# Patient Record
Sex: FEMALE | Employment: UNEMPLOYED | URBAN - NONMETROPOLITAN AREA
[De-identification: names, ages, dates, MRNs, and addresses within clinical notes are randomized per-mention and may not be internally consistent; named-entity substitution may affect disease eponyms.]

---

## 2020-08-13 ENCOUNTER — TRANSFERRED RECORDS (OUTPATIENT)
Dept: HEALTH INFORMATION MANAGEMENT | Facility: HOSPITAL | Age: 60
End: 2020-08-13

## 2020-08-13 LAB
CHOLESTEROL (EXTERNAL): 242 MG/DL (ref 114–200)
HDLC SERPL-MCNC: 59 MG/DL (ref 40–60)
HPV ABSTRACT: NORMAL
LDL CHOLESTEROL CALCULATED (EXTERNAL): 162 MG/DL
PAP-ABSTRACT: NORMAL
TRIGLYCERIDES (EXTERNAL): 103 MG/DL (ref 10–200)

## 2020-09-16 ENCOUNTER — TRANSFERRED RECORDS (OUTPATIENT)
Dept: HEALTH INFORMATION MANAGEMENT | Facility: CLINIC | Age: 60
End: 2020-09-16

## 2021-04-06 ENCOUNTER — TRANSFERRED RECORDS (OUTPATIENT)
Dept: HEALTH INFORMATION MANAGEMENT | Facility: CLINIC | Age: 61
End: 2021-04-06

## 2022-01-28 ENCOUNTER — TRANSFERRED RECORDS (OUTPATIENT)
Dept: HEALTH INFORMATION MANAGEMENT | Facility: CLINIC | Age: 62
End: 2022-01-28
Payer: COMMERCIAL

## 2022-01-31 ENCOUNTER — MEDICAL CORRESPONDENCE (OUTPATIENT)
Dept: HEALTH INFORMATION MANAGEMENT | Facility: CLINIC | Age: 62
End: 2022-01-31
Payer: COMMERCIAL

## 2022-01-31 ENCOUNTER — TRANSCRIBE ORDERS (OUTPATIENT)
Dept: OTHER | Age: 62
End: 2022-01-31
Payer: COMMERCIAL

## 2022-01-31 DIAGNOSIS — R32 URINARY INCONTINENCE, UNSPECIFIED TYPE: ICD-10-CM

## 2022-01-31 DIAGNOSIS — N81.6 RECTOCELE: ICD-10-CM

## 2022-01-31 DIAGNOSIS — N81.4 CYSTOCELE WITH PROLAPSE: ICD-10-CM

## 2022-01-31 DIAGNOSIS — N89.8 VAGINAL DISCHARGE: Primary | ICD-10-CM

## 2022-01-31 DIAGNOSIS — R15.9 INCONTINENCE OF FECES, UNSPECIFIED FECAL INCONTINENCE TYPE: ICD-10-CM

## 2022-02-01 ENCOUNTER — PRE VISIT (OUTPATIENT)
Dept: UROLOGY | Facility: CLINIC | Age: 62
End: 2022-02-01
Payer: COMMERCIAL

## 2022-02-01 NOTE — TELEPHONE ENCOUNTER
Action 02/01/22 MMT   Action Taken  CSS received incoming referral and records from [facility] for cystocele and vaginal prolapse. Documents sent to scanning. Patient is not yet scheduled.

## 2023-01-11 ENCOUNTER — TRANSFERRED RECORDS (OUTPATIENT)
Dept: HEALTH INFORMATION MANAGEMENT | Facility: CLINIC | Age: 63
End: 2023-01-11

## 2023-03-20 ENCOUNTER — TRANSFERRED RECORDS (OUTPATIENT)
Dept: HEALTH INFORMATION MANAGEMENT | Facility: CLINIC | Age: 63
End: 2023-03-20

## 2023-05-01 ENCOUNTER — MEDICAL CORRESPONDENCE (OUTPATIENT)
Dept: HEALTH INFORMATION MANAGEMENT | Facility: CLINIC | Age: 63
End: 2023-05-01
Payer: COMMERCIAL

## 2023-05-03 ENCOUNTER — TRANSCRIBE ORDERS (OUTPATIENT)
Dept: OTHER | Age: 63
End: 2023-05-03

## 2023-05-03 DIAGNOSIS — R05.9 COUGH: ICD-10-CM

## 2023-05-03 DIAGNOSIS — J84.9 INTERSTITIAL LUNG DISEASE (H): Primary | ICD-10-CM

## 2023-07-21 ENCOUNTER — OFFICE VISIT (OUTPATIENT)
Dept: PULMONOLOGY | Facility: CLINIC | Age: 63
End: 2023-07-21
Attending: INTERNAL MEDICINE
Payer: COMMERCIAL

## 2023-07-21 ENCOUNTER — ANCILLARY PROCEDURE (OUTPATIENT)
Dept: CT IMAGING | Facility: CLINIC | Age: 63
End: 2023-07-21
Attending: INTERNAL MEDICINE
Payer: COMMERCIAL

## 2023-07-21 VITALS
OXYGEN SATURATION: 98 % | RESPIRATION RATE: 17 BRPM | BODY MASS INDEX: 38.82 KG/M2 | HEART RATE: 62 BPM | DIASTOLIC BLOOD PRESSURE: 85 MMHG | WEIGHT: 233 LBS | SYSTOLIC BLOOD PRESSURE: 127 MMHG | HEIGHT: 65 IN

## 2023-07-21 DIAGNOSIS — J84.9 ILD (INTERSTITIAL LUNG DISEASE) (H): Primary | ICD-10-CM

## 2023-07-21 DIAGNOSIS — R09.02 HYPOXIA: ICD-10-CM

## 2023-07-21 DIAGNOSIS — J84.9 ILD (INTERSTITIAL LUNG DISEASE) (H): ICD-10-CM

## 2023-07-21 DIAGNOSIS — G47.33 OSA (OBSTRUCTIVE SLEEP APNEA): ICD-10-CM

## 2023-07-21 LAB
6 MIN WALK (FT): 1100 FT
6 MIN WALK (M): 335 M

## 2023-07-21 PROCEDURE — 94729 DIFFUSING CAPACITY: CPT | Performed by: INTERNAL MEDICINE

## 2023-07-21 PROCEDURE — 94726 PLETHYSMOGRAPHY LUNG VOLUMES: CPT | Performed by: INTERNAL MEDICINE

## 2023-07-21 PROCEDURE — 94618 PULMONARY STRESS TESTING: CPT | Performed by: INTERNAL MEDICINE

## 2023-07-21 PROCEDURE — 71250 CT THORAX DX C-: CPT | Performed by: RADIOLOGY

## 2023-07-21 PROCEDURE — 94375 RESPIRATORY FLOW VOLUME LOOP: CPT | Performed by: INTERNAL MEDICINE

## 2023-07-21 PROCEDURE — G0463 HOSPITAL OUTPT CLINIC VISIT: HCPCS | Performed by: INTERNAL MEDICINE

## 2023-07-21 PROCEDURE — 99205 OFFICE O/P NEW HI 60 MIN: CPT | Mod: 25 | Performed by: INTERNAL MEDICINE

## 2023-07-21 RX ORDER — FLUOXETINE 40 MG/1
40 CAPSULE ORAL DAILY
COMMUNITY
Start: 2023-07-07 | End: 2023-10-09

## 2023-07-21 RX ORDER — FLUTICASONE PROPIONATE AND SALMETEROL 500; 50 UG/1; UG/1
1 POWDER RESPIRATORY (INHALATION)
COMMUNITY
Start: 2022-12-23 | End: 2023-07-21

## 2023-07-21 RX ORDER — METOPROLOL SUCCINATE 50 MG/1
1 TABLET, EXTENDED RELEASE ORAL
COMMUNITY
Start: 2023-07-07 | End: 2023-09-25

## 2023-07-21 RX ORDER — TRAZODONE HYDROCHLORIDE 50 MG/1
1 TABLET, FILM COATED ORAL
COMMUNITY
Start: 2022-02-11 | End: 2023-09-25

## 2023-07-21 RX ORDER — ALBUTEROL SULFATE 90 UG/1
2 AEROSOL, METERED RESPIRATORY (INHALATION)
COMMUNITY
Start: 2022-08-24 | End: 2024-04-03

## 2023-07-21 ASSESSMENT — PAIN SCALES - GENERAL: PAINLEVEL: MILD PAIN (3)
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
## 2023-07-21 NOTE — NURSING NOTE
Chief Complaint   Patient presents with     Consult     New Interstitial Lung     Medications reviewed and vital signs taken.   Yani Cordoba, ALLAN

## 2023-07-21 NOTE — NURSING NOTE
Met with patient in clinic after provider visit to provide contact information sheet and introduce role as ILD RN Care Coordinator. Patient appreciative of conversation and direct contact information. Explained [facility] and provided brochure.     Discussed pulm rehab, pt prefers [facility] Order placed, Michael aware. Pt 6MWT today shows pt needs 2L O2 with activity. Pt lives in [facility], 2L O2 with activity was rx by pulmonologist in [place] one month ago. Pt currently has a home concentrator and POC (does not know the name) through [employer]. Pt unhappy with current oxygen company and would like to switch.     Spoke with [employer] and they can take her on. They are currently out of stock on inogens, not sure when they will get them back in stock. They do have several different alternatives for POC that are less bulky than big tanks. Sent Michael message to fax oxygen orders to [employer]. Spoke to pt and she expressed understanding/agreed to plan.     Caroline Love, RN, BSN  ILD Nurse   [phone]
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
## 2023-07-24 DIAGNOSIS — J84.9 ILD (INTERSTITIAL LUNG DISEASE) (H): Primary | ICD-10-CM

## 2023-07-24 LAB
DLCOUNC-%PRED-PRE: 64 %
DLCOUNC-PRE: 12.85 ML/MIN/MMHG
DLCOUNC-PRED: 20.01 ML/MIN/MMHG
ERV-%PRED-PRE: 36 %
ERV-PRE: 0.3 L
ERV-PRED: 0.84 L
EXPTIME-PRE: 6.74 SEC
FEF2575-%PRED-PRE: 88 %
FEF2575-PRE: 1.84 L/SEC
FEF2575-PRED: 2.08 L/SEC
FEFMAX-%PRED-PRE: 100 %
FEFMAX-PRE: 6.32 L/SEC
FEFMAX-PRED: 6.28 L/SEC
FEV1-%PRED-PRE: 64 %
FEV1-PRE: 1.51 L
FEV1FEV6-PRE: 82 %
FEV1FEV6-PRED: 80 %
FEV1FVC-PRE: 82 %
FEV1FVC-PRED: 80 %
FEV1SVC-PRE: 84 %
FEV1SVC-PRED: 68 %
FIFMAX-PRE: 1.97 L/SEC
FRCPLETH-%PRED-PRE: 55 %
FRCPLETH-PRE: 1.56 L
FRCPLETH-PRED: 2.84 L
FVC-%PRED-PRE: 62 %
FVC-PRE: 1.83 L
FVC-PRED: 2.93 L
IC-%PRED-PRE: 60 %
IC-PRE: 1.5 L
IC-PRED: 2.49 L
RVPLETH-%PRED-PRE: 67 %
RVPLETH-PRE: 1.26 L
RVPLETH-PRED: 1.85 L
TLCPLETH-%PRED-PRE: 58 %
TLCPLETH-PRE: 3.06 L
TLCPLETH-PRED: 5.25 L
VA-%PRED-PRE: 57 %
VA-PRE: 2.78 L
VC-%PRED-PRE: 52 %
VC-PRE: 1.8 L
VC-PRED: 3.4 L

## 2023-07-26 ENCOUNTER — TELEPHONE (OUTPATIENT)
Dept: PULMONOLOGY | Facility: CLINIC | Age: 63
End: 2023-07-26
Payer: COMMERCIAL

## 2023-07-26 NOTE — TELEPHONE ENCOUNTER
Spoke to pt, she has not heard from [facility]. Instructed pt to keep her current oxygen equipment with [employer] and reach out to them for more another set of oxygen tubing as pt states it has not been changed in a long time. Left VM for [facility], requested call back to ILD nurse line [phone].     Caroline Love RN, BSN  ILD Nurse   [phone]    ----- Message from Gayatri Burkett RN sent at 7/26/2023  8:15 AM CDT -----  Regarding: Left VM  Pt left VM about switching oxygen companies and not having the right supplies to use her equipment.     Call back #: [phone]    Gayatri

## 2023-07-26 NOTE — TELEPHONE ENCOUNTER
Spoke to Adams at [facility] and he apologized that someone in his office mispoke about servicing [facility] with oxygen. They do not service pt home area with oxygen.     Caroline Love, RN, BSN  ILD Nurse   [phone]

## 2023-07-27 ENCOUNTER — TELEPHONE (OUTPATIENT)
Dept: PULMONOLOGY | Facility: CLINIC | Age: 63
End: 2023-07-27
Payer: COMMERCIAL

## 2023-07-27 NOTE — TELEPHONE ENCOUNTER
Called pt to relay Dr. Duque's message below. Let pt know she can call any [facility] clinic to make a lab appt to have Dr. Duque's labs drawn, then let us know so we can watch for the results. Pt will go to [facility]. Pt reminder set to check for results. Let pt know we will contact her re: lab results if anything is actionable. Pt expressed understanding/agreed to plan.     Caroline Love RN, BSN  ILD Nurse   [phone]    ----- Message from Isaac Duque MD sent at 7/27/2023  2:57 PM CDT -----  Hemarty alexandra,    I forgot to get back to this patient regarding labs- placed the orders last week. She can get them when ever.  Not as much ground glass seen on the follow up Ct Chest -want to review with Julius Aviles  ----- Message -----  From: Caroline Love RN  Sent: 7/21/2023   4:30 PM CDT  To: Isaac Duque MD; #    Hi Isaac,     Can you add the .[id number] dot phrase to your note today so I can fax to [employer] oxygen company? Pt would like to switch O2 companies.     Thanks,   Caroline

## 2023-07-28 ENCOUNTER — TELEPHONE (OUTPATIENT)
Dept: PULMONOLOGY | Facility: CLINIC | Age: 63
End: 2023-07-28
Payer: COMMERCIAL

## 2023-07-28 NOTE — CONFIDENTIAL NOTE
Spoke with pt about switching oxygen companies. She has not been happy with [employer]. Was able to find out the [employer] in [place] would service her area. Pt states she's been with [employer] for approx 3 months. Before calling [facility] to see if they'd take patient on writer called pt to update. Explained that [employer] does not cover her area so found out [employer] in [place] does if she wanted to make the switch. Pt wants to do some research first. She will call us back when/if she decides to switch. Did advise that she should make the decision within the next month or 2 to have the best chances of [facility] accepting her. Pt states agreement.     Gayatri Burkett RN

## 2023-07-31 ENCOUNTER — TELEPHONE (OUTPATIENT)
Dept: PULMONOLOGY | Facility: CLINIC | Age: 63
End: 2023-07-31
Payer: COMMERCIAL

## 2023-08-01 ENCOUNTER — LAB (OUTPATIENT)
Dept: LAB | Facility: OTHER | Age: 63
End: 2023-08-01
Payer: COMMERCIAL

## 2023-08-01 ENCOUNTER — TELEPHONE (OUTPATIENT)
Dept: PULMONOLOGY | Facility: CLINIC | Age: 63
End: 2023-08-01
Payer: COMMERCIAL

## 2023-08-01 DIAGNOSIS — J84.9 ILD (INTERSTITIAL LUNG DISEASE) (H): ICD-10-CM

## 2023-08-01 LAB
CK SERPL-CCNC: 82 U/L (ref 26–192)
CRP SERPL-MCNC: <3 MG/L
ERYTHROCYTE [SEDIMENTATION RATE] IN BLOOD BY WESTERGREN METHOD: 16 MM/HR (ref 0–30)

## 2023-08-01 PROCEDURE — 86200 CCP ANTIBODY: CPT | Mod: ZL | Performed by: FAMILY MEDICINE

## 2023-08-01 PROCEDURE — 86606 ASPERGILLUS ANTIBODY: CPT | Mod: ZL | Performed by: FAMILY MEDICINE

## 2023-08-01 PROCEDURE — 86431 RHEUMATOID FACTOR QUANT: CPT | Mod: ZL | Performed by: FAMILY MEDICINE

## 2023-08-01 PROCEDURE — 86235 NUCLEAR ANTIGEN ANTIBODY: CPT | Mod: ZL | Performed by: FAMILY MEDICINE

## 2023-08-01 PROCEDURE — 86140 C-REACTIVE PROTEIN: CPT | Mod: ZL | Performed by: FAMILY MEDICINE

## 2023-08-01 PROCEDURE — 82085 ASSAY OF ALDOLASE: CPT | Mod: ZL | Performed by: FAMILY MEDICINE

## 2023-08-01 PROCEDURE — 86331 IMMUNODIFFUSION OUCHTERLONY: CPT | Mod: ZL | Performed by: FAMILY MEDICINE

## 2023-08-01 PROCEDURE — 85652 RBC SED RATE AUTOMATED: CPT | Mod: ZL | Performed by: FAMILY MEDICINE

## 2023-08-01 PROCEDURE — 82550 ASSAY OF CK (CPK): CPT | Mod: ZL | Performed by: FAMILY MEDICINE

## 2023-08-01 PROCEDURE — 82784 ASSAY IGA/IGD/IGG/IGM EACH: CPT | Mod: ZL | Performed by: FAMILY MEDICINE

## 2023-08-01 PROCEDURE — 82787 IGG 1 2 3 OR 4 EACH: CPT | Mod: ZL | Performed by: FAMILY MEDICINE

## 2023-08-01 PROCEDURE — 36415 COLL VENOUS BLD VENIPUNCTURE: CPT | Mod: ZL | Performed by: FAMILY MEDICINE

## 2023-08-01 NOTE — TELEPHONE ENCOUNTER
Spoke to patient her oxygen company [employer]esta not rent batteries for travel and she needs 2 extra batteries. It would cost her $600 to purchase more batteries. Recommended that she check with [email or web address] to see if they rent batteries, they rent travel POC's. She will contact them and may have to consider purchasing batteries she said. She will contact back with further questions.

## 2023-08-01 NOTE — TELEPHONE ENCOUNTER
----- Message from Gayatri Burkett RN sent at 8/1/2023 11:56 AM CDT -----  Regarding: Oxygen  called again about flying with oxygen. [phone]. She wants to know if there are other options to get batteries for POC for flying other than paying $300?

## 2023-08-02 LAB
CCP AB SER IA-ACNC: 1.2 U/ML
ENA SS-A AB SER IA-ACNC: 0.6 U/ML
ENA SS-A AB SER IA-ACNC: NEGATIVE
ENA SS-B IGG SER IA-ACNC: <0.6 U/ML
ENA SS-B IGG SER IA-ACNC: NEGATIVE

## 2023-08-03 ENCOUNTER — HOSPITAL ENCOUNTER (OUTPATIENT)
Dept: CARDIAC REHAB | Facility: HOSPITAL | Age: 63
Setting detail: THERAPIES SERIES
Discharge: HOME | End: 2023-08-03
Attending: INTERNAL MEDICINE
Payer: COMMERCIAL

## 2023-08-03 DIAGNOSIS — J84.9 ILD (INTERSTITIAL LUNG DISEASE) (H): ICD-10-CM

## 2023-08-03 LAB — ALDOLASE SERPL-CCNC: 3.4 U/L

## 2023-08-03 PROCEDURE — G0238 OTH RESP PROC, INDIV: HCPCS

## 2023-08-04 LAB
IGG SERPL-MCNC: 1068 MG/DL (ref 610–1616)
IGG1 SER-MCNC: 839 MG/DL (ref 382–929)
IGG2 SER-MCNC: 180 MG/DL (ref 242–700)
IGG3 SER-MCNC: 39 MG/DL (ref 22–176)
IGG4 SER-MCNC: 1 MG/DL (ref 4–86)
RHEUMATOID FACT SER NEPH-ACNC: 12 IU/ML
SUBCLASSES, PERCENT: 99 %

## 2023-08-07 ENCOUNTER — TELEPHONE (OUTPATIENT)
Dept: PULMONOLOGY | Facility: CLINIC | Age: 63
End: 2023-08-07
Payer: COMMERCIAL

## 2023-08-07 LAB
A FLAVUS AB SER QL ID: NORMAL
A FUMIGATUS1 AB SER QL ID: NORMAL
A FUMIGATUS2 AB SER QL ID: NORMAL
A FUMIGATUS3 AB SER QL ID: NORMAL
A FUMIGATUS6 AB SER QL ID: NORMAL
A PULLULANS AB SER QL ID: NORMAL
PIGEON SERUM AB QL ID: NORMAL
S RECTIVIRGULA AB SER QL ID: NORMAL
S VIRIDIS AB SER QL ID: NORMAL
T CANDIDUS AB SER QL: NORMAL

## 2023-08-07 NOTE — TELEPHONE ENCOUNTER
Labs drawn 8/1 have all resulted. Will route to Dr. Duque for review.     Caroline Love RN, BSN  ILD Nurse   [phone]    ----- Message from Caroline Love RN sent at 7/27/2023  4:49 PM CDT -----  Regarding: New pt ILD labs  Let pt know on 7/27/23 she can call any [facility] clinic to make a lab appt to have Dr. Duque's labs (labs ordered 7/21/23) drawn, then let us know so we can watch for the results. Pt will go to [facility].     Make sure pt has had labs drawn or at least made an appt. Then let Dr. Duque know to review labs.

## 2023-08-08 ENCOUNTER — HOSPITAL ENCOUNTER (OUTPATIENT)
Dept: CARDIAC REHAB | Facility: HOSPITAL | Age: 63
Setting detail: THERAPIES SERIES
Discharge: HOME | End: 2023-08-08
Attending: FAMILY MEDICINE
Payer: COMMERCIAL

## 2023-08-08 PROCEDURE — G0239 OTH RESP PROC, GROUP: HCPCS

## 2023-08-10 ENCOUNTER — HOSPITAL ENCOUNTER (OUTPATIENT)
Dept: CARDIAC REHAB | Facility: HOSPITAL | Age: 63
Setting detail: THERAPIES SERIES
Discharge: HOME | End: 2023-08-10
Attending: FAMILY MEDICINE
Payer: COMMERCIAL

## 2023-08-10 ENCOUNTER — OFFICE VISIT (OUTPATIENT)
Dept: FAMILY MEDICINE | Facility: OTHER | Age: 63
End: 2023-08-10
Attending: STUDENT IN AN ORGANIZED HEALTH CARE EDUCATION/TRAINING PROGRAM
Payer: COMMERCIAL

## 2023-08-10 ENCOUNTER — TELEPHONE (OUTPATIENT)
Dept: MULTI SPECIALTY CLINIC | Facility: CLINIC | Age: 63
End: 2023-08-10
Payer: COMMERCIAL

## 2023-08-10 VITALS
SYSTOLIC BLOOD PRESSURE: 118 MMHG | BODY MASS INDEX: 39.11 KG/M2 | RESPIRATION RATE: 16 BRPM | TEMPERATURE: 98.2 F | HEART RATE: 71 BPM | DIASTOLIC BLOOD PRESSURE: 80 MMHG | OXYGEN SATURATION: 96 % | WEIGHT: 235 LBS

## 2023-08-10 DIAGNOSIS — N39.3 PRIMARY STRESS URINARY INCONTINENCE: ICD-10-CM

## 2023-08-10 DIAGNOSIS — F51.01 PRIMARY INSOMNIA: ICD-10-CM

## 2023-08-10 DIAGNOSIS — Z13.220 SCREENING FOR LIPID DISORDERS: ICD-10-CM

## 2023-08-10 DIAGNOSIS — F33.0 MILD EPISODE OF RECURRENT MAJOR DEPRESSIVE DISORDER (H): ICD-10-CM

## 2023-08-10 DIAGNOSIS — J84.9 ILD (INTERSTITIAL LUNG DISEASE) (H): ICD-10-CM

## 2023-08-10 DIAGNOSIS — N81.6 RECTOCELE: ICD-10-CM

## 2023-08-10 DIAGNOSIS — R73.03 PREDIABETES: ICD-10-CM

## 2023-08-10 DIAGNOSIS — Z12.31 BREAST CANCER SCREENING BY MAMMOGRAM: ICD-10-CM

## 2023-08-10 DIAGNOSIS — J45.20 MILD INTERMITTENT ASTHMA WITHOUT COMPLICATION: Chronic | ICD-10-CM

## 2023-08-10 DIAGNOSIS — Z76.89 ENCOUNTER TO ESTABLISH CARE: Primary | ICD-10-CM

## 2023-08-10 DIAGNOSIS — I10 ESSENTIAL HYPERTENSION: Chronic | ICD-10-CM

## 2023-08-10 PROBLEM — M19.072 PRIMARY OSTEOARTHRITIS OF LEFT FOOT: Status: RESOLVED | Noted: 2020-08-13 | Resolved: 2023-08-10

## 2023-08-10 PROBLEM — G47.33 OSA (OBSTRUCTIVE SLEEP APNEA): Status: ACTIVE | Noted: 2021-10-14

## 2023-08-10 PROBLEM — M79.7 FIBROMYALGIA: Status: ACTIVE | Noted: 2020-08-13

## 2023-08-10 PROBLEM — E78.00 PURE HYPERCHOLESTEROLEMIA: Status: ACTIVE | Noted: 2020-08-13

## 2023-08-10 PROBLEM — M19.072 PRIMARY OSTEOARTHRITIS OF LEFT FOOT: Status: ACTIVE | Noted: 2020-08-13

## 2023-08-10 PROBLEM — F60.3 BORDERLINE PERSONALITY DISORDER (H): Status: ACTIVE | Noted: 2020-08-13

## 2023-08-10 PROBLEM — G93.32 CHRONIC FATIGUE SYNDROME: Status: ACTIVE | Noted: 2020-08-13

## 2023-08-10 PROBLEM — E78.00 PURE HYPERCHOLESTEROLEMIA: Chronic | Status: ACTIVE | Noted: 2020-08-13

## 2023-08-10 PROBLEM — R10.13 DYSPEPSIA: Status: ACTIVE | Noted: 2020-08-13

## 2023-08-10 PROBLEM — E66.01 SEVERE OBESITY (BMI 35.0-39.9) WITH COMORBIDITY (H): Chronic | Status: ACTIVE | Noted: 2020-08-13

## 2023-08-10 PROBLEM — F60.3 BORDERLINE PERSONALITY DISORDER (H): Status: RESOLVED | Noted: 2020-08-13 | Resolved: 2023-08-10

## 2023-08-10 PROBLEM — E66.01 SEVERE OBESITY (BMI 35.0-39.9) WITH COMORBIDITY (H): Status: ACTIVE | Noted: 2020-08-13

## 2023-08-10 PROCEDURE — G0463 HOSPITAL OUTPT CLINIC VISIT: HCPCS | Mod: 25

## 2023-08-10 PROCEDURE — G0463 HOSPITAL OUTPT CLINIC VISIT: HCPCS

## 2023-08-10 PROCEDURE — G0239 OTH RESP PROC, GROUP: HCPCS

## 2023-08-10 PROCEDURE — 99204 OFFICE O/P NEW MOD 45 MIN: CPT | Performed by: STUDENT IN AN ORGANIZED HEALTH CARE EDUCATION/TRAINING PROGRAM

## 2023-08-10 RX ORDER — FLUTICASONE PROPIONATE AND SALMETEROL 50; 250 UG/1; UG/1
2 POWDER RESPIRATORY (INHALATION) DAILY
COMMUNITY
Start: 2022-11-17 | End: 2024-01-22

## 2023-08-10 ASSESSMENT — ANXIETY QUESTIONNAIRES
4. TROUBLE RELAXING: NOT AT ALL
7. FEELING AFRAID AS IF SOMETHING AWFUL MIGHT HAPPEN: MORE THAN HALF THE DAYS
2. NOT BEING ABLE TO STOP OR CONTROL WORRYING: NOT AT ALL
IF YOU CHECKED OFF ANY PROBLEMS ON THIS QUESTIONNAIRE, HOW DIFFICULT HAVE THESE PROBLEMS MADE IT FOR YOU TO DO YOUR WORK, TAKE CARE OF THINGS AT HOME, OR GET ALONG WITH OTHER PEOPLE: SOMEWHAT DIFFICULT
5. BEING SO RESTLESS THAT IT IS HARD TO SIT STILL: NOT AT ALL
GAD7 TOTAL SCORE: 5
3. WORRYING TOO MUCH ABOUT DIFFERENT THINGS: NOT AT ALL
6. BECOMING EASILY ANNOYED OR IRRITABLE: MORE THAN HALF THE DAYS
GAD7 TOTAL SCORE: 5
1. FEELING NERVOUS, ANXIOUS, OR ON EDGE: SEVERAL DAYS

## 2023-08-10 ASSESSMENT — PATIENT HEALTH QUESTIONNAIRE - PHQ9
SUM OF ALL RESPONSES TO PHQ QUESTIONS 1-9: 8
10. IF YOU CHECKED OFF ANY PROBLEMS, HOW DIFFICULT HAVE THESE PROBLEMS MADE IT FOR YOU TO DO YOUR WORK, TAKE CARE OF THINGS AT HOME, OR GET ALONG WITH OTHER PEOPLE: SOMEWHAT DIFFICULT
SUM OF ALL RESPONSES TO PHQ QUESTIONS 1-9: 8

## 2023-08-10 ASSESSMENT — PAIN SCALES - GENERAL: PAINLEVEL: MILD PAIN (3)

## 2023-08-10 NOTE — TELEPHONE ENCOUNTER
Reviewed CT Chest with the pt. Reviewing images- undifferentiated and mild disease; not much GGO. DD  includes OP vs early disease process vs undiffereniated.  ILD labs unrevealing.   Will discuss at ILD conference whether biopsy would be appropriate at this time vs treatment.   PFTs are restricted but body habitus may be playing a part in this.    Treatment plan- discussed anti-fibrotics with patient. However, will review Ct during ILD conference to assess level of ground glass and whether enough to warrant anti-inflammatory therapy   Already referred her to pulm rehab.     Isaac Duque MD  [facility],  of Medicine  Pulmonary/Critical Care Medicine  Pager: [phone]  August 10, 2023

## 2023-08-10 NOTE — PROGRESS NOTES
Assessment & Plan     Encounter to establish care  Histories reviewed. Medications updated.     Mild intermittent asthma without complication  ILD (interstitial lung disease) (H)  New diagnosis, worsening over the last 6 months.  CT scan from March showed peripheral fibrotic disease.  Has seen pulmonology at [facility].  PFTs contributing to a restrictive picture.  TTE in April showed some LVH but otherwise no concerning findings.  Currently in pulmonary rehab.  Follow-up planned in October with pulmonology.  Has advair inhaler - intermittent compliance.     Essential hypertension  Well-controlled.  Continues on metoprolol.    Mild episode of recurrent major depressive disorder (H)  Suspect more of a persistent depressive disorder.  Continues on Prozac 40 mg.  Planning to get medical marijuana.  Encouraged therapy.  Will continue to monitor.    Primary insomnia  Stable.  Intermittent use of trazodone.    Breast cancer screening by mammogram  - MA Screen Bilateral w/Tae; Future    Screening for lipid disorders  - Lipid Profile (Chol, Trig, HDL, LDL calc); Future    Prediabetes  - Hemoglobin A1c; Future     Follow up for physical in one month.     Jessie Rose MD  [facility] [place]    Subjective   Elsie is a 63 year old, presenting for the following health issues:  Establish Care      HPI   Establish care - moved from [place] six years ago. Just recently moved to [place] from [place].  is from here. They met and  in [place]. 6 kids and 15 grandkids - [place], [place] and [place]. 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     Was dx with ILD within last 6 months.   Has been seen at [facility].   More dyspnea with light exercise. Fine at rest.   Prior had thought asthma, so maybe some component. Some triggers - coughing hard. 2 viral infections that hit her hard.   Now doing pulmonary rehab. 2nd appointment today.   Not doing Advair. Has but isn't the most compliant.     A lot of joint pain.  "Has seen orthopedics. Told bursitis in her hip. Planning physical therapy. Felt good to ride the bike today.     Continues on metoprolol for hypertension. Readings at goal.     Mood isn't great. Continues on prozac 40mg. She doesn't like to leave the house. Doesn't want to go to Religion or leave the house. Usually is very outgoing. Thinks all of this has been \"forever\". Not abrupt change. Usually has projects and stayed busy. Feels more isolated from her . Living together full time - he can be impossible. She wants an orderly life, avoid clutter, etc. Has tried a lot of medications in the past. Thinking about THC. Wants certification.     Takes Trazodone as needed for sleep.     Does struggle with incontinence. Fecal and urinary. Saw OB GYN at [facility] a few years ago - displeased with visit.  Recommended surgery, however ended up proceeding with conservative management with pelvic floor therapy.    Does not want cholesterol checked. Says cholesterol study was a misinterpretation of the data.       Answers submitted by the patient for this visit:  Patient Health Questionnaire (Submitted on 8/10/2023)  If you checked off any problems, how difficult have these problems made it for you to do your work, take care of things at home, or get along with other people?: Somewhat difficult  PHQ9 TOTAL SCORE: 8  EDMAR-7 (Submitted on 8/10/2023)  EDMAR 7 TOTAL SCORE: 5        Objective    /80 (BP Location: Right arm, Patient Position: Sitting, Cuff Size: Adult Regular)   Pulse 71   Temp 98.2  F (36.8  C) (Tympanic)   Resp 16   Wt 106.6 kg (235 lb)   SpO2 96%   BMI 39.11 kg/m    Body mass index is 39.11 kg/m .    Physical Exam  Constitutional:       General: She is not in acute distress.     Appearance: Normal appearance.   Cardiovascular:      Rate and Rhythm: Normal rate and regular rhythm.      Heart sounds: No murmur heard.  Pulmonary:      Effort: Pulmonary effort is normal.      Breath sounds: Normal breath " sounds. No wheezing, rhonchi or rales.   Neurological:      General: No focal deficit present.      Mental Status: She is alert and oriented to person, place, and time.   Psychiatric:         Mood and Affect: Mood normal.         Behavior: Behavior normal.

## 2023-08-10 NOTE — PATIENT INSTRUCTIONS
[facility]on Lyle [phone]  [facility] [phone]  Call [place] for appointment for medical marijuana certification.     You are due for your mammogram. You can call the [facility] at [phone] to schedule an appointment or you can also schedule through [facility].

## 2023-08-11 ENCOUNTER — HOSPITAL ENCOUNTER (EMERGENCY)
Facility: HOSPITAL | Age: 63
Discharge: HOME | End: 2023-08-11
Attending: NURSE PRACTITIONER | Admitting: NURSE PRACTITIONER
Payer: COMMERCIAL

## 2023-08-11 VITALS
DIASTOLIC BLOOD PRESSURE: 79 MMHG | OXYGEN SATURATION: 95 % | RESPIRATION RATE: 16 BRPM | TEMPERATURE: 98.2 F | HEART RATE: 70 BPM | SYSTOLIC BLOOD PRESSURE: 123 MMHG

## 2023-08-11 DIAGNOSIS — J01.00 ACUTE MAXILLARY SINUSITIS: Primary | ICD-10-CM

## 2023-08-11 DIAGNOSIS — J01.00 ACUTE MAXILLARY SINUSITIS, RECURRENCE NOT SPECIFIED: ICD-10-CM

## 2023-08-11 PROCEDURE — 99213 OFFICE O/P EST LOW 20 MIN: CPT | Performed by: NURSE PRACTITIONER

## 2023-08-11 PROCEDURE — G0463 HOSPITAL OUTPT CLINIC VISIT: HCPCS

## 2023-08-11 ASSESSMENT — ENCOUNTER SYMPTOMS
RHINORRHEA: 0
MYALGIAS: 0
HEADACHES: 0
VOMITING: 0
SORE THROAT: 0
DIARRHEA: 0
FACIAL SWELLING: 0
PSYCHIATRIC NEGATIVE: 1
FEVER: 0
SHORTNESS OF BREATH: 0
NAUSEA: 0
CHILLS: 0
TROUBLE SWALLOWING: 0

## 2023-08-11 NOTE — ED PROVIDER NOTES
History     Chief Complaint   Patient presents with    Otalgia     HPI  Elsie Rubi is a 63 year old female who presents to urgent care today ambulatory with complaints of right ear pain, sinusitis, postnasal drip and mucus in her throat in the morning.  Denies any dental issues.  Denies any fever, chills, nausea, vomiting, diarrhea, shortness of breath or chest pain.  Denies any coughing.  Patient states she received a new O2 concentrator for at home which she uses as needed and feels she had an allergic reaction when using it a few weeks ago resulting in a large amount of sneezing.  No other concerns.    Allergies:  No Known Allergies    Problem List:    Patient Active Problem List    Diagnosis Date Noted    Primary insomnia 08/10/2023     Priority: Medium    Rectocele 08/10/2023     Priority: Medium    Primary stress urinary incontinence 08/10/2023     Priority: Medium     OB GYN [facility] 2021 - declined surgery      MIC (obstructive sleep apnea) 10/14/2021     Priority: Medium    Chronic fatigue syndrome 08/13/2020     Priority: Medium    Dyspepsia 08/13/2020     Priority: Medium    Essential hypertension 08/13/2020     Priority: Medium    Fibromyalgia 08/13/2020     Priority: Medium    Mild episode of recurrent major depressive disorder (H) 08/13/2020     Priority: Medium    Mild intermittent asthma without complication 08/13/2020     Priority: Medium    Pure hypercholesterolemia 08/13/2020     Priority: Medium    Severe obesity (BMI 35.0-39.9) with comorbidity (H) 08/13/2020     Priority: Medium        Past Medical History:    Past Medical History:   Diagnosis Date    Benign essential hypertension     Borderline personality disorder (H) 08/13/2020    Depression     ILD (interstitial lung disease) (H)     Primary osteoarthritis of left foot 08/13/2020       Past Surgical History:    Past Surgical History:   Procedure Laterality Date    BUNIONECTOMY Left        Family History:    No family history on  file.    Social History:  Marital Status:   [2]  Social History     Tobacco Use    Smoking status: Never    Smokeless tobacco: Never   Substance Use Topics    Alcohol use: Yes     Comment: 2 glasses of wine daily    Drug use: Not Currently        Medications:    ADVAIR DISKUS 250-50 MCG/ACT inhaler  albuterol (PROAIR HFA/PROVENTIL HFA/VENTOLIN HFA) 108 (90 Base) MCG/ACT inhaler  amoxicillin-clavulanate (AUGMENTIN) 875-125 MG tablet  FLUoxetine (PROZAC) 40 MG capsule  metoprolol succinate ER (TOPROL XL) 50 MG 24 hr tablet  omeprazole (PRILOSEC) 20 MG DR capsule  traZODone (DESYREL) 50 MG tablet      Review of Systems   Constitutional:  Negative for chills and fever.   HENT:  Positive for congestion, ear pain and postnasal drip. Negative for dental problem, ear discharge, facial swelling, hearing loss, rhinorrhea, sore throat and trouble swallowing.    Respiratory:  Negative for shortness of breath.    Cardiovascular:  Negative for chest pain.   Gastrointestinal:  Negative for diarrhea, nausea and vomiting.   Genitourinary:  Negative for decreased urine volume.   Musculoskeletal:  Negative for myalgias.   Skin:  Negative for rash.   Neurological:  Negative for headaches.   Psychiatric/Behavioral: Negative.       Physical Exam   BP: 123/79  Pulse: 70  Temp: 98.2  F (36.8  C)  Resp: 16  SpO2: 95 %    Physical Exam  Vitals and nursing note reviewed.   Constitutional:       General: She is not in acute distress.     Appearance: Normal appearance. She is not ill-appearing or toxic-appearing.   HENT:      Head:      Jaw: There is normal jaw occlusion.      Right Ear: Tympanic membrane, ear canal and external ear normal.      Left Ear: Tympanic membrane, ear canal and external ear normal.      Nose: Congestion present.      Right Sinus: Maxillary sinus tenderness present. No frontal sinus tenderness.      Left Sinus: No maxillary sinus tenderness or frontal sinus tenderness.      Mouth/Throat:      Mouth: Mucous  membranes are moist.      Pharynx: Oropharynx is clear. No oropharyngeal exudate or posterior oropharyngeal erythema.   Cardiovascular:      Rate and Rhythm: Normal rate and regular rhythm.      Pulses: Normal pulses.      Heart sounds: Normal heart sounds.   Pulmonary:      Effort: Pulmonary effort is normal.      Breath sounds: Normal breath sounds.   Musculoskeletal:      Cervical back: Normal range of motion and neck supple. No rigidity or tenderness.   Lymphadenopathy:      Cervical: No cervical adenopathy.   Neurological:      Mental Status: She is alert.   Psychiatric:         Mood and Affect: Mood normal.       ED Course     No results found for this or any previous visit (from the past 24 hour(s)).    Medications - No data to display    Assessments & Plan (with Medical Decision Making)     I have reviewed the nursing notes.    I have reviewed the findings, diagnosis, plan and need for follow up with the patient.  (J01.00) Acute maxillary sinusitis  (primary encounter diagnosis)  Plan:   Patient ambulatory with a nontoxic appearance.  Denies any fever, chills, nausea, vomiting, diarrhea, shortness of breath or chest pain.  Patient symptoms consistent with acute sinusitis over the past 7 to 10 days which started around the time of using a new O2 concentrator.  Patient to follow-up regarding potentially getting a new O2 concentrator given that she feels she had an a reaction from it.  Will treat with Augmentin.  Patient to follow-up with primary care provider or return to urgent care/ED with any worsening in condition or additional concerns.  Patient in agreement with treatment plan.    New Prescriptions    AMOXICILLIN-CLAVULANATE (AUGMENTIN) 875-125 MG TABLET    Take 1 tablet by mouth 2 times daily for 7 days     Final diagnoses:   Acute maxillary sinusitis     8/11/2023   [facility]       Caroline Sims NP  08/11/23 1148

## 2023-08-11 NOTE — ED TRIAGE NOTES
"Right sided ear pain that started this morning. Also reports having \"pus\" in her throat every morning for the past month. Denies any other symptoms.        "

## 2023-08-11 NOTE — ED TRIAGE NOTES
"Pt presents with c/o sharp shooting right ear pain and also has \"pus\" in throat when waking up that has a foul taste.  Denies any other symtpoms   Denies any fever chills, N/V/D   S/x started this morning for the ear and throat started this month   No otc meds taken today           "

## 2023-08-11 NOTE — DISCHARGE INSTRUCTIONS
Augmentin as ordered  - Take entire course of antibiotic even if you start to feel better.  - Antibiotics can cause stomach upset including nausea and diarrhea. Read your bottle or ask the pharmacist if antibiotic can be taken with food to help prevent nausea. If you have symptoms of diarrhea you can take an over-the-counter probiotic and/or increase foods with probiotics such as yogurt, [place], sauerkraut.    Follow-up with primary care provider or return to urgent care/ED with any worsening in condition or additional concerns.

## 2023-08-13 ENCOUNTER — HEALTH MAINTENANCE LETTER (OUTPATIENT)
Age: 63
End: 2023-08-13

## 2023-08-14 ENCOUNTER — TEAM CONFERENCE (OUTPATIENT)
Dept: PULMONOLOGY | Facility: CLINIC | Age: 63
End: 2023-08-14
Payer: COMMERCIAL

## 2023-08-14 DIAGNOSIS — J84.9 ILD (INTERSTITIAL LUNG DISEASE) (H): Primary | ICD-10-CM

## 2023-08-14 NOTE — PROGRESS NOTES
ILD Conference      Patient Name: Elsie Rubi    Reason for conference discussion/Specific Question:  New consult    Referring Physician:  Reyna Littlejohn MD     Radiology Interpretation: CTs 3/20/2023, 7/21/2023  House with mold, more SOB within last year. Dry cough worse in am.  Treated w/ pred/neb-felt better, never admitted  Joint pains, not consistent with joint arthritis  Topical steroids for skin condition on arms  Moderate diffusion defect on PFTs  ILD serologies neg  6MWT decreased exercise tolerance, 2L O2 requirement     Mosaicism, scattered pattern of scarring, minimal bronchiectesis, hiatal hernia, PA a little large. Gas trapping.   Normal RV/pressures on echo   7/21/23 HRCT: lower lung predominate    DDx: NSIP vs organizing pna with small airways overlap or HP  If org pna, it has to be with small airways overlap (due to gas trapping), so maybe NSIP with organizing pna?   Nikhil Luis MD (radiology)      There was a consensus recommendation for the following actions:     Continue to lose weight. Start Cellcept.  If not improving, start steroid course.     Cellcept up to 1000mg BID to start, since it will take some time to start working and we are avoiding steroids for now, we can see pt back in 6mos w/ PFTs (Spirometry and DLCO).      Pulmonary/ILD Provider: Isaac Duque MD (pulm)     Isaac Duque MD Phillips, Jessica M, RN    Looks good (probably put continue to work on decreasing weight)  MMF up to 1000mg BID to start, so since it will take some time to start working and we are avoiding steroids for now, 6 months looks good with PFTs (Spirometry and DLCO) on return.  When I did update her, I only spoke about anti-fibrotic's, so you may have to let her know that at the ILD conference there was enough inflammatory changes to warrant the MMF.  I also spoke with her regarding biopsy if the radiologic findings were not helpful during the conference- at this time will hold  off.    Isaac Richards    8/18/23 Spoke to pt, explained team conference updates and answered questions. Pt agreeable to start MMF ramp (Increase dosing after 2 weeks at each: 500 mg/500 mg; 500 mg/1000 mg; 1000 mg/1000 mg (maintain)) up to goal of 1000mg BID. Educated pt on calcium/vit D recommendations; increased risk of skin cancer; re: side effects and to let us know if any of them are intolerable.      Baseline labs cbc w/ diff, cmp ordered, pt will schedule lab appt at [facility] Tuesday 8/22/23. Rx sent to pharmacy of choice. Instructed pt to not start taking MMF until we have determined her baseline labs are stable. Pt will have Q3m standing labs (orders placed) completed at [facility] facility in between q6m ILD visits. Pt reminder placed to review labs in 3 mos.     Michael aware to reschedule 11/3/2023 appt with Dr. Duque for Jan/Feb with PFTs (Spirometry and DLCO) and labs, pt aware. Pt expressed understanding/agreed to plan.     Caroline Love, RN, BSN  ILD Nurse   [phone]

## 2023-08-15 ENCOUNTER — HOSPITAL ENCOUNTER (OUTPATIENT)
Dept: CARDIAC REHAB | Facility: HOSPITAL | Age: 63
Setting detail: THERAPIES SERIES
Discharge: HOME | End: 2023-08-15
Attending: FAMILY MEDICINE
Payer: COMMERCIAL

## 2023-08-15 PROCEDURE — G0239 OTH RESP PROC, GROUP: HCPCS

## 2023-08-17 ENCOUNTER — MYC MEDICAL ADVICE (OUTPATIENT)
Dept: FAMILY MEDICINE | Facility: OTHER | Age: 63
End: 2023-08-17

## 2023-08-17 ENCOUNTER — HOSPITAL ENCOUNTER (OUTPATIENT)
Dept: CARDIAC REHAB | Facility: HOSPITAL | Age: 63
Setting detail: THERAPIES SERIES
Discharge: HOME | End: 2023-08-17
Attending: FAMILY MEDICINE
Payer: COMMERCIAL

## 2023-08-17 ENCOUNTER — PATIENT OUTREACH (OUTPATIENT)
Dept: OTHER | Facility: HOSPITAL | Age: 63
End: 2023-08-17

## 2023-08-17 PROCEDURE — G0239 OTH RESP PROC, GROUP: HCPCS

## 2023-08-17 NOTE — TELEPHONE ENCOUNTER
Patient Quality Outreach    Patient is due for the following:   Asthma  -  ACT needed    Next Steps:   Patient was assigned appropriate questionnaire to complete    Type of outreach:    Sent [employer] message.      Questions for provider review:    None           Brandy Garcia RN

## 2023-08-18 RX ORDER — MYCOPHENOLATE MOFETIL 500 MG/1
TABLET ORAL
Qty: 120 TABLET | Refills: 3 | Status: SHIPPED | OUTPATIENT
Start: 2023-08-18 | End: 2023-08-24

## 2023-08-18 ASSESSMENT — ASTHMA QUESTIONNAIRES: ACT_TOTALSCORE: 16

## 2023-08-21 ENCOUNTER — PATIENT OUTREACH (OUTPATIENT)
Dept: PULMONOLOGY | Facility: CLINIC | Age: 63
End: 2023-08-21
Payer: COMMERCIAL

## 2023-08-21 DIAGNOSIS — J84.9 ILD (INTERSTITIAL LUNG DISEASE) (H): ICD-10-CM

## 2023-08-21 NOTE — PROGRESS NOTES
Patient contacted with questions about Cellcept and side effects and how well its tolerated. Answered questions. Discussed diagnosis and goal of treatment. Patient express understanding and will proceed with getting lab done tomorrow and will start medication if all labs look good.     8/24- Lab reviewed look good, informed patient okay to start Cellcept.

## 2023-08-21 NOTE — TELEPHONE ENCOUNTER
Patient completed MyChart ACT per [facility] quality measure patient outreach. This writer notes that patient reports asthma is somewhat controlled presently with total ACT score is 16. Noted patient has upcoming scheduled visit with PCP Rose on 9/12/2023 10:00 AM (Arrive by 9:45 AM) and this writer added Asthma to clinic schedule note for review during Physical Exam.            8/18/2023     8:36 PM   ACT Total Scores   ACT TOTAL SCORE (Goal Greater than or Equal to 20) 16   In the past 12 months, how many times did you visit the emergency room for your asthma without being admitted to the hospital? 1   In the past 12 months, how many times were you hospitalized overnight because of your asthma? 0      Brandy Garcia RN

## 2023-08-22 ENCOUNTER — HOSPITAL ENCOUNTER (OUTPATIENT)
Dept: CARDIAC REHAB | Facility: HOSPITAL | Age: 63
Setting detail: THERAPIES SERIES
Discharge: HOME | End: 2023-08-22
Attending: FAMILY MEDICINE
Payer: COMMERCIAL

## 2023-08-22 PROCEDURE — G0237 THERAPEUTIC PROCD STRG ENDUR: HCPCS

## 2023-08-22 PROCEDURE — G0238 OTH RESP PROC, INDIV: HCPCS

## 2023-08-24 ENCOUNTER — HOSPITAL ENCOUNTER (OUTPATIENT)
Dept: CARDIAC REHAB | Facility: HOSPITAL | Age: 63
Setting detail: THERAPIES SERIES
Discharge: HOME | End: 2023-08-24
Attending: FAMILY MEDICINE
Payer: COMMERCIAL

## 2023-08-24 ENCOUNTER — LAB (OUTPATIENT)
Dept: LAB | Facility: OTHER | Age: 63
End: 2023-08-24
Payer: COMMERCIAL

## 2023-08-24 DIAGNOSIS — Z13.220 SCREENING FOR LIPID DISORDERS: ICD-10-CM

## 2023-08-24 DIAGNOSIS — R73.03 PREDIABETES: ICD-10-CM

## 2023-08-24 DIAGNOSIS — J84.9 ILD (INTERSTITIAL LUNG DISEASE) (H): ICD-10-CM

## 2023-08-24 LAB
ALBUMIN SERPL BCG-MCNC: 4.1 G/DL (ref 3.5–5.2)
ALP SERPL-CCNC: 93 U/L (ref 35–104)
ALT SERPL W P-5'-P-CCNC: 19 U/L (ref 0–50)
ANION GAP SERPL CALCULATED.3IONS-SCNC: 12 MMOL/L (ref 7–15)
AST SERPL W P-5'-P-CCNC: 28 U/L (ref 0–45)
BASOPHILS # BLD AUTO: 0 10E3/UL (ref 0–0.2)
BASOPHILS NFR BLD AUTO: 1 %
BILIRUB SERPL-MCNC: 0.7 MG/DL
BUN SERPL-MCNC: 13.8 MG/DL (ref 8–23)
CALCIUM SERPL-MCNC: 9.4 MG/DL (ref 8.8–10.2)
CHLORIDE SERPL-SCNC: 107 MMOL/L (ref 98–107)
CHOLEST SERPL-MCNC: 218 MG/DL
CREAT SERPL-MCNC: 0.95 MG/DL (ref 0.51–0.95)
DEPRECATED HCO3 PLAS-SCNC: 21 MMOL/L (ref 22–29)
EOSINOPHIL # BLD AUTO: 0.2 10E3/UL (ref 0–0.7)
EOSINOPHIL NFR BLD AUTO: 3 %
ERYTHROCYTE [DISTWIDTH] IN BLOOD BY AUTOMATED COUNT: 13.6 % (ref 10–15)
EST. AVERAGE GLUCOSE BLD GHB EST-MCNC: 123 MG/DL
GFR SERPL CREATININE-BSD FRML MDRD: 67 ML/MIN/1.73M2
GLUCOSE SERPL-MCNC: 98 MG/DL (ref 70–99)
HBA1C MFR BLD: 5.9 %
HCT VFR BLD AUTO: 39.9 % (ref 35–47)
HDLC SERPL-MCNC: 57 MG/DL
HGB BLD-MCNC: 12.8 G/DL (ref 11.7–15.7)
IMM GRANULOCYTES # BLD: 0 10E3/UL
IMM GRANULOCYTES NFR BLD: 0 %
LDLC SERPL CALC-MCNC: 139 MG/DL
LYMPHOCYTES # BLD AUTO: 1.9 10E3/UL (ref 0.8–5.3)
LYMPHOCYTES NFR BLD AUTO: 36 %
MCH RBC QN AUTO: 30 PG (ref 26.5–33)
MCHC RBC AUTO-ENTMCNC: 32.1 G/DL (ref 31.5–36.5)
MCV RBC AUTO: 93 FL (ref 78–100)
MONOCYTES # BLD AUTO: 0.6 10E3/UL (ref 0–1.3)
MONOCYTES NFR BLD AUTO: 11 %
NEUTROPHILS # BLD AUTO: 2.5 10E3/UL (ref 1.6–8.3)
NEUTROPHILS NFR BLD AUTO: 49 %
NONHDLC SERPL-MCNC: 161 MG/DL
NRBC # BLD AUTO: 0 10E3/UL
NRBC BLD AUTO-RTO: 0 /100
PLATELET # BLD AUTO: 205 10E3/UL (ref 150–450)
POTASSIUM SERPL-SCNC: 4.4 MMOL/L (ref 3.4–5.3)
PROT SERPL-MCNC: 7 G/DL (ref 6.4–8.3)
RBC # BLD AUTO: 4.27 10E6/UL (ref 3.8–5.2)
SODIUM SERPL-SCNC: 140 MMOL/L (ref 136–145)
TRIGL SERPL-MCNC: 110 MG/DL
WBC # BLD AUTO: 5.2 10E3/UL (ref 4–11)

## 2023-08-24 PROCEDURE — 80053 COMPREHEN METABOLIC PANEL: CPT | Mod: ZL

## 2023-08-24 PROCEDURE — 36415 COLL VENOUS BLD VENIPUNCTURE: CPT | Mod: ZL

## 2023-08-24 PROCEDURE — 83036 HEMOGLOBIN GLYCOSYLATED A1C: CPT | Mod: ZL

## 2023-08-24 PROCEDURE — 80061 LIPID PANEL: CPT | Mod: ZL

## 2023-08-24 PROCEDURE — 85004 AUTOMATED DIFF WBC COUNT: CPT | Mod: ZL

## 2023-08-24 PROCEDURE — G0238 OTH RESP PROC, INDIV: HCPCS

## 2023-08-24 RX ORDER — MYCOPHENOLATE MOFETIL 500 MG/1
TABLET ORAL
Qty: 120 TABLET | Refills: 3 | Status: SHIPPED | OUTPATIENT
Start: 2023-08-24 | End: 2023-09-27

## 2023-08-25 PROBLEM — R73.03 PREDIABETES: Status: ACTIVE | Noted: 2023-08-25

## 2023-08-25 PROBLEM — E78.2 MIXED HYPERLIPIDEMIA: Chronic | Status: ACTIVE | Noted: 2023-08-25

## 2023-08-25 PROBLEM — E78.2 MIXED HYPERLIPIDEMIA: Status: ACTIVE | Noted: 2023-08-25

## 2023-08-25 PROBLEM — R73.03 PREDIABETES: Chronic | Status: ACTIVE | Noted: 2023-08-25

## 2023-09-06 ENCOUNTER — TELEPHONE (OUTPATIENT)
Dept: PULMONOLOGY | Facility: CLINIC | Age: 63
End: 2023-09-06
Payer: COMMERCIAL

## 2023-09-06 NOTE — TELEPHONE ENCOUNTER
Spoke to pt, she started cellcept Aug 26th. Reviewed cellcept ramp up: MMF ramp (Increase dosing after 2 weeks at each: 500 mg/500 mg; 500 mg/1000 mg; 1000 mg/1000 mg (maintain)) up to goal of 1000mg BID. Pt taking citracal and tolerating med so far, no SEs. Pt reminder set for 9/12 to check labs. Reviewed next follow up appt with PFTs on 11/3/23 with pt. Staff message sent to Michael to add lab appt to 11/3/23 3 mo follow up appt with Dunia.  Pt was in agreement with plan and will reach out with intolerable side effects.    Caroline Love, RN, BSN  ILD Nurse   [phone]

## 2023-09-07 ENCOUNTER — TELEPHONE (OUTPATIENT)
Dept: PULMONOLOGY | Facility: CLINIC | Age: 63
End: 2023-09-07
Payer: COMMERCIAL

## 2023-09-07 NOTE — TELEPHONE ENCOUNTER
Spoke to pt re: mycophenolate lab monitoring.   Pt will have q 3-month lab monitoring (indefinite): CBC with diff + CMP drawn at [facility] end of Nov. Will also send pt MyC with details since she was driving.     Michael has left several voicemails for pt to return call to reschedule appt for Jan/Feb.   Pt agreeable to 6mo follow up with Dr. Duque with PFTs (spirometry and DLCO) in February.   Pt states she has not received any phone voicemails from Michael to re-schedule appt. Will send pt MyC with Michael phone number so she can call him to reschedule her follow up appt.     Pt expressed understanding/agreed to plan.     Caroline Love, RN, BSN  ILD Nurse   [phone]

## 2023-09-14 ENCOUNTER — TELEPHONE (OUTPATIENT)
Dept: PULMONOLOGY | Facility: CLINIC | Age: 63
End: 2023-09-14
Payer: COMMERCIAL

## 2023-09-14 NOTE — TELEPHONE ENCOUNTER
Spoke to pt, she is currently taking cellcept 1000mg Qam and 500mg Qpm.     She is currently out of town, heading to [place] to visit grandchildren today. Pt will schedule lab appt for next week at [facility] when she returns. Reminded pt to wear well fitted N95 mask around people to prevent COVID and other respiratory illness.     Pt c/o increasing SOB over past couple days.  Oxygen levels remain >90% at all times. Pt has an advair and albuterol inhaler on her med list. Pt states she threw out her advair inhaler when she started coming to the ILD clinic because she thought she didn't need it anymore. Instructed pt to use her albuterol inhaler as directed to see if it helps her SOB and to pace activity & use her POC to maintain sats >90% at all times. Will route to Dr. Duque for insight and to clarify inhalers.     Caroline Love RN, BSN  ILD Nurse   [phone]    ----- Message from Caroline Love RN sent at 9/6/2023 12:54 PM CDT -----  Pt started cellcept Aug 26th. (cellcept ramp up: MMF ramp (Increase dosing after 2 weeks at each: 500 mg/500 mg; 500 mg/1000 mg; 1000 mg/1000 mg (maintain)) up to goal of 1000mg BID)    -pt has lab appt 9/12 at 0930 to have Dr. Duque's labs drawn for Cellcept. Look for results.     -Next set of labs due in 3mo with Dunia follow up on 11/3/2023

## 2023-09-21 ENCOUNTER — HOSPITAL ENCOUNTER (OUTPATIENT)
Dept: CARDIAC REHAB | Facility: HOSPITAL | Age: 63
Setting detail: THERAPIES SERIES
Discharge: HOME | End: 2023-09-21
Attending: FAMILY MEDICINE
Payer: COMMERCIAL

## 2023-09-21 ENCOUNTER — LAB (OUTPATIENT)
Dept: LAB | Facility: OTHER | Age: 63
End: 2023-09-21
Payer: COMMERCIAL

## 2023-09-21 ENCOUNTER — TELEPHONE (OUTPATIENT)
Dept: PULMONOLOGY | Facility: CLINIC | Age: 63
End: 2023-09-21
Payer: COMMERCIAL

## 2023-09-21 DIAGNOSIS — J84.9 ILD (INTERSTITIAL LUNG DISEASE) (H): ICD-10-CM

## 2023-09-21 LAB
ALBUMIN SERPL BCG-MCNC: 4.4 G/DL (ref 3.5–5.2)
ALP SERPL-CCNC: 90 U/L (ref 35–104)
ALT SERPL W P-5'-P-CCNC: 18 U/L (ref 0–50)
ANION GAP SERPL CALCULATED.3IONS-SCNC: 12 MMOL/L (ref 7–15)
AST SERPL W P-5'-P-CCNC: 27 U/L (ref 0–45)
BASOPHILS # BLD AUTO: 0 10E3/UL (ref 0–0.2)
BASOPHILS NFR BLD AUTO: 1 %
BILIRUB SERPL-MCNC: 0.7 MG/DL
BUN SERPL-MCNC: 18 MG/DL (ref 8–23)
CALCIUM SERPL-MCNC: 9.4 MG/DL (ref 8.8–10.2)
CHLORIDE SERPL-SCNC: 104 MMOL/L (ref 98–107)
CREAT SERPL-MCNC: 1.01 MG/DL (ref 0.51–0.95)
DEPRECATED HCO3 PLAS-SCNC: 22 MMOL/L (ref 22–29)
EGFRCR SERPLBLD CKD-EPI 2021: 62 ML/MIN/1.73M2
EOSINOPHIL # BLD AUTO: 0.2 10E3/UL (ref 0–0.7)
EOSINOPHIL NFR BLD AUTO: 3 %
ERYTHROCYTE [DISTWIDTH] IN BLOOD BY AUTOMATED COUNT: 14 % (ref 10–15)
GLUCOSE SERPL-MCNC: 82 MG/DL (ref 70–99)
HCT VFR BLD AUTO: 42 % (ref 35–47)
HGB BLD-MCNC: 13.6 G/DL (ref 11.7–15.7)
IMM GRANULOCYTES # BLD: 0 10E3/UL
IMM GRANULOCYTES NFR BLD: 0 %
LYMPHOCYTES # BLD AUTO: 3 10E3/UL (ref 0.8–5.3)
LYMPHOCYTES NFR BLD AUTO: 39 %
MCH RBC QN AUTO: 30 PG (ref 26.5–33)
MCHC RBC AUTO-ENTMCNC: 32.4 G/DL (ref 31.5–36.5)
MCV RBC AUTO: 93 FL (ref 78–100)
MONOCYTES # BLD AUTO: 1.1 10E3/UL (ref 0–1.3)
MONOCYTES NFR BLD AUTO: 14 %
NEUTROPHILS # BLD AUTO: 3.3 10E3/UL (ref 1.6–8.3)
NEUTROPHILS NFR BLD AUTO: 43 %
NRBC # BLD AUTO: 0 10E3/UL
NRBC BLD AUTO-RTO: 0 /100
PLATELET # BLD AUTO: 214 10E3/UL (ref 150–450)
POTASSIUM SERPL-SCNC: 3.8 MMOL/L (ref 3.4–5.3)
PROT SERPL-MCNC: 7.2 G/DL (ref 6.4–8.3)
RBC # BLD AUTO: 4.54 10E6/UL (ref 3.8–5.2)
SODIUM SERPL-SCNC: 138 MMOL/L (ref 136–145)
WBC # BLD AUTO: 7.7 10E3/UL (ref 4–11)

## 2023-09-21 PROCEDURE — 85004 AUTOMATED DIFF WBC COUNT: CPT | Mod: ZL

## 2023-09-21 PROCEDURE — G0239 OTH RESP PROC, GROUP: HCPCS

## 2023-09-21 PROCEDURE — 80053 COMPREHEN METABOLIC PANEL: CPT | Mod: ZL

## 2023-09-21 PROCEDURE — 36415 COLL VENOUS BLD VENIPUNCTURE: CPT | Mod: ZL

## 2023-09-21 NOTE — TELEPHONE ENCOUNTER
Spoke to pt, she used albuterol inhaler once since we last talked on 9/14/23 and it helped her SOB and she hasn't felt like she needed it since. Pt denies any other symptoms and reports feeling well after trip to [place] to visit family. Pt has Pulm Rehab appt today and will see if lab can draw her labs (mycophenolate 3-month lab monitoring CBC with diff + CMP) on a walk in basis since they usually can do that for her.     Caroline Love RN, BSN  ILD Nurse   [phone]  ------------------------------------------------------------  Isaac Duque MD [facility]-  Caller: Unspecified (1 week ago, 10:00 AM)  Ask her to use the albuterol inhaler- if she is finding it a benefit, she can continue using it.  If she is finding it of benefit, but that she is having to use it multiples times a day, then we can add back another maintenance inhaler (and inhaled corticosteroid/LABA like her Advair).  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  AM

## 2023-09-22 ENCOUNTER — TELEPHONE (OUTPATIENT)
Dept: PULMONOLOGY | Facility: CLINIC | Age: 63
End: 2023-09-22
Payer: COMMERCIAL

## 2023-09-22 NOTE — TELEPHONE ENCOUNTER
----- Message from Isaac Duque MD sent at 9/22/2023 10:30 AM CDT -----  Regarding: RE: Elevated Creatinine  That is okay  ----- Message -----  From: Gayatri Burkett RN  Sent: 9/22/2023  10:28 AM CDT  To: Isaac Duque MD  Subject: Elevated Creatinine                              Hi IVY GrayI: Crt: 1.01 up from .95 4 weeks ago.    ThanksGayatri

## 2023-09-25 ENCOUNTER — OFFICE VISIT (OUTPATIENT)
Dept: FAMILY MEDICINE | Facility: OTHER | Age: 63
End: 2023-09-25
Attending: STUDENT IN AN ORGANIZED HEALTH CARE EDUCATION/TRAINING PROGRAM
Payer: COMMERCIAL

## 2023-09-25 VITALS
SYSTOLIC BLOOD PRESSURE: 105 MMHG | BODY MASS INDEX: 37.96 KG/M2 | WEIGHT: 228.13 LBS | OXYGEN SATURATION: 97 % | TEMPERATURE: 98.4 F | HEART RATE: 73 BPM | DIASTOLIC BLOOD PRESSURE: 70 MMHG

## 2023-09-25 DIAGNOSIS — Z12.11 COLON CANCER SCREENING: ICD-10-CM

## 2023-09-25 DIAGNOSIS — F51.01 PRIMARY INSOMNIA: ICD-10-CM

## 2023-09-25 DIAGNOSIS — I10 ESSENTIAL HYPERTENSION: Chronic | ICD-10-CM

## 2023-09-25 DIAGNOSIS — Z00.00 ROUTINE GENERAL MEDICAL EXAMINATION AT A HEALTH CARE FACILITY: Primary | ICD-10-CM

## 2023-09-25 DIAGNOSIS — L82.1 SEBORRHEIC KERATOSES: ICD-10-CM

## 2023-09-25 DIAGNOSIS — Z23 ENCOUNTER FOR IMMUNIZATION: ICD-10-CM

## 2023-09-25 DIAGNOSIS — L57.0 ACTINIC KERATOSIS: ICD-10-CM

## 2023-09-25 PROCEDURE — 90677 PCV20 VACCINE IM: CPT

## 2023-09-25 PROCEDURE — 90472 IMMUNIZATION ADMIN EACH ADD: CPT

## 2023-09-25 PROCEDURE — 99213 OFFICE O/P EST LOW 20 MIN: CPT | Mod: 25 | Performed by: STUDENT IN AN ORGANIZED HEALTH CARE EDUCATION/TRAINING PROGRAM

## 2023-09-25 PROCEDURE — 17110 DESTRUCTION B9 LES UP TO 14: CPT | Mod: 59 | Performed by: STUDENT IN AN ORGANIZED HEALTH CARE EDUCATION/TRAINING PROGRAM

## 2023-09-25 PROCEDURE — 90686 IIV4 VACC NO PRSV 0.5 ML IM: CPT

## 2023-09-25 PROCEDURE — 17000 DESTRUCT PREMALG LESION: CPT | Performed by: STUDENT IN AN ORGANIZED HEALTH CARE EDUCATION/TRAINING PROGRAM

## 2023-09-25 PROCEDURE — 17003 DESTRUCT PREMALG LES 2-14: CPT | Performed by: STUDENT IN AN ORGANIZED HEALTH CARE EDUCATION/TRAINING PROGRAM

## 2023-09-25 PROCEDURE — 99396 PREV VISIT EST AGE 40-64: CPT | Mod: 25 | Performed by: STUDENT IN AN ORGANIZED HEALTH CARE EDUCATION/TRAINING PROGRAM

## 2023-09-25 PROCEDURE — G0463 HOSPITAL OUTPT CLINIC VISIT: HCPCS | Mod: 25

## 2023-09-25 PROCEDURE — G0008 ADMIN INFLUENZA VIRUS VAC: HCPCS

## 2023-09-25 RX ORDER — TRAZODONE HYDROCHLORIDE 50 MG/1
50 TABLET, FILM COATED ORAL
Qty: 90 TABLET | Refills: 1 | Status: SHIPPED | OUTPATIENT
Start: 2023-09-25

## 2023-09-25 RX ORDER — METOPROLOL SUCCINATE 50 MG/1
25 TABLET, EXTENDED RELEASE ORAL
COMMUNITY
Start: 2023-09-25 | End: 2024-03-12

## 2023-09-25 ASSESSMENT — ENCOUNTER SYMPTOMS
DIZZINESS: 0
SHORTNESS OF BREATH: 1
HEADACHES: 0
FEVER: 0
DYSURIA: 0
CHILLS: 0
MYALGIAS: 0
CONSTIPATION: 1
HEMATURIA: 0
BREAST MASS: 0
WEAKNESS: 1
PARESTHESIAS: 0
HEARTBURN: 0
JOINT SWELLING: 0
SORE THROAT: 0
FREQUENCY: 0
EYE PAIN: 0
HEMATOCHEZIA: 0
COUGH: 1
ARTHRALGIAS: 0
NAUSEA: 0
ABDOMINAL PAIN: 0
DIARRHEA: 1
NERVOUS/ANXIOUS: 1
PALPITATIONS: 0

## 2023-09-25 ASSESSMENT — ASTHMA QUESTIONNAIRES: ACT_TOTALSCORE: 18

## 2023-09-25 ASSESSMENT — PAIN SCALES - GENERAL: PAINLEVEL: NO PAIN (0)

## 2023-09-25 NOTE — PROGRESS NOTES
"   SUBJECTIVE:   CC: Ju is an 63 year old who presents for preventive health visit.       9/25/2023    10:34 AM   Additional Questions   Roomed by Claudia John   Accompanied by          9/25/2023    10:34 AM   Patient Reported Additional Medications   Patient reports taking the following new medications None       Healthy Habits:     Getting at least 3 servings of Calcium per day:  NO    Bi-annual eye exam:  Yes    Dental care twice a year:  NO    Sleep apnea or symptoms of sleep apnea:  Sleep apnea    Diet:  Carbohydrate counting    Frequency of exercise:  2-3 days/week    Duration of exercise:  Other    Taking medications regularly:  Yes    Medication side effects:  None    Additional concerns today:  Yes    Pap normal/neg HPV 8/13/2020 (next due 8/2025) - reports she had two normals in the last 10 years   Colon screen: thinks it was done 8 years ago at [facility]. No polyps or family history.   Mammogram: scheduled 11/10/2023  Declines covid-19 vaccine  Agreeable to pneumovax     Asthma Follow-Up    Was ACT completed today?  Yes        9/25/2023    10:30 AM   ACT Total Scores   ACT TOTAL SCORE (Goal Greater than or Equal to 20) 18   In the past 12 months, how many times did you visit the emergency room for your asthma without being admitted to the hospital? 0   In the past 12 months, how many times were you hospitalized overnight because of your asthma? 0     Feels worsening of sensation of shortness of breath (chronic dyspnea) with new diagnosis of ILD  Wearing O2 more   Feeling \"faint\" at times   Recent labs were normal 9/21/2023 per pulmonology labs   No chest pain or anginal symptoms.   No cough or fever or URI symptoms   Does struggle with her diagnosis and chronic shortness of breath sensation - is in a supportive group  Tearful while discussing today     How many days per week do you miss taking your asthma controller medication?  I do not have an asthma controller " medication  Please describe any recent triggers for your asthma: None  Have you had any Emergency Room Visits, Urgent Care Visits, or Hospital Admissions since your last office visit?  No    Skin Lesion  Onset: Unknown  Description:   Location: Right leg around her knee   Character: raised, flakey  Itching (Pruritis): no   Progression of Symptoms:  worsening  Accompanying Signs & Symptoms:None  Precipitating factors:   Exposure to similar rash: no   New exposures: None   Recent travel: no   Alleviating factors:  None  Therapies Tried and outcome: None      Need annual derm visit now with just starting mycophenolate.   There about 1-2 months   No history of skin cancer   Can get in for annual check in six months with derm   Also two spots on right cheek that are flaky and raised    Have you ever done Advance Care Planning? (For example, a Health Directive, POLST, or a discussion with a medical provider or your loved ones about your wishes): No, advance care planning information given to patient to review.  Patient plans to discuss their wishes with loved ones or provider.      Social History     Tobacco Use    Smoking status: Never    Smokeless tobacco: Never   Substance Use Topics    Alcohol use: Yes     Comment: 2 glasses of wine daily         9/25/2023    10:29 AM   Alcohol Use   Prescreen: >3 drinks/day or >7 drinks/week? Yes   AUDIT SCORE  5     Reviewed orders with patient.  Reviewed health maintenance and updated orders accordingly - Yes    Breast Cancer Screening:  Any new diagnosis of family breast, ovarian, or bowel cancer? No    FHS-7:       9/25/2023    10:33 AM   Breast CA Risk Assessment (FHS-7)   Did any of your first-degree relatives have breast or ovarian cancer? No   Did any of your relatives have bilateral breast cancer? No   Did any man in your family have breast cancer? No   Did any woman in your family have breast and ovarian cancer? No   Did any woman in your family have breast cancer before  age 50 y? Yes   Do you have 2 or more relatives with breast and/or ovarian cancer? Yes   Do you have 2 or more relatives with breast and/or bowel cancer? No       Pertinent mammograms are reviewed under the imaging tab.    History of abnormal Pap smear: NO - age 30-65 PAP every 5 years with negative HPV co-testing recommended     Reviewed and updated as needed this visit by clinical staff   Tobacco  Allergies  Meds  Problems  Med Hx  Surg Hx           Reviewed and updated as needed this visit by Provider     Meds  Problems  Med Hx  Surg Hx            Review of Systems   Constitutional:  Negative for chills and fever.   HENT:  Negative for congestion, ear pain, hearing loss and sore throat.    Eyes:  Negative for pain and visual disturbance.   Respiratory:  Positive for cough and shortness of breath.    Cardiovascular:  Negative for chest pain, palpitations and peripheral edema.   Gastrointestinal:  Positive for constipation and diarrhea. Negative for abdominal pain, heartburn, hematochezia and nausea.   Breasts:  Negative for tenderness, breast mass and discharge.   Genitourinary:  Positive for urgency and vaginal discharge. Negative for dysuria, frequency, genital sores, hematuria, pelvic pain and vaginal bleeding.   Musculoskeletal:  Negative for arthralgias, joint swelling and myalgias.   Skin:  Negative for rash.   Neurological:  Positive for weakness. Negative for dizziness, headaches and paresthesias.   Psychiatric/Behavioral:  Positive for mood changes. The patient is nervous/anxious.         OBJECTIVE:   /70   Pulse 73   Temp 98.4  F (36.9  C) (Tympanic)   Wt 103.5 kg (228 lb 2 oz)   SpO2 97%   BMI 37.96 kg/m      Physical Exam  Constitutional:       General: She is not in acute distress.     Appearance: Normal appearance. She is well-developed. She is not ill-appearing.   HENT:      Head: Normocephalic and atraumatic.      Right Ear: Tympanic membrane and external ear normal.      Left  Ear: Tympanic membrane and external ear normal.      Nose: Nose normal.      Mouth/Throat:      Mouth: Mucous membranes are moist.      Pharynx: No oropharyngeal exudate.   Eyes:      Extraocular Movements: Extraocular movements intact.      Conjunctiva/sclera: Conjunctivae normal.   Neck:      Thyroid: No thyroid mass, thyromegaly or thyroid tenderness.   Cardiovascular:      Rate and Rhythm: Normal rate and regular rhythm.      Pulses: Normal pulses.      Heart sounds: Normal heart sounds, S1 normal and S2 normal. No murmur heard.  Pulmonary:      Effort: Pulmonary effort is normal. No respiratory distress.      Breath sounds: Normal breath sounds. No wheezing, rhonchi or rales.      Comments: Wearing O2  Abdominal:      General: There is no abdominal bruit.      Palpations: There is no pulsatile mass.   Musculoskeletal:         General: Normal range of motion.      Cervical back: Neck supple.      Right lower leg: No edema.      Left lower leg: No edema.   Lymphadenopathy:      Cervical: No cervical adenopathy.   Skin:     General: Skin is warm and dry.      Capillary Refill: Capillary refill takes less than 2 seconds.      Findings: No rash.      Comments: 2 stuck on appearing fleshy colored with slight erythema and scale on right superior knee and right middle thigh, that can get irritated and often are picked/rubbed off.     2 flaky, dry erythematous lesions with erythematous pseudonetwork under dermoscopy on right upper cheek.    Neurological:      Mental Status: She is alert and oriented to person, place, and time.      Gait: Gait is intact.   Psychiatric:         Attention and Perception: Attention normal.         Mood and Affect: Mood normal.         Speech: Speech normal.         Behavior: Behavior normal.         Thought Content: Thought content normal.         Cognition and Memory: Cognition normal.         Judgment: Judgment normal.       Risks and benefits of cryotherapy were discussed and alternate  treatment options reviewed. Lesion on right upper check, right upper mid cheek, right superior knee and right mid thigh (see exam information) was treated with 3 freeze thaw cycles each. Patient tolerated procedure well.     Diagnostic Test Results:  Labs reviewed in Epic    ASSESSMENT/PLAN:   Routine general medical Pap normal/neg HPV 8/13/2020 (next due 8/2025) - reports she had two normals in the last 10 years   Colon screen: thinks it was done 8 years ago at [facility]. No polyps or family history.   Mammogram: scheduled 11/10/2023  Declines covid-19 vaccine  Agreeable to pneumovax     Colon cancer screening  Low risk.   - COLOGUARD([employer])    Primary insomnia  Refilled.   - traZODone (DESYREL) 50 MG tablet; Take 1 tablet (50 mg) by mouth nightly as needed for sleep    Encounter for immunization  Flu and pneumo 20 today.     Seborrheic keratoses  2 lesion on right leg. Benign features under dermoscopy. Discussed concerning findings/recurrence that would be a reason to remove with shave biopsy.   - DESTRUCT BENIGN LESION, UP TO 14    Actinic keratosis  2 lesions on right upper cheek - frozen today.   Discussed if they do not fall off/resolve, will need shave biopsy.   Encouraged annual appointment with dermatology for skin exam especially now on mycophenolate.   - DESTRUCT PREMALIGNANT LESION, FIRST  - DESTRUCT PREMALIGNANT LESION, 2-14    Essential hypertension  BP on lower side today - suspect contributing to occasional symptoms of dizziness/fatigue.   Reduce dose to 25mg Metoprolol daily and monitor BP.   Hold parameters given.   May need to discontinue medication if BP consistently <120 systolic pre medication.   She will reach out via [employer] to update me in the next few weeks.       Patient has been advised of split billing requirements and indicates understanding: No      COUNSELING:  Reviewed preventive health counseling, as reflected in patient instructions      BMI:   Estimated body  "mass index is 37.96 kg/m  as calculated from the following:    Height as of 7/21/23: 1.651 m (5' 5\").    Weight as of this encounter: 103.5 kg (228 lb 2 oz).   Weight management plan: Discussed healthy diet and exercise guidelines      She reports that she has never smoked. She has never used smokeless tobacco.      Jessie Rose MD  [facility] [place]  "

## 2023-09-25 NOTE — LETTER
My Asthma Action Plan    Name: Elsie Rubi   YOB: 1960  Date: 9/25/2023   My doctor: Jessie Rose MD   [facility]        My Rescue Medicine:   Albuterol inhaler (Proair/Ventolin/Proventil HFA)  2-4 puffs EVERY 4 HOURS as needed. Use a spacer if recommended by your provider.   My Asthma Severity:   Intermittent / Exercise Induced  Know your asthma triggers: upper respiratory infections  None          GREEN ZONE   Good Control  I feel good  No cough or wheeze  Can work, sleep and play without asthma symptoms       Take your asthma control medicine every day.     If exercise triggers your asthma, take your rescue medication  15 minutes before exercise or sports, and  During exercise if you have asthma symptoms  Spacer to use with inhaler: If you have a spacer, make sure to use it with your inhaler             YELLOW ZONE Getting Worse  I have ANY of these:  I do not feel good  Cough or wheeze  Chest feels tight  Wake up at night   Keep taking your Green Zone medications  Start taking your rescue medicine:  every 20 minutes for up to 1 hour. Then every 4 hours for 24-48 hours.  If you stay in the Yellow Zone for more than 12-24 hours, contact your doctor.  If you do not return to the Green Zone in 12-24 hours or you get worse, start taking your oral steroid medicine if prescribed by your provider.           RED ZONE Medical Alert - Get Help  I have ANY of these:  I feel awful  Medicine is not helping  Breathing getting harder  Trouble walking or talking  Nose opens wide to breathe       Take your rescue medicine NOW  If your provider has prescribed an oral steroid medicine, start taking it NOW  Call your doctor NOW  If you are still in the Red Zone after 20 minutes and you have not reached your doctor:  Take your rescue medicine again and  Call 911 or go to the emergency room right away    See your regular doctor within 2 weeks of an Emergency Room or Urgent  Care visit for follow-up treatment.          Annual Reminders:  Meet with Asthma Educator,  Flu Shot in the Fall, consider Pneumonia Vaccination for patients with asthma (aged 19 and older).    Pharmacy: [facility] [address]    Electronically signed by Jessie Rose MD   Date: 09/25/23                    Asthma Triggers  How To Control Things That Make Your Asthma Worse    Triggers are things that make your asthma worse.  Look at the list below to help you find your triggers and   what you can do about them. You can help prevent asthma flare-ups by staying away from your triggers.      Trigger                                                          What you can do   Cigarette Smoke  Tobacco smoke can make asthma worse. Do not allow smoking in your home, car or around you.  Be sure no one smokes at a child s day care or school.  If you smoke, ask your health care provider for ways to help you quit.  Ask family members to quit too.  Ask your health care provider for a referral to Quit Plan to help you quit smoking, or call 9-067-902-PLAN.     Colds, Flu, Bronchitis  These are common triggers of asthma. Wash your hands often.  Don t touch your eyes, nose or mouth.  Get a flu shot every year.     Dust Mites  These are tiny bugs that live in cloth or carpet. They are too small to see. Wash sheets and blankets in hot water every week.   Encase pillows and mattress in dust mite proof covers.  Avoid having carpet if you can. If you have carpet, vacuum weekly.   Use a dust mask and HEPA vacuum.   Pollen and Outdoor Mold  Some people are allergic to trees, grass, or weed pollen, or molds. Try to keep your windows closed.  Limit time out doors when pollen count is high.   Ask you health care provider about taking medicine during allergy season.     Animal Dander  Some people are allergic to skin flakes, urine or saliva from pets with fur or feathers. Keep pets with fur or feathers out of your  home.    If you can t keep the pet outdoors, then keep the pet out of your bedroom.  Keep the bedroom door closed.  Keep pets off cloth furniture and away from stuffed toys.     Mice, Rats, and Cockroaches  Some people are allergic to the waste from these pests.   Cover food and garbage.  Clean up spills and food crumbs.  Store grease in the refrigerator.   Keep food out of the bedroom.   Indoor Mold  This can be a trigger if your home has high moisture. Fix leaking faucets, pipes, or other sources of water.   Clean moldy surfaces.  Dehumidify basement if it is damp and smelly.   Smoke, Strong Odors, and Sprays  These can reduce air quality. Stay away from strong odors and sprays, such as perfume, powder, hair spray, paints, smoke incense, paint, cleaning products, candles and new carpet.   Exercise or Sports  Some people with asthma have this trigger. Be active!  Ask your doctor about taking medicine before sports or exercise to prevent symptoms.    Warm up for 5-10 minutes before and after sports or exercise.     Other Triggers of Asthma  Cold air:  Cover your nose and mouth with a scarf.  Sometimes laughing or crying can be a trigger.  Some medicines and food can trigger asthma.

## 2023-09-25 NOTE — LETTER
My Depression Action Plan  Name: Elsie Rubi   Date of Birth 1960  Date: 9/25/2023    My doctor: Jessie Rose   My clinic: [facility] [place]  [address]  [place] [address]  [phone]            GREEN    ZONE   Good Control    What it looks like:   Things are going generally well. You have normal ups and downs. You may even feel depressed from time to time, but bad moods usually last less than a day.   What you need to do:  Continue to care for yourself (see self care plan)  Check your depression survival kit and update it as needed  Follow your physician s recommendations including any medication.  Do not stop taking medication unless you consult with your physician first.             YELLOW         ZONE Getting Worse    What it looks like:   Depression is starting to interfere with your life.   It may be hard to get out of bed; you may be starting to isolate yourself from others.  Symptoms of depression are starting to last most all day and this has happened for several days.   You may have suicidal thoughts but they are not constant.   What you need to do:     Call your care team. Your response to treatment will improve if you keep your care team informed of your progress. Yellow periods are signs an adjustment may need to be made.     Continue your self-care.  Just get dressed and ready for the day.  Don't give yourself time to talk yourself out of it.    Talk to someone in your support network.    Open up your Depression Self-Care Plan/Wellness Kit.             RED    ZONE Medical Alert - Get Help    What it looks like:   Depression is seriously interfering with your life.   You may experience these or other symptoms: You can t get out of bed most days, can t work or engage in other necessary activities, you have trouble taking care of basic hygiene, or basic responsibilities, thoughts of suicide or death that will not go away, self-injurious behavior.     What  you need to do:  Call your care team and request a same-day appointment. If they are not available (weekends or after hours) call your local crisis line, emergency room or 911.          Depression Self-Care Plan / Wellness Kit    Many people find that medication and therapy are helpful treatments for managing depression. In addition, making small changes to your everyday life can help to boost your mood and improve your wellbeing. Below are some tips for you to consider. Be sure to talk with your medical provider and/or behavioral health consultant if your symptoms are worsening or not improving.     Sleep   Sleep hygiene  means all of the habits that support good, restful sleep. It includes maintaining a consistent bedtime and wake time, using your bedroom only for sleeping or sex, and keeping the bedroom dark and free of distractions like a computer, smartphone, or television.     Develop a Healthy Routine  Maintain good hygiene. Get out of bed in the morning, make your bed, brush your teeth, take a shower, and get dressed. Don t spend too much time viewing media that makes you feel stressed. Find time to relax each day.    Exercise  Get some form of exercise every day. This will help reduce pain and release endorphins, the  feel good  chemicals in your brain. It can be as simple as just going for a walk or doing some gardening, anything that will get you moving.      Diet  Strive to eat healthy foods, including fruits and vegetables. Drink plenty of water. Avoid excessive sugar, caffeine, alcohol, and other mood-altering substances.     Stay Connected with Others  Stay in touch with friends and family members.    Manage Your Mood  Try deep breathing, massage therapy, biofeedback, or meditation. Take part in fun activities when you can. Try to find something to smile about each day.     Psychotherapy  Be open to working with a therapist if your provider recommends it.     Medication  Be sure to take your  medication as prescribed. Most anti-depressants need to be taken every day. It usually takes several weeks for medications to work. Not all medicines work for all people. It is important to follow-up with your provider to make sure you have a treatment plan that is working for you. Do not stop your medication abruptly without first discussing it with your provider.    Crisis Resources   These hotlines are for both adults and children. They and are open 24 hours a day, 7 days a week unless noted otherwise.    National Suicide Prevention Lifeline   988 or 7-796-794-RVIC (6402)    Crisis Text Line    www.crisistextline.org  Text HOME to 364120 from anywhere in the [place], anytime, about any type of crisis. A live, trained crisis counselor will receive the text and respond quickly.    [place] Lifeline for LGBTQ Youth  A national crisis intervention and suicide lifeline for LGBTQ youth under 25. Provides a safe place to talk without judgement. Call 1-335.836.2377; text START to 200932 or visit www.theRoobiqvorproject.org to talk to a trained counselor.    For [facility] crisis numbers, visit the [facility] website at:  https://[place].gov/dhs/people-we-serve/adults/health-care/mental-health/resources/crisis-contacts.jsp

## 2023-09-25 NOTE — PATIENT INSTRUCTIONS
Will need 2nd shingles shot at some point     Reduce metoprolol to 25mg once daily - check blood pressure before you take metoprolol in AM - if <110 systolic HOLD    For constipation, try miralax one capful daily mixed in 8oz water. Can titrate to more or less depending on one soft formed stool daily.   Could also consider adding magnesium citrate gummies/tabs.       Preventive Health Recommendations  Female Ages 50 - 64    Yearly exam: See your health care provider every year in order to  Review health changes.   Discuss preventive care.    Review your medicines if your doctor has prescribed any.    Get a Pap test every three years (unless you have an abnormal result and your provider advises testing more often).  If you get Pap tests with HPV test, you only need to test every 5 years, unless you have an abnormal result.   You do not need a Pap test if your uterus was removed (hysterectomy) and you have not had cancer.  You should be tested each year for STDs (sexually transmitted diseases) if you're at risk.   Have a mammogram every 1 to 2 years.  Have a colonoscopy at age 50, or have a yearly FIT test (stool test). These exams screen for colon cancer.    Have a cholesterol test every 5 years, or more often if advised.  Have a diabetes test (fasting glucose) every three years. If you are at risk for diabetes, you should have this test more often.   If you are at risk for osteoporosis (brittle bone disease), think about having a bone density scan (DEXA).    Shots: Get a flu shot each year. Get a tetanus shot every 10 years.    Nutrition:   Eat at least 5 servings of fruits and vegetables each day.  Eat whole-grain bread, whole-wheat pasta and brown rice instead of white grains and rice.  Get adequate Calcium and Vitamin D.     Lifestyle  Exercise at least 150 minutes a week (30 minutes a day, 5 days a week). This will help you control your weight and prevent disease.  Limit alcohol to one drink per day.  No  smoking.   Wear sunscreen to prevent skin cancer.   See your dentist every six months for an exam and cleaning.  See your eye doctor every 1 to 2 years.

## 2023-09-26 ENCOUNTER — HOSPITAL ENCOUNTER (OUTPATIENT)
Dept: CARDIAC REHAB | Facility: HOSPITAL | Age: 63
Setting detail: THERAPIES SERIES
Discharge: HOME | End: 2023-09-26
Attending: FAMILY MEDICINE
Payer: COMMERCIAL

## 2023-09-26 ENCOUNTER — TELEPHONE (OUTPATIENT)
Dept: PULMONOLOGY | Facility: CLINIC | Age: 63
End: 2023-09-26
Payer: COMMERCIAL

## 2023-09-26 PROCEDURE — G0239 OTH RESP PROC, GROUP: HCPCS

## 2023-09-26 NOTE — TELEPHONE ENCOUNTER
----- Message from Gayatri Burkett RN sent at 9/25/2023 11:06 AM CDT -----  Regarding: Left VM  Pt left vm stating she wants to talk more about [employer] and coordinating the monitoring with her PCP.    [phone]

## 2023-09-26 NOTE — TELEPHONE ENCOUNTER
Spoke to patient. She has new PCP wants to make sure they can see our records, they are in [facility], so told her they should be able to. Answered questions about oxygen. Patient will call back with any other issues.

## 2023-09-27 DIAGNOSIS — J84.9 ILD (INTERSTITIAL LUNG DISEASE) (H): ICD-10-CM

## 2023-09-27 RX ORDER — MYCOPHENOLATE MOFETIL 500 MG/1
1000 TABLET ORAL 2 TIMES DAILY
Qty: 120 TABLET | Refills: 3 | Status: SHIPPED | OUTPATIENT
Start: 2023-09-27 | End: 2024-01-31

## 2023-09-28 ENCOUNTER — HOSPITAL ENCOUNTER (OUTPATIENT)
Dept: CARDIAC REHAB | Facility: HOSPITAL | Age: 63
Setting detail: THERAPIES SERIES
Discharge: HOME | End: 2023-09-28
Attending: FAMILY MEDICINE
Payer: COMMERCIAL

## 2023-09-28 PROCEDURE — G0239 OTH RESP PROC, GROUP: HCPCS

## 2023-10-03 ENCOUNTER — HOSPITAL ENCOUNTER (OUTPATIENT)
Dept: CARDIAC REHAB | Facility: HOSPITAL | Age: 63
Setting detail: THERAPIES SERIES
Discharge: HOME | End: 2023-10-03
Attending: FAMILY MEDICINE
Payer: COMMERCIAL

## 2023-10-03 PROCEDURE — G0239 OTH RESP PROC, GROUP: HCPCS

## 2023-10-06 DIAGNOSIS — F33.0 MILD EPISODE OF RECURRENT MAJOR DEPRESSIVE DISORDER (H): Primary | ICD-10-CM

## 2023-10-09 RX ORDER — FLUOXETINE 40 MG/1
40 CAPSULE ORAL DAILY
Qty: 90 CAPSULE | Refills: 3 | Status: SHIPPED | OUTPATIENT
Start: 2023-10-09 | End: 2024-07-15

## 2023-10-09 NOTE — TELEPHONE ENCOUNTER
Prozac       Last Written Prescription Date:  07/07/2023 reported by patient   Last Fill Quantity: unknown,   # refills: unknown   Last Office Visit: 9/25/2023

## 2023-10-10 ENCOUNTER — HOSPITAL ENCOUNTER (OUTPATIENT)
Dept: CARDIAC REHAB | Facility: HOSPITAL | Age: 63
Setting detail: THERAPIES SERIES
Discharge: HOME | End: 2023-10-10
Attending: STUDENT IN AN ORGANIZED HEALTH CARE EDUCATION/TRAINING PROGRAM
Payer: COMMERCIAL

## 2023-10-10 PROCEDURE — G0239 OTH RESP PROC, GROUP: HCPCS

## 2023-10-12 ENCOUNTER — HOSPITAL ENCOUNTER (OUTPATIENT)
Dept: CARDIAC REHAB | Facility: HOSPITAL | Age: 63
Setting detail: THERAPIES SERIES
Discharge: HOME | End: 2023-10-12
Attending: STUDENT IN AN ORGANIZED HEALTH CARE EDUCATION/TRAINING PROGRAM
Payer: COMMERCIAL

## 2023-10-12 LAB — NONINV COLON CA DNA+OCC BLD SCRN STL QL: NEGATIVE

## 2023-10-12 PROCEDURE — G0239 OTH RESP PROC, GROUP: HCPCS

## 2023-10-18 ENCOUNTER — TELEPHONE (OUTPATIENT)
Dept: PULMONOLOGY | Facility: CLINIC | Age: 63
End: 2023-10-18
Payer: COMMERCIAL

## 2023-10-18 NOTE — TELEPHONE ENCOUNTER
Left Voicemail (1st Attempt) for the patient to call back and reschedule the following:    Appointment type: TISH  Provider: SREEDHAR  Return date: N/A  Specialty phone number: [phone]  Additional appointment(s) needed: LABS, FPFT  Additonal Notes: N/A

## 2023-11-03 ENCOUNTER — TELEPHONE (OUTPATIENT)
Dept: PULMONOLOGY | Facility: CLINIC | Age: 63
End: 2023-11-03

## 2023-11-03 NOTE — TELEPHONE ENCOUNTER
Call placed to patient regarding covid exposure  Left message on voicemail to call back to further discuss    Lottei Kohli RN

## 2023-11-03 NOTE — TELEPHONE ENCOUNTER
Patient returned call to clinic  Patient reports her coworker had first hand exposure to a covid positive patient, she was then exposed to her coworker  Both her and her coworker remain symptom free  Ju wants to know which mask to wear at work  Nurse advised patient N95 will definitely reduce her risk the most but to use best judgement in being around people with symptoms   Patient advised to monitor for symptoms in herself and to reach out if they become present    Lottie Kohli RN

## 2023-11-03 NOTE — TELEPHONE ENCOUNTER
----- Message from Caroline Love RN sent at 11/3/2023  1:49 PM CDT -----  Regarding: VM: poss covid expsure  1258: she was exposed to covid at work and wants to know what to do protect herself

## 2023-11-10 ENCOUNTER — ANCILLARY PROCEDURE (OUTPATIENT)
Dept: MAMMOGRAPHY | Facility: OTHER | Age: 63
End: 2023-11-10
Attending: STUDENT IN AN ORGANIZED HEALTH CARE EDUCATION/TRAINING PROGRAM
Payer: COMMERCIAL

## 2023-11-10 ENCOUNTER — TELEPHONE (OUTPATIENT)
Dept: MAMMOGRAPHY | Facility: OTHER | Age: 63
End: 2023-11-10

## 2023-11-10 DIAGNOSIS — Z12.31 BREAST CANCER SCREENING BY MAMMOGRAM: ICD-10-CM

## 2023-11-10 PROCEDURE — 77067 SCR MAMMO BI INCL CAD: CPT | Mod: TC

## 2023-12-19 ENCOUNTER — HOSPITAL ENCOUNTER (EMERGENCY)
Facility: HOSPITAL | Age: 63
Discharge: HOME | End: 2023-12-19
Attending: NURSE PRACTITIONER | Admitting: NURSE PRACTITIONER
Payer: COMMERCIAL

## 2023-12-19 VITALS
OXYGEN SATURATION: 98 % | HEART RATE: 89 BPM | TEMPERATURE: 98.6 F | RESPIRATION RATE: 19 BRPM | WEIGHT: 228.18 LBS | SYSTOLIC BLOOD PRESSURE: 149 MMHG | HEIGHT: 64 IN | BODY MASS INDEX: 38.96 KG/M2 | DIASTOLIC BLOOD PRESSURE: 92 MMHG

## 2023-12-19 DIAGNOSIS — R06.02 SHORTNESS OF BREATH: ICD-10-CM

## 2023-12-19 DIAGNOSIS — R05.1 ACUTE COUGH: Primary | ICD-10-CM

## 2023-12-19 LAB
FLUAV RNA SPEC QL NAA+PROBE: NEGATIVE
FLUBV RNA RESP QL NAA+PROBE: NEGATIVE
RSV RNA SPEC NAA+PROBE: NEGATIVE
SARS-COV-2 RNA RESP QL NAA+PROBE: NEGATIVE

## 2023-12-19 PROCEDURE — 87637 SARSCOV2&INF A&B&RSV AMP PRB: CPT | Performed by: NURSE PRACTITIONER

## 2023-12-19 PROCEDURE — G0463 HOSPITAL OUTPT CLINIC VISIT: HCPCS | Mod: 25

## 2023-12-19 PROCEDURE — 99213 OFFICE O/P EST LOW 20 MIN: CPT | Performed by: NURSE PRACTITIONER

## 2023-12-19 PROCEDURE — 94640 AIRWAY INHALATION TREATMENT: CPT

## 2023-12-19 PROCEDURE — 250N000012 HC RX MED GY IP 250 OP 636 PS 637: Performed by: NURSE PRACTITIONER

## 2023-12-19 PROCEDURE — 87633 RESP VIRUS 12-25 TARGETS: CPT | Performed by: NURSE PRACTITIONER

## 2023-12-19 PROCEDURE — 250N000009 HC RX 250: Performed by: NURSE PRACTITIONER

## 2023-12-19 PROCEDURE — 87486 CHLMYD PNEUM DNA AMP PROBE: CPT | Performed by: NURSE PRACTITIONER

## 2023-12-19 RX ORDER — PREDNISONE 20 MG/1
60 TABLET ORAL ONCE
Status: COMPLETED | OUTPATIENT
Start: 2023-12-19 | End: 2023-12-19

## 2023-12-19 RX ORDER — IPRATROPIUM BROMIDE AND ALBUTEROL SULFATE 2.5; .5 MG/3ML; MG/3ML
3 SOLUTION RESPIRATORY (INHALATION) ONCE
Status: COMPLETED | OUTPATIENT
Start: 2023-12-19 | End: 2023-12-19

## 2023-12-19 RX ADMIN — PREDNISONE 60 MG: 20 TABLET ORAL at 16:07

## 2023-12-19 RX ADMIN — IPRATROPIUM BROMIDE AND ALBUTEROL SULFATE 3 ML: .5; 3 SOLUTION RESPIRATORY (INHALATION) at 16:13

## 2023-12-19 ASSESSMENT — ENCOUNTER SYMPTOMS
FEVER: 0
CHEST TIGHTNESS: 1
CHILLS: 0
SHORTNESS OF BREATH: 1
COUGH: 1

## 2023-12-19 NOTE — ED TRIAGE NOTES
ARMANDO Velazquez assessed patient in triage and determined patient Urgent Care appropriate. Will be seen in Urgent Care.

## 2023-12-19 NOTE — DISCHARGE INSTRUCTIONS
You tested negative for COVID-19, influenza and RSV.  We will let you know about the other respiratory viral testing that we did once we have the results.    Continue with your medications at home.    Keep scheduled appointment with pulmonology tomorrow.    Return to urgent care or emergency department for any worsening or concerning symptoms.

## 2023-12-19 NOTE — ED PROVIDER NOTES
History     Chief Complaint   Patient presents with    Cough    Shortness of Breath     Breathing issues, cough     HPI  Elsie Rubi is a 63 year old female who has a primary medical history most significant for interstitial lung disease and asthma and presents today with concerns of an acute cough, chest tightness and shortness of breath.  Patient reports symptoms started earlier today.  She denies any fever, rhinorrhea, nasal congestion.  Eating and drinking as per usual.  She has been using her albuterol inhaler and last used it about an hour prior to this arrival.  She just feels like she cannot catch her breath.  She called her pulmonologist and was advised to present here for a respiratory viral panel testing.  She has an appointment with the pulmonologist but it was supposed to be for recheck of her ILD.    Allergies:  No Known Allergies    Problem List:    Patient Active Problem List    Diagnosis Date Noted    Prediabetes 08/25/2023     Priority: Medium    Mixed hyperlipidemia 08/25/2023     Priority: Medium     The 10-year ASCVD risk score (Radha DENT, et al., 2019) is: 5.8%    Values used to calculate the score:      Age: 63 years      Sex: Female      Is Non- : No      Diabetic: No      Tobacco smoker: No      Systolic Blood Pressure: 123 mmHg      Is BP treated: Yes      HDL Cholesterol: 57 mg/dL      Total Cholesterol: 218 mg/dL        Primary insomnia 08/10/2023     Priority: Medium    Rectocele 08/10/2023     Priority: Medium    Primary stress urinary incontinence 08/10/2023     Priority: Medium     OB GYN [facility] 2021 - declined surgery      MIC (obstructive sleep apnea) 10/14/2021     Priority: Medium    Chronic fatigue syndrome 08/13/2020     Priority: Medium    Dyspepsia 08/13/2020     Priority: Medium    Essential hypertension 08/13/2020     Priority: Medium    Fibromyalgia 08/13/2020     Priority: Medium    Mild episode of recurrent major depressive disorder (H24)  "08/13/2020     Priority: Medium    Mild intermittent asthma without complication 08/13/2020     Priority: Medium    Pure hypercholesterolemia 08/13/2020     Priority: Medium    Severe obesity (BMI 35.0-39.9) with comorbidity (H) 08/13/2020     Priority: Medium        Past Medical History:    Past Medical History:   Diagnosis Date    Benign essential hypertension     Borderline personality disorder (H) 08/13/2020    Depression     ILD (interstitial lung disease) (H)     Primary osteoarthritis of left foot 08/13/2020       Past Surgical History:    Past Surgical History:   Procedure Laterality Date    BUNIONECTOMY Left        Family History:    History reviewed. No pertinent family history.    Social History:  Marital Status:   [2]  Social History     Tobacco Use    Smoking status: Never    Smokeless tobacco: Never   Vaping Use    Vaping Use: Never used   Substance Use Topics    Alcohol use: Yes     Comment: 2 glasses of wine daily    Drug use: Not Currently        Medications:    albuterol (PROAIR HFA/PROVENTIL HFA/VENTOLIN HFA) 108 (90 Base) MCG/ACT inhaler  FLUoxetine (PROZAC) 40 MG capsule  metoprolol succinate ER (TOPROL XL) 50 MG 24 hr tablet  mycophenolate (GENERIC EQUIVALENT) 500 MG tablet  omeprazole (PRILOSEC) 20 MG DR capsule  traZODone (DESYREL) 50 MG tablet  ADVAIR DISKUS 250-50 MCG/ACT inhaler          Review of Systems   Constitutional:  Negative for chills and fever.   Respiratory:  Positive for cough, chest tightness and shortness of breath.    Cardiovascular:  Negative for chest pain.   All other systems reviewed and are negative.      Physical Exam   BP: 149/92  Pulse: 89  Temp: 98.6  F (37  C)  Resp: 19  Height: 162.6 cm (5' 4\")  Weight: 103.5 kg (228 lb 2.8 oz)  SpO2: 98 %      Physical Exam  Vitals and nursing note reviewed.   Constitutional:       General: She is not in acute distress.     Appearance: Normal appearance. She is well-developed. She is not diaphoretic.   HENT:      Head: " Normocephalic and atraumatic.      Mouth/Throat:      Mouth: Mucous membranes are moist.   Eyes:      General: No scleral icterus.     Conjunctiva/sclera: Conjunctivae normal.   Cardiovascular:      Rate and Rhythm: Normal rate and regular rhythm.   Pulmonary:      Effort: Pulmonary effort is normal. No tachypnea, bradypnea or respiratory distress.      Breath sounds: Decreased breath sounds present. No wheezing, rhonchi or rales.      Comments: Occasional dry harsh cough heard during exam.  Slight audible wheezes heard during coughing.  These resolve after she is done coughing.  Abdominal:      General: Abdomen is flat.   Musculoskeletal:      Cervical back: Normal range of motion and neck supple.   Skin:     General: Skin is warm and dry.      Coloration: Skin is not pale.   Neurological:      Mental Status: She is alert and oriented to person, place, and time.         ED Course                 Procedures              Results for orders placed or performed during the hospital encounter of 12/19/23 (from the past 24 hour(s))   Symptomatic Influenza A/B, RSV, & SARS-CoV2 PCR (COVID-19) Nasopharyngeal    Specimen: Nasopharyngeal; Swab   Result Value Ref Range    Influenza A PCR Negative Negative    Influenza B PCR Negative Negative    RSV PCR Negative Negative    SARS CoV2 PCR Negative Negative    Narrative    Testing was performed using the Xpert Xpress CoV2/Flu/RSV Assay on the [employer] GeneXpert Instrument. This test should be ordered for the detection of SARS-CoV-2, influenza, and RSV viruses in individuals who meet clinical and/or epidemiological criteria. Test performance is unknown in asymptomatic patients. This test is for in vitro diagnostic use under the FDA EUA for laboratories certified under CLIA to perform high or moderate complexity testing. This test has not been FDA cleared or approved. A negative result does not rule out the presence of PCR inhibitors in the specimen or target RNA in concentration  below the limit of detection for the assay. If only one viral target is positive but coinfection with multiple targets is suspected, the sample should be re-tested with another FDA cleared, approved, or authorized test, if coinfection would change clinical management. This test was validated by the [facility]. These laboratories are certified under the Clinical Laboratory Improvement Amendments of 1988 (CLIA-88) as qualified to perform high complexity laboratory testing.       Medications   predniSONE (DELTASONE) tablet 60 mg (60 mg Oral $Given 12/19/23 1538)   ipratropium - albuterol 0.5 mg/2.5 mg/3 mL (DUONEB) neb solution 3 mL (3 mLs Nebulization $Given 12/19/23 6902)       Assessments & Plan (with Medical Decision Making)  This is a pleasant 63-year-old female with a history of ILD and asthma that presented for evaluation of cough and shortness of breath with symptoms having started earlier today.  No wheezing or crackles auscultated.  She does have some diminished lung sounds bilaterally.  Talking clearly and in full sentences.  Respirations are even and nonlabored with an oxygen saturation of 98% on room air.  During this visit patient noted that she still did not feel like she could catch her breath.  She was given a DuoNeb as well as prednisone and reported improvement in her breathing following the DuoNeb.  She tested negative for COVID-19, influenza and RSV.  An additional respiratory viral panel was completed today and results are pending at discharge.  Patient will be notified of these results when they are available.  At this time it appears patient has a viral respiratory illness that may be causing her asthma or ILD symptoms to flareup.  Advised patient to continue with her inhalers at home.  Keep scheduled appointment with pulmonologist tomorrow.  Return to urgent care or emergency department for any worsening or concerning symptoms.     I have reviewed the nursing notes.    I  have reviewed the findings, diagnosis, plan and need for follow up with the patient.  This document was prepared using a combination of typing and voice generated software.  While every attempt was made for accuracy, spelling and grammatical errors may exist.         New Prescriptions    No medications on file       Final diagnoses:   Acute cough   Shortness of breath       12/19/2023   [facility]       Mpofu, Prudence, CNP  12/19/23 9096

## 2023-12-19 NOTE — ED TRIAGE NOTES
Pt presents with c/o SOB, cough. Reports interstitial lung disease and asthma. Sx started earlier today. Denies other flu like sx. Called provider and was told to come get checked. Pt has taken albuterol.

## 2023-12-20 ENCOUNTER — OFFICE VISIT (OUTPATIENT)
Dept: PULMONOLOGY | Facility: CLINIC | Age: 63
End: 2023-12-20
Payer: COMMERCIAL

## 2023-12-20 ENCOUNTER — LAB (OUTPATIENT)
Dept: LAB | Facility: CLINIC | Age: 63
End: 2023-12-20
Payer: COMMERCIAL

## 2023-12-20 ENCOUNTER — OFFICE VISIT (OUTPATIENT)
Dept: PULMONOLOGY | Facility: CLINIC | Age: 63
End: 2023-12-20
Attending: INTERNAL MEDICINE
Payer: COMMERCIAL

## 2023-12-20 VITALS — OXYGEN SATURATION: 95 % | DIASTOLIC BLOOD PRESSURE: 86 MMHG | SYSTOLIC BLOOD PRESSURE: 138 MMHG | HEART RATE: 75 BPM

## 2023-12-20 DIAGNOSIS — J84.9 ILD (INTERSTITIAL LUNG DISEASE) (H): Primary | ICD-10-CM

## 2023-12-20 DIAGNOSIS — R09.02 HYPOXIA: ICD-10-CM

## 2023-12-20 DIAGNOSIS — J84.9 ILD (INTERSTITIAL LUNG DISEASE) (H): ICD-10-CM

## 2023-12-20 DIAGNOSIS — J45.31 MILD PERSISTENT ASTHMA WITH ACUTE EXACERBATION: ICD-10-CM

## 2023-12-20 DIAGNOSIS — R73.03 PREDIABETES: Chronic | ICD-10-CM

## 2023-12-20 LAB
ALBUMIN SERPL BCG-MCNC: 4.4 G/DL (ref 3.5–5.2)
ALP SERPL-CCNC: 90 U/L (ref 40–150)
ALT SERPL W P-5'-P-CCNC: 24 U/L (ref 0–50)
ANION GAP SERPL CALCULATED.3IONS-SCNC: 11 MMOL/L (ref 7–15)
AST SERPL W P-5'-P-CCNC: 33 U/L (ref 0–45)
BASOPHILS # BLD AUTO: 0 10E3/UL (ref 0–0.2)
BASOPHILS NFR BLD AUTO: 0 %
BILIRUB SERPL-MCNC: 0.5 MG/DL
BUN SERPL-MCNC: 17.1 MG/DL (ref 8–23)
C PNEUM DNA SPEC QL NAA+PROBE: NOT DETECTED
CALCIUM SERPL-MCNC: 9.6 MG/DL (ref 8.8–10.2)
CHLORIDE SERPL-SCNC: 104 MMOL/L (ref 98–107)
CREAT SERPL-MCNC: 0.8 MG/DL (ref 0.51–0.95)
DEPRECATED HCO3 PLAS-SCNC: 25 MMOL/L (ref 22–29)
EGFRCR SERPLBLD CKD-EPI 2021: 82 ML/MIN/1.73M2
EOSINOPHIL # BLD AUTO: 0.1 10E3/UL (ref 0–0.7)
EOSINOPHIL NFR BLD AUTO: 1 %
ERYTHROCYTE [DISTWIDTH] IN BLOOD BY AUTOMATED COUNT: 14.1 % (ref 10–15)
FLUAV H1 2009 PAND RNA SPEC QL NAA+PROBE: NOT DETECTED
FLUAV H1 RNA SPEC QL NAA+PROBE: NOT DETECTED
FLUAV H3 RNA SPEC QL NAA+PROBE: NOT DETECTED
FLUAV RNA SPEC QL NAA+PROBE: NOT DETECTED
FLUBV RNA SPEC QL NAA+PROBE: NOT DETECTED
GLUCOSE SERPL-MCNC: 102 MG/DL (ref 70–99)
HADV DNA SPEC QL NAA+PROBE: NOT DETECTED
HCOV PNL SPEC NAA+PROBE: NOT DETECTED
HCT VFR BLD AUTO: 41.4 % (ref 35–47)
HGB BLD-MCNC: 13.3 G/DL (ref 11.7–15.7)
HMPV RNA SPEC QL NAA+PROBE: NOT DETECTED
HPIV1 RNA SPEC QL NAA+PROBE: NOT DETECTED
HPIV2 RNA SPEC QL NAA+PROBE: NOT DETECTED
HPIV3 RNA SPEC QL NAA+PROBE: NOT DETECTED
HPIV4 RNA SPEC QL NAA+PROBE: NOT DETECTED
IMM GRANULOCYTES # BLD: 0 10E3/UL
IMM GRANULOCYTES NFR BLD: 0 %
LYMPHOCYTES # BLD AUTO: 2.7 10E3/UL (ref 0.8–5.3)
LYMPHOCYTES NFR BLD AUTO: 28 %
M PNEUMO DNA SPEC QL NAA+PROBE: NOT DETECTED
MCH RBC QN AUTO: 30.4 PG (ref 26.5–33)
MCHC RBC AUTO-ENTMCNC: 32.1 G/DL (ref 31.5–36.5)
MCV RBC AUTO: 95 FL (ref 78–100)
MONOCYTES # BLD AUTO: 1 10E3/UL (ref 0–1.3)
MONOCYTES NFR BLD AUTO: 10 %
NEUTROPHILS # BLD AUTO: 5.9 10E3/UL (ref 1.6–8.3)
NEUTROPHILS NFR BLD AUTO: 61 %
NRBC # BLD AUTO: 0 10E3/UL
NRBC BLD AUTO-RTO: 0 /100
PLATELET # BLD AUTO: 222 10E3/UL (ref 150–450)
POTASSIUM SERPL-SCNC: 3.9 MMOL/L (ref 3.4–5.3)
PROT SERPL-MCNC: 7.3 G/DL (ref 6.4–8.3)
RBC # BLD AUTO: 4.38 10E6/UL (ref 3.8–5.2)
RSV RNA SPEC QL NAA+PROBE: NOT DETECTED
RSV RNA SPEC QL NAA+PROBE: NOT DETECTED
RV+EV RNA SPEC QL NAA+PROBE: NOT DETECTED
SODIUM SERPL-SCNC: 140 MMOL/L (ref 135–145)
WBC # BLD AUTO: 9.7 10E3/UL (ref 4–11)

## 2023-12-20 PROCEDURE — 85025 COMPLETE CBC W/AUTO DIFF WBC: CPT | Performed by: PATHOLOGY

## 2023-12-20 PROCEDURE — 80053 COMPREHEN METABOLIC PANEL: CPT | Performed by: PATHOLOGY

## 2023-12-20 PROCEDURE — G0463 HOSPITAL OUTPT CLINIC VISIT: HCPCS | Performed by: INTERNAL MEDICINE

## 2023-12-20 PROCEDURE — 99215 OFFICE O/P EST HI 40 MIN: CPT | Mod: 25 | Performed by: INTERNAL MEDICINE

## 2023-12-20 PROCEDURE — 94729 DIFFUSING CAPACITY: CPT | Performed by: INTERNAL MEDICINE

## 2023-12-20 PROCEDURE — 94060 EVALUATION OF WHEEZING: CPT | Performed by: INTERNAL MEDICINE

## 2023-12-20 PROCEDURE — 36415 COLL VENOUS BLD VENIPUNCTURE: CPT | Performed by: PATHOLOGY

## 2023-12-20 RX ORDER — PREDNISONE 20 MG/1
20 TABLET ORAL DAILY
Qty: 7 TABLET | Refills: 0 | Status: SHIPPED | OUTPATIENT
Start: 2023-12-20 | End: 2024-01-22

## 2023-12-20 RX ORDER — FLUTICASONE PROPIONATE AND SALMETEROL 250; 50 UG/1; UG/1
1 POWDER RESPIRATORY (INHALATION) 2 TIMES DAILY
Qty: 20.01 EACH | Refills: 6 | Status: SHIPPED | OUTPATIENT
Start: 2023-12-20 | End: 2024-01-22

## 2023-12-20 ASSESSMENT — PAIN SCALES - GENERAL: PAINLEVEL: MILD PAIN (3)

## 2023-12-20 NOTE — LETTER
12/20/2023         RE: Elsie Rubi  [address] [facility] [address]        Dear Colleague,    Thank you for referring your patient, Elsie Rubi, to the [facility] [place]. Please see a copy of my visit note below.    Pulmonary Patient Follow Up Clinic Note   12/20/2023      PCP: Jessie Rose    Reason for visit: ILD     Pulmonary HPI:   Elsie Rubi is a 63 year old female w/ h/o elevated BMI, hx of PSG/HST+ but not on NIV, hiatal hernia, GERD, HTN, reported hx of asthma  who presents for follow up of ILD.     Last Pulmonary visit 7/2023.  Team conference outcome-  NSIP vs organizing pna with small airways overlap or HP with small airways disease with enough acute inflammatory changes to suggest treatment with MMF.   Started on MMF, tolerating well. Was prescribed O2 2 LPM via NC on exertion.   Pulm rehab completed.   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 Symptomatic, not wearing O2 with exertion consistently.   O2 at rest (When she should be on RA) is good. Not measuring with exertion. When at pulm rehab, they were not measuring very consistently with exertion though the few times she was measured she was on RA and spO2>90%.   Recently developed URI symptoms. CXR unchanged, oxygenation unchanged. She visited the ED yesterday- COVID-19, RSV, Influenza negative. Full viral PCR pending at time of appt.  She is coughing frequently, chest tightness, wheezing, and increased SOB/BECKER. No fevers.   Patient assumed that since she had ILD that she did not need the inhaler anymore, but has found a difference since stopping her previous Advair.   Did try going to the sleep medicine referral but had issues with scheduling. Will try again.  Having issues with weight.   Having some issues with mood, though partly due to recent job experience.   Will be traveling down to [place] soon.     Review of systems: a complete 12-point ROS  conducted, & found to be negative w/ exceptions as noted in the HPI.    Reviewed PMH, PSH, FH, SH    Medications:  Current Outpatient Medications   Medication    albuterol (PROAIR HFA/PROVENTIL HFA/VENTOLIN HFA) 108 (90 Base) MCG/ACT inhaler    FLUoxetine (PROZAC) 40 MG capsule    fluticasone-salmeterol (ADVAIR) 250-50 MCG/ACT inhaler    metoprolol succinate ER (TOPROL XL) 50 MG 24 hr tablet    mycophenolate (GENERIC EQUIVALENT) 500 MG tablet    omeprazole (PRILOSEC) 20 MG DR capsule    predniSONE (DELTASONE) 20 MG tablet    traZODone (DESYREL) 50 MG tablet    ADVAIR DISKUS 250-50 MCG/ACT inhaler     No current facility-administered medications for this visit.       Allergies:  No Known Allergies    Physical examination:  /86 (BP Location: Right arm, Patient Position: Chair, Cuff Size: Adult Large)   Pulse 75   SpO2 95%     General: NAD, coughing with deep inspiration  Eyes: Anicteric sclera  Mouth: MMM w/o lesions  Neck: No stridor   Lymphatics: No significant cervical or supraclavicular LNs  CV: RR, no m/c/r   Lungs:Expiratory wheezing in posterior lung fields, and in periphery of anterior lung fields  Abd: protruding  Ext: WWP, no BLE edema  Skin: No rashes, cyanosis, or jaundice  Neuro: Intact mentation w/ normal speech. Normal strength & muscle tone w/ normal gait & stance  Psych: Euthymic, normal affect, good eye contact    Labs: reviewed in [facility] & personally interpreted.     Previous ILD serologies negative    Imaging: reviewed in [facility] & personally interpreted. Below are the interpretations from the formal Radiology review.  No new imaging    PFT:  Most Recent [facility] Pulmonary Function Testing (FVC/FEV1 only)  FVC-Pre   Date Value Ref Range Status   12/20/2023 1.89 L    07/21/2023 1.83 L      FVC-%Pred-Pre   Date Value Ref Range Status   12/20/2023 65 %    07/21/2023 62 %      FEV1-Pre   Date Value Ref Range Status   12/20/2023 1.62 L    07/21/2023 1.51 L      FEV1-%Pred-Pre   Date Value Ref Range  Status   12/20/2023 70 %    07/21/2023 64 %      PFT- Date: 12/20/2023   FEV1- 1.62L (70%), FVC-1.89L (65%), FEV1/FVC- 0.86,  DLCO- 15.3 ml/min/mmHg (76%)-  Interpretation-Similar spirometry with suggestion of mild obstruction  Normalization of DLCO compared to previous      7/21/2023  FEV1- 1.51L (64%)  FVC- 1.83 (62%)  R- 0.82  TLC- 3.06L (58%)  uncDLCO- 12.85 (64%)  ERV and RV not as reduced as severely as TLC  RV/TLC-0.41  6MW- 2L NC O2 to complete (ended at 94%). 335m walked (329m -LLN)     [facility] PFTs:  3/20/23:   FVC: 1.89 L (61%)  FEV1: 1.62 L (67%)  FEV1/FVC: 86%  RV: 1.18 L (60%)  TLC: 3.38 L (68%)  RV/TLC ratio 35%  uncDLCO: 14.93 ml/min/mmHg (76%, not adjusted for Hb)    Impression & recommendations:    Ju was seen today for interstitial lung disease (ild).    Diagnoses and all orders for this visit:    ILD (interstitial lung disease) (H)  -     Adult Pulmonology Clinic Follow-Up Order (3 Month)  -     General PFT Lab (Please always keep checked); Future  -     Pulmonary Function Test; Future    Hypoxia    Mild persistent asthma with acute exacerbation  -     predniSONE (DELTASONE) 20 MG tablet; Take 1 tablet (20 mg) by mouth daily  -     fluticasone-salmeterol (ADVAIR) 250-50 MCG/ACT inhaler; Inhale 1 puff into the lungs 2 times daily    Prediabetes  -     Adult Comprehensive Weight Management  Referral; Future    BMI 39.0-39.9,adult  -     Adult Comprehensive Weight Management  Referral; Future    Other orders  -     Adult Pulmonology Clinic Follow-Up Order (3 Month); Future      Patient returns on follow-up ILD appointment.  Diagnosis non-IPF- NSIP with organizing portions first HP with evidence small airways disease also present, groundglass also seen.  Started on mycophenolate earlier this year.  PFTs exhibit improvement on DLCO.  Patient underwent pulmonary rehab with intermittent measurements of her SpO2 on exertion noting for SpO2 greater than 90% typically while symptoms on  room air.  That being said, patient's weight continues to be an issue.  Dyspnea on exertion did improve with increased activity and she did enjoy it.  Looking to restart program and maintain it at home.  Considering her prediabetes status and the potential effect her weight is causing on her respiratory symptoms and oxygenation, will refer her to weight management clinic.  If she continues to have good improvement on her DLCO will repeat 6-minute walk to see if she continues to need oxygen.  Will continue mycophenolate at this time.  She referred to sleep medicine considering that she has had diagnosed sleep apnea but never wore a noninvasive device previously.  Considering length of time and potential confounding factor this could play with nocturnal hypoxia, refer to sleep medicine.  Has had difficulty scheduling an appointment for repeat polysomnogram.  If she does have difficulty we will place a referral again potentially to a different group.  Today she was noted to have extensive wheezing on exam though PFTs today did not show any obstruction.  Asked her to use nebulizers and if no improvement in her symptoms she could use prednisone for short course.  She had stopped her Advair for period of time and has found that her daily asthma symptoms have gotten worse.  As such, we will restart her Advair.     RTC in 3 months with PFTs and potentially 6-minute walk      54 minutes excluding the time spent on cigarette cessation was  spent on the date of the encounter doing chart review, history and exam, documentation and further activities as noted above.    These conclusions are made at the best of one's knowledge and belief based on the provided evidence such as patient's history and allergy test results and they can change over time or can be incomplete because of missing information's.    I explained the lab values, imagings and findings to the patient.  Patient expressed understanding I did not recognize any  barriers to the understanding of the patient.    The above note was dictated using voice recognition software and may include typographical errors. Please contact the author for any clarifications.    Isaac Duque MD  , Division of Pulmonary/Critical Care

## 2023-12-20 NOTE — NURSING NOTE
Chief Complaint   Patient presents with    Interstitial Lung Disease (ILD)     ILD Follow up      Vitals were taken and medications were reconciled.     Caridad WINSTON  1:06 PM

## 2023-12-21 LAB
DLCOCOR-%PRED-PRE: 76 %
DLCOCOR-PRE: 15.34 ML/MIN/MMHG
DLCOUNC-%PRED-PRE: 76 %
DLCOUNC-PRE: 15.3 ML/MIN/MMHG
DLCOUNC-PRED: 19.94 ML/MIN/MMHG
ERV-%PRED-PRE: 17 %
ERV-PRE: 0.19 L
ERV-PRED: 1.09 L
EXPTIME-PRE: 6.54 SEC
FEF2575-%PRED-POST: 138 %
FEF2575-%PRED-PRE: 113 %
FEF2575-POST: 2.82 L/SEC
FEF2575-PRE: 2.31 L/SEC
FEF2575-PRED: 2.04 L/SEC
FEFMAX-%PRED-PRE: 105 %
FEFMAX-PRE: 6.58 L/SEC
FEFMAX-PRED: 6.22 L/SEC
FEV1-%PRED-PRE: 70 %
FEV1-PRE: 1.62 L
FEV1FEV6-PRE: 86 %
FEV1FEV6-PRED: 80 %
FEV1FVC-PRE: 86 %
FEV1FVC-PRED: 80 %
FEV1SVC-PRE: 74 %
FEV1SVC-PRED: 70 %
FIFMAX-PRE: 2.94 L/SEC
FVC-%PRED-PRE: 65 %
FVC-PRE: 1.89 L
FVC-PRED: 2.9 L
IC-%PRED-PRE: 91 %
IC-PRE: 1.99 L
IC-PRED: 2.19 L
VA-%PRED-PRE: 67 %
VA-PRE: 3.23 L
VC-%PRED-PRE: 66 %
VC-PRE: 2.18 L
VC-PRED: 3.29 L

## 2023-12-26 NOTE — PROGRESS NOTES
Pulmonary Patient Follow Up Clinic Note   12/20/2023      PCP: Jessie Rose    Reason for visit: ILD     Pulmonary HPI:   Elsie Rubi is a 63 year old female w/ h/o elevated BMI, hx of PSG/HST+ but not on NIV, hiatal hernia, GERD, HTN, reported hx of asthma  who presents for follow up of ILD.     Last Pulmonary visit 7/2023.  Team conference outcome-  NSIP vs organizing pna with small airways overlap or HP with small airways disease with enough acute inflammatory changes to suggest treatment with MMF.   Started on MMF, tolerating well. Was prescribed O2 2 LPM via NC on exertion.   Pulm rehab completed.   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 Symptomatic, not wearing O2 with exertion consistently.   O2 at rest (When she should be on RA) is good. Not measuring with exertion. When at pulm rehab, they were not measuring very consistently with exertion though the few times she was measured she was on RA and spO2>90%.   Recently developed URI symptoms. CXR unchanged, oxygenation unchanged. She visited the ED yesterday- COVID-19, RSV, Influenza negative. Full viral PCR pending at time of appt.  She is coughing frequently, chest tightness, wheezing, and increased SOB/BECKER. No fevers.   Patient assumed that since she had ILD that she did not need the inhaler anymore, but has found a difference since stopping her previous Advair.   Did try going to the sleep medicine referral but had issues with scheduling. Will try again.  Having issues with weight.   Having some issues with mood, though partly due to recent job experience.   Will be traveling down to [place] soon.     Review of systems: a complete 12-point ROS conducted, & found to be negative w/ exceptions as noted in the HPI.    Reviewed PMH, PSH, FH, SH    Medications:  Current Outpatient Medications   Medication     albuterol (PROAIR HFA/PROVENTIL HFA/VENTOLIN HFA) 108 (90 Base) MCG/ACT inhaler     FLUoxetine (PROZAC) 40 MG capsule      fluticasone-salmeterol (ADVAIR) 250-50 MCG/ACT inhaler     metoprolol succinate ER (TOPROL XL) 50 MG 24 hr tablet     mycophenolate (GENERIC EQUIVALENT) 500 MG tablet     omeprazole (PRILOSEC) 20 MG DR capsule     predniSONE (DELTASONE) 20 MG tablet     traZODone (DESYREL) 50 MG tablet     ADVAIR DISKUS 250-50 MCG/ACT inhaler     No current facility-administered medications for this visit.       Allergies:  No Known Allergies    Physical examination:  /86 (BP Location: Right arm, Patient Position: Chair, Cuff Size: Adult Large)   Pulse 75   SpO2 95%     General: NAD, coughing with deep inspiration  Eyes: Anicteric sclera  Mouth: MMM w/o lesions  Neck: No stridor   Lymphatics: No significant cervical or supraclavicular LNs  CV: RR, no m/c/r   Lungs:Expiratory wheezing in posterior lung fields, and in periphery of anterior lung fields  Abd: protruding  Ext: WWP, no BLE edema  Skin: No rashes, cyanosis, or jaundice  Neuro: Intact mentation w/ normal speech. Normal strength & muscle tone w/ normal gait & stance  Psych: Euthymic, normal affect, good eye contact    Labs: reviewed in [facility] & personally interpreted.     Previous ILD serologies negative    Imaging: reviewed in [facility] & personally interpreted. Below are the interpretations from the formal Radiology review.  No new imaging    PFT:  Most Recent [facility] Pulmonary Function Testing (FVC/FEV1 only)  FVC-Pre   Date Value Ref Range Status   12/20/2023 1.89 L    07/21/2023 1.83 L      FVC-%Pred-Pre   Date Value Ref Range Status   12/20/2023 65 %    07/21/2023 62 %      FEV1-Pre   Date Value Ref Range Status   12/20/2023 1.62 L    07/21/2023 1.51 L      FEV1-%Pred-Pre   Date Value Ref Range Status   12/20/2023 70 %    07/21/2023 64 %      PFT- Date: 12/20/2023   FEV1- 1.62L (70%), FVC-1.89L (65%), FEV1/FVC- 0.86,  DLCO- 15.3 ml/min/mmHg (76%)-  Interpretation-Similar spirometry with suggestion of mild obstruction  Normalization of DLCO compared to  previous      7/21/2023  FEV1- 1.51L (64%)  FVC- 1.83 (62%)  R- 0.82  TLC- 3.06L (58%)  uncDLCO- 12.85 (64%)  ERV and RV not as reduced as severely as TLC  RV/TLC-0.41  6MW- 2L NC O2 to complete (ended at 94%). 335m walked (329m -LLN)     OSH PFTs:  3/20/23:   FVC: 1.89 L (61%)  FEV1: 1.62 L (67%)  FEV1/FVC: 86%  RV: 1.18 L (60%)  TLC: 3.38 L (68%)  RV/TLC ratio 35%  uncDLCO: 14.93 ml/min/mmHg (76%, not adjusted for Hb)    Impression & recommendations:    Ju was seen today for interstitial lung disease (ild).    Diagnoses and all orders for this visit:    ILD (interstitial lung disease) (H)  -     Adult Pulmonology Clinic Follow-Up Order (3 Month)  -     General PFT Lab (Please always keep checked); Future  -     Pulmonary Function Test; Future    Hypoxia    Mild persistent asthma with acute exacerbation  -     predniSONE (DELTASONE) 20 MG tablet; Take 1 tablet (20 mg) by mouth daily  -     fluticasone-salmeterol (ADVAIR) 250-50 MCG/ACT inhaler; Inhale 1 puff into the lungs 2 times daily    Prediabetes  -     Adult Comprehensive Weight Management  Referral; Future    BMI 39.0-39.9,adult  -     Adult Comprehensive Weight Management  Referral; Future    Other orders  -     Adult Pulmonology Clinic Follow-Up Order (3 Month); Future      Patient returns on follow-up ILD appointment.  Diagnosis non-IPF- NSIP with organizing portions first HP with evidence small airways disease also present, groundglass also seen.  Started on mycophenolate earlier this year.  PFTs exhibit improvement on DLCO.  Patient underwent pulmonary rehab with intermittent measurements of her SpO2 on exertion noting for SpO2 greater than 90% typically while symptoms on room air.  That being said, patient's weight continues to be an issue.  Dyspnea on exertion did improve with increased activity and she did enjoy it.  Looking to restart program and maintain it at home.  Considering her prediabetes status and the potential effect  her weight is causing on her respiratory symptoms and oxygenation, will refer her to weight management clinic.  If she continues to have good improvement on her DLCO will repeat 6-minute walk to see if she continues to need oxygen.  Will continue mycophenolate at this time.  She referred to sleep medicine considering that she has had diagnosed sleep apnea but never wore a noninvasive device previously.  Considering length of time and potential confounding factor this could play with nocturnal hypoxia, refer to sleep medicine.  Has had difficulty scheduling an appointment for repeat polysomnogram.  If she does have difficulty we will place a referral again potentially to a different group.  Today she was noted to have extensive wheezing on exam though PFTs today did not show any obstruction.  Asked her to use nebulizers and if no improvement in her symptoms she could use prednisone for short course.  She had stopped her Advair for period of time and has found that her daily asthma symptoms have gotten worse.  As such, we will restart her Advair.     RTC in 3 months with PFTs and potentially 6-minute walk      54 minutes excluding the time spent on cigarette cessation was  spent on the date of the encounter doing chart review, history and exam, documentation and further activities as noted above.    These conclusions are made at the best of one's knowledge and belief based on the provided evidence such as patient's history and allergy test results and they can change over time or can be incomplete because of missing information's.    I explained the lab values, imagings and findings to the patient.  Patient expressed understanding I did not recognize any barriers to the understanding of the patient.    The above note was dictated using voice recognition software and may include typographical errors. Please contact the author for any clarifications.    Isaac Duque MD  , Division of Pulmonary/Critical  Care

## 2023-12-29 DIAGNOSIS — J84.9 ILD (INTERSTITIAL LUNG DISEASE) (H): Primary | ICD-10-CM

## 2023-12-29 DIAGNOSIS — J45.31 MILD PERSISTENT ASTHMA WITH ACUTE EXACERBATION: ICD-10-CM

## 2024-01-02 ENCOUNTER — MYC MEDICAL ADVICE (OUTPATIENT)
Dept: PULMONOLOGY | Facility: CLINIC | Age: 64
End: 2024-01-02
Payer: COMMERCIAL

## 2024-01-02 DIAGNOSIS — R09.02 HYPOXIA: ICD-10-CM

## 2024-01-02 DIAGNOSIS — J84.9 ILD (INTERSTITIAL LUNG DISEASE) (H): Primary | ICD-10-CM

## 2024-01-04 RX ORDER — FLUTICASONE PROPIONATE AND SALMETEROL 250; 50 UG/1; UG/1
1 POWDER RESPIRATORY (INHALATION) EVERY 12 HOURS
Qty: 60 EACH | Refills: 3 | Status: SHIPPED | OUTPATIENT
Start: 2024-01-04 | End: 2024-04-26

## 2024-01-04 NOTE — TELEPHONE ENCOUNTER
No preference response   Rx ordered for advair inhaler  Mychart message sent to patient    Lottie Kohli RN

## 2024-01-22 ENCOUNTER — OFFICE VISIT (OUTPATIENT)
Dept: ENDOCRINOLOGY | Facility: CLINIC | Age: 64
End: 2024-01-22
Payer: COMMERCIAL

## 2024-01-22 VITALS
RESPIRATION RATE: 16 BRPM | SYSTOLIC BLOOD PRESSURE: 146 MMHG | BODY MASS INDEX: 37.94 KG/M2 | WEIGHT: 222.2 LBS | HEART RATE: 71 BPM | HEIGHT: 64 IN | OXYGEN SATURATION: 97 % | DIASTOLIC BLOOD PRESSURE: 100 MMHG

## 2024-01-22 DIAGNOSIS — R10.13 DYSPEPSIA: ICD-10-CM

## 2024-01-22 DIAGNOSIS — R73.03 PREDIABETES: Chronic | ICD-10-CM

## 2024-01-22 DIAGNOSIS — E66.01 CLASS 2 SEVERE OBESITY WITH SERIOUS COMORBIDITY AND BODY MASS INDEX (BMI) OF 38.0 TO 38.9 IN ADULT, UNSPECIFIED OBESITY TYPE (H): Primary | ICD-10-CM

## 2024-01-22 DIAGNOSIS — G47.33 OSA (OBSTRUCTIVE SLEEP APNEA): Chronic | ICD-10-CM

## 2024-01-22 DIAGNOSIS — E66.812 CLASS 2 SEVERE OBESITY WITH SERIOUS COMORBIDITY AND BODY MASS INDEX (BMI) OF 38.0 TO 38.9 IN ADULT, UNSPECIFIED OBESITY TYPE (H): Primary | ICD-10-CM

## 2024-01-22 PROCEDURE — 99205 OFFICE O/P NEW HI 60 MIN: CPT

## 2024-01-22 PROCEDURE — 99417 PROLNG OP E/M EACH 15 MIN: CPT

## 2024-01-22 ASSESSMENT — PAIN SCALES - GENERAL: PAINLEVEL: NO PAIN (0)

## 2024-01-22 NOTE — NURSING NOTE
"Chief Complaint   Patient presents with    New Patient     New bariatric        Vitals:    01/22/24 1057   BP: (!) 146/100   BP Location: Left arm   Patient Position: Sitting   Cuff Size: Adult Large   Pulse: 71   Resp: 16   SpO2: 97%   Weight: 100.8 kg (222 lb 3.2 oz)   Height: 1.626 m (5' 4\")       Body mass index is 38.14 kg/m .                          Kevon Landin NRP  "

## 2024-01-22 NOTE — PATIENT INSTRUCTIONS
"Thank you for allowing us the privilege of caring for you. We hope we provided you with the excellent service you deserve.   Please let us know if there is anything else we can do for you so that we can be sure you are completely satisfied with your care experience.    To ensure the quality of our services you may be receiving a patient satisfaction survey from an independent patient satisfaction monitoring company.    The greatest compliment you can give is a \"Likely to Recommend\"    Your visit was with Leanne Stanford PA-C today.    Instructions per today's visit:     Henrry Rubi, it was great to visit with you today.  Here is a review of our visit.  If our clinic scheduler is not able to reach you please call [phone] to schedule your next appointments.    Plan  Start Zepbound 2.5mg once weekly for 4 weeks, then increase to 5mg once weekly.  Alternatives discussed if there are issues with side effects or insurance: topiramate, contrave, metformin  Goals we discussed today: reducing nightly wine, have a protein rich snack in the afternoon (protein bar, protein shake, cheese, yogurt)   Labs ordered today: pre-bariatric labs, please schedule these at any [facility] lab  Required weight loss prior to surgery 10 lbs, weight goal is 212 lbs  Clearance(s) required prior to surgery: primary care, pulmonology, cardiology, sleep (templates have been sent)   A psychological evaluation is required for all patients prior to surgery, contact information for recommended psych providers has been sent via [employer]  Follow up with Leanne in 3 months   Meet with bariatric surgeon Dr. Cabrera in 1 month. Items to discuss: patient'sconcern for stomach ulcers, whether she needs to stop mycophenolate prior to surgery  Meet with [facility] pharmacist in 6 weeks to check in with starting zepbound and medication management  Group dietician visit scheduled tomorrow  New bariatric consult class scheduled 1/24/24  See dietician in 1 " month and monthly until surgery (3 month minimum)   Schedule EGD with Dr. Cabrera to evaluate for concern for frequent heart burn over the last 5 years        Information about Video Visits with [facility]: video visit information  _________________________________________________________________________________________________________________________________________________________  If you are asked by your clinic team to have your blood pressure checked:  [facility] do offer several locations for blood pressure checks. Please follow the below link to schedule an appointment. Scheduling an appointment at the pharmacy for a blood pressure check is now preferred.    Appointment Plus (appointment-plus.[employer])  _________________________________________________________________________________________________________________________________________________________  Important contact and scheduling information:  Please call our contact center at [phone] to schedule your next appointments.  To find a lab location near you, please call [phone].  For any nursing questions or concerns call Deyanira Schuler LPN at [phone] or Katarina Leonard RN at [phone]  Please call during clinic hours Monday through Friday 8:00a - 4:00p if you have questions or you can contact us via [employer] at anytime and we will reply during clinic hours.    Lab results will be communicated through My Chart or letter (if My Chart not used). Please call the clinic if you have not received communication after 1 week or if you have any questions.?  Clinic Fax: [phone]    _________________________________________________________________________________________________________________________________________________________  Meal Replacement Products:    Here is the link to our new e-store where you can purchase our meal replacement products    [facility] E-Store  [employer]/store    The one week starter kit is a great  way to sample a variety of products and see what works for you.    If you want more information about the product go to: [employer]    If you are an employee or [facility] or [facility] please contact your care team for a 10% estore discount    Free Shipping for orders over $75     Benefits of meal replacements products:    Portion and calorie control  Improved nutrition  Structured eating  Simplified food choices  Avoid contact with trigger foods  _________________________________________________________________________________________________________________________________________________________  Interested in working with a health ?  Health coaches work with you to improve your overall health and wellbeing.  They look at the whole person, and may involve discussion of different areas of life, including, but not limited to the four pillars of health (sleep, exercise, nutrition, and stress management). Discuss with your care team if you would like to start working a health .  Health Coaching-3 Pack: Schedule by calling [phone]    $99 for three health coaching visits    Visits may be done in person or via phone    Coaching is a partnership between the  and the client; Coaches do not prescribe or diagnose    Coaching helps inspire the client to reach his/her personal goals   _________________________________________________________________________________________________________________________________________________________  24 Week Healthy Lifestyle Plan:    Our mission in the 24-week Healthy Lifestyle Plan is to provide you with individualized care by giving you the tools, education and support you need to lose weight and maintain a healthy lifestyle. In your 24-week journey, you ll be supported by a dedicated weight loss team that includes registered dietitians, medical weight management providers, health coaches, and nurses -- all  with special expertise in weight loss -- to help you every step of the way.     Monthly meetings with your registered dietician or medical weight management provider help to review your progress, update your care plan, and make any adjustments needed to ensure success. Between these visits, weekly and bi-weekly health  visits will help you focus on the four pillars of weight loss -- stress, sleep, nutrition, and exercise -- and how you can best adapt each to achieve sustainable weight loss results.    In addition, you will be given exclusive access to online wellbeing classes through [employer].  Your initial visit will be with a medical weight management provider who will help to understand your weight loss goals and ensure this program is the right fit for you. Please let our team know if you are interested in the 24 week plan by sending a message to your care team or calling [phone] to schedule.  _________________________________________________________________________________________________________________________________________________________  __________  [facility] Get Moving Program  Our team of physical therapists is trained to help you understand and take control of your condition. They will perform a thorough evaluation to determine your ability for activity and develop a customized plan to fit your goals and physical ability.  Scheduling: Unsure if the Get Moving program is right for you? Discuss the program with your medical provider or diabetes educator. You can also call us at [phone] to ask questions or schedule an appointment.   [facility] Get Moving Program  ____________________________________________________________________________________________________________________________________________________________________________  [facility] Diabetes Prevention Program (DPP)  If you have prediabetes and Medicare please contact us via MyChart to learn more about the  Diabetes Prevention Program (DPP)  Program Details:  [facility] offers the year-long Diabetes Prevention Program (DPP). The program helps you to make lifestyle changes that prevent or delay type 2 diabetes by supporting healthy eating, increased physical activity, stress reduction and use of coping skills.   On average, previous [facility] DPP cohorts have lost and maintained at least 5% of their starting weight throughout the program and averaged more than 150 minutes of physical activity per week.  Participants meet weekly for one-hour group sessions over sixteen weeks, every other week for the next 8 weeks, and monthly for the last six months.   A year-long maintenance program is also available for participants who complete the first year.   Location & Cost:   During the COVID-19 Public Health Emergency, the program is offered virtually. When in-person classes can resume, they will be held at [facility].  For people with Medicare, the program is covered in full. A self-pay option will also be available for those with non-Medicare insurance plans.   ______________________________________________________________________________________________________________________________________________________________________________________________________________________________    To work with a Behavioral Health Psychologist:    Call to schedule:    Tu Navarrete - [phone]  Sonia Ku - [phone]  Luiza Zuniga - [phone]  Lou Franklin - [phone]   Love Ye PhD (cannot accept Medicare) [phone]        Thank you,   [facility] Comprehensive Weight Management Team

## 2024-01-22 NOTE — PROGRESS NOTES
"  145 minutes spent by me on the date of the encounter doing chart review, history and exam, documentation and further activities per the note    New Bariatric Surgery Consultation Note    2024    RE: Elsie Rubi  MR#: [id number]  : 1960      Referring provider:       2024    11:27 AM   --   Who referred you Dr. Duque       Chief Complaint/Reason for visit: evaluation for possible weight loss surgery    Dear Jessie Rose MD (General),    I had the pleasure of seeing your patient, Elsie Rubi, to evaluate her obesity and consider her for possible weight loss surgery. As you know, Elsie Rubi is 64 year old.  She has a height of 5' 4\", a weight of 222 lbs 3.2 oz, and calculated Body mass index is 38.14 kg/m .    Assessment & Plan   Problem List Items Addressed This Visit       MIC (obstructive sleep apnea) (Chronic)    Prediabetes (Chronic)    Relevant Medications    tirzepatide-Weight Management (ZEPBOUND) 2.5 MG/0.5ML prefilled pen    tirzepatide-Weight Management (ZEPBOUND) 5 MG/0.5ML prefilled pen    Other Relevant Orders    Adult Sleep Eval & Management  Referral    Parathyroid Hormone Intact    TSH with free T4 reflex    Vitamin A    Vitamin D Deficiency    Vitamin B12    Adult Cardiology Eval  Referral    Adult GI  Referral - Procedure Only    Med Therapy Management Referral    Dyspepsia     Other Visit Diagnoses       Class 2 severe obesity with serious comorbidity and body mass index (BMI) of 38.0 to 38.9 in adult, unspecified obesity type (H)    -  Primary    Relevant Medications    tirzepatide-Weight Management (ZEPBOUND) 2.5 MG/0.5ML prefilled pen    tirzepatide-Weight Management (ZEPBOUND) 5 MG/0.5ML prefilled pen    Other Relevant Orders    Adult Sleep Eval & Management  Referral    Parathyroid Hormone Intact    TSH with free T4 reflex    Vitamin A    Vitamin D Deficiency    Vitamin B12    Adult Cardiology Eval  " Referral    Adult GI  Referral - Procedure Only    Med Therapy Management Referral    BMI 39.0-39.9,adult             Overweight onset in late 30s with starting to work night shift while caring for 6 kids. Prior to this, weighed around 130 lbs.  Gained 70 lbs from age 35 to 40. Attributes this to not having time to cook, eating a lot more convenient food.   At age 40 was able to lose 30 lbs with atkins and exercising, was then prescribed aderall 70mg for adhd, saw 50lbs further weight loss, was able to get down to 130 lbs. Did not tolerate adderall due to side effects, stopped adderall 12 years ago and saw weight regain with this, and has continued to see  weight gain.     6 months ago weighed highest lifetime weight of 235 lbs, was able to lose approximately 10 lbs with significant diet changes (high protein, high fat, non-starchy vegetables, low carb), but she found this to be overly restrictive.  She has since been working to improve diet by not being overly restrictive, and has been able to maintain weight loss but has not been able to lose further weight. Focuses on mediterranean style diet currently.   She has been considering weight loss surgery for the last 10 years. She was working towards bariatric surgery in [place] 6-7 years ago, but was ultimately unable to get it due to concerns about her pulmonary health at the time.     Comorbidities associated with weight gain  include prediabetes, high blood pressure, GERD (managed with omeprazole), knee pain.   Additional health issues outside of weight gain include interstitial lung disease and asthma (diagnosed 9 months ago, managed by pulmonologist). Per her chart, she is also diagnosed with borderline personality disorder, though she disputes this diagnosis and feels that she hasn't had symptoms related to this diagnosis in over 15 years. Does not see a therapist. No history of psychosis, no history of hallucinations. Patient has a lot of community  support and feels that her mental health is well managed through her [employer] and her community.     Motivators for weight loss include improved exercise tolerance, reduce complications related to her lung disease, improve her self esteem, having more clothing options, decreasing fatigue.    She would like to pursue bariatric surgery for sustainable weight loss.     Regarding eating patterns and diet,  she typically eats 2-3 meals a day. Is able to get full. Can stay full. Eats out/ gets take out 2 times a month. Drinks coffee with half and half and splenda (2 cups every morning). Drinks diet pepsi a few time a week. Drinks a few glasses of water throughout the day. Drinks 2 glasses of wine every night. Is open to having 1 glass of wine instead.     L[employer]-day Restorationist, doesn't eat meat any Wednesday or Friday, and then for 7 weeks before christmas, 7 weeks before easter.     Breakfast: eats later in the day due to medication restrictions (mycophenolate) coffee with half n half, salami, cheese  Lunch: skips lunch about half of the time, might snack on cheese or left-overs, piece of bread with peanut butter   Dinner: hamburger vanesa with mayonnaise, salmon and a vegetable, tuna melt, always has 2 glasses of wine  Snacks: 3 fun sized chocolates after   Doesn't eat after dinner.    Struggles with portion size, enjoys sweets, enjoys wine every night.     Regarding activity, has a very sedentary lifestyle. 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  Doesn't typically like to leave house, loves to be at home. Bought a modified rowing machine that she had enjoyed using in the past with pulmonary rehab, hasn't been using. Is struggling to find the motivation to exercise more and also has big issues with activity due to anxiety related to becoming out of breath as a result of her interstitial lung disease.       Past/ Current AOMs   Current: none  Past: Wellbutrin in the past for mood 20-30+ years ago. Does not recall  negative side effects, cannot recall if it impacted weight.       To help with 10 lbs of weight loss prior to surgery, would likely benefit with a GLP-1 GIP dual agonist medication like zepbound to help with reducing portion size and in reducing cravings for sweets and wine. Side effects and risks associated with this medication, as well as medication administration instructions, were discussed. Patient expressed understanding and a willingness to proceed with starting this medication.     Plan  Start Zepbound 2.5mg once weekly for 4 weeks, then increase to 5mg once weekly.  Alternatives discussed if there are issues with side effects or insurance: topiramate, contrave, metformin  Goals we discussed today: reducing nightly wine, have a protein rich snack in the afternoon (protein bar, protein shake, cheese, yogurt)   Labs ordered today: pre-bariatric labs, please schedule these at any [facility] lab  Required weight loss prior to surgery 10 lbs, weight goal is 212 lbs  Clearance(s) required prior to surgery: primary care, pulmonology, cardiology, sleep (templates have been sent)   A psychological evaluation is required for all patients prior to surgery, contact information for recommended psych providers has been sent via [employer]  Follow up with Leanne in 3 months   Meet with bariatric surgeon Dr. Cabrera in 1 month. Items to discuss: patient's concern for stomach ulcers, whether she needs to stop mycophenolate prior to surgery  Meet with [facility] pharmacist in 6 weeks to check in with starting zepbound and medication management  Group dietician visit scheduled tomorrow  New bariatric consult class scheduled 1/24/24  See dietician in 1 month and monthly until surgery (3 month minimum)   Schedule EGD with Dr. Cabrera to evaluate for concern for frequent heart burn over the last 5 years      Anti-obesity medication started today for this patient   ZEPBOUND  Not concurrently taking a different GLP-1   No history of pancreatitis    No personal or family history of medullary thyroid carcinoma  No personal or family history of Multiple Endocrine Neoplasia Type 2  .     Potential anti-obesity medications for this patient the future if there are issues with cost or side effects  TOPIRAMATE  No history of kidney stones  No history of glaucoma   No issues with memory  No chronic kidney disease   .  CONTRAVE  No current opioid use  No history of liver disease  No history of bipolar disorder  No history of cardiovascular disease   No history of cardiac arrhythmias   No history of seizures  No history of bulimia nervosa  No history of anorexia nervosa  .  METFORMIN  Has diagnosis of prediabetes   No history of chronic kidney disease  GFR >45  .    The following medications could be considered with caution for this patient   PHENTERMINE  Would need better controlled blood pressure  .         HISTORY OF PRESENT ILLNESS:      1/16/2024    11:27 AM   Weight Loss History Reviewed with Patient   How long have you been overweight? From Middle age and beyond   What is the most that you have ever weighed 233   What is the most weight you have lost? 70   I have tried the following methods to lose weight Watching portions or calories    Exercise    Atkins type diet (low carb/high protein)   I have tried the following weight loss medications? (Check all that apply) None     Overweight onset in late 30s with starting to work night shift while caring for 6 kids. Prior to this, weighed around 130 lbs.  Gained 70 lbs from age 35 to 40. Attributes this to not having time to cook, eating a lot more convenient food.   At age 40 was able to lose 30 lbs with atkins and exercising, was then prescribed aderall 70mg for adhd, saw 50lbs weight loss, was able to get down to 130 lbs. Did not tolerate adderall due to side effects, stopped adderall 12 years ago and saw weight regain with this, and has continued to see  weight gain.     6 months ago weighed highest lifetime  weight of 235, was able to lose approximately 10 lbs with significant diet changes (high protein, high fat, non-starchy vegetables, low carb), but she found this to be overly restrictive.  She has since been working to improve diet by not being overly restrictive, and has been able to maintain weight loss but has not been able to lose weight. Focuses on mediterranean style diet currently.     Comorbidities associated with weight gain  include prediabetes, high blood pressure, GERD (managed with omeprazole), knee pain.   Additional health issues outside of weight gain include interstitial lung disease (diagnosed 9 months ago, managed by pulmonologist).   Motivators include improved self esteem, having more clothing options, improve energy levels.      Has been considering weight loss surgery for the last 10 years. She was working towards bariatric surgery in [place] 6-7 years ago, but was ultimately unable to get it due to pulmonary concerns.     Regarding eating patterns and diet,  she typically eats 2-3 meals a day. Is able to get full. Can stay full. Eats out/ gets take out 2 times a month. Drinks coffee with half and half and splenda (2 cups every morning). Drinks diet pepsi a few time a week. Drinks a few glasses of water throughout the day. Drinks 2 glasses of wine every night. Is open to having 1 glass of wine instead.     L[employer] day Baptism, doesn't eat meat any Wednesday or Friday, and then for 7 weeks before tamiko, 7 weeks before easter.     Breakfast: eats later in the day due to medication restrictions (mycophenolate) coffee with half n half, salami, cheese  Lunch: skips lunch about half of the time, might snack on cheese or left-overs, piece of bread with peanut butter   Dinner: hamburger vanesa with mayonnaise, salmon and a vegetable, tuna melt, always has 2 glasses of wine  Snacks: 3 fun sized chocolates after   Doesn't eat after dinner.        Regarding activity, has a very sedentary lifestyle. 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  Doesn't typically like to leave house, loves to be at home. Bought a modified rowing machine that she had enjoyed using in the past with pulmonary rehab, hasn't been using. Is struggling to find the motivation to exercise more and also has big issues with activity due to anxiety related to becoming out of breath as a result of her interstitial lung disease.       Past/ Current AOMs   Current: none    Past: Wellbutrin in the past for mood 20-30+ years ago. Does not recall negative side effects, cannot recall if it impacted weight.     CO-MORBIDITIES OF OBESITY INCLUDE:      1/16/2024    11:27 AM   --   I have the following health issues associated with obesity Pre-Diabetes    High Blood Pressure    GERD (Reflux)    Asthma    Stress Incontinence       MIC: has been diagnosed with sleep apnea, does not use a CPAP.     History of bleeding or clotting disorder: none    Is patient on biologic or immunomodulators: takes mycophenolate for interstitial lung disease         PAST MEDICAL HISTORY:  Past Medical History:   Diagnosis Date    Benign essential hypertension     Borderline personality disorder (H) 08/13/2020    Depression     ILD (interstitial lung disease) (H)     Primary osteoarthritis of left foot 08/13/2020     Has been diagnosed with major depressive disorder   Hasn't had issues related to borderline personality disorder for over 15 years. Does not see a therapist. Patient does not feel that borderline personality disorder is appropriate to have on her chart. No history of psychosis, no history of hallucinations. Patient has a lot of community support and feels that her mental health is well managed through her [employer] and her community.       PAST SURGICAL HISTORY:  Past Surgical History:   Procedure Laterality Date    BUNIONECTOMY Left      Tubal ligation     No other abdominal surgeries.        FAMILY HISTORY:   No family history on file.    SOCIAL HISTORY:        1/16/2024    11:27 AM   Social History Questions Reviewed With Patient   Which best describes your employment status (select all that apply) I am retired   Which best describes your marital status    Who do you have in your support network that can be available to help you for the first 2 weeks after surgery?    Who can you count on for support throughout your weight loss surgery journey?        Alcohol use: 2 glasses of wine daily   Marijauna use: seldom, tried THC gummies 1.5 months ago, doesn't plan on doing it again.         HABITS:      1/16/2024    11:27 AM   --   How often do you drink alcohol? 4 or more times a week   If you do drink alcohol, how many drinks might you have in a day? (one drink = 5 oz. wine, 1 can/bottle of beer, 1 shot liquor) 1 or 2   Do you currently use any of the following Nicotine products? No   Have you ever used any of the following nicotine products? No   Have you or are you currently using street drugs or prescription strength medication for which you do not have a prescription for? No   Do you have a history of chemical dependency (alcohol or drug abuse)? No     Currently taking narcotic/opioids No      PSYCHOLOGICAL HISTORY:       1/16/2024    11:27 AM   Psychological History Reviewed With Patient   Have you ever attempted suicide? Never.   Have you had thoughts of suicide in the past year? No   Have you ever been hospitalized for mental illness or a suicide attempt? More than 10 years ago.   Do you have a history of chronic pain? Yes   Have you ever been diagnosed with fibromyalgia? Yes   Are you currently being treated for any of the following? (select all that apply) Depression   Are you currently seeing a therapist or counselor? No   Are you currently seeing a psychiatrist? No     Was hospitalized due to extreme anxiety more than 10 years ago.   No suicidal attempts.   No suicidal ideation for the last several years.     ROS:      1/16/2024    11:27 AM    --   Skin None of the above   HEENT None of these   Musculoskeletal Back pain    Limited mobility   Cardiovascular None of the above   Pulmonary Shortness of breath with activity   Gastrointestinal Heartburn   Genitourinary Stress incontinence (losing urine when coughing, sneezing, etc.)   Hematological Family history of blood clots   Neurological None of the above   Female only Post-menopausal       GI specific ROS   GERD Yes  Frequency of GERD symptoms: significant issues (abdominal pain, burping) when not taking omeprazole, well controlled with omeprazole   Duration of GERD symptoms :10 years   History of peptic ulcer disease No , she expresses concern that she has one undiagnosed.   Dysphagia: none  Vomiting No  Hematemesis No  Melena No  Hematochezia No  Last EGD: last one 5+ years ago, does not recall the reading from this     EATING BEHAVIORS:      1/16/2024    11:27 AM   --   Have you or anyone else thought that you had an eating disorder? No   Do you currently binge eat (eat a large amount of food in a short time)? No   Are you an emotional eater? No   Do you get up to eat after falling asleep? No   Can you afford 3 meals a day? Yes   Can you afford 50-60 dollars a month for vitamins? Yes         EXERCISE:      1/16/2024    11:27 AM   --   How often do you exercise? Less than 1 time per week   What is the duration of your exercise (in minutes)? 10 Minutes   What keeps you from being more active? Pain    I should be more active but I just have not gotten around to it    Shortness of breath         MEDICATIONS:  Current Outpatient Medications   Medication Sig Dispense Refill    albuterol (PROAIR HFA/PROVENTIL HFA/VENTOLIN HFA) 108 (90 Base) MCG/ACT inhaler Inhale 2 puffs into the lungs      FLUoxetine (PROZAC) 40 MG capsule TAKE ONE CAPSULE BY MOUTH DAILY 90 capsule 3    fluticasone-salmeterol (ADVAIR) 250-50 MCG/ACT inhaler Inhale 1 puff into the lungs every 12 hours 60 each 3    omeprazole (PRILOSEC) 20  MG DR capsule Take 20 mg by mouth daily      tirzepatide-Weight Management (ZEPBOUND) 2.5 MG/0.5ML prefilled pen Inject 0.5 mLs (2.5 mg) Subcutaneous every 7 days For 4 weeks 2 mL 0    tirzepatide-Weight Management (ZEPBOUND) 5 MG/0.5ML prefilled pen Inject 0.5 mLs (5 mg) Subcutaneous every 7 days After completing 4 weeks of taking the 2.5mg dose 2 mL 2    traZODone (DESYREL) 50 MG tablet Take 1 tablet (50 mg) by mouth nightly as needed for sleep 90 tablet 1    metoprolol succinate ER (TOPROL XL) 50 MG 24 hr tablet Take 0.5 tablets (25 mg) by mouth daily at 2 pm      mycophenolate (GENERIC EQUIVALENT) 500 MG tablet Take 2 tablets (1,000 mg) by mouth 2 times daily 120 tablet 3       ALLERGIES:  No Known Allergies             No data to display                FIB-4 Calculation: 1.91 at 12/20/2023 11:42 AM  Calculated from:  SGOT/AST: 33 U/L at 12/20/2023 11:42 AM  SGPT/ALT: 24 U/L at 12/20/2023 11:42 AM  Platelets: 222 10e3/uL at 12/20/2023 11:42 AM  Age: 63 years    Fib-4 < 1.3: No further evaluation at this point, unless other concerns    - If the Fib-4 is >2.67  Fibroscan and elective liver clinic referral    - Intermediate Fib-4 scores: Get a Fibroscan, consider repeating this in 1-2 years.    Anti-obesity medication ROS:    HEENT  Hx of glaucoma: No    Cardiovascular  CAD:No  HTN:Yes managed with PCP     Gastrointestinal  GERD:Yes  Constipation:Yes has a rectocele  Liver Dz:No  H/O Pancreatitis:No    Psychiatric  Bipolar: No , no history of psychosis or hallucinations   Anxiety:Yes  Depression:Yes  History of alcohol/drug abuse: No  Hx of eating disorder:No    Endocrine  Personal or family hx of MTC or MEN2:No  Diabetes/prediabetes: Yes    Neurologic:  Hx of seizures: No  Hx of migraines: Yes stopped since menopause   Memory Impairment: No  CVA history: No        History of kidney stones: No  Kidney disease: No        Objective    BP (!) 146/100 (BP Location: Left arm, Patient Position: Sitting, Cuff Size:  "Adult Large)   Pulse 71   Resp 16   Ht 1.626 m (5' 4\")   Wt 100.8 kg (222 lb 3.2 oz)   SpO2 97%   BMI 38.14 kg/m    BP (!) 146/100 (BP Location: Left arm, Patient Position: Sitting, Cuff Size: Adult Large)   Pulse 71   Resp 16   Ht 1.626 m (5' 4\")   Wt 100.8 kg (222 lb 3.2 oz)   SpO2 97%   BMI 38.14 kg/m    Body mass index is 38.14 kg/m .  Physical Exam   GENERAL: alert and no distress  EYES: Eyes grossly normal to inspection.  No discharge or erythema, or obvious scleral/conjunctival abnormalities.  RESP: No audible wheeze, cough, or visible cyanosis.    SKIN: Visible skin clear. No significant rash, abnormal pigmentation or lesions.  NEURO: Cranial nerves grossly intact.  Mentation and speech appropriate for age.  PSYCH: Appropriate affect, tone, and pace of words        In summary, Elsie Rubi has Class II obesity with a body mass index of Body mass index is 38.14 kg/m . kg/m2 and the comorbidities stated above. She completed an informational seminar and is a possible candidate for the laparoscopic gastric sleeve.  She will have to complete the following pre-requisites:    Received weight loss goal of 10 lb prior to surgery.  Achieve clearance from dietitian to see surgeon.  Have preoperative laboratory tests drawn.  Psychological Evaluation with MMPI and clearance for weight loss surgery.  Letter of clearance from the following pulmonology, cardiology, sleep medicine .    Today in the office we discussed gastric sleeve surgery. Preoperative, perioperative, and postoperative processes, management, and follow up were addressed.  Risks and benefits were outlined including the risk of death, staple line leak (1-2%), PE, DVT, ulcer, worsening GERD, N/V, stricture, hernia, wound infection, weight regain, and vitamin deficiencies. I emphasized exercise and activity along with appropriate food choice as the main foundation for weight loss with surgery providing surgical reinforcement of this.  All " questions were answered.  A goal sheet and support group handout were given to the patient.        If you have not already watched our online seminar please go to [email or web address]    Weight loss requirement: 10lbs prior to surgery. Goal Weight: 212. Will have final weight check 2-3 weeks prior to surgery at anesthesia or nurse pre-op teaching visit.    -Need current weight confirmation at primary clinic or weight management clinic No    Bariatric labs ordered, call for a lab only appointment at any [facility] lab. To find a lab location near you, please call [phone]. Please let us know if orders need to be faxed to a non [facility] lab.    Schedule bariatric psych eval as soon as possible.  List of psychologists will be sent to you via [email or web address] or given to you in clinic.     Call Agustin Arreaga at [phone] to discuss insurance coverage for bariatric surgery.  Please check with your insurance regarding bariatric surgery coverage also. Agustin can also help you with scheduling psych eval if you are having difficulties.    The following clearance letters are needed: Letter templates will be sent to you via [email or web address] or given to you in clinic. Providers can submit through electronic medical record or fax to [phone].  - Primary care provider.   - Sleep Medicine due to history of diagnosed sleep apnea, Cardiology due to age over 55, and Pulmonology due to interstitial lung disease     Smoking cessation and nicotine test needed: No    Birth control after surgery discussed. Patient instructed that 2 forms of birth control required after surgery and to avoid pregnancy for at least 18 months after surgery: NA, post menopause     NEXT VISITS: A  should reach out to you to schedule the following appointments.  If they do not reach you please call [phone] to schedule the following appointments:    -See dietitian in 1 month and monthly for 3 months    -See Lauren Bloch [facility]  pharmacist in 1 month to follow up on weight loss medications and     -See Leanne in 3 months to follow up on pre-op weight loss and weight loss medications    -See Dr Cabrera in 1 month for bariatric surgeon visit. Discuss bariatric surgery.  Important items to discuss: concern for stomach ulcers, whether she needs to stop mycophenolate prior to surgery    -Schedule EGD with Dr. Cabrera      Once the patient has completed the requirements in their task list and there are no further recommendations, the pt will be allowed to see the surgeon of their choice for consultation on the laparoscopic gastric sleeve surgery. Patient verbalizes understanding of the process to surgery and expectations for the postoperative period including the need for lifelong lifestyle changes, vitamin supplementation, and laboratory monitoring.    Medications that may contribute to the patient's obesity, such as antipsychotic medications, have been identified. Correctable endocrine disorders and other medical conditions have been ruled out, or are under successful treatment. Identified personal barriers to making and continuing needed life changes. Identified behavior changes/strategies to address personal barriers.    Sincerely,     Leanne Stanford PA-C

## 2024-01-22 NOTE — LETTER
"2024       RE: Elsie Rubi  [address] [facility] [address]     Dear Colleague,    Thank you for referring your patient, Elsie Rubi, to the [facility]. Please see a copy of my visit note below.      145 minutes spent by me on the date of the encounter doing chart review, history and exam, documentation and further activities per the note    New Bariatric Surgery Consultation Note    2024    RE: Elsie Rubi  MR#: [id number]  : 1960      Referring provider:       2024    11:27 AM   --   Who referred you Dr. Duque       Chief Complaint/Reason for visit: evaluation for possible weight loss surgery    Dear Jessie Rose MD (General),    I had the pleasure of seeing your patient, Elsie Rubi, to evaluate her obesity and consider her for possible weight loss surgery. As you know, Elsie Rubi is 64 year old.  She has a height of 5' 4\", a weight of 222 lbs 3.2 oz, and calculated Body mass index is 38.14 kg/m .    Assessment & Plan  Problem List Items Addressed This Visit       MIC (obstructive sleep apnea) (Chronic)    Prediabetes (Chronic)    Relevant Medications    tirzepatide-Weight Management (ZEPBOUND) 2.5 MG/0.5ML prefilled pen    tirzepatide-Weight Management (ZEPBOUND) 5 MG/0.5ML prefilled pen    Other Relevant Orders    Adult Sleep Eval & Management  Referral    Parathyroid Hormone Intact    TSH with free T4 reflex    Vitamin A    Vitamin D Deficiency    Vitamin B12    Adult Cardiology Eval  Referral    Adult GI  Referral - Procedure Only    Med Therapy Management Referral    Dyspepsia     Other Visit Diagnoses       Class 2 severe obesity with serious comorbidity and body mass index (BMI) of 38.0 to 38.9 in adult, unspecified obesity type (H)    -  Primary    Relevant Medications    tirzepatide-Weight Management " (ZEPBOUND) 2.5 MG/0.5ML prefilled pen    tirzepatide-Weight Management (ZEPBOUND) 5 MG/0.5ML prefilled pen    Other Relevant Orders    Adult Sleep Eval & Management  Referral    Parathyroid Hormone Intact    TSH with free T4 reflex    Vitamin A    Vitamin D Deficiency    Vitamin B12    Adult Cardiology Eval  Referral    Adult GI  Referral - Procedure Only    Med Therapy Management Referral    BMI 39.0-39.9,adult             Overweight onset in late 30s with starting to work night shift while caring for 6 kids. Prior to this, weighed around 130 lbs.  Gained 70 lbs from age 35 to 40. Attributes this to not having time to cook, eating a lot more convenient food.   At age 40 was able to lose 30 lbs with atkins and exercising, was then prescribed aderall 70mg for adhd, saw 50lbs further weight loss, was able to get down to 130 lbs. Did not tolerate adderall due to side effects, stopped adderall 12 years ago and saw weight regain with this, and has continued to see  weight gain.     6 months ago weighed highest lifetime weight of 235 lbs, was able to lose approximately 10 lbs with significant diet changes (high protein, high fat, non-starchy vegetables, low carb), but she found this to be overly restrictive.  She has since been working to improve diet by not being overly restrictive, and has been able to maintain weight loss but has not been able to lose further weight. Focuses on mediterranean style diet currently.   She has been considering weight loss surgery for the last 10 years. She was working towards bariatric surgery in [place] 6-7 years ago, but was ultimately unable to get it due to concerns about her pulmonary health at the time.     Comorbidities associated with weight gain  include prediabetes, high blood pressure, GERD (managed with omeprazole), knee pain.   Additional health issues outside of weight gain include interstitial lung disease and asthma (diagnosed 9 months ago,  managed by pulmonologist). Per her chart, she is also diagnosed with borderline personality disorder, though she disputes this diagnosis and feels that she hasn't had symptoms related to this diagnosis in over 15 years. Does not see a therapist. No history of psychosis, no history of hallucinations. Patient has a lot of community support and feels that her mental health is well managed through her [employer] and her community.     Motivators for weight loss include improved exercise tolerance, reduce complications related to her lung disease, improve her self esteem, having more clothing options, decreasing fatigue.    She would like to pursue bariatric surgery for sustainable weight loss.     Regarding eating patterns and diet,  she typically eats 2-3 meals a day. Is able to get full. Can stay full. Eats out/ gets take out 2 times a month. Drinks coffee with half and half and splenda (2 cups every morning). Drinks diet pepsi a few time a week. Drinks a few glasses of water throughout the day. Drinks 2 glasses of wine every night. Is open to having 1 glass of wine instead.     Congregational Religious, doesn't eat meat any Wednesday or Friday, and then for 7 weeks before christmas, 7 weeks before easter.     Breakfast: eats later in the day due to medication restrictions (mycophenolate) coffee with half n half, salami, cheese  Lunch: skips lunch about half of the time, might snack on cheese or left-overs, piece of bread with peanut butter   Dinner: hamburger vanesa with mayonnaise, salmon and a vegetable, tuna melt, always has 2 glasses of wine  Snacks: 3 fun sized chocolates after   Doesn't eat after dinner.    Struggles with portion size, enjoys sweets, enjoys wine every night.     Regarding activity, has a very sedentary lifestyle. 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  Doesn't typically like to leave house, loves to be at home. Bought a modified rowing machine that she had enjoyed using in the past with pulmonary  rehab, hasn't been using. Is struggling to find the motivation to exercise more and also has big issues with activity due to anxiety related to becoming out of breath as a result of her interstitial lung disease.       Past/ Current AOMs   Current: none  Past: Wellbutrin in the past for mood 20-30+ years ago. Does not recall negative side effects, cannot recall if it impacted weight.       To help with 10 lbs of weight loss prior to surgery, would likely benefit with a GLP-1 GIP dual agonist medication like zepbound to help with reducing portion size and in reducing cravings for sweets and wine. Side effects and risks associated with this medication, as well as medication administration instructions, were discussed. Patient expressed understanding and a willingness to proceed with starting this medication.     Plan  Start Zepbound 2.5mg once weekly for 4 weeks, then increase to 5mg once weekly.  Alternatives discussed if there are issues with side effects or insurance: topiramate, contrave, metformin  Goals we discussed today: reducing nightly wine, have a protein rich snack in the afternoon (protein bar, protein shake, cheese, yogurt)   Labs ordered today: pre-bariatric labs, please schedule these at any [place] lab  Required weight loss prior to surgery 10 lbs, weight goal is 212 lbs  Clearance(s) required prior to surgery: primary care, pulmonology, cardiology, sleep (templates have been sent)   A psychological evaluation is required for all patients prior to surgery, contact information for recommended psych providers has been sent via FAMOCO  Follow up with Leanne in 3 months   Meet with bariatric surgeon Dr. Cabrera in 1 month. Items to discuss: patient's concern for stomach ulcers, whether she needs to stop mycophenolate prior to surgery  Meet with [facility] pharmacist in 6 weeks to check in with starting zepbound and medication management  Group dietician visit scheduled tomorrow  New bariatric consult class  scheduled 1/24/24  See dietician in 1 month and monthly until surgery (3 month minimum)   Schedule EGD with Dr. Cabrera to evaluate for concern for frequent heart burn over the last 5 years      Anti-obesity medication started today for this patient   ZEPBOUND  Not concurrently taking a different GLP-1   No history of pancreatitis   No personal or family history of medullary thyroid carcinoma  No personal or family history of Multiple Endocrine Neoplasia Type 2  .     Potential anti-obesity medications for this patient the future if there are issues with cost or side effects  TOPIRAMATE  No history of kidney stones  No history of glaucoma   No issues with memory  No chronic kidney disease   .  CONTRAVE  No current opioid use  No history of liver disease  No history of bipolar disorder  No history of cardiovascular disease   No history of cardiac arrhythmias   No history of seizures  No history of bulimia nervosa  No history of anorexia nervosa  .  METFORMIN  Has diagnosis of prediabetes   No history of chronic kidney disease  GFR >45  .    The following medications could be considered with caution for this patient   PHENTERMINE  Would need better controlled blood pressure  .         HISTORY OF PRESENT ILLNESS:      1/16/2024    11:27 AM   Weight Loss History Reviewed with Patient   How long have you been overweight? From Middle age and beyond   What is the most that you have ever weighed 233   What is the most weight you have lost? 70   I have tried the following methods to lose weight Watching portions or calories    Exercise    Atkins type diet (low carb/high protein)   I have tried the following weight loss medications? (Check all that apply) None     Overweight onset in late 30s with starting to work night shift while caring for 6 kids. Prior to this, weighed around 130 lbs.  Gained 70 lbs from age 35 to 40. Attributes this to not having time to cook, eating a lot more convenient food.   At age 40 was able to  lose 30 lbs with atkins and exercising, was then prescribed aderall 70mg for adhd, saw 50lbs weight loss, was able to get down to 130 lbs. Did not tolerate adderall due to side effects, stopped adderall 12 years ago and saw weight regain with this, and has continued to see  weight gain.     6 months ago weighed highest lifetime weight of 235, was able to lose approximately 10 lbs with significant diet changes (high protein, high fat, non-starchy vegetables, low carb), but she found this to be overly restrictive.  She has since been working to improve diet by not being overly restrictive, and has been able to maintain weight loss but has not been able to lose weight. Focuses on mediterranean style diet currently.     Comorbidities associated with weight gain  include prediabetes, high blood pressure, GERD (managed with omeprazole), knee pain.   Additional health issues outside of weight gain include interstitial lung disease (diagnosed 9 months ago, managed by pulmonologist).   Motivators include improved self esteem, having more clothing options, improve energy levels.      Has been considering weight loss surgery for the last 10 years. She was working towards bariatric surgery in [place] 6-7 years ago, but was ultimately unable to get it due to pulmonary concerns.     Regarding eating patterns and diet,  she typically eats 2-3 meals a day. Is able to get full. Can stay full. Eats out/ gets take out 2 times a month. Drinks coffee with half and half and splenda (2 cups every morning). Drinks diet pepsi a few time a week. Drinks a few glasses of water throughout the day. Drinks 2 glasses of wine every night. Is open to having 1 glass of wine instead.     Religion Denominational, doesn't eat meat any Wednesday or Friday, and then for 7 weeks before tamiko, 7 weeks before easter.     Breakfast: eats later in the day due to medication restrictions (mycophenolate) coffee with half n half, salami, cheese  Lunch: skips lunch  about half of the time, might snack on cheese or left-overs, piece of bread with peanut butter   Dinner: hamburger vanesa with mayonnaise, salmon and a vegetable, tuna melt, always has 2 glasses of wine  Snacks: 3 fun sized chocolates after   Doesn't eat after dinner.        Regarding activity, has a very sedentary lifestyle. 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  Doesn't typically like to leave house, loves to be at home. Bought a modified rowing machine that she had enjoyed using in the past with pulmonary rehab, hasn't been using. Is struggling to find the motivation to exercise more and also has big issues with activity due to anxiety related to becoming out of breath as a result of her interstitial lung disease.       Past/ Current AOMs   Current: none    Past: Wellbutrin in the past for mood 20-30+ years ago. Does not recall negative side effects, cannot recall if it impacted weight.     CO-MORBIDITIES OF OBESITY INCLUDE:      1/16/2024    11:27 AM   --   I have the following health issues associated with obesity Pre-Diabetes    High Blood Pressure    GERD (Reflux)    Asthma    Stress Incontinence       MIC: has been diagnosed with sleep apnea, does not use a CPAP.     History of bleeding or clotting disorder: none    Is patient on biologic or immunomodulators: takes mycophenolate for interstitial lung disease         PAST MEDICAL HISTORY:  Past Medical History:   Diagnosis Date    Benign essential hypertension     Borderline personality disorder (H) 08/13/2020    Depression     ILD (interstitial lung disease) (H)     Primary osteoarthritis of left foot 08/13/2020     Has been diagnosed with major depressive disorder   Hasn't had issues related to borderline personality disorder for over 15 years. Does not see a therapist. Patient does not feel that borderline personality disorder is appropriate to have on her chart. No history of psychosis, no history of hallucinations. Patient has a lot of community  support and feels that her mental health is well managed through her [employer] and her community.       PAST SURGICAL HISTORY:  Past Surgical History:   Procedure Laterality Date    BUNIONECTOMY Left      Tubal ligation     No other abdominal surgeries.        FAMILY HISTORY:   No family history on file.    SOCIAL HISTORY:       1/16/2024    11:27 AM   Social History Questions Reviewed With Patient   Which best describes your employment status (select all that apply) I am retired   Which best describes your marital status    Who do you have in your support network that can be available to help you for the first 2 weeks after surgery?    Who can you count on for support throughout your weight loss surgery journey?        Alcohol use: 2 glasses of wine daily   Marijauna use: seldom, tried THC gummies 1.5 months ago, doesn't plan on doing it again.         HABITS:      1/16/2024    11:27 AM   --   How often do you drink alcohol? 4 or more times a week   If you do drink alcohol, how many drinks might you have in a day? (one drink = 5 oz. wine, 1 can/bottle of beer, 1 shot liquor) 1 or 2   Do you currently use any of the following Nicotine products? No   Have you ever used any of the following nicotine products? No   Have you or are you currently using street drugs or prescription strength medication for which you do not have a prescription for? No   Do you have a history of chemical dependency (alcohol or drug abuse)? No     Currently taking narcotic/opioids No      PSYCHOLOGICAL HISTORY:       1/16/2024    11:27 AM   Psychological History Reviewed With Patient   Have you ever attempted suicide? Never.   Have you had thoughts of suicide in the past year? No   Have you ever been hospitalized for mental illness or a suicide attempt? More than 10 years ago.   Do you have a history of chronic pain? Yes   Have you ever been diagnosed with fibromyalgia? Yes   Are you currently being treated for any of the  following? (select all that apply) Depression   Are you currently seeing a therapist or counselor? No   Are you currently seeing a psychiatrist? No     Was hospitalized due to extreme anxiety more than 10 years ago.   No suicidal attempts.   No suicidal ideation for the last several years.     ROS:      1/16/2024    11:27 AM   --   Skin None of the above   HEENT None of these   Musculoskeletal Back pain    Limited mobility   Cardiovascular None of the above   Pulmonary Shortness of breath with activity   Gastrointestinal Heartburn   Genitourinary Stress incontinence (losing urine when coughing, sneezing, etc.)   Hematological Family history of blood clots   Neurological None of the above   Female only Post-menopausal       GI specific ROS   GERD Yes  Frequency of GERD symptoms: significant issues (abdominal pain, burping) when not taking omeprazole, well controlled with omeprazole   Duration of GERD symptoms :10 years   History of peptic ulcer disease No , she expresses concern that she has one undiagnosed.   Dysphagia: none  Vomiting No  Hematemesis No  Melena No  Hematochezia No  Last EGD: last one 5+ years ago, does not recall the reading from this     EATING BEHAVIORS:      1/16/2024    11:27 AM   --   Have you or anyone else thought that you had an eating disorder? No   Do you currently binge eat (eat a large amount of food in a short time)? No   Are you an emotional eater? No   Do you get up to eat after falling asleep? No   Can you afford 3 meals a day? Yes   Can you afford 50-60 dollars a month for vitamins? Yes         EXERCISE:      1/16/2024    11:27 AM   --   How often do you exercise? Less than 1 time per week   What is the duration of your exercise (in minutes)? 10 Minutes   What keeps you from being more active? Pain    I should be more active but I just have not gotten around to it    Shortness of breath         MEDICATIONS:  Current Outpatient Medications   Medication Sig Dispense Refill     albuterol (PROAIR HFA/PROVENTIL HFA/VENTOLIN HFA) 108 (90 Base) MCG/ACT inhaler Inhale 2 puffs into the lungs      FLUoxetine (PROZAC) 40 MG capsule TAKE ONE CAPSULE BY MOUTH DAILY 90 capsule 3    fluticasone-salmeterol (ADVAIR) 250-50 MCG/ACT inhaler Inhale 1 puff into the lungs every 12 hours 60 each 3    omeprazole (PRILOSEC) 20 MG DR capsule Take 20 mg by mouth daily      tirzepatide-Weight Management (ZEPBOUND) 2.5 MG/0.5ML prefilled pen Inject 0.5 mLs (2.5 mg) Subcutaneous every 7 days For 4 weeks 2 mL 0    tirzepatide-Weight Management (ZEPBOUND) 5 MG/0.5ML prefilled pen Inject 0.5 mLs (5 mg) Subcutaneous every 7 days After completing 4 weeks of taking the 2.5mg dose 2 mL 2    traZODone (DESYREL) 50 MG tablet Take 1 tablet (50 mg) by mouth nightly as needed for sleep 90 tablet 1    metoprolol succinate ER (TOPROL XL) 50 MG 24 hr tablet Take 0.5 tablets (25 mg) by mouth daily at 2 pm      mycophenolate (GENERIC EQUIVALENT) 500 MG tablet Take 2 tablets (1,000 mg) by mouth 2 times daily 120 tablet 3       ALLERGIES:  No Known Allergies             No data to display                FIB-4 Calculation: 1.91 at 12/20/2023 11:42 AM  Calculated from:  SGOT/AST: 33 U/L at 12/20/2023 11:42 AM  SGPT/ALT: 24 U/L at 12/20/2023 11:42 AM  Platelets: 222 10e3/uL at 12/20/2023 11:42 AM  Age: 63 years    Fib-4 < 1.3: No further evaluation at this point, unless other concerns    - If the Fib-4 is >2.67  Fibroscan and elective liver clinic referral    - Intermediate Fib-4 scores: Get a Fibroscan, consider repeating this in 1-2 years.    Anti-obesity medication ROS:    HEENT  Hx of glaucoma: No    Cardiovascular  CAD:No  HTN:Yes managed with PCP     Gastrointestinal  GERD:Yes  Constipation:Yes has a rectocele  Liver Dz:No  H/O Pancreatitis:No    Psychiatric  Bipolar: No , no history of psychosis or hallucinations   Anxiety:Yes  Depression:Yes  History of alcohol/drug abuse: No  Hx of eating disorder:No    Endocrine  Personal or  "family hx of MTC or MEN2:No  Diabetes/prediabetes: Yes    Neurologic:  Hx of seizures: No  Hx of migraines: Yes stopped since menopause   Memory Impairment: No  CVA history: No        History of kidney stones: No  Kidney disease: No        Objective   BP (!) 146/100 (BP Location: Left arm, Patient Position: Sitting, Cuff Size: Adult Large)   Pulse 71   Resp 16   Ht 1.626 m (5' 4\")   Wt 100.8 kg (222 lb 3.2 oz)   SpO2 97%   BMI 38.14 kg/m    BP (!) 146/100 (BP Location: Left arm, Patient Position: Sitting, Cuff Size: Adult Large)   Pulse 71   Resp 16   Ht 1.626 m (5' 4\")   Wt 100.8 kg (222 lb 3.2 oz)   SpO2 97%   BMI 38.14 kg/m    Body mass index is 38.14 kg/m .  Physical Exam   GENERAL: alert and no distress  EYES: Eyes grossly normal to inspection.  No discharge or erythema, or obvious scleral/conjunctival abnormalities.  RESP: No audible wheeze, cough, or visible cyanosis.    SKIN: Visible skin clear. No significant rash, abnormal pigmentation or lesions.  NEURO: Cranial nerves grossly intact.  Mentation and speech appropriate for age.  PSYCH: Appropriate affect, tone, and pace of words        In summary, Elsie Rubi has Class II obesity with a body mass index of Body mass index is 38.14 kg/m . kg/m2 and the comorbidities stated above. She completed an informational seminar and is a possible candidate for the laparoscopic gastric sleeve.  She will have to complete the following pre-requisites:    Received weight loss goal of 10 lb prior to surgery.  Achieve clearance from dietitian to see surgeon.  Have preoperative laboratory tests drawn.  Psychological Evaluation with MMPI and clearance for weight loss surgery.  Letter of clearance from the following pulmonology, cardiology, sleep medicine .    Today in the office we discussed gastric sleeve surgery. Preoperative, perioperative, and postoperative processes, management, and follow up were addressed.  Risks and benefits were outlined including " the risk of death, staple line leak (1-2%), PE, DVT, ulcer, worsening GERD, N/V, stricture, hernia, wound infection, weight regain, and vitamin deficiencies. I emphasized exercise and activity along with appropriate food choice as the main foundation for weight loss with surgery providing surgical reinforcement of this.  All questions were answered.  A goal sheet and support group handout were given to the patient.        If you have not already watched our online seminar please go to [email or web address]    Weight loss requirement: 10lbs prior to surgery. Goal Weight: 212. Will have final weight check 2-3 weeks prior to surgery at anesthesia or nurse pre-op teaching visit.    -Need current weight confirmation at primary clinic or weight management clinic No    Bariatric labs ordered, call for a lab only appointment at any [facility] lab. To find a lab location near you, please call [phone]. Please let us know if orders need to be faxed to a non [facility] lab.    Schedule bariatric psych eval as soon as possible.  List of psychologists will be sent to you via [facility] or given to you in clinic.     Call Agustin Arreaga at [phone] to discuss insurance coverage for bariatric surgery.  Please check with your insurance regarding bariatric surgery coverage also. Agustin can also help you with scheduling psych eval if you are having difficulties.    The following clearance letters are needed: Letter templates will be sent to you via [facility] or given to you in clinic. Providers can submit through electronic medical record or fax to [phone].  - Primary care provider.   - Sleep Medicine due to history of diagnosed sleep apnea, Cardiology due to age over 55, and Pulmonology due to interstitial lung disease     Smoking cessation and nicotine test needed: No    Birth control after surgery discussed. Patient instructed that 2 forms of birth control required after surgery and to avoid pregnancy  for at least 18 months after surgery: NA, post menopause     NEXT VISITS: A  should reach out to you to schedule the following appointments.  If they do not reach you please call [phone] to schedule the following appointments:    -See dietitian in 1 month and monthly for 3 months    -See Lauren Bloch [facility] pharmacist in 1 month to follow up on weight loss medications and     -See Leanne in 3 months to follow up on pre-op weight loss and weight loss medications    -See Dr Cabrera in 1 month for bariatric surgeon visit. Discuss bariatric surgery.  Important items to discuss: concern for stomach ulcers, whether she needs to stop mycophenolate prior to surgery    -Schedule EGD with Dr. Cabrera      Once the patient has completed the requirements in their task list and there are no further recommendations, the pt will be allowed to see the surgeon of their choice for consultation on the laparoscopic gastric sleeve surgery. Patient verbalizes understanding of the process to surgery and expectations for the postoperative period including the need for lifelong lifestyle changes, vitamin supplementation, and laboratory monitoring.    Medications that may contribute to the patient's obesity, such as antipsychotic medications, have been identified. Correctable endocrine disorders and other medical conditions have been ruled out, or are under successful treatment. Identified personal barriers to making and continuing needed life changes. Identified behavior changes/strategies to address personal barriers.    Sincerely,     Leanne Stanford PA-C

## 2024-01-23 ENCOUNTER — TELEPHONE (OUTPATIENT)
Dept: ENDOCRINOLOGY | Facility: CLINIC | Age: 64
End: 2024-01-23

## 2024-01-23 ENCOUNTER — VIRTUAL VISIT (OUTPATIENT)
Dept: ENDOCRINOLOGY | Facility: CLINIC | Age: 64
End: 2024-01-23
Payer: COMMERCIAL

## 2024-01-23 ENCOUNTER — TELEPHONE (OUTPATIENT)
Dept: GASTROENTEROLOGY | Facility: CLINIC | Age: 64
End: 2024-01-23

## 2024-01-23 VITALS — BODY MASS INDEX: 37.56 KG/M2 | WEIGHT: 220 LBS | HEIGHT: 64 IN

## 2024-01-23 PROCEDURE — 99207 PR NO CHARGE NURSE ONLY: CPT | Mod: 95

## 2024-01-23 NOTE — TELEPHONE ENCOUNTER
Called [facility] to check if policy covers bariatric surgery, it does, follows DHS guidelines per Debbi 1/23/24.

## 2024-01-24 ENCOUNTER — MYC MEDICAL ADVICE (OUTPATIENT)
Dept: PULMONOLOGY | Facility: OTHER | Age: 64
End: 2024-01-24

## 2024-01-24 ENCOUNTER — TELEPHONE (OUTPATIENT)
Dept: ENDOCRINOLOGY | Facility: CLINIC | Age: 64
End: 2024-01-24

## 2024-01-24 ENCOUNTER — VIRTUAL VISIT (OUTPATIENT)
Dept: ENDOCRINOLOGY | Facility: CLINIC | Age: 64
End: 2024-01-24
Payer: COMMERCIAL

## 2024-01-24 VITALS — WEIGHT: 220 LBS | BODY MASS INDEX: 37.56 KG/M2 | HEIGHT: 64 IN

## 2024-01-24 DIAGNOSIS — Z71.3 NUTRITIONAL COUNSELING: Primary | ICD-10-CM

## 2024-01-24 DIAGNOSIS — E66.812 CLASS 2 SEVERE OBESITY WITH SERIOUS COMORBIDITY AND BODY MASS INDEX (BMI) OF 38.0 TO 38.9 IN ADULT, UNSPECIFIED OBESITY TYPE (H): ICD-10-CM

## 2024-01-24 DIAGNOSIS — E66.01 CLASS 2 SEVERE OBESITY WITH SERIOUS COMORBIDITY AND BODY MASS INDEX (BMI) OF 38.0 TO 38.9 IN ADULT, UNSPECIFIED OBESITY TYPE (H): ICD-10-CM

## 2024-01-24 PROCEDURE — 99207 PR NO CHARGE LOS: CPT | Mod: 95

## 2024-01-24 PROCEDURE — 97804 MEDICAL NUTRITION GROUP: CPT | Mod: 95

## 2024-01-24 ASSESSMENT — SLEEP AND FATIGUE QUESTIONNAIRES
HOW LIKELY ARE YOU TO NOD OFF OR FALL ASLEEP WHILE WATCHING TV: SLIGHT CHANCE OF DOZING
HOW LIKELY ARE YOU TO NOD OFF OR FALL ASLEEP IN A CAR, WHILE STOPPED FOR A FEW MINUTES IN TRAFFIC: WOULD NEVER DOZE
HOW LIKELY ARE YOU TO NOD OFF OR FALL ASLEEP WHILE SITTING AND READING: WOULD NEVER DOZE
HOW LIKELY ARE YOU TO NOD OFF OR FALL ASLEEP WHILE LYING DOWN TO REST IN THE AFTERNOON WHEN CIRCUMSTANCES PERMIT: WOULD NEVER DOZE
HOW LIKELY ARE YOU TO NOD OFF OR FALL ASLEEP WHILE SITTING INACTIVE IN A PUBLIC PLACE: SLIGHT CHANCE OF DOZING
HOW LIKELY ARE YOU TO NOD OFF OR FALL ASLEEP WHEN YOU ARE A PASSENGER IN A CAR FOR AN HOUR WITHOUT A BREAK: SLIGHT CHANCE OF DOZING
HOW LIKELY ARE YOU TO NOD OFF OR FALL ASLEEP WHILE SITTING AND TALKING TO SOMEONE: WOULD NEVER DOZE
HOW LIKELY ARE YOU TO NOD OFF OR FALL ASLEEP WHILE SITTING QUIETLY AFTER LUNCH WITHOUT ALCOHOL: SLIGHT CHANCE OF DOZING

## 2024-01-24 NOTE — NURSING NOTE
Is the patient currently in the state of [place]? YES    Visit mode:VIDEO    If the visit is dropped, the patient can be reconnected by: VIDEO VISIT: Text to cell phone:   Telephone Information:   Mobile [phone]       Will anyone else be joining the visit? NO  (If patient encounters technical issues they should call [phone]    How would you like to obtain your AVS? MyChart    Are changes needed to the allergy or medication list? No    Reason for visit: Nurse Visit    Leanne ASHLEY

## 2024-01-24 NOTE — TELEPHONE ENCOUNTER
Patient Contacted  and scheduled the following:    Appointment type: MINOO GIL  Provider: Radha Stanford PA-C  Return date: 04/30/2024  Specialty phone number: [phone]  Additional appointment(s) needed: yes  Additonal Notes: PT would like to hold off on Vivek Appts for now, she is still checking with insurance about coverage.  **Spoke with pt in regards to Vivek /RD appts, pt decided not to move forward due to insurance coverage. 02/01/24 **

## 2024-01-24 NOTE — PROGRESS NOTES
"Video-Visit Details    Type of service:  Video Visit    Video Start Time: 3:00 PM   Video End Time: 3:54 PM     Originating Location (pt. Location): Home    Distant Location (provider location):  Offsite (providers home) [facility] [place]     Platform used for Video Visit: Other: Zoom    New Bariatric Nutrition Class Note    Reason For Visit: Nutrition Class     Elsie Rubi is a 64 year old presenting today for virtual bariatric nutrition class and consultation.  Pt is interested in weight loss surgery.     Pt referred by EARL Varela on January 22, 2024.    CO-MORBIDITIES OF OBESITY INCLUDE:        1/16/2024    11:27 AM   --   I have the following health issues associated with obesity Pre-Diabetes    High Blood Pressure    GERD (Reflux)    Asthma    Stress Incontinence       SUPPORT:      1/16/2024    11:27 AM   Support System Reviewed With Patient   Who do you have in your support network that can be available to help you for the first 2 weeks after surgery?    Who can you count on for support throughout your weight loss surgery journey?        ANTHROPOMETRICS:  Estimated body mass index is 37.74 kg/m  as calculated from the following:    Height as of 1/23/24: 1.626 m (5' 4.02\").    Weight as of 1/23/24: 99.8 kg (220 lb).        1/16/2024    11:27 AM   --   I have tried the following methods to lose weight Watching portions or calories    Exercise    Atkins type diet (low carb/high protein)           1/16/2024    11:27 AM   Weight Loss Questions Reviewed With Patient   How long have you been overweight? From Middle age and beyond         NUTRITION HISTORY:    Patient attended virtual WLS nutrition class today via Zoom.         1/16/2024    11:27 AM   Recall Diet Questions Reviewed With Patient   Describe what you typically consume for breakfast (typical or most recent) Cheesy eggs   Describe what you typically consume for lunch (typical or most " recent) Skip or munch on leftovers   Describe what you typically consume for supper (typical or most recent) 5 oz Protein with 1/2 baked potato or vegetable   Describe what you typically consume as snacks (typical or most recent) Cheese, nuts   How many ounces of water, or other low calorie drinks, do you drink daily (8 oz=1 glass)? 24 oz   How many ounces of caffeine (coffee, tea, pop) do you drink daily (8 oz=1 glass)? 16 oz   How many ounces of carbonated (pop, beer, sparkling water) drinks do you drinky daily (8 oz=1 glass)? 0 oz   How many ounces of juice, pop, sweet tea, sports drinks, protein drinks, other sweetened drinks, do you drink daily (8 oz=1 glass)? 0 oz   How many ounces of milk do you drink daily (8 oz=1 glass) 0 oz   How often do you drink alcohol? 4 or more times a week   If you do drink alcohol, how many drinks might you have in a day? (one drink = 5 oz. wine, 1 can/bottle of beer, 1 shot liquor) 1 or 2           1/16/2024    11:27 AM   Eating Habits   What foods do you crave? Salty           1/16/2024    11:27 AM   Dining Out History Reviewed With Patient   How often do you dine out? Rarely.   Where do you dine out? (select all that apply) sit-down restaurants   What types of food do you order when you dine out? Entree           1/16/2024    11:27 AM   Physical Activity Reviewed With Patient   How often do you exercise? Less than 1 time per week   What is the duration of your exercise (in minutes)? 10 Minutes   What keeps you from being more active? Pain    I should be more active but I just have not gotten around to it    Shortness of breath       EXERCISE:        1/16/2024    11:27 AM   --   How often do you exercise? Less than 1 time per week   What is the duration of your exercise (in minutes)? 10 Minutes   What keeps you from being more active? Pain    I should be more active but I just have not gotten around to it    Shortness of breath       NUTRITION DIAGNOSIS:  Obesity r/t long history  of positive energy balance aeb BMI >30 kg/m2.    INTERVENTION:  Patient attended New WLS Nutrition Class today.    Intervention Provided/Education Provided on post-op diet guidelines, vitamins/minerals essential post-operatively, GI anatomy of bariatric surgeries, ways to help prepare for post-op diet guidelines pre-operatively, portion/calorie-control, mindful eating and sources of protein. Provided pt with list of goals and handouts below via [employer]. Presentation slides provided via email.          1/16/2024    11:27 AM   Questions Reviewed With Patient   How ready are you to make changes regarding your weight? Number 1 = Not ready at all to make changes up to 10 = very ready. 10   How confident are you that you can change? 1 = Not confident that you will be successful making changes up to 10 = very confident. 8       Expected Engagement: good    GOALS:  Relating To Eating:  - Eat slowly (20-30 minutes per meal), chewing foods well (25 chews per bite/applesauce consistency)  - Focus on eating smaller portion sizes at meals and snacks    Relating to beverages:  - Reduce caffeine/carbonation/calorie containing beverages  - Separate fluids from meals by 30 minutes before, during, and after eating  - Drink 48-64 ounces of fluid per day. Small, frequent sips between meals.    Relating to activity:  Increase activity as able      Protein Sources for Weight Loss  [email or web address]     Carbohydrates  [email or web address]     Mindful Eating  [email or web address]     Post-op Diet Handouts:  Diet Guidelines after Weight-loss Surgery  [email or web address]     Your Stage 1 Diet: Clear Liquids  [email or web address]     Your Stage 2 Diet: Low-fat Full Liquids  [email or web address]     Your Stage 3 Diet: Pureed Foods  [email or web address]     Pureed Pleasures  [email or web address]    Your Stage 4 Diet: Soft Foods  [email or web address]    Your  Stage 5 Diet: Regular Foods  [email or web address]    Supplements after Sleeve Gastrectomy, Gastric Bypass or Single Anastomosis Duodenal Switch  [email or web address]    Keeping Track of Fluids  [email or web address]      Follow up: Monthly until surgery recommended     Time spent with patient: 54 minutes.  Caroline Mcduffie RD, LD

## 2024-01-24 NOTE — PATIENT INSTRUCTIONS
Henrry Dodd,     Thank you for attending the weight loss surgery nutrition class.     Follow-up with RD in 1 month. Please call to schedule.     Thank you,    Caroline Mcduffie, RD, LD  If you would like to schedule or reschedule an appointment with the RD, please call [phone]    Nutrition Goals  Relating To Eating:  - Eat slowly (20-30 minutes per meal), chewing foods well (25 chews per bite/applesauce consistency)  - Focus on eating smaller portion sizes at meals and snacks    Relating to beverages:  - Reduce caffeine/carbonation/calorie containing beverages  - Separate fluids from meals by 30 minutes before, during, and after eating  - Drink 48-64 ounces of fluid per day. Small, frequent sips between meals.    Relating to activity:  Increase activity as able      Protein Sources for Weight Loss  [email or web address]     Carbohydrates  [email or web address]     Mindful Eating  [email or web address]     Post-op Diet Handouts:  Diet Guidelines after Weight-loss Surgery  [email or web address]     Your Stage 1 Diet: Clear Liquids  [email or web address]     Your Stage 2 Diet: Low-fat Full Liquids  [email or web address]     Your Stage 3 Diet: Pureed Foods  [email or web address]     Pureed Pleasures  [email or web address]    Your Stage 4 Diet: Soft Foods  [email or web address]    Your Stage 5 Diet: Regular Foods  [email or web address]    Supplements after Sleeve Gastrectomy, Gastric Bypass or Single Anastomosis Duodenal Switch  [email or web address]    Keeping Track of Fluids  [email or web address]      COMPREHENSIVE WEIGHT MANAGEMENT PROGRAM  VIRTUAL SUPPORT GROUPS    At [facility], our Comprehensive Weight Management program offers on-line support groups for patients who are working on weight loss and considering, preparing for, or have had weight loss surgery.     There is no cost for this opportunity.  You are  invited to attend the?Virtual Support Groups?provided by any of the following locations:    [facility] via Microsoft Teams with Sandra Hopper RN  2.   [place] via Rennovia with Lalit Keenan, PhD, LP  3.   [place] via Rennovia with Shea Winston RN  4.   [facility] via a Zoom Meeting with LLUVIA Ng    The following Support Group information can also be found on our website:  [email or web address]      [facility] Weight Loss Surgery Support Group  The support group is a patient-lead forum that meets monthly to share experiences, encouragement and education. It is open to those who have had weight loss surgery, are scheduled for surgery, or are considering surgery.   WHEN: This group meets on the 3rd Wednesday of each month from 5:00PM - 6:00PM virtually using Microsoft Teams.   FACILITATOR: Led by Sandra Covarrubias, ANDREW, MADHURI, RN, the program's Clinical Coordinator.   TO REGISTER: Please contact the clinic via "[employer] or call the nurse line directly at [phone] to inform our staff that you would like an invite sent to you and to let us know the email you would like the invite sent to. Prior to the meeting, a link with directions on how to join the meeting will be sent to you.    2024 Meetings   January 17  February 21  March 20  April 17  May 15  Alexandra 19      [facility] Bariatric Care Support Group?  This is open to all pre- and post- operative bariatric surgery patients as well as their support system.   WHEN: This group meets the 3rd Tuesday of each month from 6:30 PM - 8:00 PM virtually using Microsoft Teams.   FACILITATOR: Led by Lalit Keenan, Ph.D who is a Licensed Psychologist with the [facility] Comprehensive Weight Management Program.   TO REGISTER: Please send an email to aLlit Keenan, Ph.D.,   [email or web address]?if you would like an invitation to the group. Prior to the meeting, a link with directions on how to join the meeting will be sent to you.    2024 Meetings January 16: \"Medication Management and Bariatric Surgery\", Jackie Gambino, PharmD, Pharmacy Resident at [facility]  February 20: \"A Bariatric Surgery Patient's Perspective\", ELENA Torres, [facility], Behavioral Health Clinician at [facility]  March 19  April 16  May 21  Alexandra 18: \"Nutritional Labeling\", Dietitian speaker to be announced.  November 19: \"Holiday Eating\", Dietitian speaker to be announced.    [facility] and [facility] Post-Operative Bariatric Surgery Support Group  This is a support group for [facility] bariatric patients (and those external to [facility]) who have had bariatric surgery and are at least 3 months post-surgery.  WHEN: This support group meets the 4th Wednesday of the month from 11:00 AM - 12:00 PM virtually using Microsoft Teams.   FACILITATOR: Led by Certified Bariatric Nurse, Shea Winston RN.   TO REGISTER: Please send an email to Shea at [email or web address] if you would like an invitation to the group.  Prior to the meeting, a link with directions on how to join the meeting will be sent to you.    2024 Meetings  January 24  February 28  March 27  April 24  May 22  Alexandra 26    [facility] Healthy Lifestyle Group?  This is a 60 minute virtual coaching group for those who want to lead a healthier lifestyle. Come together to set goals and overcome barriers in a supportive group environment. We will address the four pillars of health: nutrition, exercise, sleep and emotional well-being.  This group is highly recommended for those who are participating in the 24 week Healthy Lifestyle Plan and our Health Coaching sessions.  WHEN: This group meets the 1st Friday " "of the month, 12:30 PM - 1:30 PM online, via a zoom meeting.    FACILITATOR: Led by National Board Certified Health and , Shea Toure, [facility].   TO REGISTER: Please call the Call Center at [phone] to register. You will get an appointment to attend in [facility]. Fifteen minutes prior to the meeting, complete the e-check in and you will get the link to join the meeting.    There is no charge to attend this group and space is limited.     2024 Meetings  January 5: \"New Years Vision: Manifest your Best 2024!\" (guided imagery,  journaling and discussion)  February 2: \"Let's Talk\"  March 1: \"10 Percent Happier\" by Priyank Christensen (Book Bites - a guided discussion on the nuggets of wisdom from favorite wellness books, no need to read the book but highly encouraged)  April 5: \"Let's Talk\"  May 3: \"Essentialism: The Disciplined Pursuit of Less\" by Jorge Rosario (Book Bites discussion)  June 7: \"Let's Talk\"  July 5: NO MEETING, off for the 4th of July Holiday  August 2: \"The Blue Zones, Secrets for Living a Longer Life\" by Priyank Sofia (Book Bites discussion)        "

## 2024-01-25 ENCOUNTER — TELEPHONE (OUTPATIENT)
Dept: ENDOCRINOLOGY | Facility: CLINIC | Age: 64
End: 2024-01-25
Payer: COMMERCIAL

## 2024-01-25 NOTE — LETTER
"2024    To: Awa    RE: Elsie Rubi  [address] [facility] [address]  : 1960  MRN: [id number]    To Whom It May Concern,    I am writing on behalf of my patient, Elsie Rubi to document the medical necessity of Zepbound for the treatment of ovesity. This letter provides information about the patient's medical history and diagnosis and a statement summarizing my treatment rationale.     Summary of Patient History and Diagnosis  I had the pleasure of seeing your patient, Elsie Rubi, to evaluate her obesity and consider her for possible weight loss surgery. As you know, Elsie Rubi is 64 year old.  She has a height of 5' 4\", a weight of 222 lbs 3.2 oz, and calculated Body mass index is 38.14 kg/m .     Overweight onset in late 30s with starting to work night shift while caring for 6 kids. Prior to this, weighed around 130 lbs.  Gained 70 lbs from age 35 to 40. Attributes this to not having time to cook, eating a lot more convenient food. At age 40 was able to lose 30 lbs with atkins and exercising, was then prescribed Adderall 70mg for ADHD, saw 50lbs further weight loss, was able to get down to 130 lbs. Did not tolerate Adderall due to side effects, stopped Adderall 12 years ago and saw weight regain with this, and has continued to see  weight gain.      6 months ago weighed highest lifetime weight of 235 lbs, was able to lose approximately 10 lbs with significant diet changes (high protein, high fat, non-starchy vegetables, low carb), but she found this to be overly restrictive.  She has since been working to improve diet by not being overly restrictive, and has been able to maintain weight loss but has not been able to lose further weight. Focuses on mediterranean style diet currently.     She has been considering weight loss surgery for the last 10 years. She was working towards bariatric surgery in [place] 6-7 years ago, but was ultimately unable to get it due to " "concerns about her pulmonary health at the time.      Comorbidities associated with weight gain  include prediabetes, high blood pressure, GERD (managed with omeprazole), knee pain. Additional health issues outside of weight gain include interstitial lung disease and asthma (diagnosed 9 months ago, managed by pulmonologist). Per her chart, she is also diagnosed with borderline personality disorder, though she disputes this diagnosis and feels that she hasn't had symptoms related to this diagnosis in over 15 years. Does not see a therapist. No history of psychosis, no history of hallucinations. Patient has a lot of community support and feels that her mental health is well managed through her Yarsanism and her community.      Motivators for weight loss include improved exercise tolerance, reduce complications related to her lung disease, improve her self esteem, having more clothing options, decreasing fatigue.     She would like to pursue bariatric surgery for sustainable weight loss.     Weight loss medication(s) are indicated due to patient having a BMI of at least 30 kg/m2.    Estimated body mass index is 37.76 kg/m  as calculated from the following:    Height as of 1/24/24: 1.626 m (5' 4\").    Weight as of 1/24/24: 99.8 kg (220 lb).        Treatment Rationale  Denial Date: 1/26/2024  Denial Reason(s):     Phentermine is contraindicated due to uncontrolled high blood pressure. Saxenda and Wegovy are on nationwide supply shortages and indefinite backorders, therefore inappropriate to prescribe. We are requesting the start of Zepbound, as it is most effective for weight loss and available.     Zepbound (tirzepatide) is an FDA-approved medication for chronic weight management in adults with a BMI of 30 or higher or a BMI of 27 or higher with weight-related comorbidity. Elsie's BMI qualifies them for Zepbound, which has shown remarkable efficacy and a favorable safety profile. Patient has no history of pancreatitis. " Elsie has no personal or family history of medullary thyroid carcinoma or MEN2. Zepbound is the only medication in its class as a Glucagon-like peptide-1 (GLP-1) and glucose-dependent insulinotropic polypeptide (GIP) agonist being used for weight management.     Elsie's unsuccessful weight management attempts and previous medication failures highlight the need for Zepbound as a medically necessary treatment option. Denying coverage would hinder Elsie's progress and increase the risk of obesity-related health conditions.    I kindly request that you review Elsie's case and reconsider coverage for Zepbound as an integral part of their obesity treatment plan.         Duration  Up to 12 months.      Summary  In summary, Zepbound is medically necessary for this patient s medical condition. Please call my office at [phone] if I can provide you with any additional information to approve my request. I look forward to receiving your timely response and approval of this request.     Sincerely,    Leanne Stanford PA-C

## 2024-01-25 NOTE — PROGRESS NOTES
01/24/24 1209   Reason For Your Visit   Please briefly describe the main reason(s) for your sleep visit Overweight and I snore.   Approximately when did this problem start 6 years   What are your goals for this visit Evaluation   Time in Bed - Work Or School Days   Do you work or go to school No   About how long does it take you to fall asleep Up to an hour   How often do you have trouble falling asleep 5   How often do you wake up during the night 2   What wakes you up at night Pain;Use the bathroom   How often do you have trouble falling back to sleep Occasionally   About how long does it take to fall back to sleep 1 minute up to an hour or two.   What do you usually do if you have trouble getting back to sleep Benadryl. Leah. Internet   What time do you usually get out of bed to start your day 9am   Do you use an alarm No   Time in Bed - Weekends/Non-work Days/All Other Days   What time do you usually get into bed 11is   About how long does it take you to fall asleep 20 min - 1 hour   What time do you usually get out of bed to start your day 9   Do you use an alarm No   Sleep Need   On average, about how much sleep do you think you get 7-8 hours   About how much sleep do you think you need 7-8 hours   Sleep Position   Which sleep positions do you prefer Side   Do you do any of the following activities in bed Use phone, computer, or tablet   How often do you take a nap on purpose Never   About how long are your naps 1 hour when sick   How often do you doze off unintentionally Never   Have you ever had a driving accident or near-miss due to sleepiness/drowsiness Yes   Sleep Disruptions - Breathing/Snoring   Do you snore Yes   Do other people complain about your snoring Yes   Have you been told you stop breathing in your sleep Yes   Do you have issues with any of the following Morning mouth dryness;Stuffy nose when you wake up;Heartburn or reflux at night   Sleep Disruptions - Movement   Do you get pain,  discomfort, with an urge to move Yes   Does it happen when you are resting Yes   Does it get better if you move around No   Does it happen more at night Yes   Have you been told you kick your legs at night No   Sleep Disruptions - Behaviours in Sleep   Have you ever experienced any of the following during your sleep Recurring Nightmares   Do you ever experience sudden muscle weakness during the day No   4) Is there anything else you would like your sleep provider to know Neck pain interferes w/ sleep   Caffeine, Alcohol and Other Substances   How many caffeinated beverages (coffee, tea, soda, energy drinks) per day 3   What time of day is your last caffeine use 2 pm   List any prescribed or over the counter stimulants that you take None   List any prescribed or over the counter sleep medication you take Trazadone, benadryl   List previous sleep medications you have tried Ambien   Do you drink alcohol to help you sleep Yes   Do you drink alcohol near bedtime Yes   Family History   Has any family member been diagnosed with a sleep disorder No   In the last TWO WEEKS have you experienced any of the following symptoms?   Fevers No   Night Sweats No   Weight Gain No   Pain at Night Yes   Double Vision Yes   Changes in Vision No   Difficulty Breathing through Nose Yes   Sore Throat in Morning No   Dry Mouth in the Morning Yes   Shortness of Breath Lying Flat No   Shortness of Breath With Activity Yes   Awakening with Shortness of Breath No   Increased Cough No   Heart Racing at Night No   Swelling in Feet or Legs No   Diarrhea at Night No   Heartburn at Night Yes   Urinating More than Once at Night No   Losing Control of Urine at Night No   Joint Pains at Night Yes   Headaches in Morning No   Weakness in Arms or Legs No   Depressed Mood Yes   Anxiety No         1/24/2024    12:15 PM    Douglas Sleepiness Scale ( GUILLERMO Zayas  4726-3255<br>ESS - [place]/English - Final version - 21 Nov 07 - [facility] [place].)   Sitting  and reading Would never doze   Watching TV Slight chance of dozing   Sitting, inactive in a public place (e.g. a theatre or a meeting) Slight chance of dozing   As a passenger in a car for an hour without a break Slight chance of dozing   Lying down to rest in the afternoon when circumstances permit Would never doze   Sitting and talking to someone Would never doze   Sitting quietly after a lunch without alcohol Slight chance of dozing   In a car, while stopped for a few minutes in traffic Would never doze   Williamson Score (MC) 4   Williamson Score (Sleep) 4         1/24/2024    12:11 PM   Insomnia Severity Index (FELICITA)   Difficulty falling asleep 2   Difficulty staying asleep 2   Problems waking up too early 0   How SATISFIED/DISSATISFIED are you with your CURRENT sleep pattern? 3   How NOTICEABLE to others do you think your sleep problem is in terms of impairing the quality of your life? 0   How WORRIED/DISTRESSED are you about your current sleep problem? 2   To what extent do you consider your sleep problem to INTERFERE with your daily functioning (e.g. daytime fatigue, mood, ability to function at work/daily chores, concentration, memory, mood, etc.) CURRENTLY? 2   FELICITA Total Score 11         1/24/2024     2:50 PM   STOP BANG Questionnaire (  2008, the American Society of Anesthesiologists, Inc. [employer]   BMI Clinic: 37.76

## 2024-01-25 NOTE — TELEPHONE ENCOUNTER
PA Initiation    Medication: ZEPBOUND 2.5 MG/0.5ML SC SOAJ  Insurance Company: [employer] - Phone [phone] Fax [phone]  Pharmacy Filling the Rx:    Filling Pharmacy Phone:    Filling Pharmacy Fax:    Start Date: 1/25/2024

## 2024-01-26 NOTE — TELEPHONE ENCOUNTER
PRIOR AUTHORIZATION DENIED    Medication: ZEPBOUND 2.5 MG/0.5ML SC SOAJ  Insurance Company: [employer] - Phone [phone] Fax [phone]  Denial Date: 1/26/2024  Denial Reason(s):       Appeal Information:   Patient Notified: yes

## 2024-01-26 NOTE — TELEPHONE ENCOUNTER
Chart review prior to sleep testing.    Patient Summary:  64 year old female who is referred for sleep-disordered breathing.    Patient Active Problem List    Diagnosis Date Noted    Prediabetes 08/25/2023     Priority: Medium    Mixed hyperlipidemia 08/25/2023     Priority: Medium     The 10-year ASCVD risk score (Radha DENT, et al., 2019) is: 5.8%    Values used to calculate the score:      Age: 63 years      Sex: Female      Is Non- : No      Diabetic: No      Tobacco smoker: No      Systolic Blood Pressure: 123 mmHg      Is BP treated: Yes      HDL Cholesterol: 57 mg/dL      Total Cholesterol: 218 mg/dL        Primary insomnia 08/10/2023     Priority: Medium    Rectocele 08/10/2023     Priority: Medium    Primary stress urinary incontinence 08/10/2023     Priority: Medium     OB GYN [facility] 2021 - declined surgery      MIC (obstructive sleep apnea) 10/14/2021     Priority: Medium    Chronic fatigue syndrome 08/13/2020     Priority: Medium    Dyspepsia 08/13/2020     Priority: Medium    Essential hypertension 08/13/2020     Priority: Medium    Fibromyalgia 08/13/2020     Priority: Medium    Mild episode of recurrent major depressive disorder (H24) 08/13/2020     Priority: Medium    Mild intermittent asthma without complication 08/13/2020     Priority: Medium    Pure hypercholesterolemia 08/13/2020     Priority: Medium    Severe obesity (BMI 35.0-39.9) with comorbidity (H) 08/13/2020     Priority: Medium       Current Outpatient Medications   Medication    albuterol (PROAIR HFA/PROVENTIL HFA/VENTOLIN HFA) 108 (90 Base) MCG/ACT inhaler    FLUoxetine (PROZAC) 40 MG capsule    fluticasone-salmeterol (ADVAIR) 250-50 MCG/ACT inhaler    metoprolol succinate ER (TOPROL XL) 50 MG 24 hr tablet    mycophenolate (GENERIC EQUIVALENT) 500 MG tablet    omeprazole (PRILOSEC) 20 MG DR capsule    tirzepatide-Weight Management (ZEPBOUND) 2.5 MG/0.5ML prefilled pen    tirzepatide-Weight Management (ZEPBOUND)  "5 MG/0.5ML prefilled pen    traZODone (DESYREL) 50 MG tablet     No current facility-administered medications for this visit.       Pertinent PMHx of chronic fatigue syndrome, fibromyalgia, mild intermittent asthma, obesity, HTN.    Six minute walk tolerance test on 8/2/2023 that did show need for supplemental oxygen at 2 LPM starting at ~3.5 minutes.    STOP-BANG score of 6+, with unknown neck circumference.  [place] score of 4.  FELICITA: 11    BMI of Estimated body mass index is 37.76 kg/m  as calculated from the following:    Height as of an earlier encounter on 1/24/24: 1.626 m (5' 4\").    Weight as of an earlier encounter on 1/24/24: 99.8 kg (220 lb).     Chief concern per questionnaire: \"Overweight and I snore. \"    Duration of symptoms:  \"6 years \"    Goals for visit per questionnaire: \"Evaluation \"    Sleep pattern:  Workdays.  ? To 9am.  Weekends.  11ish - 9am.  Time to fall asleep: ~up to 60 minutes.  Awakenings: 2 times per night, 1 minutes up to 1-2 hours to return to sleep.  Average total sleep time:  7-8 hours  Napping.  0 days per week, - hours per nap.    Yes for phone in bed.    Yes for RLS screen.  No for sleep walking.  No for dream enactment behavior.  No for bruxism.    No for morning headaches.  Yes for snoring.  Yes for observed apnea.  No for FHx of MIC.    Currently taking trazodone 50mg and benadryl (unknown dose) at bedtime.    Caffeine use:  Yes for 3+ per day.  No for within 6 hours of bed.    A/P:    1.)  High likelihood of MIC with STOP-BANG score of 6+.   - Would appear to be candidate for either home sleep testing or in-lab PSG.    ---  This note was written with the assistance of the Dragon voice-dictation technology software. The final document, although reviewed, may contain errors. For corrections, please contact the office.    Rigoberto Nielson MD    Sleep Medicine  [facility]  - [place], [place]  Main Office: [phone]  [facility] - " [facility], [facility] - [place]  [address] [place] [address], [address]  Schedule visits: [phone]  Main Office: [phone]  Fax: [phone]

## 2024-01-29 DIAGNOSIS — E66.812 CLASS 2 SEVERE OBESITY WITH SERIOUS COMORBIDITY AND BODY MASS INDEX (BMI) OF 38.0 TO 38.9 IN ADULT, UNSPECIFIED OBESITY TYPE (H): ICD-10-CM

## 2024-01-29 DIAGNOSIS — E66.01 CLASS 2 SEVERE OBESITY WITH SERIOUS COMORBIDITY AND BODY MASS INDEX (BMI) OF 38.0 TO 38.9 IN ADULT, UNSPECIFIED OBESITY TYPE (H): ICD-10-CM

## 2024-01-29 DIAGNOSIS — R10.13 DYSPEPSIA: Primary | ICD-10-CM

## 2024-01-29 RX ORDER — CEFAZOLIN SODIUM 2 G/50ML
2 SOLUTION INTRAVENOUS
Status: CANCELLED | OUTPATIENT
Start: 2024-01-29

## 2024-01-29 RX ORDER — CEFAZOLIN SODIUM 2 G/50ML
2 SOLUTION INTRAVENOUS SEE ADMIN INSTRUCTIONS
Status: CANCELLED | OUTPATIENT
Start: 2024-01-29

## 2024-01-29 NOTE — TELEPHONE ENCOUNTER
Medication Appeal Initiation    Medication: ZEPBOUND 2.5 MG/0.5ML SC SOAJ  Appeal Start Date:  1/29/2024  Insurance Company: [employer]  Insurance Phone:  [phone]  Insurance Fax: [phone]  Comments:      Faxed appeal letter, Denial and visit notes

## 2024-01-29 NOTE — TELEPHONE ENCOUNTER
Medication Appeal Request    Please initiate an appeal for the requested medication: ZEPBOUND 2.5 MG/0.5ML SC SOAJ    Has a letter of medical necessity been completed in EPIC?   yes    Any additional lab values/information to include?     Would you like to include any research articles?               If yes please include the hyperlink(s) below or fax to    [phone] for Specialty/Retail               [phone] for Infusion/Clinic Administered.                Include the patients name and MRN on the fax cover sheet.

## 2024-01-31 ENCOUNTER — TELEPHONE (OUTPATIENT)
Dept: PULMONOLOGY | Facility: OTHER | Age: 64
End: 2024-01-31

## 2024-01-31 ENCOUNTER — TELEPHONE (OUTPATIENT)
Dept: PULMONOLOGY | Facility: CLINIC | Age: 64
End: 2024-01-31
Payer: COMMERCIAL

## 2024-01-31 DIAGNOSIS — J84.9 ILD (INTERSTITIAL LUNG DISEASE) (H): ICD-10-CM

## 2024-01-31 RX ORDER — MYCOPHENOLATE MOFETIL 500 MG/1
1000 TABLET ORAL 2 TIMES DAILY
Qty: 120 TABLET | Refills: 3 | Status: SHIPPED | OUTPATIENT
Start: 2024-01-31 | End: 2024-08-22

## 2024-01-31 NOTE — TELEPHONE ENCOUNTER
MEDICATION APPEAL APPROVED    Medication: ZEPBOUND 2.5 MG/0.5ML SC SOAJ  Authorization Effective Date: 1/30/2024  Authorization Expiration Date: 1/30/2025  Approved Dose/Quantity: 2  Reference #: [id number]   Appeal Insurance Company: [employer]  Expected CoPay: $       CoPay Card Available:    Financial Assistance Needed:    Filling Pharmacy:    Patient Notified:    Comments:

## 2024-02-01 DIAGNOSIS — E66.01 MORBID OBESITY (H): ICD-10-CM

## 2024-02-01 DIAGNOSIS — R73.03 DIABETES MELLITUS, LATENT: Primary | ICD-10-CM

## 2024-02-09 NOTE — PATIENT INSTRUCTIONS
Elsie Rubi,    Thank you for taking time to attend the New Bariatric class.  Below are items to be mindful of as you work through the process as you wait for your surgery date.    In-Clinic Weight:  If you have not had an official in-clinic weight, please call [phone] to schedule a nurse visit for an in-clinic weight.  If you live a distance from the Clinic and Surgery Center, you can have the weight done with your PCP.  On the day of your weight, weigh at home in the same clothes you will weigh in at the clinic.  That way we can get a better correlation of the scales.    Labs: if you have not had your initial labs done yet, please schedule an appointment with a [facility] lab.  If you need to have your labs faxed to an outside lab, contact our office.    Clearances: work on getting scheduled for your Psych clearance and contact your other providers for other clearances/letters of support that are needed.  As you get your clearances, you are welcome to send a [facility] message and we will keep an eye out for the reports/letters.  Our fax is: [phone].  Electronic copies can always be uploaded into [facility] and sent as an attachment to a [employer] message.    Dietician visits: attend your dietician visits monthly.  Missed appointments can delay your surgery date.  All patients are required to attend monthly dietician visits until their surgery.    Nutrition/Intake Modification:  Decrease portion sizes.  Read labels and portion out food according to the portion on the container.    Take time to read the nutrition information for recipes.  You will be surprised where hidden carbohydrates can be.  Make sure you are getting in at least 60 grams of protein daily - roughly 20 grams per meal.  Decrease/eliminate carbs in the form of chips, crackers, pasta, rice, pancakes, waffles, bagels, and white potatoes.  Replace with healthy vegetables.  Increase water intake to at least 64 ounces each day.  Take at least 30  minutes to eat your meal.  Be mindful of your meal when eating.  Chew your food well.  Savor the flavors and textures and be mindful of your mood.  Switch to caffeine and carbonation free beverages.    Mental Health:  Take a look at your past and current relationship with food.  Work with a therapist/psychiatrist/counselor/psychologist to discuss your mental health as it relates to food and food intake.  Attend a support group.    Activity:  Get active!  It is important to be mobile after surgery.  It helps with weight loss, healthy muscles & bones, and decreases chances of blood clots.  Start slow and have small goals.  If you currently have mobility issues, start with chair-based exercises and work your way up to standing exercises.    8. Support Group Information  Please click the following link for Support Group Information and Times: [email or web address]    9.  Your Task List:    Bariatric Task List    Fax:  Please fax all paperwork to: [phone] -     Status:  Is patient a candidate for bariatric surgery?:  patient is a candidate for bariatric surgery -     Cleared to schedule surgeon consult?:  cleared to schedule surgeon consult -     Status:  surgery evaluation in process -     Surgeon: Dr. Cabrera -     Tentative surgery month/year: tbd -        Insurance: Insurance:  [facility] -      Contact insurance to discuss coverage: Needed -       Cigna: PCP Recommendation and Medical Clearance:    -     HP Referral:    -      Advanced beneficiary notification (ABN) for Medicare patients for RD visits   and surgery:   -      Weight history:   -     Other:    -        Patient Info: Initial Weight:  222 -     Date of Initial Weight/Height:  1/22/2024 -     Goal Weight (lbs):  212 -     Required Weight Loss:  10 -     Surgery Type:  sleeve gastrectomy -     Multidisciplinary Meeting:    -        Dietician Visits: Structured weight loss required by insurance?:   "  -     Dietician Visit 1:  Needed -     Dietician Visit 2:  Needed -     Dietician Visit 3:  Needed -     Dietician Visit additional:  Needed - Monthly until surgery   Clearance from dietician to see surgeon?:    -     Dietician Notes:    -        Psychological Evaluation: Psych eval:  Needed - scheduling info sent 1/22/24   Other:    -        Lab Work: Complete Blood Count:  Completed -     Comprehensive Metabolic Panel:  Needed - Ordered 1/22/24 - AS   Vitamin D:  Needed - Ordered 1/22/24 - AS   PTH:  Needed - Ordered 1/22/24 - AS   Hgb A1c:  Completed -      Lipids: Completed -      TSH ([facility], [facility], [place] MA): Needed - Ordered 1/22/24 - AS   Vitamin A: Needed - Ordered 1/22/24 - AS   Vitamin B12: Needed - Ordered 1/22/24 - AS   Other:    -        Consults/ Clearance Sleep Medicine:  Needed - template sent 1/22/24 [facility]   Cardiac:  Needed - template sent 1/22/24 [facility]   Pulmonology:  Needed - template sent 1/22/24 [facility]      Testing: UGI:    -     EGD:  Needed - order placed, scheduling info sent 1/22/24 [facility]   Sleep Study:   -     Other:   -     Other:    -        PCP: Establish care with PCP:  Completed -     Follow up with PCP:    -     PCP letter of support:  Needed - template sent 1/22/24 [facility]      Health Maintenance: Colonoscopy(> 50 yrs or family hx):    -     Mammogram (> 40 yrs or family hx):    -     Pap Smear (women):   -     Other:   -     Other:    -        Patient Education:  Information Session:  Needed -     Attended New Consult Class?: Completed - 1/23/24 - AS   Given \"Making your decision\" handout?:  Yes -     Given \"A Roadmap to you Weight Loss Surgery\" handout?: Yes -     Given \"Epic Care Companion\" information?: Yes -     Attended support group?:  Needed -     Support plan in place?:  Needed -     Research consents signed?:    -     Avoid NSAIDS/ Alternate Plan for Pain:   -        Additional Surgery Requirements: Review Coag plan:    -     HgA1c <8:    -     Inpatient pain consult:    -     Final " nicotine screen:    -     Dental work complete:    -     Birth control plan:    -     Gallstone prevention plan (Actigall for 6 months postop): Needed -     Other:   -     Other:   -        Final Tasks:  Before surgery online preop class:  Needed -     After surgery online class:  Needed -     Nurse visit for information:  Needed -     Weight Check: Needed -     History and Physical:   -   Needed -     Final labs per clinic: Needed -     See MTM Pharmacist for medication review and plan for after surgery (if DM, transplant hx, greater than 10 meds): Needed -     Chest xray per clinic:   -     Electrocardiogram (ECG) per clinic:   -     Schedule postop appointments: Needed -     Other:   -   -        Notes:   -             Please feel free to reach out if you have any questions.    Sincerely,    Katarina Cortez RN, BSN  RN Care Coordinator  Bariatric Surgery and Comprehensive Weight Management  Phone: [phone]  Fax: [phone]  [phone]

## 2024-02-09 NOTE — PROGRESS NOTES
New Bariatric Patient Class    Elsie M Hayleeruben attended the New Consult WLS class today.     Patient's current comorbidities include:  Patient Active Problem List   Diagnosis    Chronic fatigue syndrome    Dyspepsia    Essential hypertension    Fibromyalgia    Mild episode of recurrent major depressive disorder (H24)    Mild intermittent asthma without complication    MIC (obstructive sleep apnea)    Pure hypercholesterolemia    Severe obesity (BMI 35.0-39.9) with comorbidity (H)    Primary insomnia    Rectocele    Primary stress urinary incontinence    Prediabetes    Mixed hyperlipidemia       Current Outpatient Medications   Medication Sig Dispense Refill    albuterol (PROAIR HFA/PROVENTIL HFA/VENTOLIN HFA) 108 (90 Base) MCG/ACT inhaler Inhale 2 puffs into the lungs      FLUoxetine (PROZAC) 40 MG capsule TAKE ONE CAPSULE BY MOUTH DAILY 90 capsule 3    fluticasone-salmeterol (ADVAIR) 250-50 MCG/ACT inhaler Inhale 1 puff into the lungs every 12 hours 60 each 3    metoprolol succinate ER (TOPROL XL) 50 MG 24 hr tablet Take 0.5 tablets (25 mg) by mouth daily at 2 pm      mycophenolate (GENERIC EQUIVALENT) 500 MG tablet Take 2 tablets (1,000 mg) by mouth 2 times daily 120 tablet 3    omeprazole (PRILOSEC) 20 MG DR capsule Take 20 mg by mouth daily      tirzepatide-Weight Management (ZEPBOUND) 2.5 MG/0.5ML prefilled pen Inject 0.5 mLs (2.5 mg) Subcutaneous every 7 days For 4 weeks 2 mL 0    tirzepatide-Weight Management (ZEPBOUND) 5 MG/0.5ML prefilled pen Inject 0.5 mLs (5 mg) Subcutaneous every 7 days After completing 4 weeks of taking the 2.5mg dose 2 mL 2    traZODone (DESYREL) 50 MG tablet Take 1 tablet (50 mg) by mouth nightly as needed for sleep 90 tablet 1       The following items were reviewed and questions answered.  Goal weight  Labs  Psych clearance  Specialty Clearances  Task list  Preop diet and exercise changes  Postop diet requirements  Postop medication requirements  Postop exercise  Postop lifetime  behavior and diet changes    Patient will continue to meet with ASHELY, Dietician and Surgeon as required preoperatively.    All questions answered.  Patient instructed to contact the office for any questions and to notify office of any updates/clearances completed.       Bariatric Task List    Fax:  Please fax all paperwork to: [phone] -     Status:  Is patient a candidate for bariatric surgery?:  patient is a candidate for bariatric surgery -     Cleared to schedule surgeon consult?:  cleared to schedule surgeon consult -     Status:  surgery evaluation in process -     Surgeon: Dr. Cabrera -     Tentative surgery month/year: tbd -        Insurance: Insurance:  [facility] -      Contact insurance to discuss coverage: Needed -       C[employer]: PCP Recommendation and Medical Clearance:    -     HP Referral:    -      Advanced beneficiary notification (ABN) for Medicare patients for RD visits   and surgery:   -      Weight history:   -     Other:    -        Patient Info: Initial Weight:  222 -     Date of Initial Weight/Height:  1/22/2024 -     Goal Weight (lbs):  212 -     Required Weight Loss:  10 -     Surgery Type:  sleeve gastrectomy -     Multidisciplinary Meeting:    -        Dietician Visits: Structured weight loss required by insurance?:    -     Dietician Visit 1:  Needed -     Dietician Visit 2:  Needed -     Dietician Visit 3:  Needed -     Dietician Visit 4:    -     Dietician Visit 5:    -     Dietician Visit 6:    -     Dietician Visit additional:  Needed - Monthly until surgery   Clearance from dietician to see surgeon?:    -     Dietician Notes:    -        Psychological Evaluation: Psych eval:  Needed - scheduling info sent 1/22/24   Therapist letter of support:    -     Psychiatrist letter of support:    -     Establish care with therapist:    -     Complete eating disorder evaluation:    -     Letter of clearance from therapist/eating disorder program:    -     Other:    -        Lab Work: Complete  "Blood Count:  Completed -     Comprehensive Metabolic Panel:  Needed - Ordered 1/22/24 - AS   Vitamin D:  Needed - Ordered 1/22/24 - AS   PTH:  Needed - Ordered 1/22/24 - AS   Hgb A1c:  Completed -      Lipids: Completed -      TSH ([facility], [facility], [place]): Needed - Ordered 1/22/24 - AS     Ferritin:   -       Folate:   -       Testosterone, Total and Free:   -     Thiamine:   -     Vitamin A: Needed - Ordered 1/22/24 - AS   Vitamin B12: Needed - Ordered 1/22/24 - AS   Zinc:   -     C-peptide:   -     H. pylori:    -     MRSA (2 swabs, minimum 48 hours apart):   -     Nicotine Testing:    -     Recheck Vitamin D:   -     Other:    -        Consults/ Clearance Sleep Medicine:  Needed - template sent 1/22/24 [facility]   Cardiac:  Needed - template sent 1/22/24 [facility]   Pain:   -     Dental:    -     Endocrine:    -     Gastroenterology:    -     Vascular Medicine:    -     Hematology:    -     Medical Weight Management:   -     Physical Therapy/Exercise:    -     Nephrology:    -     Neurology:    -     Pulmonology:  Needed - template sent 1/22/24 [facility]   Rheumatology:    -     Other:    -     Other:    -     Other:    -        Testing: UGI:    -     EGD:  Needed - order placed, scheduling info sent 1/22/24 [facility]   Sleep Study:   -     Other:   -     Other:    -        PCP: Establish care with PCP:  Completed -     Follow up with PCP:    -     PCP letter of support:  Needed - template sent 1/22/24 [facility]      Health Maintenance: Colonoscopy(> 50 yrs or family hx):    -     Mammogram (> 40 yrs or family hx):    -     Pap Smear (women):   -     Other:   -     Other:    -        Stopping Smoking/ Alcohol Use/Cannabis Use: Quit tobacco use (3 months smoke free)?:    -     Quit date:    -     Quit alcohol use:   -     Quit date:   -     Other:   -     Quit date:   -        Patient Education:  Information Session:  Needed -     Attended New Consult Class?: Completed - 1/23/24 - AS   Given \"Making your decision\" handout?:  Yes -     Given \"A " "Roadmap to you Weight Loss Surgery\" handout?: Yes -     Given \"Epic Care Companion\" information?: Yes -     Attended support group?:  Needed -     Support plan in place?:  Needed -     Research consents signed?:    -     Avoid NSAIDS/ Alternate Plan for Pain:   -        Additional Surgery Requirements: Review Coag plan:    -     HgA1c <8:    -     Inpatient pain consult:    -     Final nicotine screen:    -     Dental work complete:    -     Birth control plan:    -     Gallstone prevention plan (Actigall for 6 months postop): Needed -     Other:   -     Other:   -        Final Tasks:  Before surgery online preop class:  Needed -     After surgery online class:  Needed -     Nurse visit for information:  Needed -     Weight Check: Needed -     History and Physical:   -   Needed -     Final labs per clinic: Needed -     See MTM Pharmacist for medication review and plan for after surgery (if DM, transplant hx, greater than 10 meds): Needed -     Chest xray per clinic:   -     Electrocardiogram (ECG) per clinic:   -     Schedule postop appointments: Needed -     Other:   -   -        Notes:   -            "

## 2024-02-26 ENCOUNTER — TELEPHONE (OUTPATIENT)
Dept: FAMILY MEDICINE | Facility: OTHER | Age: 64
End: 2024-02-26

## 2024-02-26 NOTE — TELEPHONE ENCOUNTER
Okay to schedule at 10:30 or 11am tomorrow with virtual. Can do other time in afternoon if those don't work either.

## 2024-02-26 NOTE — TELEPHONE ENCOUNTER
3:11 PM    Reason for Call: OVERBOOK    Patient is having the following symptoms: patient is OUT OF TOWN and is experiencing severe back spasms.  Requesting a video/telephone visit    The patient is requesting an appointment for as soon as possible with Dr. Jessie Rose.    Was an appointment offered for this call? No  If yes : Appointment type              Date    Preferred method for responding to this message: Telephone Call    What is your phone number [phone]     If we cannot reach you directly, may we leave a detailed response at the number you provided? Yes    Can this message wait until your PCP/provider returns, if unavailable today? No, Patient requesting a return phone call today is possible    Thank you  Lizzie Stewart

## 2024-02-27 ENCOUNTER — TELEPHONE (OUTPATIENT)
Dept: PHARMACY | Facility: CLINIC | Age: 64
End: 2024-02-27
Payer: COMMERCIAL

## 2024-02-27 DIAGNOSIS — I10 ESSENTIAL HYPERTENSION: Primary | Chronic | ICD-10-CM

## 2024-02-27 RX ORDER — METOPROLOL SUCCINATE 50 MG/1
25 TABLET, EXTENDED RELEASE ORAL
Status: CANCELLED | OUTPATIENT
Start: 2024-02-27

## 2024-02-27 NOTE — TELEPHONE ENCOUNTER
Patient had to have initial visit with MTM cancelled today as she is in [place]. She needs refill. Routing to nursing Melvin for prescribing provider Leanne Stanford, PAC.     Lauren Bloch, PharmD, BCACP   Medication Therapy Management Pharmacist   [facility]

## 2024-02-27 NOTE — TELEPHONE ENCOUNTER
Patient needs to be rescheduled for their virtual visit due to Reason for Reschedule: Out-of-State    Appointment mode: Telephone  Provider: Bloch, Lauren Turner, RPH      Pt requesting clinic call patient back as soon as possible for medication refills.

## 2024-02-27 NOTE — TELEPHONE ENCOUNTER
Writer called to check patient in for her video visit. Patient stated she was informed that she is not able to do a video visit due to being in [place]. Patient stated she is having back spasms and was wanting to get a muscle relaxer. Patient also stated she needled a refill on her metoprolol. Patient stated she got some of the medication from her mother.     Writer spoke with PCP. PCP would like the patient to be triage to get more information and determine what treatment is needed. Please advise.

## 2024-02-27 NOTE — TELEPHONE ENCOUNTER
Patient Contacted regarding the following:    Appointment type: New MTM  Provider: Lauren Bloch,RPH  Return date: 02/27/2024  Specialty phone number: [phone]  Additional appointment(s) needed: no   Additonal Notes: PT declined to rescheduled appt, stated she has not started medication due to her insurance not covering it.

## 2024-02-28 ENCOUNTER — TELEPHONE (OUTPATIENT)
Dept: ENDOCRINOLOGY | Facility: CLINIC | Age: 64
End: 2024-02-28

## 2024-02-28 NOTE — TELEPHONE ENCOUNTER
MTM appointment cancelled, we made one more attempt to reschedule. Spoke with patient today, she declined to reschedule appointment as she is not taking the medication/appointment not needed.     Routing back to referring provider and MTM pharmacist.       Gayatri Georges CPhT  MTM

## 2024-02-28 NOTE — TELEPHONE ENCOUNTER
"Checks BP approx 1x month  Can't remember last check  States feeling \"fine\"  Traveling home from [place] this week  Writer recommended to present to ED/UC along her route with any acute decline in condition  Made f/up celestino't with PCP for 3/12/24  No further concerns at calls end.  Phone call ended pleasantly.     "

## 2024-03-11 ENCOUNTER — TELEPHONE (OUTPATIENT)
Dept: GASTROENTEROLOGY | Facility: CLINIC | Age: 64
End: 2024-03-11
Payer: COMMERCIAL

## 2024-03-11 ENCOUNTER — TELEPHONE (OUTPATIENT)
Dept: ENDOCRINOLOGY | Facility: CLINIC | Age: 64
End: 2024-03-11
Payer: COMMERCIAL

## 2024-03-11 NOTE — TELEPHONE ENCOUNTER
Called patient as I had a cancellation for an endoscopy 3/13 at 1 p.m. with Dr. Cabrera. I asked her if she could fill that slot and she said the endoscopy was to be done as part of her workup for bariatric surgery but she called [facility] and was told there is no coverage.   I had called [facility] on 1/23/24 and documented in Epic that there is coverage after speaking with a rep named Debbi.   I spoke with Maureen today at [facility] and she said there is coverage and apologized for the misinformation. She said our program is in network and Dr. Cabrera is in network as well. Will call Elsie back to see if she can take the 3/13 endoscopy slot.

## 2024-03-11 NOTE — TELEPHONE ENCOUNTER
"PER KATHERINE GO RN, DR TOM HAS STATED THAT THE PROCEDURE CAN BE PERFORMED UNDER CS.     FULL SCREENING FROM 1/23/24 TELEPHONE ENCOUNTER BELOW.    Final Scheduling Details   Colonoscopy prep sent?  N/A      Procedure scheduled  Upper endoscopy (EGD)      Surgeon:  VAISHALI     Date of procedure:  3/13/24     Pre-OP / PAC:   No - Not required for this site.    Location  [facility] - Per order.    Sedation   Moderate Sedation  PER DR TOM      Patient Reminders:   You will receive a call from a Nurse to review instructions and health history.  This assessment must be completed prior to your procedure.  Failure to complete the Nurse assessment may result in the procedure being cancelled.      On the day of your procedure, please designate an adult(s) who can drive you home stay with you for the next 24 hours. The medicines used in the exam will make you sleepy. You will not be able to drive.      You cannot take public transportation, ride share services, or non-medical taxi service without a responsible caregiver.  Medical transport services are allowed with the requirement that a responsible caregiver will receive you at your destination.  We require that drivers and caregivers are confirmed prior to your procedure.    FROM 1/23/24 TELEPHONE ENCOUNTER:    Endoscopy Scheduling Screen     Have you had a positive Covid test in the last 14 days?  No     Are you active on [facility]?   Yes     What insurance is in the chart?  Other:  [facility]     Ordering/Referring Provider:   CHARANJIT RAWLS         (If ordering provider performs procedure, schedule with ordering provider unless otherwise instructed. )     BMI: Estimated body mass index is 37.74 kg/m  as calculated from the following:    Height as of an earlier encounter on 1/23/24: 1.626 m (5' 4.02\").    Weight as of an earlier encounter on 1/23/24: 99.8 kg (220 lb).      Sedation Ordered  MAC/deep sedation.   BMI<= 45 45 < BMI <= 48 48 < BMI < = 50  BMI > 50   No Restrictions " "No [facility]  No [facility]  [facility] with exceptions Hospital Only OR Only         Are you taking any prescription medications for pain 3 or more times per week?   NO - No RN review required.     Do you have a history of malignant hyperthermia or adverse reaction to anesthesia?  No     (Females) Are you currently pregnant?   No          Have you been diagnosed or told you have pulmonary hypertension?   No     Do you have an LVAD?  No     Have you been told you have moderate to severe sleep apnea?  No     Have you been told you have COPD, asthma, or any other lung disease?  Yes      What breathing problems do you have?  Asthma and interstitial lung disease       Do you use home oxygen?  Yes (Hospital Only)     Have your breathing problems required an ED visit or hospitalization in the last year?  Yes (RN Review required for scheduling unless scheduling in Hospital.)     Do you have any heart conditions?  No      Have you ever had an organ transplant?         No     Have you ever had or are you awaiting a heart or lung transplant?    No     Have you had a stroke or transient ischemic attack (TIA aka \"mini stroke\" in the last 6 months?           No     Have you been diagnosed with or been told you have cirrhosis of the liver?             No     Are you currently on dialysis?   No     Do you need assistance transferring?              No     BMI: Estimated body mass index is 37.74 kg/m  as calculated from the following:    Height as of an earlier encounter on 1/23/24: 1.626 m (5' 4.02\").    Weight as of an earlier encounter on 1/23/24: 99.8 kg (220 lb).      Is patients BMI > 40 and scheduling location [facility]?  No     Do you take an injectable medication for weight loss or diabetes (excluding insulin)?  No     Do you take the medication Naltrexone?  No     Do you take blood thinners?  No         Prep   Are you currently on dialysis or do you have chronic kidney disease?  No     Do you have a diagnosis of diabetes?  No     Do " you have a diagnosis of cystic fibrosis (CF)?  No     On a regular basis do you go 3 -5 days between bowel movements?  No     BMI > 40?  No     Preferred Pharmacy: N/A

## 2024-03-11 NOTE — TELEPHONE ENCOUNTER
General Call    Contacts         Type Contact Phone/Fax    03/11/2024 10:39 AM CDT Phone (Incoming) Ju Rubi (Self)           Reason for Call:  Please call pt to discuss when to increase Zepbound. She has finally started it, I've sched a pharm appt in mid-April        [facility] you [facility] or would you prefer to receive a phone call?:   Patient would prefer a phone call   Okay to leave a detailed message?: Yes at Cell number on file:    Telephone Information:   Mobile [phone]

## 2024-03-11 NOTE — TELEPHONE ENCOUNTER
Pre visit planning completed.      Procedure details:    Patient scheduled for Upper endoscopy (EGD) on 3/13/2024.     Arrival time: 1200. Procedure time 1300    Pre op exam needed? N/A    Facility location: [facility]; [address], [place] [address]    Sedation type: Conscious sedation     Indication for procedure: 10+ years of heart burn, managed with omeprazole, candidate for bariatric surgery. Last EGD in [place] 5 years ago.       Chart review:     Electronic implanted devices? No    Recent diagnosis of diverticulitis within the last 6 weeks? N/A    Diabetic? Prediabetic     Diabetic medication HOLDING recommendations: (if applicable)  Oral diabetic medications: N/A  Diabetic injectables: N/A  Insulin: N/A      Medication review:    Anticoagulants? No    NSAIDS? No NSAID medications per patient's medication list.  RN will verify with pre-assessment call.    Other medication HOLDING recommendations:  Weight loss injectable medication: Tirzepatide-Weight Management (Zepbound). Weekly dosing of medication.  Hold 7 days before procedure.  Follow up with managing provider.     - IVYI sent to  advising that pt took her weight loss injectable on 3/7    Prep for procedure:     Bowel prep recommendation: N/A    Prep instructions sent via lorena Estes RN  Endoscopy Procedure Pre Assessment RN  [phone] option 4

## 2024-03-11 NOTE — TELEPHONE ENCOUNTER
Answered questions regarding Zepbound dosing.    Checked with insurance regarding bariatric surgery.  States she is covered for surgery as long as there is a prior authorization.  Reviewed and updated task list.

## 2024-03-11 NOTE — TELEPHONE ENCOUNTER
General Call    Contacts         Type Contact Phone/Fax    03/11/2024 10:37 AM CDT Phone (Incoming) Ju Rubi (Self) [phone] (H)          Reason for Call:  please call pt to discuss task list (pt is now Certain she has insurance).        Could we send this information to you in [employer] or would you prefer to receive a phone call?:   Patient would prefer a phone call   Okay to leave a detailed message?: Yes at Cell number on file:    Telephone Information:   Mobile [phone]

## 2024-03-11 NOTE — TELEPHONE ENCOUNTER
Pre assessment completed for upcoming procedure.   (Please see previous telephone encounter notes for complete details)       Procedure details:    Arrival time and facility location reviewed.    Pre op exam needed? N/A    Designated  policy reviewed. Instructed to have someone stay 6  hours post procedure.       Medication review:    Medications reviewed. Please see supporting documentation below. Holding recommendations discussed (if applicable).       Prep for procedure:     Procedure prep instructions reviewed.        Any additional information needed:  N/A      Patient  verbalized understanding and had no questions or concerns at this time.      Danielle Estes RN  Endoscopy Procedure Pre Assessment RN  [phone] option 4

## 2024-03-12 ENCOUNTER — OFFICE VISIT (OUTPATIENT)
Dept: FAMILY MEDICINE | Facility: OTHER | Age: 64
End: 2024-03-12
Attending: STUDENT IN AN ORGANIZED HEALTH CARE EDUCATION/TRAINING PROGRAM
Payer: COMMERCIAL

## 2024-03-12 ENCOUNTER — THERAPY VISIT (OUTPATIENT)
Dept: SLEEP MEDICINE | Facility: HOSPITAL | Age: 64
End: 2024-03-12
Attending: FAMILY MEDICINE
Payer: COMMERCIAL

## 2024-03-12 ENCOUNTER — LAB (OUTPATIENT)
Dept: LAB | Facility: OTHER | Age: 64
End: 2024-03-12
Attending: STUDENT IN AN ORGANIZED HEALTH CARE EDUCATION/TRAINING PROGRAM
Payer: COMMERCIAL

## 2024-03-12 VITALS
WEIGHT: 220.5 LBS | BODY MASS INDEX: 37.85 KG/M2 | DIASTOLIC BLOOD PRESSURE: 94 MMHG | HEART RATE: 93 BPM | OXYGEN SATURATION: 97 % | TEMPERATURE: 98.8 F | SYSTOLIC BLOOD PRESSURE: 138 MMHG

## 2024-03-12 DIAGNOSIS — M25.561 CHRONIC PAIN OF RIGHT KNEE: ICD-10-CM

## 2024-03-12 DIAGNOSIS — M62.838 MUSCLE SPASM: ICD-10-CM

## 2024-03-12 DIAGNOSIS — G89.29 CHRONIC PAIN OF RIGHT KNEE: ICD-10-CM

## 2024-03-12 DIAGNOSIS — E66.01 CLASS 2 SEVERE OBESITY WITH SERIOUS COMORBIDITY AND BODY MASS INDEX (BMI) OF 38.0 TO 38.9 IN ADULT, UNSPECIFIED OBESITY TYPE (H): ICD-10-CM

## 2024-03-12 DIAGNOSIS — J45.20 MILD INTERMITTENT ASTHMA WITHOUT COMPLICATION: Chronic | ICD-10-CM

## 2024-03-12 DIAGNOSIS — R73.03 PREDIABETES: Chronic | ICD-10-CM

## 2024-03-12 DIAGNOSIS — E66.01 MORBID OBESITY (H): ICD-10-CM

## 2024-03-12 DIAGNOSIS — M54.9 CHRONIC UPPER BACK PAIN: ICD-10-CM

## 2024-03-12 DIAGNOSIS — R73.03 DIABETES MELLITUS, LATENT: ICD-10-CM

## 2024-03-12 DIAGNOSIS — E66.812 CLASS 2 SEVERE OBESITY WITH SERIOUS COMORBIDITY AND BODY MASS INDEX (BMI) OF 38.0 TO 38.9 IN ADULT, UNSPECIFIED OBESITY TYPE (H): ICD-10-CM

## 2024-03-12 DIAGNOSIS — E66.01 SEVERE OBESITY (BMI 35.0-39.9) WITH COMORBIDITY (H): Chronic | ICD-10-CM

## 2024-03-12 DIAGNOSIS — J84.9 ILD (INTERSTITIAL LUNG DISEASE) (H): ICD-10-CM

## 2024-03-12 DIAGNOSIS — G89.29 CHRONIC UPPER BACK PAIN: ICD-10-CM

## 2024-03-12 DIAGNOSIS — I10 ESSENTIAL HYPERTENSION: Primary | Chronic | ICD-10-CM

## 2024-03-12 LAB
ALBUMIN SERPL BCG-MCNC: 4.3 G/DL (ref 3.5–5.2)
ALP SERPL-CCNC: 93 U/L (ref 40–150)
ALT SERPL W P-5'-P-CCNC: 23 U/L (ref 0–50)
ANION GAP SERPL CALCULATED.3IONS-SCNC: 13 MMOL/L (ref 7–15)
AST SERPL W P-5'-P-CCNC: 30 U/L (ref 0–45)
BASOPHILS # BLD AUTO: 0 10E3/UL (ref 0–0.2)
BASOPHILS NFR BLD AUTO: 0 %
BILIRUB SERPL-MCNC: 0.6 MG/DL
BUN SERPL-MCNC: 18.5 MG/DL (ref 8–23)
CALCIUM SERPL-MCNC: 9.5 MG/DL (ref 8.8–10.2)
CHLORIDE SERPL-SCNC: 102 MMOL/L (ref 98–107)
CREAT SERPL-MCNC: 0.86 MG/DL (ref 0.51–0.95)
DEPRECATED HCO3 PLAS-SCNC: 21 MMOL/L (ref 22–29)
EGFRCR SERPLBLD CKD-EPI 2021: 75 ML/MIN/1.73M2
EOSINOPHIL # BLD AUTO: 0.1 10E3/UL (ref 0–0.7)
EOSINOPHIL NFR BLD AUTO: 2 %
ERYTHROCYTE [DISTWIDTH] IN BLOOD BY AUTOMATED COUNT: 14.2 % (ref 10–15)
GLUCOSE SERPL-MCNC: 95 MG/DL (ref 70–99)
HCT VFR BLD AUTO: 39.3 % (ref 35–47)
HGB BLD-MCNC: 12.6 G/DL (ref 11.7–15.7)
IMM GRANULOCYTES # BLD: 0 10E3/UL
IMM GRANULOCYTES NFR BLD: 0 %
LYMPHOCYTES # BLD AUTO: 1.9 10E3/UL (ref 0–5.3)
LYMPHOCYTES NFR BLD AUTO: 26 %
MCH RBC QN AUTO: 29.1 PG (ref 26.5–33)
MCHC RBC AUTO-ENTMCNC: 32.1 G/DL (ref 31.5–36.5)
MCV RBC AUTO: 91 FL (ref 78–100)
MONOCYTES # BLD AUTO: 0.8 10E3/UL (ref 0–1.3)
MONOCYTES NFR BLD AUTO: 11 %
NEUTROPHILS # BLD AUTO: 4.4 10E3/UL (ref 1.6–8.3)
NEUTROPHILS NFR BLD AUTO: 61 %
NRBC # BLD AUTO: 0 10E3/UL
NRBC BLD AUTO-RTO: 0 /100
PLATELET # BLD AUTO: 214 10E3/UL (ref 150–450)
POTASSIUM SERPL-SCNC: 4.1 MMOL/L (ref 3.4–5.3)
PROT SERPL-MCNC: 7.5 G/DL (ref 6.4–8.3)
RBC # BLD AUTO: 4.33 10E6/UL (ref 3.8–5.2)
SLPCOMP: NORMAL
SODIUM SERPL-SCNC: 136 MMOL/L (ref 135–145)
TSH SERPL DL<=0.005 MIU/L-ACNC: 2.39 UIU/ML (ref 0.3–4.2)
WBC # BLD AUTO: 7.2 10E3/UL (ref 4–11)

## 2024-03-12 PROCEDURE — G0463 HOSPITAL OUTPT CLINIC VISIT: HCPCS

## 2024-03-12 PROCEDURE — 80053 COMPREHEN METABOLIC PANEL: CPT | Mod: ZL

## 2024-03-12 PROCEDURE — 85025 COMPLETE CBC W/AUTO DIFF WBC: CPT | Mod: ZL

## 2024-03-12 PROCEDURE — 84590 ASSAY OF VITAMIN A: CPT | Mod: ZL

## 2024-03-12 PROCEDURE — 82607 VITAMIN B-12: CPT | Mod: ZL

## 2024-03-12 PROCEDURE — 95810 POLYSOM 6/> YRS 4/> PARAM: CPT

## 2024-03-12 PROCEDURE — 95810 POLYSOM 6/> YRS 4/> PARAM: CPT | Mod: 26 | Performed by: FAMILY MEDICINE

## 2024-03-12 PROCEDURE — 83970 ASSAY OF PARATHORMONE: CPT | Mod: ZL

## 2024-03-12 PROCEDURE — 84443 ASSAY THYROID STIM HORMONE: CPT | Mod: ZL

## 2024-03-12 PROCEDURE — 36415 COLL VENOUS BLD VENIPUNCTURE: CPT | Mod: ZL

## 2024-03-12 PROCEDURE — 82306 VITAMIN D 25 HYDROXY: CPT | Mod: ZL

## 2024-03-12 PROCEDURE — 99214 OFFICE O/P EST MOD 30 MIN: CPT | Performed by: STUDENT IN AN ORGANIZED HEALTH CARE EDUCATION/TRAINING PROGRAM

## 2024-03-12 RX ORDER — CYCLOBENZAPRINE HCL 5 MG
5 TABLET ORAL 2 TIMES DAILY PRN
Qty: 30 TABLET | Refills: 0 | Status: ON HOLD | OUTPATIENT
Start: 2024-03-12 | End: 2024-09-10

## 2024-03-12 RX ORDER — METOPROLOL SUCCINATE 25 MG/1
25 TABLET, EXTENDED RELEASE ORAL DAILY
Qty: 90 TABLET | Refills: 1 | Status: ON HOLD | OUTPATIENT
Start: 2024-03-12 | End: 2024-09-10

## 2024-03-12 ASSESSMENT — ANXIETY QUESTIONNAIRES
2. NOT BEING ABLE TO STOP OR CONTROL WORRYING: NOT AT ALL
8. IF YOU CHECKED OFF ANY PROBLEMS, HOW DIFFICULT HAVE THESE MADE IT FOR YOU TO DO YOUR WORK, TAKE CARE OF THINGS AT HOME, OR GET ALONG WITH OTHER PEOPLE?: SOMEWHAT DIFFICULT
IF YOU CHECKED OFF ANY PROBLEMS ON THIS QUESTIONNAIRE, HOW DIFFICULT HAVE THESE PROBLEMS MADE IT FOR YOU TO DO YOUR WORK, TAKE CARE OF THINGS AT HOME, OR GET ALONG WITH OTHER PEOPLE: SOMEWHAT DIFFICULT
GAD7 TOTAL SCORE: 4
6. BECOMING EASILY ANNOYED OR IRRITABLE: MORE THAN HALF THE DAYS
1. FEELING NERVOUS, ANXIOUS, OR ON EDGE: SEVERAL DAYS
4. TROUBLE RELAXING: NOT AT ALL
7. FEELING AFRAID AS IF SOMETHING AWFUL MIGHT HAPPEN: SEVERAL DAYS
3. WORRYING TOO MUCH ABOUT DIFFERENT THINGS: NOT AT ALL
GAD7 TOTAL SCORE: 4
5. BEING SO RESTLESS THAT IT IS HARD TO SIT STILL: NOT AT ALL
7. FEELING AFRAID AS IF SOMETHING AWFUL MIGHT HAPPEN: SEVERAL DAYS
GAD7 TOTAL SCORE: 4

## 2024-03-12 ASSESSMENT — PATIENT HEALTH QUESTIONNAIRE - PHQ9
SUM OF ALL RESPONSES TO PHQ QUESTIONS 1-9: 6
10. IF YOU CHECKED OFF ANY PROBLEMS, HOW DIFFICULT HAVE THESE PROBLEMS MADE IT FOR YOU TO DO YOUR WORK, TAKE CARE OF THINGS AT HOME, OR GET ALONG WITH OTHER PEOPLE: VERY DIFFICULT
SUM OF ALL RESPONSES TO PHQ QUESTIONS 1-9: 6

## 2024-03-12 ASSESSMENT — ASTHMA QUESTIONNAIRES
QUESTION_4 LAST FOUR WEEKS HOW OFTEN HAVE YOU USED YOUR RESCUE INHALER OR NEBULIZER MEDICATION (SUCH AS ALBUTEROL): ONCE A WEEK OR LESS
QUESTION_2 LAST FOUR WEEKS HOW OFTEN HAVE YOU HAD SHORTNESS OF BREATH: MORE THAN ONCE A DAY
QUESTION_3 LAST FOUR WEEKS HOW OFTEN DID YOUR ASTHMA SYMPTOMS (WHEEZING, COUGHING, SHORTNESS OF BREATH, CHEST TIGHTNESS OR PAIN) WAKE YOU UP AT NIGHT OR EARLIER THAN USUAL IN THE MORNING: NOT AT ALL
ACT_TOTALSCORE: 16
ACT_TOTALSCORE: 16
QUESTION_5 LAST FOUR WEEKS HOW WOULD YOU RATE YOUR ASTHMA CONTROL: SOMEWHAT CONTROLLED
EMERGENCY_ROOM_LAST_YEAR_TOTAL: ONE
QUESTION_1 LAST FOUR WEEKS HOW MUCH OF THE TIME DID YOUR ASTHMA KEEP YOU FROM GETTING AS MUCH DONE AT WORK, SCHOOL OR AT HOME: SOME OF THE TIME

## 2024-03-12 ASSESSMENT — PAIN SCALES - GENERAL: PAINLEVEL: MILD PAIN (3)

## 2024-03-12 NOTE — LETTER
March 12, 2024      Elsie Rubi  [address] [place] [id number]        To Whom It May Concern:      Elsie Rubi is a patient of mine and I am writing a letter in support of her having bariatric surgery. She does have complications, with obesity as a possible inciting factor, including hypertension and interstitial lung disease. She has been compliant with recommended treatments and would follow necessary recommendations post bariatric surgery.         Sincerely,      3/12/2024  2:27 PM        Jessie Rose MD

## 2024-03-12 NOTE — PROGRESS NOTES
Assessment & Plan     Essential hypertension  Not well-controlled at this time.  Did stop her metoprolol last fall, has had borderline elevation since then.  Discussed option of ACE or ARB rather than beta-blocker therapy, however she did well with the beta-blocker before and would like to resume this.  Plan to start metoprolol 25 mg once daily.  Monitor blood pressure at home  Follow-up in 1 month, sooner if needed  - metoprolol succinate ER (TOPROL XL) 25 MG 24 hr tablet; Take 1 tablet (25 mg) by mouth daily    ILD (interstitial lung disease) (H)  Follows with pulmonology for her interstitial lung disease.  Continues on mycophenolate.  Interested in pulmonary rehab again, as this was very beneficial for her in the past.  New referral placed.  - Pulmonary Rehab  Referral; Future    Mild intermittent asthma without complication  Possibly worsening over the last few days, although no wheezing on examination and exam is quite reassuring.  She is already on a relatively high dose of Advair, although could technically increase to 2 puffs twice daily.  May be an allergy component, try adding Claritin or Zyrtec over the next 1 to 2 weeks  Discussed option of Singulair, which she declined right now due to possible side effects  If worsening, certainly needs follow-up right away and she was understanding    Severe obesity (BMI 35.0-39.9) with comorbidity (H)  Letter of support written for bariatric surgery.  Do feel this would be very beneficial for her with her interstitial lung disease, which may improve some with weight loss, in addition to her hypertension and back pain    Muscle spasm  Chronic upper back pain  Significant trapezius muscle spasm, worse on the left  With her history of upper back pain/muscle dysfunction, recommend physical therapy  Will give Flexeril to use as needed for severe muscle spasm.  She has tolerated this in the past.  - cyclobenzaprine (FLEXERIL) 5 MG tablet; Take 1 tablet (5 mg) by  mouth 2 times daily as needed for muscle spasms  - Physical Therapy  Referral; Future    Chronic pain of right knee  Suspect deconditioning versus patellofemoral pain versus patellar arthritis  No signs of ligamentous injury or meniscus etiology  Recommend physical therapy for initial treatment  Will reassess at follow-up in 4 weeks, if not improving will obtain x-ray  - Physical Therapy  Referral; Future      I spent a total of 36 minutes on the day of the visit.   Time spent by me doing chart review, history and exam, documentation and further activities per the note        Subjective   Ju is a 64 year old, presenting for the following health issues:  Hypertension, Back Pain, and Asthma        3/12/2024     2:01 PM   Additional Questions   Roomed by Claudia John   Accompanied by None         3/12/2024     2:01 PM   Patient Reported Additional Medications   Patient reports taking the following new medications None     HPI       Letter of support for Bariatric surgery     Patient states she has been sedentary and has concerns about it, she is looking for something in the [facility] system to get her out of the house and some exercise. Loved being in pulmonary rehab. Has been six month since she stopped. More deconditioned.       Hypertension Follow-up    Do you check your blood pressure regularly outside of the clinic? Yes   Are you following a low salt diet? No   Are your blood pressures ever more than 140 on the top number (systolic) OR more   than 90 on the bottom number (diastolic), for example 140/90? Yes    Stopped metoprolol last fall - bp high since then   Has noticed higher HR since off metoprolol   Liked metoprolol, wanting to resume     Back Spasm     Duration: on and off for her whole life, worsened around 2 weeks ago   Description (location/character/radiation): upper back and neck   Intensity:  moderate, severe  Accompanying signs and symptoms: pain, tense muscles, limited  "range of motion   History (similar episodes/previous evaluation): ongoing   Precipitating or alleviating factors: stretching, focusing on posture   Therapies tried and outcome: stretching, focusing on posture       History of scoliosis and back brace as a child  Back has always been weak and easily fatigued with muscle tightness and bad posture  Prior ortho visits helped   Was at moms for 3 weeks, sleeping on bad bed, a lot of time sitting with pillows propped up - this caused flare   Unsure if ice helps, heat minimal effect   Naproxen helps   Mom had flexeril, helped her   No fever, night sweats, unintentional weight loss, focal tenderness, or unchanged pain in spite of position.   No signs of cauda equina - no bilateral radicular symptoms, urinary retention, or saddle anesthesia.        Musculoskeletal problem/pain    Duration: ongoing   Description  Location: left knee   Intensity:  8/10 when using stairs   Accompanying signs and symptoms: none  History  Previous similar problem: YES  Previous evaluation:  Unknown   Precipitating or alleviating factors:  Trauma or overuse: no   Aggravating factors include: climbing stairs  Therapies tried and outcome: nothing     Left knee, sporadic type pain   Hard time getting up and down stairs   Occasional pain when getting up from sitting   Pain is stabbing, behind patella  No giving out, locking or swelling   Hips feel unstable at times   Deconditioned     ILD - feeling like lungs are worse right now, last few days. Little flares up usually resolve on their own. Did start using her o2, intermittently compliant with. Not ill. Feeling more \"anxious\". Talking in complete sentences. No significant dyspnea. Used albuterol this AM and felt good. Has not needed steroids.         Objective    BP (!) 139/91 (BP Location: Right arm, Patient Position: Sitting, Cuff Size: Adult Large)   Pulse 93   Temp 98.8  F (37.1  C) (Tympanic)   Wt 100 kg (220 lb 8 oz)   SpO2 97%   BMI 37.85 " kg/m    Body mass index is 37.85 kg/m .    Physical Exam  Constitutional:       General: She is not in acute distress.     Appearance: Normal appearance.   Cardiovascular:      Rate and Rhythm: Normal rate and regular rhythm.      Heart sounds: No murmur heard.  Pulmonary:      Effort: Pulmonary effort is normal.      Breath sounds: Normal breath sounds. No wheezing, rhonchi or rales.      Comments: Good air entry. Normal work of breathing. Talking in complete sentences.   Musculoskeletal:      Comments: Full range of motion with her neck.  Significant muscle tension in the left upper trapezius and into the neck area.  Some tension in the right side but less than the left.    Left knee with full range of motion and no crepitus.  No medial or lateral joint line tenderness.  Full flexion and extension without pain.  No pain with medial or lateral Jennifer.  MCL, LCL, ACL, and PCL intact.  No edema or erythema.   Neurological:      General: No focal deficit present.      Mental Status: She is alert and oriented to person, place, and time.   Psychiatric:         Mood and Affect: Mood normal.         Behavior: Behavior normal.                Signed Electronically by: Jessie Rose MD    Answers submitted by the patient for this visit:  Patient Health Questionnaire (Submitted on 3/12/2024)  If you checked off any problems, how difficult have these problems made it for you to do your work, take care of things at home, or get along with other people?: Very difficult  PHQ9 TOTAL SCORE: 6  EDMAR-7 (Submitted on 3/12/2024)  EDMAR 7 TOTAL SCORE: 4

## 2024-03-12 NOTE — PATIENT INSTRUCTIONS
Ideal blood pressure 130/80  Check blood pressure once daily  Resume metoprolol 25mg once daily   A referral was placed for you to do physical therapy.   If this referral was to [facility], if you have not heard anything after 2 business days, please call [phone] to schedule an appointment.   Try flexeril 5mg as needed for muscle spasm

## 2024-03-13 ENCOUNTER — HOSPITAL ENCOUNTER (OUTPATIENT)
Facility: CLINIC | Age: 64
Discharge: HOME | End: 2024-03-13
Attending: SURGERY | Admitting: SURGERY
Payer: COMMERCIAL

## 2024-03-13 VITALS
SYSTOLIC BLOOD PRESSURE: 129 MMHG | OXYGEN SATURATION: 100 % | RESPIRATION RATE: 15 BRPM | HEART RATE: 81 BPM | DIASTOLIC BLOOD PRESSURE: 78 MMHG

## 2024-03-13 LAB — UPPER GI ENDOSCOPY: NORMAL

## 2024-03-13 PROCEDURE — 43239 EGD BIOPSY SINGLE/MULTIPLE: CPT | Performed by: SURGERY

## 2024-03-13 PROCEDURE — G0500 MOD SEDAT ENDO SERVICE >5YRS: HCPCS | Performed by: SURGERY

## 2024-03-13 PROCEDURE — 88305 TISSUE EXAM BY PATHOLOGIST: CPT | Mod: TC | Performed by: SURGERY

## 2024-03-13 PROCEDURE — 88305 TISSUE EXAM BY PATHOLOGIST: CPT | Mod: 26 | Performed by: PATHOLOGY

## 2024-03-13 PROCEDURE — 250N000011 HC RX IP 250 OP 636: Performed by: SURGERY

## 2024-03-13 RX ORDER — ONDANSETRON 2 MG/ML
4 INJECTION INTRAMUSCULAR; INTRAVENOUS EVERY 6 HOURS PRN
Status: CANCELLED | OUTPATIENT
Start: 2024-03-13

## 2024-03-13 RX ORDER — NALOXONE HYDROCHLORIDE 0.4 MG/ML
0.4 INJECTION, SOLUTION INTRAMUSCULAR; INTRAVENOUS; SUBCUTANEOUS
Status: CANCELLED | OUTPATIENT
Start: 2024-03-13

## 2024-03-13 RX ORDER — ONDANSETRON 4 MG/1
4 TABLET, ORALLY DISINTEGRATING ORAL EVERY 6 HOURS PRN
Status: CANCELLED | OUTPATIENT
Start: 2024-03-13

## 2024-03-13 RX ORDER — FLUMAZENIL 0.1 MG/ML
0.2 INJECTION, SOLUTION INTRAVENOUS
Status: CANCELLED | OUTPATIENT
Start: 2024-03-13 | End: 2024-03-14

## 2024-03-13 RX ORDER — FENTANYL CITRATE 50 UG/ML
INJECTION, SOLUTION INTRAMUSCULAR; INTRAVENOUS PRN
Status: DISCONTINUED | OUTPATIENT
Start: 2024-03-13 | End: 2024-03-18 | Stop reason: HOSPADM

## 2024-03-13 RX ORDER — PROCHLORPERAZINE MALEATE 10 MG
10 TABLET ORAL EVERY 6 HOURS PRN
Status: CANCELLED | OUTPATIENT
Start: 2024-03-13

## 2024-03-13 RX ORDER — NALOXONE HYDROCHLORIDE 0.4 MG/ML
0.2 INJECTION, SOLUTION INTRAMUSCULAR; INTRAVENOUS; SUBCUTANEOUS
Status: CANCELLED | OUTPATIENT
Start: 2024-03-13

## 2024-03-13 ASSESSMENT — ACTIVITIES OF DAILY LIVING (ADL)
ADLS_ACUITY_SCORE: 35

## 2024-03-13 NOTE — H&P
[facility] Anesthesia Pre-op History and Physical    Elsie Rubi MRN# [id number]   Age: 64 year old YOB: 1960                    Location [facility], [place]; 3rd floor endoscopy center          Primary care provider: Jessie Rose         Chief Complaint and/or Reason for Procedure:   Has obesity; reflux; pre-bariatric evaluation         Active problem list:     Patient Active Problem List    Diagnosis Date Noted    ILD (interstitial lung disease) (H) 03/12/2024     Priority: Medium    Prediabetes 08/25/2023     Priority: Medium    Mixed hyperlipidemia 08/25/2023     Priority: Medium     The 10-year ASCVD risk score (Radha DENT, et al., 2019) is: 5.8%    Values used to calculate the score:      Age: 63 years      Sex: Female      Is Non- : No      Diabetic: No      Tobacco smoker: No      Systolic Blood Pressure: 123 mmHg      Is BP treated: Yes      HDL Cholesterol: 57 mg/dL      Total Cholesterol: 218 mg/dL        Primary insomnia 08/10/2023     Priority: Medium    Rectocele 08/10/2023     Priority: Medium    Primary stress urinary incontinence 08/10/2023     Priority: Medium     OB GYN [facility] 2021 - declined surgery      MIC (obstructive sleep apnea) 10/14/2021     Priority: Medium    Chronic fatigue syndrome 08/13/2020     Priority: Medium    Dyspepsia 08/13/2020     Priority: Medium    Essential hypertension 08/13/2020     Priority: Medium    Fibromyalgia 08/13/2020     Priority: Medium    Mild episode of recurrent major depressive disorder (H24) 08/13/2020     Priority: Medium    Mild intermittent asthma without complication 08/13/2020     Priority: Medium    Severe obesity (BMI 35.0-39.9) with comorbidity (H) 08/13/2020     Priority: Medium            Medications (include herbals and vitamins):     No current facility-administered medications for this encounter.             Allergies:    No Known Allergies           Physical Exam:   Patient Vitals for the  past 8 hrs:   BP Pulse Resp SpO2   03/13/24 1314 (!) 138/96 87 16 97 %     No intake/output data recorded.  Breathing unlabored  NAD            Lab / Radiology Results:   Guidelines for CXR: new or unstable cardio-pulmonary disease         Anesthetic risk and/or ASA classification:       Jerel Cabrera MD     Plan    Upper Endoscopy to evaluate for pre-sleeve      Jerel Cabrera MD  Surgery  [phone] (hospital )  [phone] (clinic nurses)

## 2024-03-13 NOTE — PROGRESS NOTES
The patient presents to the sleep center with complaints of sleep disordered breathing. The patient's AHI was 12, with the lowest o2 saturation being 81.5%, and the time below 89% being 15.3 minutes. A Trazadone 50mg was taken. A normal sinus rhythm was observed. Minimal limb movements were present during the test. Moderate to loud snoring was noted by the tech. All stages of sleep were observed. The total sleep time was 370.5 minutes, sleep efficiency was 79.4%, and the sleep latency was 53.1 minutes. The patient tolerated the test well.

## 2024-03-14 ENCOUNTER — OFFICE VISIT (OUTPATIENT)
Dept: CARDIOLOGY | Facility: OTHER | Age: 64
End: 2024-03-14
Payer: COMMERCIAL

## 2024-03-14 VITALS
HEIGHT: 64 IN | SYSTOLIC BLOOD PRESSURE: 135 MMHG | BODY MASS INDEX: 37.39 KG/M2 | DIASTOLIC BLOOD PRESSURE: 89 MMHG | OXYGEN SATURATION: 100 % | HEART RATE: 101 BPM | RESPIRATION RATE: 16 BRPM | WEIGHT: 219 LBS

## 2024-03-14 DIAGNOSIS — E66.812 CLASS 2 SEVERE OBESITY WITH SERIOUS COMORBIDITY AND BODY MASS INDEX (BMI) OF 38.0 TO 38.9 IN ADULT, UNSPECIFIED OBESITY TYPE (H): ICD-10-CM

## 2024-03-14 DIAGNOSIS — Z82.49 FAMILY HISTORY OF ISCHEMIC HEART DISEASE (IHD): ICD-10-CM

## 2024-03-14 DIAGNOSIS — E78.5 HYPERLIPIDEMIA LDL GOAL <100: ICD-10-CM

## 2024-03-14 DIAGNOSIS — J84.9 ILD (INTERSTITIAL LUNG DISEASE) (H): Chronic | ICD-10-CM

## 2024-03-14 DIAGNOSIS — K44.9 HIATAL HERNIA WITH GASTROESOPHAGEAL REFLUX DISEASE AND ESOPHAGITIS: Primary | ICD-10-CM

## 2024-03-14 DIAGNOSIS — R73.03 PREDIABETES: Chronic | ICD-10-CM

## 2024-03-14 DIAGNOSIS — G47.33 OSA (OBSTRUCTIVE SLEEP APNEA): ICD-10-CM

## 2024-03-14 DIAGNOSIS — E66.01 CLASS 2 SEVERE OBESITY WITH SERIOUS COMORBIDITY AND BODY MASS INDEX (BMI) OF 38.0 TO 38.9 IN ADULT, UNSPECIFIED OBESITY TYPE (H): ICD-10-CM

## 2024-03-14 DIAGNOSIS — I10 ESSENTIAL HYPERTENSION: Chronic | ICD-10-CM

## 2024-03-14 DIAGNOSIS — K29.30 CHRONIC SUPERFICIAL GASTRITIS WITHOUT BLEEDING: ICD-10-CM

## 2024-03-14 DIAGNOSIS — K21.00 HIATAL HERNIA WITH GASTROESOPHAGEAL REFLUX DISEASE AND ESOPHAGITIS: Primary | ICD-10-CM

## 2024-03-14 DIAGNOSIS — R06.09 DYSPNEA ON EXERTION: Primary | ICD-10-CM

## 2024-03-14 LAB
ATRIAL RATE - MUSE: 82 BPM
DIASTOLIC BLOOD PRESSURE - MUSE: NORMAL MMHG
INTERPRETATION ECG - MUSE: NORMAL
P AXIS - MUSE: 66 DEGREES
PATH REPORT.COMMENTS IMP SPEC: NORMAL
PATH REPORT.FINAL DX SPEC: NORMAL
PATH REPORT.GROSS SPEC: NORMAL
PATH REPORT.MICROSCOPIC SPEC OTHER STN: NORMAL
PATH REPORT.RELEVANT HX SPEC: NORMAL
PHOTO IMAGE: NORMAL
PR INTERVAL - MUSE: 158 MS
PTH-INTACT SERPL-MCNC: 61 PG/ML (ref 15–65)
QRS DURATION - MUSE: 84 MS
QT - MUSE: 382 MS
QTC - MUSE: 446 MS
R AXIS - MUSE: 0 DEGREES
SYSTOLIC BLOOD PRESSURE - MUSE: NORMAL MMHG
T AXIS - MUSE: 43 DEGREES
VENTRICULAR RATE- MUSE: 82 BPM
VIT B12 SERPL-MCNC: 609 PG/ML (ref 232–1245)
VIT D+METAB SERPL-MCNC: 21 NG/ML (ref 20–50)

## 2024-03-14 PROCEDURE — 99204 OFFICE O/P NEW MOD 45 MIN: CPT | Performed by: NURSE PRACTITIONER

## 2024-03-14 PROCEDURE — G0463 HOSPITAL OUTPT CLINIC VISIT: HCPCS

## 2024-03-14 PROCEDURE — 93005 ELECTROCARDIOGRAM TRACING: CPT | Performed by: NURSE PRACTITIONER

## 2024-03-14 PROCEDURE — 93010 ELECTROCARDIOGRAM REPORT: CPT | Mod: 77 | Performed by: INTERNAL MEDICINE

## 2024-03-14 RX ORDER — OMEPRAZOLE 40 MG/1
40 CAPSULE, DELAYED RELEASE ORAL DAILY
Qty: 90 CAPSULE | Refills: 3 | Status: ON HOLD | OUTPATIENT
Start: 2024-03-14 | End: 2024-09-11

## 2024-03-14 ASSESSMENT — PAIN SCALES - GENERAL: PAINLEVEL: NO PAIN (0)

## 2024-03-14 NOTE — PATIENT INSTRUCTIONS
Thank you for allowing Becac Singer CNP and our  team to participate in your care. Please call our office at [phone] with scheduling questions or if you need to cancel or change your appointment. With any other questions or concerns you may call cardiology nurse at  [phone].       If you experience chest pain, chest pressure, chest tightness, shortness of breath, fainting, lightheadedness, nausea, vomiting, or other concerning symptoms, please report to the Emergency Department or call 911. These symptoms may be emergent, and best treated in the Emergency Department.

## 2024-03-14 NOTE — PROGRESS NOTES
"[facility]   CARDIOLOGY CONSULT     Elsie Rubi   [address] [facility] [address]      Jessie Rose     Chief Complaint   Patient presents with    Consult        HPI:   Elsie \"Ju\"Greyson is a pleasant 64-year old female who presents for cardiology consult for pre-operative clearance. She has a cardiovascular history including hypertension, hyperlipidemia. She has a non-cardiac history including asthma, interstitial lung disease, obstructive sleep apnea, nocturnal hypoxia, obesity.      Today, Ju has no specific concerns. She endorses that for insurance purposes she is needing clearance for weight loss surgery.  Lifestyle is sedentary- \"sit on the couch\" most of the time. She has low energy. She is hoping to do some pulmonary rehab to help her get back into physical shape. She does get dyspneic easily. Walking into the kitchen \"I am gasping for air.\" Yesterday she did go for a walk outside and she was able to walk 2 blocks without dyspnea. After 2-3 minutes of doing the 6 minute walk test she tells me she gets dyspneic and her oxygen saturation drops into the 80s. She does have a second story to her home and feels dyspneic once she gets to the top of the stairs. She tells me without the metoprolol her heart rate was 130 the other day at the top of the stairs. Other than physical exertion- she has not had sensation of racing/skipping/fluttering in her chest. No episodes of lightheadedness, near-syncope, syncope. No LE edema.     Dyspnea on exertion for the last year- she went in to see her PCP as she had been significant dyspneic, she was getting \"really sick with viral illness.\" She was referred to pulmonology and diagnosis of ILD was made. She also has MIC and nocturnal hypoxia.     Social history: , supportive . Raised six children, Retired as of January 2024.       Smoking History: Lifelong non-tobacco user    Alcohol History: Daily moderate use- 2 glasses of wine for " the last few years    Substance Abuse History: 2 years was on ritalin for concentration/focus during graduate school. She has been off of this for about 10 years.     Sleep History: She has a lot of pain that interferes with her sleep. Sometimes takes benadryl or trazodone. She does snore, she has had witnessed apnea episodes. She did recently do sleep evaluation- results pending.    Family History:   - Father- MI at age 55, 4V CABG, heart failure        RELEVANT TESTING:  Transthoracic Echocardiogram 4/2023   Interpretation Summary   A two-dimensional transthoracic echocardiogram with color flow and Doppler was performed.     The left ventricle is normal size. The left ventricular systolic function is normal. Wall motion is normal.   Qualitative ejection fraction is 65-70% (normal).   Left ventricular diastolic function is normal.   TR signal inadequate to allow accurate estimate RV systolic pressure.   Inferior vena cava size normal and collapsibility > 50% normal indicating normal right atrial pressure (3 mm Hg).   There is no pericardial effusion.   No color doppler evidence for atrial septal defect or patent foramen ovale. A bubble study was not performed.     PAST MEDICAL HISTORY:   Past Medical History:   Diagnosis Date    Benign essential hypertension     Borderline personality disorder (H) 08/13/2020    Depression     Depressive disorder 1978    ILD (interstitial lung disease) (H)     Primary osteoarthritis of left foot 08/13/2020    Uncomplicated asthma 2019          FAMILY HISTORY:   Family History   Problem Relation Age of Onset    Hypertension Mother     Osteoporosis Mother     Coronary Artery Disease Father     Hyperlipidemia Father     Cerebrovascular Disease Father     Coronary Artery Disease Paternal Grandfather     Breast Cancer Paternal Grandmother     Anxiety Disorder Son     Anxiety Disorder Daughter     Mental Illness Daughter     Obesity Daughter     Obesity Sister     Obesity Daughter            PAST SURGICAL HISTORY:   Past Surgical History:   Procedure Laterality Date    BUNIONECTOMY Left     COLONOSCOPY  2015    ESOPHAGOSCOPY, GASTROSCOPY, DUODENOSCOPY (EGD), COMBINED N/A 3/13/2024    Procedure: ESOPHAGOGASTRODUODENOSCOPY, WITH BIOPSY;  Surgeon: Jerel Cabrera MD;  Location:  GI    GYN SURGERY  1996    ORTHOPEDIC SURGERY  2021          SOCIAL HISTORY:   Social History     Socioeconomic History    Marital status:      Spouse name: Delon    Number of children: None    Years of education: None    Highest education level: None   Tobacco Use    Smoking status: Never    Smokeless tobacco: Never   Vaping Use    Vaping Use: Never used   Substance and Sexual Activity    Alcohol use: Yes     Comment: Wiine daily with dinner    Drug use: Not Currently    Sexual activity: Yes     Partners: Male     Birth control/protection: Post-menopausal   Other Topics Concern    Parent/sibling w/ CABG, MI or angioplasty before 65F 55M? Yes     Comment: Father heart attack and bypass around 55     Social Determinants of Health     Financial Resource Strain: Low Risk  (3/9/2024)    Financial Resource Strain     Within the past 12 months, have you or your family members you live with been unable to get utilities (heat, electricity) when it was really needed?: No   Food Insecurity: Low Risk  (3/9/2024)    Food Insecurity     Within the past 12 months, did you worry that your food would run out before you got money to buy more?: No     Within the past 12 months, did the food you bought just not last and you didn t have money to get more?: No   Transportation Needs: Low Risk  (3/9/2024)    Transportation Needs     Within the past 12 months, has lack of transportation kept you from medical appointments, getting your medicines, non-medical meetings or appointments, work, or from getting things that you need?: No   Interpersonal Safety: Unknown (3/12/2024)    Interpersonal Safety     Do you feel physically and  "emotionally safe where you currently live?: Patient declined     Within the past 12 months, have you been hit, slapped, kicked or otherwise physically hurt by someone?: Patient declined     Within the past 12 months, have you been humiliated or emotionally abused in other ways by your partner or ex-partner?: Patient declined   Housing Stability: Low Risk  (3/9/2024)    Housing Stability     Do you have housing? : Yes     Are you worried about losing your housing?: No          CURRENT MEDICATIONS:   Current Outpatient Medications   Medication    albuterol (PROAIR HFA/PROVENTIL HFA/VENTOLIN HFA) 108 (90 Base) MCG/ACT inhaler    cyclobenzaprine (FLEXERIL) 5 MG tablet    FLUoxetine (PROZAC) 40 MG capsule    fluticasone-salmeterol (ADVAIR) 250-50 MCG/ACT inhaler    metoprolol succinate ER (TOPROL XL) 25 MG 24 hr tablet    mycophenolate (GENERIC EQUIVALENT) 500 MG tablet    omeprazole (PRILOSEC) 40 MG DR capsule    tirzepatide-Weight Management (ZEPBOUND) 2.5 MG/0.5ML prefilled pen    traZODone (DESYREL) 50 MG tablet    tirzepatide-Weight Management (ZEPBOUND) 5 MG/0.5ML prefilled pen     No current facility-administered medications for this visit.            ALLERGIES:   No Known Allergies       PHYSICAL EXAM:     Vitals: /89   Pulse 101   Resp 16   Ht 1.626 m (5' 4\")   Wt 99.3 kg (219 lb)   SpO2 100%   BMI 37.59 kg/m    BMI= Body mass index is 37.59 kg/m .    GENERAL: The patient is a well-developed, well-nourished, in no apparent distress.  HEENT: Head is normocephalic and atraumatic.   NECK: Supple. No adenopathy, asymmetry or masses. Carotid upstroke are normal bilaterally, no cervical bruits, JVP not visible.   CHEST/ LUNGS: Lungs clear to auscultation, no rales, rhonchi or wheezes, no use of accessory muscles, no retractions, respirations unlabored and normal respiratory rate.   CARDIO: Regular rate and rhythm normal with S1 and S2, no S3 or S4 and no murmur, click or rub. Precordium quiet with normal " "PMI.    ABD: Abdomen is soft and nontender, nondistended. no abdominal bruits heard.   EXTREMITIES: No lower extremity edema present. Peripheral pulses normal and equal bilaterally.  VASC: Radial, femoral, dorsalis pedis and posterior tibialis pulses normal and symmetric with no femoral bruits heard.  MUSCULOSKELETAL: No joint swelling.   NEUROLOGIC: Alert and oriented X3. Normal speech  SKIN: No jaundice. No visible skin lesions present.       TEST RESULTS:   EKG 3/14/2024          ASSESSMENT:   Elsie \"Hang Rubi is a pleasant 64-year old female who presents for cardiology consult for pre-operative clearance. She has a cardiovascular history including hypertension, hyperlipidemia. She has a non-cardiac history including asthma, interstitial lung disease, obstructive sleep apnea, nocturnal hypoxia, obesity.      Today, Ju has no specific concerns. She is wanting to proceed with weight loss surgery and needs cardiovascular clearance. No chest discomfort. She does have ongoing dyspnea, dyspnea on exertion for about 1 year. This has limited her physical activity level. Symptoms related to interstitial lung disease. She does follow with pulmonology.       PLAN:   Dyspnea on exertion  Could be multifactorial with pulmonary history, obesity, deconditioning. Could also be an anginal equivalent. Given her cardiovascular risk factors- plan for stress testing.     Hyperlipidemia with LDL goal less than 100, uncontrolled  Family history of early ischemic heart disease  She will trial heart healthy lifestyle changes and see if improvement in LDL. If not, consider starting statin to lower LDL versus CT calcium to further risk stratify with family history of early ischemic heart disease.   Heart healthy lifestyle including:  Heart healthy diet low in sodium and saturated fats  Maintain a healthy weight  At least 30 minutes of moderate intensity physical activity daily  Smoking cessation, if applicable  Alcohol in " moderation- this is no more than 1 drink per day for women, 2 drinks per day for men    Recent Labs   Lab Test 08/24/23  1223 08/13/20  1152   CHOL 218*  --    HDL 57  --    *  --    TRIG 110 103     The 10-year ASCVD risk score (Radha DENT, et al., 2019) is: 6.2%    Values used to calculate the score:      Age: 64 years      Sex: Female      Is Non- : No      Diabetic: No      Tobacco smoker: No      Systolic Blood Pressure: 121 mmHg      Is BP treated: Yes      HDL Cholesterol: 57 mg/dL      Total Cholesterol: 218 mg/dL      Obesity  Pre-diabetes  Heart healthy lifestyle as above  Body mass index is 37.59 kg/m .  Wt Readings from Last 5 Encounters:   03/21/24 98.4 kg (217 lb)   03/20/24 98.4 kg (217 lb)   03/15/24 99.8 kg (220 lb)   03/14/24 99.3 kg (219 lb)   03/12/24 100 kg (220 lb 8 oz)         Hypertension with BP goal less than 140/90, controlled  She has been on metoprolol succinate 25 mg once daily for management of hypertension. She ran out about a month ago and re-started it this morning.   Heart healthy lifestyle as above and weight loss will also be helpful to lowering blood pressure    BP Readings from Last 6 Encounters:   03/21/24 121/80   03/20/24 121/80   03/14/24 135/89   03/13/24 129/78   03/12/24 (!) 138/94   01/22/24 (!) 146/100       Follow-up on an as needed basis if stress testing is normal.  If stress testing is abnormal- follow up/discuss coronary angiogram.       Thank you for allowing me to participate in the care of your patient. Please do not hesitate to contact me if you have any questions.     Becca Singer, CNP         Addendum:  Patient completed stress testing 3/25/2024 for dyspnea on exertion and unable to complete 4 METs of physical activity. Stress testing was negative for ischemia or infarction.   This individual is at low risk for cardiovascular event during the moderate risk surgery. The revised cardiovascular risk index is <2 and is  associated with a <7 percent risk of major cardiovascular events. She has none of the following risk factors: surgery type [undergoing supra-inguinal vascular, intra-peritoneal, or intra-thoracic surgery], history of congestive heart failure, history of ischemic heart disease, history of cerebrovascular disease, pre-operative treatment with insulin, and pre-operative creatinine > 2mg/dL. She doesnot have the ability to perform > 4 MET levels of activity due to dyspnea. She did have a stress test 3/2024 which was negative for infarction and ischemia. She may proceed with elective weight loss surgery with no additional cardiac testing or procedures.      Becca Singer CNP on 8/13/2024 at 1:36 PM

## 2024-03-14 NOTE — PROGRESS NOTES
"Video-Visit Details    Type of service:  Video Visit    Video Start Time: 2:06 PM   Video End Time: 2:31 PM     Originating Location (pt. Location): Home    Distant Location (provider location): Offsite (providers home) [facility] [place]     Platform used for Video Visit: Kelsy    New Bariatric Nutrition Consultation Note    Reason For Visit: Nutrition Assessment    Elsie Rubi is a 64 year old presenting today for new bariatric nutrition consult.   Patient is interested in laparoscopic sleeve gastrectomy with Dr. Cabrera expected surgery in [place].  Patient is accompanied by self.  This is patient's 2nd of 3 required nutrition visits prior to surgery.     Pt referred by EARL Varela on January 22, 2024.    CO-MORBIDITIES OF OBESITY INCLUDE:        1/16/2024    11:27 AM   --   I have the following health issues associated with obesity Pre-Diabetes    High Blood Pressure    GERD (Reflux)    Asthma    Stress Incontinence     SUPPORT:      1/16/2024    11:27 AM   Support System Reviewed With Patient   Who do you have in your support network that can be available to help you for the first 2 weeks after surgery?    Who can you count on for support throughout your weight loss surgery journey?      ANTHROPOMETRICS:  Initial consult weight: 222 lb on 1/22/24   Estimated body mass index is 37.74 kg/m  as calculated from the following:    Height as of this encounter: 1.626 m (5' 4.02\").    Weight as of this encounter: 99.8 kg (220 lb).  Current Weight: 220 lb per pt report.     Required weight loss goal pre-op: -10 lbs from initial consult weight (goal weight 212 lbs or less before surgery)        1/16/2024    11:27 AM   --   I have tried the following methods to lose weight Watching portions or calories    Exercise    Atkins type diet (low carb/high protein)           1/16/2024    11:27 AM   Weight Loss Questions Reviewed With Patient   How long have you been " overweight? From Middle age and beyond       MEDICATION FOR WEIGHT LOSS: Zepbound - 2nd injection, side effects indigestion for 2-3 days. Appetite suppression     VITAMIN/MINERAL SUPPLEMENTS: Did not discuss     NUTRITION HISTORY:  Food allergies: NKFA   Food intolerances: None   Previous experience with diet modification for weight loss: Mediterranean style diet, high protein, high fat, non-starchy vegetables, low carb.     Focuses on a mediterranean style diet currently.     Per Leanne's visit: Regarding eating patterns and diet,  she typically eats 2-3 meals a day. Is able to get full. Can stay full. Eats out/ gets take out 2 times a month. Drinks coffee with half and half and splenda (2 cups every morning). Drinks diet pepsi a few time a week. Drinks a few glasses of water throughout the day. Drinks 2 glasses of wine every night. Is open to having 1 glass of wine instead.      Yarsanism Muslim, doesn't eat meat any Wednesday or Friday, and then for 7 weeks before christmas, 7 weeks before easter.     Carb Manager  Katy - tracking daily intake are around 900-1300 calories per day. Encouraged to aim for around 1550 calories which is her estimated BMR.     Recent Diet Recall:  Breakfast: delays this till around noon.   Lunch: Eats around noon.   Dinner: 7 PM   Snacks: little piece of chocolate   Beverages: Drink coffee, tea, water but doesn't love the taste of plain water. Has gotten away from diet Pepsi.   Alcohol: did not discuss     Lent starts on Monday for patient and pretty much follows a vegan diet during this time.         1/16/2024    11:27 AM   Recall Diet Questions Reviewed With Patient   Describe what you typically consume for breakfast (typical or most recent) Cheesy eggs   Describe what you typically consume for lunch (typical or most recent) Skip or munch on leftovers   Describe what you typically consume for supper (typical or most recent) 5 oz Protein with 1/2 baked potato or vegetable   Describe  what you typically consume as snacks (typical or most recent) Cheese, nuts   How many ounces of water, or other low calorie drinks, do you drink daily (8 oz=1 glass)? 24 oz   How many ounces of caffeine (coffee, tea, pop) do you drink daily (8 oz=1 glass)? 16 oz   How many ounces of carbonated (pop, beer, sparkling water) drinks do you drinky daily (8 oz=1 glass)? 0 oz   How many ounces of juice, pop, sweet tea, sports drinks, protein drinks, other sweetened drinks, do you drink daily (8 oz=1 glass)? 0 oz   How many ounces of milk do you drink daily (8 oz=1 glass) 0 oz   How often do you drink alcohol? 4 or more times a week   If you do drink alcohol, how many drinks might you have in a day? (one drink = 5 oz. wine, 1 can/bottle of beer, 1 shot liquor) 1 or 2           1/16/2024    11:27 AM   Eating Habits   What foods do you crave? Salty           1/16/2024    11:27 AM   Dining Out History Reviewed With Patient   How often do you dine out? Rarely.   Where do you dine out? (select all that apply) sit-down restaurants   What types of food do you order when you dine out? Entree     EXERCISE: has a very sedentary lifestyle. 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  Doesn't typically like to leave house, loves to be at home. Bought a modified rowing machine that she had enjoyed using in the past with pulmonary rehab, hasn't been using. Is struggling to find the motivation to exercise more and also has big issues with activity due to anxiety related to becoming out of breath as a result of her interstitial lung disease.          1/16/2024    11:27 AM   --   How often do you exercise? Less than 1 time per week   What is the duration of your exercise (in minutes)? 10 Minutes   What keeps you from being more active? Pain    I should be more active but I just have not gotten around to it    Shortness of breath     NUTRITION DIAGNOSIS:  Obesity r/t long history of positive energy balance aeb BMI >30  kg/m2.    INTERVENTION:  Intervention Provided/Education Provided on post-op diet guidelines, vitamins/minerals essential post-operatively, GI anatomy of bariatric surgeries, ways to help prepare for post-op diet guidelines pre-operatively, portion/calorie-control, mindful eating and sources of protein.  Patient demonstrates understanding.     Personal barriers to making and continuing required life changes have been identified, and strategies to overcome those barriers have been recommended AND family and social supports have been assessed and strategies to strengthen those supports have been recommended.    Provided pt with list of goals, RD contact information and resources listed below via [employer].       Provided education on nutrition considerations when starting GLP1 medication including, changes in meal/snack volumes; meeting protein, hydration and micronutrient needs; limiting high-fat foods; and diet/lifestyle strategies for preventing constipation.         1/16/2024    11:27 AM   Questions Reviewed With Patient   How ready are you to make changes regarding your weight? Number 1 = Not ready at all to make changes up to 10 = very ready. 10   How confident are you that you can change? 1 = Not confident that you will be successful making changes up to 10 = very confident. 8     Expected Engagement: good    GOALS:  Relating To Eating:  Eat slowly (20-30 minutes per meal), chewing foods well (25 chews per bite/applesauce consistency)  Eat 3 meals per day  Consume 60-90 g protein per day  Aim for 1530 calories per day.     Relating to beverages:  Reduce caffeine/carbonation/calorie containing beverages  Separate fluids from meals by 30 minutes before, during, and after eating  Drink 48-64 ounces of fluid per day    Relating to dietary supplements:  Be thinking about post op vitamins and mineral supplements you will want to have.     Relating to activity:  Increase activity as able    Protein Sources for Weight  Loss  [email or web address]     Carbohydrates  [email or web address]     Mindful Eating  [email or web address]     Summary of Volumetrics Eating Plan  [email or web address]     Diet Guidelines after Weight Loss Surgery  [email or web address]     Seated Exercises for Arms and Legs (can be done before or after surgery)  [email or web address]    Follow Up: in 4-6 weeks.     Time spent with patient: 25 minutes.  Caroline Mcduffie RD, LD

## 2024-03-15 ENCOUNTER — VIRTUAL VISIT (OUTPATIENT)
Dept: ENDOCRINOLOGY | Facility: CLINIC | Age: 64
End: 2024-03-15
Payer: COMMERCIAL

## 2024-03-15 VITALS — WEIGHT: 220 LBS | HEIGHT: 64 IN | BODY MASS INDEX: 37.56 KG/M2

## 2024-03-15 DIAGNOSIS — E66.812 CLASS 2 SEVERE OBESITY WITH SERIOUS COMORBIDITY AND BODY MASS INDEX (BMI) OF 38.0 TO 38.9 IN ADULT, UNSPECIFIED OBESITY TYPE (H): ICD-10-CM

## 2024-03-15 DIAGNOSIS — Z71.3 NUTRITIONAL COUNSELING: Primary | ICD-10-CM

## 2024-03-15 DIAGNOSIS — E66.01 CLASS 2 SEVERE OBESITY WITH SERIOUS COMORBIDITY AND BODY MASS INDEX (BMI) OF 38.0 TO 38.9 IN ADULT, UNSPECIFIED OBESITY TYPE (H): ICD-10-CM

## 2024-03-15 PROCEDURE — 99207 PR NO CHARGE LOS: CPT | Mod: 95

## 2024-03-15 PROCEDURE — 97802 MEDICAL NUTRITION INDIV IN: CPT | Mod: 95

## 2024-03-15 ASSESSMENT — PAIN SCALES - GENERAL: PAINLEVEL: NO PAIN (0)

## 2024-03-15 NOTE — LETTER
"3/15/2024       RE: Elsie Rubi  [address] [place] [address]     Dear Colleague,    Thank you for referring your patient, Elsie Rubi, to the [facility] at [facility]. Please see a copy of my visit note below.    Video-Visit Details    Type of service:  Video Visit    Video Start Time: 2:06 PM   Video End Time: 2:31 PM     Originating Location (pt. Location): Home    Distant Location (provider location): Offsite (providers home) [facility] [place]     Platform used for Video Visit: [email or web address]    New Bariatric Nutrition Consultation Note    Reason For Visit: Nutrition Assessment    Elsie Rubi is a 64 year old presenting today for new bariatric nutrition consult.   Patient is interested in laparoscopic sleeve gastrectomy with Dr. Cabrera expected surgery in [place].  Patient is accompanied by self.  This is patient's 2nd of 3 required nutrition visits prior to surgery.     Pt referred by EARL Varela on January 22, 2024.    CO-MORBIDITIES OF OBESITY INCLUDE:        1/16/2024    11:27 AM   --   I have the following health issues associated with obesity Pre-Diabetes    High Blood Pressure    GERD (Reflux)    Asthma    Stress Incontinence     SUPPORT:      1/16/2024    11:27 AM   Support System Reviewed With Patient   Who do you have in your support network that can be available to help you for the first 2 weeks after surgery?    Who can you count on for support throughout your weight loss surgery journey?      ANTHROPOMETRICS:  Initial consult weight: 222 lb on 1/22/24   Estimated body mass index is 37.74 kg/m  as calculated from the following:    Height as of this encounter: 1.626 m (5' 4.02\").    Weight as of this encounter: 99.8 kg (220 lb).  Current Weight: 220 lb per pt report.     Required weight loss goal pre-op: -10 lbs from initial consult weight (goal " weight 212 lbs or less before surgery)        1/16/2024    11:27 AM   --   I have tried the following methods to lose weight Watching portions or calories    Exercise    Atkins type diet (low carb/high protein)           1/16/2024    11:27 AM   Weight Loss Questions Reviewed With Patient   How long have you been overweight? From Middle age and beyond       MEDICATION FOR WEIGHT LOSS: Zepbound - 2nd injection, side effects indigestion for 2-3 days. Appetite suppression     VITAMIN/MINERAL SUPPLEMENTS: Did not discuss     NUTRITION HISTORY:  Food allergies: NKFA   Food intolerances: None   Previous experience with diet modification for weight loss: Mediterranean style diet, high protein, high fat, non-starchy vegetables, low carb.     Focuses on a mediterranean style diet currently.     Per Leanne's visit: Regarding eating patterns and diet,  she typically eats 2-3 meals a day. Is able to get full. Can stay full. Eats out/ gets take out 2 times a month. Drinks coffee with half and half and splenda (2 cups every morning). Drinks diet pepsi a few time a week. Drinks a few glasses of water throughout the day. Drinks 2 glasses of wine every night. Is open to having 1 glass of wine instead.      Protestant Mormonism, doesn't eat meat any Wednesday or Friday, and then for 7 weeks before christmas, 7 weeks before easter.     Carb Manager  Katy - tracking daily intake are around 900-1300 calories per day. Encouraged to aim for around 1550 calories which is her estimated BMR.     Recent Diet Recall:  Breakfast: delays this till around noon.   Lunch: Eats around noon.   Dinner: 7 PM   Snacks: little piece of chocolate   Beverages: Drink coffee, tea, water but doesn't love the taste of plain water. Has gotten away from diet Pepsi.   Alcohol: did not discuss     Lent starts on Monday for patient and pretty much follows a vegan diet during this time.         1/16/2024    11:27 AM   Recall Diet Questions Reviewed With Patient    Describe what you typically consume for breakfast (typical or most recent) Cheesy eggs   Describe what you typically consume for lunch (typical or most recent) Skip or munch on leftovers   Describe what you typically consume for supper (typical or most recent) 5 oz Protein with 1/2 baked potato or vegetable   Describe what you typically consume as snacks (typical or most recent) Cheese, nuts   How many ounces of water, or other low calorie drinks, do you drink daily (8 oz=1 glass)? 24 oz   How many ounces of caffeine (coffee, tea, pop) do you drink daily (8 oz=1 glass)? 16 oz   How many ounces of carbonated (pop, beer, sparkling water) drinks do you drinky daily (8 oz=1 glass)? 0 oz   How many ounces of juice, pop, sweet tea, sports drinks, protein drinks, other sweetened drinks, do you drink daily (8 oz=1 glass)? 0 oz   How many ounces of milk do you drink daily (8 oz=1 glass) 0 oz   How often do you drink alcohol? 4 or more times a week   If you do drink alcohol, how many drinks might you have in a day? (one drink = 5 oz. wine, 1 can/bottle of beer, 1 shot liquor) 1 or 2           1/16/2024    11:27 AM   Eating Habits   What foods do you crave? Salty           1/16/2024    11:27 AM   Dining Out History Reviewed With Patient   How often do you dine out? Rarely.   Where do you dine out? (select all that apply) sit-down restaurants   What types of food do you order when you dine out? Entree     EXERCISE: has a very sedentary lifestyle. 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  Doesn't typically like to leave house, loves to be at home. Bought a modified rowing machine that she had enjoyed using in the past with pulmonary rehab, hasn't been using. Is struggling to find the motivation to exercise more and also has big issues with activity due to anxiety related to becoming out of breath as a result of her interstitial lung disease.          1/16/2024    11:27 AM   --   How often do you exercise? Less than 1 time  per week   What is the duration of your exercise (in minutes)? 10 Minutes   What keeps you from being more active? Pain    I should be more active but I just have not gotten around to it    Shortness of breath     NUTRITION DIAGNOSIS:  Obesity r/t long history of positive energy balance aeb BMI >30 kg/m2.    INTERVENTION:  Intervention Provided/Education Provided on post-op diet guidelines, vitamins/minerals essential post-operatively, GI anatomy of bariatric surgeries, ways to help prepare for post-op diet guidelines pre-operatively, portion/calorie-control, mindful eating and sources of protein.  Patient demonstrates understanding.     Personal barriers to making and continuing required life changes have been identified, and strategies to overcome those barriers have been recommended AND family and social supports have been assessed and strategies to strengthen those supports have been recommended.    Provided pt with list of goals, RD contact information and resources listed below via [employer].       Provided education on nutrition considerations when starting GLP1 medication including, changes in meal/snack volumes; meeting protein, hydration and micronutrient needs; limiting high-fat foods; and diet/lifestyle strategies for preventing constipation.         1/16/2024    11:27 AM   Questions Reviewed With Patient   How ready are you to make changes regarding your weight? Number 1 = Not ready at all to make changes up to 10 = very ready. 10   How confident are you that you can change? 1 = Not confident that you will be successful making changes up to 10 = very confident. 8     Expected Engagement: good    GOALS:  Relating To Eating:  Eat slowly (20-30 minutes per meal), chewing foods well (25 chews per bite/applesauce consistency)  Eat 3 meals per day  Consume 60-90 g protein per day  Aim for 1530 calories per day.     Relating to beverages:  Reduce caffeine/carbonation/calorie containing beverages  Separate fluids  from meals by 30 minutes before, during, and after eating  Drink 48-64 ounces of fluid per day    Relating to dietary supplements:  Be thinking about post op vitamins and mineral supplements you will want to have.     Relating to activity:  Increase activity as able    Protein Sources for Weight Loss  [email or web address]     Carbohydrates  [email or web address]     Mindful Eating  [email or web address]     Summary of Volumetrics Eating Plan  [email or web address]     Diet Guidelines after Weight Loss Surgery  [email or web address]     Seated Exercises for Arms and Legs (can be done before or after surgery)  [email or web address]    Follow Up: in 4-6 weeks.     Time spent with patient: 25 minutes.  Caroline Mcduffie RD, LD

## 2024-03-15 NOTE — NURSING NOTE
Is the patient currently in the state of [place]? YES    Visit mode:VIDEO    If the visit is dropped, the patient can be reconnected by: VIDEO VISIT: Text to cell phone:   Telephone Information:   Mobile [phone]       Will anyone else be joining the visit? NO  (If patient encounters technical issues they should call [phone]    How would you like to obtain your AVS? MyChart    Are changes needed to the allergy or medication list? N/A    Reason for visit: RECHECK ([facility])    Lauren ASHLEY

## 2024-03-15 NOTE — PATIENT INSTRUCTIONS
Henrry Dodd,     It was nice to meet you today.     Follow-up with RD in 4-6 weeks    Thank you,    Caroline Mcduffie, ANDREW, LD  If you would like to schedule or reschedule an appointment with the RD, please call [phone]    Nutrition Goals  Relating To Eating:  Eat slowly (20-30 minutes per meal), chewing foods well (25 chews per bite/applesauce consistency)  Eat 3 meals per day  Consume 60-90 g protein per day  Aim for 1530 calories per day.     Relating to beverages:  Reduce caffeine/carbonation/calorie containing beverages  Separate fluids from meals by 30 minutes before, during, and after eating  Drink 48-64 ounces of fluid per day    Relating to dietary supplements:  Be thinking about post op vitamins and mineral supplements you will want to have.     Relating to activity:  Increase activity as able    Protein Sources for Weight Loss  [email or web address]     Carbohydrates  [email or web address]     Mindful Eating  [email or web address]     Summary of Volumetrics Eating Plan  [email or web address]     Diet Guidelines after Weight Loss Surgery  [email or web address]     Seated Exercises for Arms and Legs (can be done before or after surgery)  [email or web address]    COMPREHENSIVE WEIGHT MANAGEMENT PROGRAM  VIRTUAL SUPPORT GROUPS    At [facility], our Comprehensive Weight Management program offers on-line support groups for patients who are working on weight loss and considering, preparing for, or have had weight loss surgery.     There is no cost for this opportunity.  You are invited to attend the?Virtual Support Groups?provided by any of the following locations:    [facility] via [employer] Teams with Sandra Hopper RN  2.   [place] via [employer] Teams with Lalit Keenan, PhD, LP  3.   [place] via Machine Talker with Shea Winston RN  4.   [facility] via a Zoom Meeting with OXANA Ng-    The following Support Group  "information can also be found on our website:  [email or web address]      [facility] Weight Loss Surgery Support Group  The support group is a patient-lead forum that meets monthly to share experiences, encouragement and education. It is open to those who have had weight loss surgery, are scheduled for surgery, or are considering surgery.   WHEN: This group meets on the 3rd Wednesday of each month from 5:00PM - 6:00PM virtually using Microsoft Teams.   FACILITATOR: Led by Sandra Covarrubias, ANDREW, LD, RN, the program's Clinical Coordinator.   TO REGISTER: Please contact the clinic via GeekChicDaily or call the nurse line directly at [phone] to inform our staff that you would like an invite sent to you and to let us know the email you would like the invite sent to. Prior to the meeting, a link with directions on how to join the meeting will be sent to you.    2024 Meetings   January 17  February 21  March 20  April 17  May 15  Alexandra 19      [facility] Bariatric Care Support Group?  This is open to all pre- and post- operative bariatric surgery patients as well as their support system.   WHEN: This group meets the 3rd Tuesday of each month from 6:30 PM - 8:00 PM virtually using Microsoft Teams.   FACILITATOR: Led by Lalit Keenan, Ph.D who is a Licensed Psychologist with the [facility] Comprehensive Weight Management Program.   TO REGISTER: Please send an email to Lalit Keenan, Ph.D., LP at?[email or web address]?if you would like an invitation to the group. Prior to the meeting, a link with directions on how to join the meeting will be sent to you.    2024 Meetings January 16: \"Medication Management and Bariatric Surgery\", Jackie Gambino, PharmD, Pharmacy Resident at [facility]  February 20: \"A Bariatric Surgery Patient's Perspective\", Elsie" "ELENA Drake, LIZETHSW, Behavioral Health Clinician at [facility]  March 19  April 16  May 21  Alexandra 18: \"Nutritional Labeling\", Dietitian speaker to be announced.  November 19: \"Holiday Eating\", Dietitian speaker to be announced.    [facility] Post-Operative Bariatric Surgery Support Group  This is a support group for [facility] bariatric patients (and those external to [facility]) who have had bariatric surgery and are at least 3 months post-surgery.  WHEN: This support group meets the 4th Wednesday of the month from 11:00 AM - 12:00 PM virtually using Microsoft Teams.   FACILITATOR: Led by Certified Bariatric Nurse, Shea Winston RN.   TO REGISTER: Please send an email to Shea at [email or web address] if you would like an invitation to the group.  Prior to the meeting, a link with directions on how to join the meeting will be sent to you.    2024 Meetings  January 24  February 28  March 27  April 24  May 22  Alexandra 26    [facility] Healthy Lifestyle Group?  This is a 60 minute virtual coaching group for those who want to lead a healthier lifestyle. Come together to set goals and overcome barriers in a supportive group environment. We will address the four pillars of health: nutrition, exercise, sleep and emotional well-being.  This group is highly recommended for those who are participating in the 24 week Healthy Lifestyle Plan and our Health Coaching sessions.  WHEN: This group meets the 1st Friday of the month, 12:30 PM - 1:30 PM online, via a zoom meeting.    FACILITATOR: Led by National Board Certified Health and , Shea Toure, [facility].   TO REGISTER: Please call the Call Center at [phone] to register. You will get an appointment to attend in [employer]. Fifteen minutes prior to the meeting, complete the e-check in and you will get the link to join the " "meeting.    There is no charge to attend this group and space is limited.     2024 Meetings  January 5: \"New Years Vision: Manifest your Best 2024!\" (guided imagery,  journaling and discussion)  February 2: \"Let's Talk\"  March 1: \"10 Percent Happier\" by Priyank Christensen (Book Bites - a guided discussion on the nuggets of wisdom from favorite wellness books, no need to read the book but highly encouraged)  April 5: \"Let's Talk\"  May 3: \"Essentialism: The Disciplined Pursuit of Less\" by Jorge Rosario (Book Bites discussion)  June 7: \"Let's Talk\"  July 5: NO MEETING, off for the 4th of July Holiday  August 2: \"The Blue Zones, Secrets for Living a Longer Life\" by Priyank Sofia (Book Bites discussion)        "

## 2024-03-16 LAB
ANNOTATION COMMENT IMP: NORMAL
RETINYL PALMITATE SERPL-MCNC: <0.02 MG/L
VIT A SERPL-MCNC: 0.53 MG/L

## 2024-03-16 NOTE — PROCEDURES
"-   SLEEP STUDY INTERPRETATION  DIAGNOSTIC POLYSOMNOGRAPHY REPORT      Patient: KELLEY LACEY  YOB: 1960  Study Date: 3/12/2024  MRN: [id number]  Referring Provider: Jessie Rose MD  Ordering Provider: Rigoberto Nielson MD    Indications for Polysomnography: The patient is a 64 year old Female who is 5' 4\" and weighs 220.0 lbs. Her BMI is 38.0, [place] sleepiness scale 4 and neck circumference is - cm. Relevant medical history includes chronic fatigue syndrome, fibromyalgia, mild intermittent asthma, obesity, HTN, abnormal 6 minute walk test. A diagnostic polysomnogram was performed to evaluate for sleep apnea/hypoventilation/hypoxemia.    Polysomnogram Data: A full night polysomnogram recorded the standard physiologic parameters including EEG, EOG, EMG, ECG, nasal and oral airflow. Respiratory parameters of chest and abdominal movements were recorded with respiratory inductance plethysmography. Oxygen saturation was recorded by pulse oximetry. Hypopnea scoring rule used: 1B 4%.    Sleep Architecture: Delayed sleep onset and REM latency, supine REM was observed.  Mildly increased arousal index.  The total recording time of the polysomnogram was 466.8 minutes. The total sleep time was 370.5 minutes. Sleep latency was delayed at 53.1 minutes with the use of a sleep aid (trazodone 50mg). REM latency was 243.5 minutes. Arousal index was mildly increased at 18.5 arousals per hour. Sleep efficiency was normal at 79.4%. Wake after sleep onset was 43.0 minutes. The patient spent 2.4% of total sleep time in Stage N1, 28.5% in Stage N2, 48.4% in Stage N3, and 20.6% in REM. Time in REM supine was 23.5 minutes.    Respiration: Mild MIC (AHI 12) with mild sleep-associated hypoxemia (SpO2 <= 88% for 8.7 minutes).  Events ? The polysomnogram revealed a presence of 28 obstructive, 4 central, and - mixed apneas resulting in an apnea index of 5.2 events per hour. There were 39 obstructive hypopneas and - central " hypopneas resulting in an obstructive hypopnea index of 6.3 and central hypopnea index of - events per hour. The combined apnea/hypopnea index was 12.0 events per hour (central apnea/hypopnea index was 0.6 events per hour). The REM AHI was 23.5 events per hour. The supine AHI was 16.8 events per hour. The RERA index was - events per hour.  The RDI was 12.0 events per hour.  Snoring - was reported as moderate to loud.  Respiratory rate and pattern - was notable for normal respiratory rate and pattern.  Sustained Sleep Associated Hypoventilation - Transcutaneous carbon dioxide monitoring was not used, however significant hypoventilation was not suggested by oximetry.  Sleep Associated Hypoxemia - (Greater than 5 minutes O2 sat at or below 88%) was present. Baseline oxygen saturation was 92.6%. Lowest oxygen saturation was 75.4%. Time spent less than or equal to 88% was 8.7 minutes. Time spent less than or equal to 89% was 15.3 minutes.    Movement Activity: Nothing of note  Periodic Limb Activity - There were - PLMs during the entire study. The PLM index was - movements per hour. The PLM Arousal Index was - per hour.  REM EMG Activity - Excessive transient/sustained muscle activity was not present.  Nocturnal Behavior - Abnormal sleep related behaviors were not noted during/arising out of NREM / REM sleep.   Bruxism - None apparent.    Cardiac Summary: Appears NSR  The average pulse rate was 86.2 bpm. The minimum pulse rate was 62.0 bpm while the maximum pulse rate was 121.0 bpm.      Assessment:   Delayed sleep onset and REM latency, supine REM was observed.  Mildly increased arousal index.  Mild MIC (AHI 12) with mild sleep-associated hypoxemia (SpO2 <= 88% for 8.7 minutes).    Recommendations:  Consider repeat polysomnography with full night titration of positive airway pressure therapy for the control of sleep disordered breathing.  Based on the presence of mild obstructive sleep apnea and excessive daytime  sleepiness, treatment could be empirically initiated with Auto?titrating PAP therapy with a range of 5 to 15 cmH2O. Recommend clinical follow up with sleep management team.  Patient may be a candidate for dental appliance through referral to Sleep Dentistry for the treatment of obstructive sleep apnea and/or socially disruptive snoring.  If devices are not acceptable or effective, patient may benefit from evaluation of possible surgical options. If she is interested, would recommend referral to specialized ENT-Sleep provider.  Advice regarding the risks of drowsy driving.  Suggest optimizing sleep schedule and avoiding sleep deprivation.  Weight management (if BMI > 30).    Diagnostic Codes:   Obstructive Sleep Apnea G47.33  Sleep Hypoxemia/Hypoventilation G47.36      _____________________________________   Electronically Signed By: Rigoberto Nielson MD (3/16/2024)

## 2024-03-20 ENCOUNTER — OFFICE VISIT (OUTPATIENT)
Dept: PULMONOLOGY | Facility: CLINIC | Age: 64
End: 2024-03-20
Attending: INTERNAL MEDICINE
Payer: COMMERCIAL

## 2024-03-20 VITALS
WEIGHT: 217 LBS | OXYGEN SATURATION: 99 % | BODY MASS INDEX: 36.15 KG/M2 | HEIGHT: 65 IN | SYSTOLIC BLOOD PRESSURE: 121 MMHG | DIASTOLIC BLOOD PRESSURE: 80 MMHG | HEART RATE: 66 BPM

## 2024-03-20 DIAGNOSIS — G47.33 OSA (OBSTRUCTIVE SLEEP APNEA): ICD-10-CM

## 2024-03-20 DIAGNOSIS — G47.34 NOCTURNAL HYPOXIA: ICD-10-CM

## 2024-03-20 DIAGNOSIS — R09.02 HYPOXIA: ICD-10-CM

## 2024-03-20 DIAGNOSIS — J84.9 ILD (INTERSTITIAL LUNG DISEASE) (H): Primary | ICD-10-CM

## 2024-03-20 DIAGNOSIS — J45.30 MILD PERSISTENT ASTHMA WITHOUT COMPLICATION: ICD-10-CM

## 2024-03-20 DIAGNOSIS — J84.9 ILD (INTERSTITIAL LUNG DISEASE) (H): ICD-10-CM

## 2024-03-20 PROCEDURE — 94729 DIFFUSING CAPACITY: CPT | Performed by: INTERNAL MEDICINE

## 2024-03-20 PROCEDURE — G0463 HOSPITAL OUTPT CLINIC VISIT: HCPCS | Performed by: INTERNAL MEDICINE

## 2024-03-20 PROCEDURE — 94375 RESPIRATORY FLOW VOLUME LOOP: CPT | Performed by: INTERNAL MEDICINE

## 2024-03-20 PROCEDURE — 99215 OFFICE O/P EST HI 40 MIN: CPT | Mod: 25 | Performed by: INTERNAL MEDICINE

## 2024-03-20 ASSESSMENT — PAIN SCALES - GENERAL: PAINLEVEL: NO PAIN (0)

## 2024-03-20 NOTE — PROGRESS NOTES
"Virtual Visit Details    Type of service:  Video Visit     Elsie Rubi is a 64 year old female who is being evaluated via a billable video visit.       The patient has been notified of following:      \"This video visit will be conducted via a call between you and your physician/provider. We have found that certain health care needs can be provided without the need for an in-person physical exam.  This service lets us provide the care you need with a video conversation.  If a prescription is necessary we can send it directly to your pharmacy.  If lab work is needed we can place an order for that and you can then stop by our lab to have the test done at a later time.     Video visits are billed at different rates depending on your insurance coverage.  Please reach out to your insurance provider with any questions.     If during the course of the call the physician/provider feels a video visit is not appropriate, you will not be charged for this service.\"     Patient has given verbal consent for Video visit? Yes  How would you like to obtain your AVS? Mail a copy  If you are dropped from the video visit, the video invite should be resent to: Text to cell phone: -  Will anyone else be joining your video visit? No  If patient encounters technical issues they should call [phone]      Video-Visit Details     Type of service:  Video Visit     Start Time:  1pm  End Time:  1:25pm    Originating Location (pt. Location): Home     Distant Location (provider location):  Off-site, [facility]       Platform used for Video Visit: appCREAR    Virtual visit for review of sleep testing results.     A/P:     1.)  Mild MIC (AHI 12) with mild sleep-associated hypoxemia (SpO2 <= 88% for 8.7 minutes)    I do suspect that her mild obstructive sleep apnea and mild hypoxemia will improve following weight loss with planned upcoming bariatric surgery.  She is also certain that she be unable to tolerate PAP " "therapy.  But since she currently does have supplemental oxygen at home available, we will start treatment with nocturnal supplemental oxygen at 2 L/min.    Plan for follow-up in 1 month with home overnight oximetry on nocturnal supplemental oxygen at 2 L/min.    SUBJECTIVE:  Kelley Rubi is a 64 year old female.    64 year old female who is referred for sleep-disordered breathing.     Pertinent PMHx of chronic fatigue syndrome, fibromyalgia, interstitial lung disease, obesity, HTN.     Six minute walk tolerance test on 8/2/2023 that did show need for supplemental oxygen at 2 LPM starting at ~3.5 minutes.     STOP-BANG score of 6+, with unknown neck circumference.  Howard score of 4.  FELICITA: 11     BMI of Estimated body mass index is 37.76 kg/m  as calculated from the following:    Height as of an earlier encounter on 1/24/24: 1.626 m (5' 4\").    Weight as of an earlier encounter on 1/24/24: 99.8 kg (220 lb).      Chief concern per questionnaire: \"Overweight and I snore. \"     Duration of symptoms:  \"6 years \"     Goals for visit per questionnaire: \"Evaluation \"     Sleep pattern:  Workdays.  ? To 9am.  Weekends.  11ish - 9am.  Time to fall asleep: ~up to 60 minutes.  Awakenings: 2 times per night, 1 minutes up to 1-2 hours to return to sleep.  Average total sleep time:  7-8 hours  Napping.  0 days per week, - hours per nap.     Yes for phone in bed.     Yes for RLS screen.  No for sleep walking.  No for dream enactment behavior.  No for bruxism.     No for morning headaches.  Yes for snoring.  Yes for observed apnea.  No for FHx of MIC.     Currently taking trazodone 50mg and benadryl (unknown dose) at bedtime.     Caffeine use:  Yes for 3+ per day.  No for within 6 hours of bed.    SLEEP STUDY INTERPRETATION  DIAGNOSTIC POLYSOMNOGRAPHY REPORT        Patient: KELLEY RUBI  YOB: 1960  Study Date: 3/12/2024  MRN: [id number]  Referring Provider: Jessie Rose MD  Ordering Provider: Naga" "Rigoberto NEWTON     Indications for Polysomnography: The patient is a 64 year old Female who is 5' 4\" and weighs 220.0 lbs. Her BMI is 38.0, [place] sleepiness scale 4 and neck circumference is - cm. Relevant medical history includes chronic fatigue syndrome, fibromyalgia, mild intermittent asthma, obesity, HTN, abnormal 6 minute walk test. A diagnostic polysomnogram was performed to evaluate for sleep apnea/hypoventilation/hypoxemia.     Polysomnogram Data: A full night polysomnogram recorded the standard physiologic parameters including EEG, EOG, EMG, ECG, nasal and oral airflow. Respiratory parameters of chest and abdominal movements were recorded with respiratory inductance plethysmography. Oxygen saturation was recorded by pulse oximetry. Hypopnea scoring rule used: 1B 4%.     Sleep Architecture: Delayed sleep onset and REM latency, supine REM was observed.  Mildly increased arousal index.  The total recording time of the polysomnogram was 466.8 minutes. The total sleep time was 370.5 minutes. Sleep latency was delayed at 53.1 minutes with the use of a sleep aid (trazodone 50mg). REM latency was 243.5 minutes. Arousal index was mildly increased at 18.5 arousals per hour. Sleep efficiency was normal at 79.4%. Wake after sleep onset was 43.0 minutes. The patient spent 2.4% of total sleep time in Stage N1, 28.5% in Stage N2, 48.4% in Stage N3, and 20.6% in REM. Time in REM supine was 23.5 minutes.     Respiration: Mild MIC (AHI 12) with mild sleep-associated hypoxemia (SpO2 <= 88% for 8.7 minutes).  Events ? The polysomnogram revealed a presence of 28 obstructive, 4 central, and - mixed apneas resulting in an apnea index of 5.2 events per hour. There were 39 obstructive hypopneas and - central hypopneas resulting in an obstructive hypopnea index of 6.3 and central hypopnea index of - events per hour. The combined apnea/hypopnea index was 12.0 events per hour (central apnea/hypopnea index was 0.6 events per hour). The " REM AHI was 23.5 events per hour. The supine AHI was 16.8 events per hour. The RERA index was - events per hour.  The RDI was 12.0 events per hour.  Snoring - was reported as moderate to loud.  Respiratory rate and pattern - was notable for normal respiratory rate and pattern.  Sustained Sleep Associated Hypoventilation - Transcutaneous carbon dioxide monitoring was not used, however significant hypoventilation was not suggested by oximetry.  Sleep Associated Hypoxemia - (Greater than 5 minutes O2 sat at or below 88%) was present. Baseline oxygen saturation was 92.6%. Lowest oxygen saturation was 75.4%. Time spent less than or equal to 88% was 8.7 minutes. Time spent less than or equal to 89% was 15.3 minutes.     Movement Activity: Nothing of note  Periodic Limb Activity - There were - PLMs during the entire study. The PLM index was - movements per hour. The PLM Arousal Index was - per hour.  REM EMG Activity - Excessive transient/sustained muscle activity was not present.  Nocturnal Behavior - Abnormal sleep related behaviors were not noted during/arising out of NREM / REM sleep.   Bruxism - None apparent.     Cardiac Summary: Appears NSR  The average pulse rate was 86.2 bpm. The minimum pulse rate was 62.0 bpm while the maximum pulse rate was 121.0 bpm.       Assessment:   Delayed sleep onset and REM latency, supine REM was observed.  Mildly increased arousal index.  Mild MIC (AHI 12) with mild sleep-associated hypoxemia (SpO2 <= 88% for 8.7 minutes).     Recommendations:  Consider repeat polysomnography with full night titration of positive airway pressure therapy for the control of sleep disordered breathing.  Based on the presence of mild obstructive sleep apnea and excessive daytime sleepiness, treatment could be empirically initiated with Auto?titrating PAP therapy with a range of 5 to 15 cmH2O. Recommend clinical follow up with sleep management team.  Patient may be a candidate for dental appliance through  referral to Sleep Dentistry for the treatment of obstructive sleep apnea and/or socially disruptive snoring.  If devices are not acceptable or effective, patient may benefit from evaluation of possible surgical options. If she is interested, would recommend referral to specialized ENT-Sleep provider.  Advice regarding the risks of drowsy driving.  Suggest optimizing sleep schedule and avoiding sleep deprivation.  Weight management (if BMI > 30).     Diagnostic Codes:   Obstructive Sleep Apnea G47.33  Sleep Hypoxemia/Hypoventilation G47.36      _____________________________________   Electronically Signed By: Rigoberto Nielson MD (3/16/2024)       Past medical history:    Patient Active Problem List    Diagnosis Date Noted    ILD (interstitial lung disease) (H) 03/12/2024     Priority: Medium    Prediabetes 08/25/2023     Priority: Medium    Mixed hyperlipidemia 08/25/2023     Priority: Medium     The 10-year ASCVD risk score (Radha DENT, et al., 2019) is: 5.8%    Values used to calculate the score:      Age: 63 years      Sex: Female      Is Non- : No      Diabetic: No      Tobacco smoker: No      Systolic Blood Pressure: 123 mmHg      Is BP treated: Yes      HDL Cholesterol: 57 mg/dL      Total Cholesterol: 218 mg/dL        Primary insomnia 08/10/2023     Priority: Medium    Rectocele 08/10/2023     Priority: Medium    Primary stress urinary incontinence 08/10/2023     Priority: Medium     OB GYN [facility] 2021 - declined surgery      MIC (obstructive sleep apnea) 10/14/2021     Priority: Medium    Chronic fatigue syndrome 08/13/2020     Priority: Medium    Dyspepsia 08/13/2020     Priority: Medium    Essential hypertension 08/13/2020     Priority: Medium    Fibromyalgia 08/13/2020     Priority: Medium    Mild episode of recurrent major depressive disorder (H24) 08/13/2020     Priority: Medium    Mild intermittent asthma without complication 08/13/2020     Priority: Medium    Severe obesity  (BMI 35.0-39.9) with comorbidity (H) 08/13/2020     Priority: Medium       10 point ROS of systems including Constitutional, Eyes, Respiratory, Cardiovascular, Gastroenterology, Genitourinary, Integumentary, Muscularskeletal, Psychiatric were all negative except for pertinent positives noted in my HPI.    Current Outpatient Medications   Medication Sig Dispense Refill    albuterol (PROAIR HFA/PROVENTIL HFA/VENTOLIN HFA) 108 (90 Base) MCG/ACT inhaler Inhale 2 puffs into the lungs      cyclobenzaprine (FLEXERIL) 5 MG tablet Take 1 tablet (5 mg) by mouth 2 times daily as needed for muscle spasms 30 tablet 0    FLUoxetine (PROZAC) 40 MG capsule TAKE ONE CAPSULE BY MOUTH DAILY 90 capsule 3    fluticasone-salmeterol (ADVAIR) 250-50 MCG/ACT inhaler Inhale 1 puff into the lungs every 12 hours 60 each 3    metoprolol succinate ER (TOPROL XL) 25 MG 24 hr tablet Take 1 tablet (25 mg) by mouth daily 90 tablet 1    mycophenolate (GENERIC EQUIVALENT) 500 MG tablet Take 2 tablets (1,000 mg) by mouth 2 times daily 120 tablet 3    omeprazole (PRILOSEC) 40 MG DR capsule Take 1 capsule (40 mg) by mouth daily 90 capsule 3    tirzepatide-Weight Management (ZEPBOUND) 2.5 MG/0.5ML prefilled pen Inject 0.5 mLs (2.5 mg) Subcutaneous every 7 days For 4 weeks 2 mL 0    tirzepatide-Weight Management (ZEPBOUND) 5 MG/0.5ML prefilled pen Inject 0.5 mLs (5 mg) Subcutaneous every 7 days After completing 4 weeks of taking the 2.5mg dose (Patient not taking: Reported on 3/14/2024) 2 mL 2    traZODone (DESYREL) 50 MG tablet Take 1 tablet (50 mg) by mouth nightly as needed for sleep 90 tablet 1       OBJECTIVE:  There were no vitals taken for this visit.    Physical Exam     ---  This note was written with the assistance of the Dragon voice-dictation technology software. The final document, although reviewed, may contain errors. For corrections, please contact the office.    Total time spent preparing to see the patient, review of chart, obtaining  history and physical examination, review of sleep testing, review of treatment options, education, discussion with patient and documenting in Epic / EMR was 25 minutes.  All time involved was spent on the day of service for the patient (the same day as the patient's appointment).    Rigoberto Nielson MD    Sleep Medicine  [place], [place]  Main Office: [phone]  [facility] [address], [address]  Schedule visits: [phone]  Main Office: [phone]  Fax: [phone]

## 2024-03-20 NOTE — NURSING NOTE
Chief Complaint   Patient presents with    Interstitial Lung Disease (ILD)     3 month follow up      Vitals were taken and medications were reconciled.     Caridad Kaur RMA  3:09 PM

## 2024-03-20 NOTE — LETTER
3/20/2024         RE: Elsie Rubi  [address] [facility] [address]        Dear Colleague,    Thank you for referring your patient, Elsie Rubi, to the [facility] FOR LUNG SCIENCE AND [facility] [place]. Please see a copy of my visit note below.    Pulmonary Patient Follow Up Clinic Note   03/20/2024      PCP: Jessie Rose    Reason for visit: ILD     Pulmonary HPI:   Elsie Rubi is a 63 year old female w/ h/o elevated BMI, hx of PSG/HST+ but not on NIV, hiatal hernia, GERD, HTN, reported hx of asthma  who presents for follow up of ILD.     Pulmonary visit 7/2023.  Team conference outcome-  NSIP vs organizing pna with small airways overlap or HP with small airways disease with enough acute inflammatory changes to suggest treatment with MMF.   Started on MMF, tolerating well. Was prescribed O2 2 LPM via NC on exertion.   12/20/2023: Developed URI symptoms. Viral work up negative. Also noted to have been of Advair; this was restarted due to asthma symptoms. Pulm rehab completed.   Symptomatic, not wearing O2 with exertion consistently.    O2 at rest (When she should be on RA) is good. Not measuring with exertion. When at pulm rehab, they were not measuring very consistently with exertion though the few times she was measured she was on RA and spO2>90%.   Was referred again to sleep medicine considering her previous MIC dx and concern for nocturnal hypoxia.     3/20/2024:   Retired now since Jan 2024.   Symptoms stable, though remains with exertional BECKER with going up flight of stairs (though not consistently wearing oxygen). Has not been keeping up with exercise routinely. Was referred to pulm rehab again by her PCP (though not clear regarding coverage)  Completed PSG on 3/12- noted for nocturnal hypoxia and AHI-12 (mild MIC). Has follow up visit appt soon to go over results and next steps. Tolerating MMF well- no GI symptoms; labs stable.  Seen at weight management  "clinic- possible looking into gastric sleeve surgery potentially in 3-4 months and will need pulm risk assessment. Looks like Zepbound was also prescribed. Cards consulted also for pre-op clearance.   Had EGD 3/13 as part of this process- found grade A esophagitis with large hiatal hernia. Omeprazole added.     Review of systems: a complete 12-point ROS conducted, & found to be negative w/ exceptions as noted in the HPI.    Reviewed PMH, PSH, FH, SH    Medications:  Current Outpatient Medications   Medication     albuterol (PROAIR HFA/PROVENTIL HFA/VENTOLIN HFA) 108 (90 Base) MCG/ACT inhaler     cyclobenzaprine (FLEXERIL) 5 MG tablet     FLUoxetine (PROZAC) 40 MG capsule     fluticasone-salmeterol (ADVAIR) 250-50 MCG/ACT inhaler     metoprolol succinate ER (TOPROL XL) 25 MG 24 hr tablet     mycophenolate (GENERIC EQUIVALENT) 500 MG tablet     omeprazole (PRILOSEC) 40 MG DR capsule     tirzepatide-Weight Management (ZEPBOUND) 2.5 MG/0.5ML prefilled pen     traZODone (DESYREL) 50 MG tablet     tirzepatide-Weight Management (ZEPBOUND) 5 MG/0.5ML prefilled pen     No current facility-administered medications for this visit.       Allergies:  No Known Allergies    Physical examination:  /80 (BP Location: Right arm, Patient Position: Sitting, Cuff Size: Adult Large)   Pulse 66   Ht 1.651 m (5' 5\")   Wt 98.4 kg (217 lb)   SpO2 99%   BMI 36.11 kg/m      General: NAD, coughing with deep inspiration  CV: RR, no m/c/r   Lungs: CTAB, no wrr  Abd: protruding  Ext: WWP, no BLE edema  Neuro: No focal deficits  Psych: Euthymic, normal affect, good eye contact    Labs: reviewed in [employer] & personally interpreted.     Previous ILD serologies negative  q3 month CBC with diff and CMP- stable    Imaging: reviewed in [employer] & personally interpreted. Below are the interpretations from the formal Radiology review.  No new imaging    PFT:  Most Recent [facility] Pulmonary Function Testing (FVC/FEV1 only)  FVC-Pre   Date Value Ref Range " Status   03/20/2024 1.86 L    12/20/2023 1.89 L    07/21/2023 1.83 L      FVC-%Pred-Pre   Date Value Ref Range Status   03/20/2024 64 %    12/20/2023 65 %    07/21/2023 62 %      FEV1-Pre   Date Value Ref Range Status   03/20/2024 1.60 L    12/20/2023 1.62 L    07/21/2023 1.51 L      FEV1-%Pred-Pre   Date Value Ref Range Status   03/20/2024 69 %    12/20/2023 70 %    07/21/2023 64 %      PFT- Date: 3/20/2024  FEV1- 1.6L (69%), FVC-1.86L (64%), FEV1/FVC- 0.86, DLCO- 16.19 ml/min/mmHg (81%)-  Interpretation-Similar spirometry with FVC suggesting mild restriction with normal DLCO (continued trend upward vs previous and significant improvement vs 7/2023)        PFT- Date: 12/20/2023   FEV1- 1.62L (70%), FVC-1.89L (65%), FEV1/FVC- 0.86,  DLCO- 15.3 ml/min/mmHg (76%)-  Interpretation-Similar spirometry with FVC suggesting mild restriction  Normalization of DLCO compared to previous      7/21/2023  FEV1- 1.51L (64%)  FVC- 1.83 (62%)  R- 0.82  TLC- 3.06L (58%)  uncDLCO- 12.85 (64%)  ERV and RV not as reduced as severely as TLC  RV/TLC-0.41  6MW- 2L NC O2 to complete (ended at 94%). 335m walked (329m -LLN)     OSH PFTs:  3/20/23:   FVC: 1.89 L (61%)  FEV1: 1.62 L (67%)  FEV1/FVC: 86%  RV: 1.18 L (60%)  TLC: 3.38 L (68%)  RV/TLC ratio 35%  uncDLCO: 14.93 ml/min/mmHg (76%, not adjusted for Hb)    Impression & recommendations:    Ju was seen today for interstitial lung disease (ild).    Diagnoses and all orders for this visit:    ILD (interstitial lung disease) (H)  -     General PFT Lab (Please always keep checked); Future  -     Pulmonary Function Test; Future  -     6 minute walk test; Future    Hypoxia    Mild persistent asthma without complication    BMI 36.0-36.9,adult    MIC (obstructive sleep apnea)    Nocturnal hypoxia    Other orders  -     Adult Pulmonology Clinic Follow-Up Order (3 Month)  -     Adult Pulmonology Clinic Follow-Up Order (3 Month); Future      Patient returns on follow-up ILD  appointment.  Diagnosis non-IPF- NSIP with organizing portions vs HP with evidence small airways disease also present, groundglass also seen.  Started on mycophenolate.    PFTs exhibit stable spirometry (mild restriction by FVC) with significant improvement of DLCO to normal levels.   Patient underwent pulmonary rehab with intermittent measurements of her SpO2 on exertion noting for SpO2 greater than 90% typically while symptoms on room air.  However, since completion of pulm rehab, not continuing exercises. Have SOB on exertion with increased workloads. Not wearing 2LPM as previously prescribed. ILD, deconditioning, and weight likely leading to BECKER. Also cardiac stress test ordered.  However, considering normalization of DLCO, wondering if O2 requirements have improved since starting MMF. Will repeat 6MW.    Continue MMF.    Mild persistent asthma- continue Advair and prn albuterol (for symptoms prn and before exercise). Reviewed technique, and rinsing/garggle tactics.     Finally had sleep study- mild MIC, but nocturnal hypoxia seen. Follows up with sleep medicine soon.    Was seen in weight loss clinic- was prescribed Zepbound and also being worked up gastric surgery. Will need pulm risk assessment- can provide closer to time of procedure.     If she continues to have good improvement on her DLCO will repeat 6-minute walk to see if she continues to need oxygen.    She referred to sleep medicine considering that she has had diagnosed sleep apnea but never wore a noninvasive device previously.  Considering length of time and potential confounding factor this could play with nocturnal hypoxia, refer to sleep medicine.  Has had difficulty scheduling an appointment for repeat polysomnogram.  If she does have difficulty we will place a referral again potentially to a different group.  Today she was noted to have extensive wheezing on exam though PFTs today did not show any obstruction.  Asked her to use nebulizers and  if no improvement in her symptoms she could use prednisone for short course.  She had stopped her Advair for period of time and has found that her daily asthma symptoms have gotten worse.  As such, we will restart her Advair.     RTC in 3 months with PFTs and potentially 6-minute walk      40 minutes excluding the time spent on cigarette cessation was  spent on the date of the encounter doing chart review, history and exam, documentation and further activities as noted above.    These conclusions are made at the best of one's knowledge and belief based on the provided evidence such as patient's history and allergy test results and they can change over time or can be incomplete because of missing information's.    I explained the lab values, imagings and findings to the patient.  Patient expressed understanding I did not recognize any barriers to the understanding of the patient.    The above note was dictated using voice recognition software and may include typographical errors. Please contact the author for any clarifications.    Isaac Duque MD  , Division of Pulmonary/Critical Care

## 2024-03-21 ENCOUNTER — VIRTUAL VISIT (OUTPATIENT)
Dept: PULMONOLOGY | Facility: OTHER | Age: 64
End: 2024-03-21
Attending: FAMILY MEDICINE
Payer: COMMERCIAL

## 2024-03-21 VITALS
SYSTOLIC BLOOD PRESSURE: 121 MMHG | HEIGHT: 65 IN | DIASTOLIC BLOOD PRESSURE: 80 MMHG | WEIGHT: 217 LBS | BODY MASS INDEX: 36.15 KG/M2

## 2024-03-21 DIAGNOSIS — J84.9 ILD (INTERSTITIAL LUNG DISEASE) (H): ICD-10-CM

## 2024-03-21 DIAGNOSIS — R09.02 HYPOXIA: ICD-10-CM

## 2024-03-21 DIAGNOSIS — G47.33 OSA (OBSTRUCTIVE SLEEP APNEA): Primary | ICD-10-CM

## 2024-03-21 PROCEDURE — 99213 OFFICE O/P EST LOW 20 MIN: CPT | Mod: 95 | Performed by: FAMILY MEDICINE

## 2024-03-21 ASSESSMENT — PAIN SCALES - GENERAL: PAINLEVEL: NO PAIN (0)

## 2024-03-21 NOTE — NURSING NOTE
Patient declined individual medication review by support staff because - no changes from 3/20/21 visit.      Has patient had flu shot for current/most recent flu season? If so, when? Yes: 9/25/23      Is the patient currently in the state of [place]? YES    Visit mode:VIDEO    If the visit is dropped, the patient can be reconnected by: VIDEO VISIT: Text to cell phone:   Telephone Information:   Mobile [phone]       Will anyone else be joining the visit? NO  (If patient encounters technical issues they should call [phone])    How would you like to obtain your AVS? MyChart    Are changes needed to the allergy or medication list? No    Reason for visit: RECHECK    Debra AVILAF

## 2024-03-21 NOTE — PROGRESS NOTES
Pulmonary Patient Follow Up Clinic Note   03/20/2024      PCP: Jessie Rose    Reason for visit: ILD     Pulmonary HPI:   Elsie Rubi is a 63 year old female w/ h/o elevated BMI, hx of PSG/HST+ but not on NIV, hiatal hernia, GERD, HTN, reported hx of asthma  who presents for follow up of ILD.     Pulmonary visit 7/2023.  Team conference outcome-  NSIP vs organizing pna with small airways overlap or HP with small airways disease with enough acute inflammatory changes to suggest treatment with MMF.   Started on MMF, tolerating well. Was prescribed O2 2 LPM via NC on exertion.   12/20/2023: Developed URI symptoms. Viral work up negative. Also noted to have been of Advair; this was restarted due to asthma symptoms. Pulm rehab completed.   Symptomatic, not wearing O2 with exertion consistently.    O2 at rest (When she should be on RA) is good. Not measuring with exertion. When at pulm rehab, they were not measuring very consistently with exertion though the few times she was measured she was on RA and spO2>90%.   Was referred again to sleep medicine considering her previous MIC dx and concern for nocturnal hypoxia.     3/20/2024:   Retired now since Jan 2024.   Symptoms stable, though remains with exertional BECKER with going up flight of stairs (though not consistently wearing oxygen). Has not been keeping up with exercise routinely. Was referred to pulm rehab again by her PCP (though not clear regarding coverage)  Completed PSG on 3/12- noted for nocturnal hypoxia and AHI-12 (mild MIC). Has follow up visit appt soon to go over results and next steps. Tolerating MMF well- no GI symptoms; labs stable.  Seen at weight management clinic- possible looking into gastric sleeve surgery potentially in 3-4 months and will need pulm risk assessment. Looks like Zepbound was also prescribed. Cards consulted also for pre-op clearance.   Had EGD 3/13 as part of this process- found grade A esophagitis with large hiatal hernia.  "Omeprazole added.     Review of systems: a complete 12-point ROS conducted, & found to be negative w/ exceptions as noted in the HPI.    Reviewed PMH, PSH, FH, SH    Medications:  Current Outpatient Medications   Medication     albuterol (PROAIR HFA/PROVENTIL HFA/VENTOLIN HFA) 108 (90 Base) MCG/ACT inhaler     cyclobenzaprine (FLEXERIL) 5 MG tablet     FLUoxetine (PROZAC) 40 MG capsule     fluticasone-salmeterol (ADVAIR) 250-50 MCG/ACT inhaler     metoprolol succinate ER (TOPROL XL) 25 MG 24 hr tablet     mycophenolate (GENERIC EQUIVALENT) 500 MG tablet     omeprazole (PRILOSEC) 40 MG DR capsule     tirzepatide-Weight Management (ZEPBOUND) 2.5 MG/0.5ML prefilled pen     traZODone (DESYREL) 50 MG tablet     tirzepatide-Weight Management (ZEPBOUND) 5 MG/0.5ML prefilled pen     No current facility-administered medications for this visit.       Allergies:  No Known Allergies    Physical examination:  /80 (BP Location: Right arm, Patient Position: Sitting, Cuff Size: Adult Large)   Pulse 66   Ht 1.651 m (5' 5\")   Wt 98.4 kg (217 lb)   SpO2 99%   BMI 36.11 kg/m      General: NAD, coughing with deep inspiration  CV: RR, no m/c/r   Lungs: CTAB, no wrr  Abd: protruding  Ext: WWP, no BLE edema  Neuro: No focal deficits  Psych: Euthymic, normal affect, good eye contact    Labs: reviewed in [employer] & personally interpreted.     Previous ILD serologies negative  q3 month CBC with diff and CMP- stable    Imaging: reviewed in [employer] & personally interpreted. Below are the interpretations from the formal Radiology review.  No new imaging    PFT:  Most Recent [facility]ze Pulmonary Function Testing (FVC/FEV1 only)  FVC-Pre   Date Value Ref Range Status   03/20/2024 1.86 L    12/20/2023 1.89 L    07/21/2023 1.83 L      FVC-%Pred-Pre   Date Value Ref Range Status   03/20/2024 64 %    12/20/2023 65 %    07/21/2023 62 %      FEV1-Pre   Date Value Ref Range Status   03/20/2024 1.60 L    12/20/2023 1.62 L    07/21/2023 1.51 L  "     FEV1-%Pred-Pre   Date Value Ref Range Status   03/20/2024 69 %    12/20/2023 70 %    07/21/2023 64 %      PFT- Date: 3/20/2024  FEV1- 1.6L (69%), FVC-1.86L (64%), FEV1/FVC- 0.86, DLCO- 16.19 ml/min/mmHg (81%)-  Interpretation-Similar spirometry with FVC suggesting mild restriction with normal DLCO (continued trend upward vs previous and significant improvement vs 7/2023)        PFT- Date: 12/20/2023   FEV1- 1.62L (70%), FVC-1.89L (65%), FEV1/FVC- 0.86,  DLCO- 15.3 ml/min/mmHg (76%)-  Interpretation-Similar spirometry with FVC suggesting mild restriction  Normalization of DLCO compared to previous      7/21/2023  FEV1- 1.51L (64%)  FVC- 1.83 (62%)  R- 0.82  TLC- 3.06L (58%)  uncDLCO- 12.85 (64%)  ERV and RV not as reduced as severely as TLC  RV/TLC-0.41  6MW- 2L NC O2 to complete (ended at 94%). 335m walked (329m -LLN)     OSH PFTs:  3/20/23:   FVC: 1.89 L (61%)  FEV1: 1.62 L (67%)  FEV1/FVC: 86%  RV: 1.18 L (60%)  TLC: 3.38 L (68%)  RV/TLC ratio 35%  uncDLCO: 14.93 ml/min/mmHg (76%, not adjusted for Hb)    Impression & recommendations:    Ju was seen today for interstitial lung disease (ild).    Diagnoses and all orders for this visit:    ILD (interstitial lung disease) (H)  -     General PFT Lab (Please always keep checked); Future  -     Pulmonary Function Test; Future  -     6 minute walk test; Future    Hypoxia    Mild persistent asthma without complication    BMI 36.0-36.9,adult    MIC (obstructive sleep apnea)    Nocturnal hypoxia    Other orders  -     Adult Pulmonology Clinic Follow-Up Order (3 Month)  -     Adult Pulmonology Clinic Follow-Up Order (3 Month); Future      Patient returns on follow-up ILD appointment.  Diagnosis non-IPF- NSIP with organizing portions vs HP with evidence small airways disease also present, groundglass also seen.  Started on mycophenolate.    PFTs exhibit stable spirometry (mild restriction by FVC) with significant improvement of DLCO to normal levels.   Patient  underwent pulmonary rehab with intermittent measurements of her SpO2 on exertion noting for SpO2 greater than 90% typically while symptoms on room air.  However, since completion of pulm rehab, not continuing exercises. Have SOB on exertion with increased workloads. Not wearing 2LPM as previously prescribed. ILD, deconditioning, and weight likely leading to BECKER. Also cardiac stress test ordered.  However, considering normalization of DLCO, wondering if O2 requirements have improved since starting MMF. Will repeat 6MW.    Continue MMF.    Mild persistent asthma- continue Advair and prn albuterol (for symptoms prn and before exercise). Reviewed technique, and rinsing/garggle tactics.     Finally had sleep study- mild MIC, but nocturnal hypoxia seen. Follows up with sleep medicine soon.    Was seen in weight loss clinic- was prescribed Zepbound and also being worked up gastric surgery. Will need pulm risk assessment- can provide closer to time of procedure.     If she continues to have good improvement on her DLCO will repeat 6-minute walk to see if she continues to need oxygen.    She referred to sleep medicine considering that she has had diagnosed sleep apnea but never wore a noninvasive device previously.  Considering length of time and potential confounding factor this could play with nocturnal hypoxia, refer to sleep medicine.  Has had difficulty scheduling an appointment for repeat polysomnogram.  If she does have difficulty we will place a referral again potentially to a different group.  Today she was noted to have extensive wheezing on exam though PFTs today did not show any obstruction.  Asked her to use nebulizers and if no improvement in her symptoms she could use prednisone for short course.  She had stopped her Advair for period of time and has found that her daily asthma symptoms have gotten worse.  As such, we will restart her Advair.     RTC in 3 months with PFTs and potentially 6-minute walk      40  minutes excluding the time spent on cigarette cessation was  spent on the date of the encounter doing chart review, history and exam, documentation and further activities as noted above.    These conclusions are made at the best of one's knowledge and belief based on the provided evidence such as patient's history and allergy test results and they can change over time or can be incomplete because of missing information's.    I explained the lab values, imagings and findings to the patient.  Patient expressed understanding I did not recognize any barriers to the understanding of the patient.    The above note was dictated using voice recognition software and may include typographical errors. Please contact the author for any clarifications.    Isaac Duque MD  , Division of Pulmonary/Critical Care

## 2024-03-22 ENCOUNTER — TELEPHONE (OUTPATIENT)
Dept: NUCLEAR MEDICINE | Facility: HOSPITAL | Age: 64
End: 2024-03-22

## 2024-03-22 NOTE — TELEPHONE ENCOUNTER
Made an appointment reminder call regarding [place] lexiscan stress test scheduled on 3/25 at 730. Reminded patient no caffeine after 630 pm and no food after midnight. Test duration is about 4 ours. Asked patient if she needed an oxygen tank while here, she said that she will be ok with her concentrator as she doesn't use it continually.

## 2024-03-25 ENCOUNTER — HOSPITAL ENCOUNTER (OUTPATIENT)
Dept: CARDIOLOGY | Facility: HOSPITAL | Age: 64
Setting detail: NUCLEAR MEDICINE
Discharge: HOME | End: 2024-03-25
Attending: NURSE PRACTITIONER
Payer: COMMERCIAL

## 2024-03-25 ENCOUNTER — HOSPITAL ENCOUNTER (OUTPATIENT)
Dept: NUCLEAR MEDICINE | Facility: HOSPITAL | Age: 64
Setting detail: NUCLEAR MEDICINE
Discharge: HOME | End: 2024-03-25
Attending: NURSE PRACTITIONER
Payer: COMMERCIAL

## 2024-03-25 DIAGNOSIS — R73.03 PREDIABETES: Chronic | ICD-10-CM

## 2024-03-25 DIAGNOSIS — E66.812 CLASS 2 SEVERE OBESITY WITH SERIOUS COMORBIDITY AND BODY MASS INDEX (BMI) OF 38.0 TO 38.9 IN ADULT, UNSPECIFIED OBESITY TYPE (H): ICD-10-CM

## 2024-03-25 DIAGNOSIS — E66.01 CLASS 2 SEVERE OBESITY WITH SERIOUS COMORBIDITY AND BODY MASS INDEX (BMI) OF 38.0 TO 38.9 IN ADULT, UNSPECIFIED OBESITY TYPE (H): ICD-10-CM

## 2024-03-25 DIAGNOSIS — I10 ESSENTIAL HYPERTENSION: Chronic | ICD-10-CM

## 2024-03-25 DIAGNOSIS — R06.09 DYSPNEA ON EXERTION: ICD-10-CM

## 2024-03-25 PROCEDURE — A9500 TC99M SESTAMIBI: HCPCS | Performed by: RADIOLOGY

## 2024-03-25 PROCEDURE — 93016 CV STRESS TEST SUPVJ ONLY: CPT | Performed by: INTERNAL MEDICINE

## 2024-03-25 PROCEDURE — 93017 CV STRESS TEST TRACING ONLY: CPT

## 2024-03-25 PROCEDURE — 78452 HT MUSCLE IMAGE SPECT MULT: CPT

## 2024-03-25 PROCEDURE — 250N000011 HC RX IP 250 OP 636: Mod: JZ | Performed by: INTERNAL MEDICINE

## 2024-03-25 PROCEDURE — 93018 CV STRESS TEST I&R ONLY: CPT | Performed by: INTERNAL MEDICINE

## 2024-03-25 PROCEDURE — 343N000001 HC RX 343: Performed by: RADIOLOGY

## 2024-03-25 RX ORDER — REGADENOSON 0.08 MG/ML
0.4 INJECTION, SOLUTION INTRAVENOUS ONCE
Status: COMPLETED | OUTPATIENT
Start: 2024-03-25 | End: 2024-03-25

## 2024-03-25 RX ADMIN — Medication 10.7 MILLICURIE: at 08:00

## 2024-03-25 RX ADMIN — REGADENOSON 0.4 MG: 0.08 INJECTION, SOLUTION INTRAVENOUS at 09:47

## 2024-03-25 RX ADMIN — Medication 31.1 MILLICURIE: at 09:47

## 2024-03-26 ENCOUNTER — TELEPHONE (OUTPATIENT)
Dept: CARDIAC REHAB | Facility: HOSPITAL | Age: 64
End: 2024-03-26

## 2024-03-26 ENCOUNTER — TELEPHONE (OUTPATIENT)
Dept: FAMILY MEDICINE | Facility: OTHER | Age: 64
End: 2024-03-26

## 2024-03-26 DIAGNOSIS — J84.9 ILD (INTERSTITIAL LUNG DISEASE) (H): Primary | ICD-10-CM

## 2024-03-26 LAB
CV BLOOD PRESSURE: 75 MMHG
CV STRESS MAX HR HE: 85
DLCOCOR-%PRED-PRE: 83 %
DLCOCOR-PRE: 16.61 ML/MIN/MMHG
DLCOUNC-%PRED-PRE: 81 %
DLCOUNC-PRE: 16.19 ML/MIN/MMHG
DLCOUNC-PRED: 19.92 ML/MIN/MMHG
ERV-%PRED-PRE: 19 %
ERV-PRE: 0.21 L
ERV-PRED: 1.09 L
EXPTIME-PRE: 6.75 SEC
FEF2575-%PRED-PRE: 115 %
FEF2575-PRE: 2.34 L/SEC
FEF2575-PRED: 2.03 L/SEC
FEFMAX-%PRED-PRE: 116 %
FEFMAX-PRE: 7.24 L/SEC
FEFMAX-PRED: 6.2 L/SEC
FEV1-%PRED-PRE: 69 %
FEV1-PRE: 1.6 L
FEV1FEV6-PRE: 86 %
FEV1FEV6-PRED: 80 %
FEV1FVC-PRE: 86 %
FEV1FVC-PRED: 80 %
FEV1SVC-PRE: 79 %
FEV1SVC-PRED: 70 %
FIFMAX-PRE: 2.41 L/SEC
FVC-%PRED-PRE: 64 %
FVC-PRE: 1.86 L
FVC-PRED: 2.89 L
IC-%PRED-PRE: 82 %
IC-PRE: 1.8 L
IC-PRED: 2.18 L
NUC STRESS EJECTION FRACTION: 76 %
RATE PRESSURE PRODUCT: NORMAL
STRESS ECHO BASELINE DIASTOLIC HE: 78
STRESS ECHO BASELINE HR: 65 BPM
STRESS ECHO BASELINE SYSTOLIC BP: 118
STRESS ECHO CALCULATED PERCENT HR: 54 %
STRESS ECHO LAST STRESS DIASTOLIC BP: 84
STRESS ECHO LAST STRESS SYSTOLIC BP: 128
STRESS ECHO TARGET HR: 156
VA-%PRED-PRE: 64 %
VA-PRE: 3.11 L
VC-%PRED-PRE: 61 %
VC-PRE: 2.01 L
VC-PRED: 3.29 L

## 2024-03-26 NOTE — TELEPHONE ENCOUNTER
3/26: Called patient for Pulmonary Rehab evaluation.  Due to only getting 4 lifetime enrollments into Pulmonary Rehab patient declines at this time due to not having exacerbation  but would like referral to the WEL (Maintenance) program. If agreed please order [phone], scroll down and select WEL.  Thank You.

## 2024-03-28 ENCOUNTER — APPOINTMENT (OUTPATIENT)
Dept: GENERAL RADIOLOGY | Facility: HOSPITAL | Age: 64
End: 2024-03-28
Attending: STUDENT IN AN ORGANIZED HEALTH CARE EDUCATION/TRAINING PROGRAM
Payer: COMMERCIAL

## 2024-03-28 ENCOUNTER — HOSPITAL ENCOUNTER (EMERGENCY)
Facility: HOSPITAL | Age: 64
Discharge: HOME | End: 2024-03-28
Attending: STUDENT IN AN ORGANIZED HEALTH CARE EDUCATION/TRAINING PROGRAM | Admitting: STUDENT IN AN ORGANIZED HEALTH CARE EDUCATION/TRAINING PROGRAM
Payer: COMMERCIAL

## 2024-03-28 VITALS
RESPIRATION RATE: 18 BRPM | WEIGHT: 217 LBS | OXYGEN SATURATION: 94 % | HEIGHT: 64 IN | HEART RATE: 87 BPM | TEMPERATURE: 100.8 F | SYSTOLIC BLOOD PRESSURE: 133 MMHG | BODY MASS INDEX: 37.05 KG/M2 | DIASTOLIC BLOOD PRESSURE: 89 MMHG

## 2024-03-28 DIAGNOSIS — J10.1 INFLUENZA A: ICD-10-CM

## 2024-03-28 LAB
ANION GAP SERPL CALCULATED.3IONS-SCNC: 10 MMOL/L (ref 7–15)
BASOPHILS # BLD AUTO: 0 10E3/UL (ref 0–0.2)
BASOPHILS NFR BLD AUTO: 1 %
BUN SERPL-MCNC: 13 MG/DL (ref 8–23)
CALCIUM SERPL-MCNC: 8.4 MG/DL (ref 8.8–10.2)
CHLORIDE SERPL-SCNC: 105 MMOL/L (ref 98–107)
CREAT SERPL-MCNC: 0.98 MG/DL (ref 0.51–0.95)
DEPRECATED HCO3 PLAS-SCNC: 21 MMOL/L (ref 22–29)
EGFRCR SERPLBLD CKD-EPI 2021: 64 ML/MIN/1.73M2
EOSINOPHIL # BLD AUTO: 0 10E3/UL (ref 0–0.7)
EOSINOPHIL NFR BLD AUTO: 1 %
ERYTHROCYTE [DISTWIDTH] IN BLOOD BY AUTOMATED COUNT: 14.1 % (ref 10–15)
FLUAV RNA SPEC QL NAA+PROBE: POSITIVE
FLUBV RNA RESP QL NAA+PROBE: NEGATIVE
GLUCOSE SERPL-MCNC: 92 MG/DL (ref 70–99)
HCT VFR BLD AUTO: 36.1 % (ref 35–47)
HGB BLD-MCNC: 11.6 G/DL (ref 11.7–15.7)
HOLD SPECIMEN: NORMAL
IMM GRANULOCYTES # BLD: 0 10E3/UL
IMM GRANULOCYTES NFR BLD: 1 %
LYMPHOCYTES # BLD AUTO: 0.4 10E3/UL (ref 0.8–5.3)
LYMPHOCYTES NFR BLD AUTO: 11 %
MCH RBC QN AUTO: 29.4 PG (ref 26.5–33)
MCHC RBC AUTO-ENTMCNC: 32.1 G/DL (ref 31.5–36.5)
MCV RBC AUTO: 91 FL (ref 78–100)
MONOCYTES # BLD AUTO: 0.6 10E3/UL (ref 0–1.3)
MONOCYTES NFR BLD AUTO: 17 %
NEUTROPHILS # BLD AUTO: 2.5 10E3/UL (ref 1.6–8.3)
NEUTROPHILS NFR BLD AUTO: 71 %
NRBC # BLD AUTO: 0 10E3/UL
NRBC BLD AUTO-RTO: 0 /100
PLATELET # BLD AUTO: 130 10E3/UL (ref 150–450)
POTASSIUM SERPL-SCNC: 3.7 MMOL/L (ref 3.4–5.3)
RBC # BLD AUTO: 3.95 10E6/UL (ref 3.8–5.2)
RSV RNA SPEC NAA+PROBE: NEGATIVE
SARS-COV-2 RNA RESP QL NAA+PROBE: NEGATIVE
SODIUM SERPL-SCNC: 136 MMOL/L (ref 135–145)
WBC # BLD AUTO: 3.5 10E3/UL (ref 4–11)

## 2024-03-28 PROCEDURE — 99284 EMERGENCY DEPT VISIT MOD MDM: CPT | Mod: 25

## 2024-03-28 PROCEDURE — 87637 SARSCOV2&INF A&B&RSV AMP PRB: CPT

## 2024-03-28 PROCEDURE — 71046 X-RAY EXAM CHEST 2 VIEWS: CPT

## 2024-03-28 PROCEDURE — 99284 EMERGENCY DEPT VISIT MOD MDM: CPT | Performed by: STUDENT IN AN ORGANIZED HEALTH CARE EDUCATION/TRAINING PROGRAM

## 2024-03-28 PROCEDURE — 250N000013 HC RX MED GY IP 250 OP 250 PS 637: Performed by: STUDENT IN AN ORGANIZED HEALTH CARE EDUCATION/TRAINING PROGRAM

## 2024-03-28 PROCEDURE — 82374 ASSAY BLOOD CARBON DIOXIDE: CPT | Performed by: STUDENT IN AN ORGANIZED HEALTH CARE EDUCATION/TRAINING PROGRAM

## 2024-03-28 PROCEDURE — 85004 AUTOMATED DIFF WBC COUNT: CPT | Performed by: STUDENT IN AN ORGANIZED HEALTH CARE EDUCATION/TRAINING PROGRAM

## 2024-03-28 PROCEDURE — 36415 COLL VENOUS BLD VENIPUNCTURE: CPT | Performed by: STUDENT IN AN ORGANIZED HEALTH CARE EDUCATION/TRAINING PROGRAM

## 2024-03-28 RX ORDER — IBUPROFEN 600 MG/1
600 TABLET, FILM COATED ORAL ONCE
Status: COMPLETED | OUTPATIENT
Start: 2024-03-28 | End: 2024-03-28

## 2024-03-28 RX ORDER — ACETAMINOPHEN 325 MG/1
325 TABLET ORAL ONCE
Status: COMPLETED | OUTPATIENT
Start: 2024-03-28 | End: 2024-03-28

## 2024-03-28 RX ADMIN — ACETAMINOPHEN 325 MG: 325 TABLET, FILM COATED ORAL at 12:42

## 2024-03-28 RX ADMIN — IBUPROFEN 600 MG: 600 TABLET, FILM COATED ORAL at 11:55

## 2024-03-28 ASSESSMENT — ENCOUNTER SYMPTOMS
CHILLS: 1
FEVER: 1
COUGH: 1
ACTIVITY CHANGE: 1
SHORTNESS OF BREATH: 1
FATIGUE: 1

## 2024-03-28 ASSESSMENT — ACTIVITIES OF DAILY LIVING (ADL)
ADLS_ACUITY_SCORE: 35

## 2024-03-28 NOTE — ED TRIAGE NOTES
Pt presents with concerns of flu, pt reports being on and immunosuppressant, pt reports desire to ensure there is nothing in her lungs. C/o fever, body aches, congestion, chest tightness and cough. Symptom onset was yesterday. Otc tylenol cold/flu. Around 0800

## 2024-03-28 NOTE — ED NOTES
Presents to the urgent care with complaints of a 2 day history of cough, fever, chest pressure, and fatigue. Also complains of chest pressure. Tylenol cold and flu ~2 hours PTA with temp of 102 on arrival to the urgent care. She is immunocompromised as she takes mycophenolate. Also has hx of interstitial lung dx and mild persistent asthma. She is ill appearing with sats of 93%. Covid multiplex pending. During visit with pulmonology on 3/20, sats 99%. Her history of clinical presentation warrant further workup with possible admission. Discussed with Dr. Lott in the ED and care transferred to ED.      Caterina Mandel, NP  03/28/24 [id number]

## 2024-03-28 NOTE — DISCHARGE INSTRUCTIONS
Return to emergency room if worsening symptoms or new concerning symptoms.  Follow with primary care provider within the next week.  Call schedule appointment.  Stay hydrated.  You can use ibuprofen and Tylenol for pain control and fever control.    May take Ibuprofen 600-800mg every 6 hours (not to exceed 3,200mg in 24 hours) and/or Tylenol 650mg every 4 hours or 1000mg every 6 hours (not to exceed 4,000mg in 24 hours) as needed for  fever    May alternate each medication every 3 hours. For example, if you took ibuprofen at 9am you could take tylenol at 12pm (noon), then repeat ibuprofen at 3pm and then repeat tylenol at 6pm and so on.

## 2024-03-28 NOTE — ED PROVIDER NOTES
History     Chief Complaint   Patient presents with    Flu Symptoms     HPI  Elsie Rubi is a 64 year old female who presents to the urgent care with a 2 day history of fever, fatigue, and cough. Experiencing some mid chest pressure today. Tylenol cold and flu (650mg) ~2 hours PTA with temp of 102 on arrival to the urgent care. She is immunocompromised as she takes mycophenolate. Also has hx of interstitial lung dx and mild persistent asthma. She is ill appearing with sats of 93%. Covid multiplex pending. During visit with pulmonology on 3/20, sats 99%.     Allergies:  No Known Allergies    Problem List:    Patient Active Problem List    Diagnosis Date Noted    ILD (interstitial lung disease) (H) 03/12/2024     Priority: Medium    Prediabetes 08/25/2023     Priority: Medium    Mixed hyperlipidemia 08/25/2023     Priority: Medium     The 10-year ASCVD risk score (Radha DENT, et al., 2019) is: 5.8%    Values used to calculate the score:      Age: 63 years      Sex: Female      Is Non- : No      Diabetic: No      Tobacco smoker: No      Systolic Blood Pressure: 123 mmHg      Is BP treated: Yes      HDL Cholesterol: 57 mg/dL      Total Cholesterol: 218 mg/dL        Primary insomnia 08/10/2023     Priority: Medium    Rectocele 08/10/2023     Priority: Medium    Primary stress urinary incontinence 08/10/2023     Priority: Medium     OB [facility] 2021 - declined surgery      MIC (obstructive sleep apnea) 10/14/2021     Priority: Medium    Chronic fatigue syndrome 08/13/2020     Priority: Medium    Dyspepsia 08/13/2020     Priority: Medium    Essential hypertension 08/13/2020     Priority: Medium    Fibromyalgia 08/13/2020     Priority: Medium    Mild episode of recurrent major depressive disorder (H24) 08/13/2020     Priority: Medium    Mild intermittent asthma without complication 08/13/2020     Priority: Medium    Severe obesity (BMI 35.0-39.9) with comorbidity (H) 08/13/2020      Priority: Medium        Past Medical History:    Past Medical History:   Diagnosis Date    Benign essential hypertension     Borderline personality disorder (H) 08/13/2020    Depression     Depressive disorder 1978    ILD (interstitial lung disease) (H)     Primary osteoarthritis of left foot 08/13/2020    Uncomplicated asthma 2019       Past Surgical History:    Past Surgical History:   Procedure Laterality Date    BUNIONECTOMY Left     COLONOSCOPY  2015    ESOPHAGOSCOPY, GASTROSCOPY, DUODENOSCOPY (EGD), COMBINED N/A 3/13/2024    Procedure: ESOPHAGOGASTRODUODENOSCOPY, WITH BIOPSY;  Surgeon: Jerel Cabrera MD;  Location:  GI    GYN SURGERY  1996    ORTHOPEDIC SURGERY  2021       Family History:    Family History   Problem Relation Age of Onset    Hypertension Mother     Osteoporosis Mother     Coronary Artery Disease Father     Hyperlipidemia Father     Cerebrovascular Disease Father     Coronary Artery Disease Paternal Grandfather     Breast Cancer Paternal Grandmother     Anxiety Disorder Son     Anxiety Disorder Daughter     Mental Illness Daughter     Obesity Daughter     Obesity Sister     Obesity Daughter        Social History:  Marital Status:   [2]  Social History     Tobacco Use    Smoking status: Never     Passive exposure: Never    Smokeless tobacco: Never   Vaping Use    Vaping Use: Never used   Substance Use Topics    Alcohol use: Yes     Comment: Wiine daily with dinner    Drug use: Not Currently        Medications:    albuterol (PROAIR HFA/PROVENTIL HFA/VENTOLIN HFA) 108 (90 Base) MCG/ACT inhaler  cyclobenzaprine (FLEXERIL) 5 MG tablet  FLUoxetine (PROZAC) 40 MG capsule  fluticasone-salmeterol (ADVAIR) 250-50 MCG/ACT inhaler  metoprolol succinate ER (TOPROL XL) 25 MG 24 hr tablet  omeprazole (PRILOSEC) 40 MG DR capsule  traZODone (DESYREL) 50 MG tablet  mycophenolate (GENERIC EQUIVALENT) 500 MG tablet  tirzepatide-Weight Management (ZEPBOUND) 2.5 MG/0.5ML prefilled  "pen  tirzepatide-Weight Management (ZEPBOUND) 5 MG/0.5ML prefilled pen          Review of Systems   Constitutional:  Positive for activity change, chills, fatigue and fever.   Respiratory:  Positive for cough and shortness of breath (chronic, slightly worse today).    Cardiovascular:  Positive for chest pain (mid chest pressure).   All other systems reviewed and are negative.      Physical Exam   BP: 117/82  Pulse: 92  Temp: (!) 102  F (38.9  C)  Resp: 22  Height: 162.6 cm (5' 4\")  Weight: 98.4 kg (217 lb)  SpO2: 93 %      Physical Exam  Vitals and nursing note reviewed.   Constitutional:       Appearance: Normal appearance. She is ill-appearing. She is not toxic-appearing or diaphoretic.   Cardiovascular:      Rate and Rhythm: Normal rate and regular rhythm.   Neurological:      Mental Status: She is alert.     Clear to auscultation bilaterally on my exam-Oren, agree with above    ED Course     ED Course as of 03/28/24 1555   Thu Mar 28, 2024   1429 I have assumed care of the patient.   1553 Influenza A(!): Positive   1553 Differential includes but is not limited to PNA, CHF, COPD exacerbation, pulmonary edema, pulmonary fibrosis, rhinorrhea, strep pharyngitis, viral URI, Pertussis, bronchitis, GERD/LPreflux, environmental irritant, foreign body   Based on patient's history and presentation, duration of symptoms, most likely etiology is a viral upper respiratory infection.  Physical exam reassuring, patient is well-appearing, pulmonary exam acceptable and at baseline.  imaging is indicated in this presentation despite the mild severity, reassuring vitals and the duration of symptoms given she is on cellcept. Overall doubt bacterial source at this time, and with CXR without consolidation, no hx of double sickening, I am reassured. No hx imaging or exam findings to suggest PNA, CHF, COPD exacerbation, pulmonary edema, fibrosis.  Pt offered viral testing and Flu A pos.   Antipyretic indicated given symptoms and temp " with improvement.  Pt appropriate for further outpatient management, discharged  in stable condition with all questions answered and return precautions given.   1555 Patient did insist that she would be going home if she was flu positive which she was.  I think this is reasonable.  She states she will stay hydrated and she does look better after improving the temperature     Procedures              Results for orders placed or performed during the hospital encounter of 03/28/24 (from the past 24 hour(s))   Symptomatic Influenza A/B, RSV, & SARS-CoV2 PCR (COVID-19) Nose    Specimen: Nose; Swab   Result Value Ref Range    Influenza A PCR Positive (A) Negative    Influenza B PCR Negative Negative    RSV PCR Negative Negative    SARS CoV2 PCR Negative Negative    Narrative    Testing was performed using the Xpert Xpress CoV2/Flu/RSV Assay on the Cepheid GeneXpert Instrument. This test should be ordered for the detection of SARS-CoV-2, influenza, and RSV viruses in individuals who meet clinical and/or epidemiological criteria. Test performance is unknown in asymptomatic patients. This test is for in vitro diagnostic use under the FDA EUA for laboratories certified under CLIA to perform high or moderate complexity testing. This test has not been FDA cleared or approved. A negative result does not rule out the presence of PCR inhibitors in the specimen or target RNA in concentration below the limit of detection for the assay. If only one viral target is positive but coinfection with multiple targets is suspected, the sample should be re-tested with another FDA cleared, approved, or authorized test, if coinfection would change clinical management. This test was validated by the [facility] [employer]. These laboratories are certified under the Clinical Laboratory Improvement Amendments of 1988 (CLIA-88) as qualified to perform high complexity laboratory testing.   CBC with platelets differential    Narrative    The  following orders were created for panel order CBC with platelets differential.  Procedure                               Abnormality         Status                     ---------                               -----------         ------                     CBC with platelets and d...[[id number]]  Abnormal            Final result                 Please view results for these tests on the individual orders.   Basic metabolic panel   Result Value Ref Range    Sodium 136 135 - 145 mmol/L    Potassium 3.7 3.4 - 5.3 mmol/L    Chloride 105 98 - 107 mmol/L    Carbon Dioxide (CO2) 21 (L) 22 - 29 mmol/L    Anion Gap 10 7 - 15 mmol/L    Urea Nitrogen 13.0 8.0 - 23.0 mg/dL    Creatinine 0.98 (H) 0.51 - 0.95 mg/dL    GFR Estimate 64 >60 mL/min/1.73m2    Calcium 8.4 (L) 8.8 - 10.2 mg/dL    Glucose 92 70 - 99 mg/dL   CBC with platelets and differential   Result Value Ref Range    WBC Count 3.5 (L) 4.0 - 11.0 10e3/uL    RBC Count 3.95 3.80 - 5.20 10e6/uL    Hemoglobin 11.6 (L) 11.7 - 15.7 g/dL    Hematocrit 36.1 35.0 - 47.0 %    MCV 91 78 - 100 fL    MCH 29.4 26.5 - 33.0 pg    MCHC 32.1 31.5 - 36.5 g/dL    RDW 14.1 10.0 - 15.0 %    Platelet Count 130 (L) 150 - 450 10e3/uL    % Neutrophils 71 %    % Lymphocytes 11 %    % Monocytes 17 %    % Eosinophils 1 %    % Basophils 1 %    % Immature Granulocytes 1 %    NRBCs per 100 WBC 0 <1 /100    Absolute Neutrophils 2.5 1.6 - 8.3 10e3/uL    Absolute Lymphocytes 0.4 (L) 0.8 - 5.3 10e3/uL    Absolute Monocytes 0.6 0.0 - 1.3 10e3/uL    Absolute Eosinophils 0.0 0.0 - 0.7 10e3/uL    Absolute Basophils 0.0 0.0 - 0.2 10e3/uL    Absolute Immature Granulocytes 0.0 <=0.4 10e3/uL    Absolute NRBCs 0.0 10e3/uL   Extra Tube    Narrative    The following orders were created for panel order Extra Tube.  Procedure                               Abnormality         Status                     ---------                               -----------         ------                     Extra Blue Top Tube[[id number]]                               Final result               Extra Red Top Tube[[id number]]                                                          Extra Heparinized Syringe[[id number]]                                                     Please view results for these tests on the individual orders.   Extra Blue Top Tube   Result Value Ref Range    Hold Specimen JIC    Chest XR,  PA & LAT    Narrative    Procedure:XR CHEST 2 VIEWS    Clinical history:Female, 64 years, cough    Technique: Two views are submitted.    Comparison: No relevant prior imaging.    Findings: The cardiac silhouette is within normal limits.. The  pulmonary vasculature is within normal limits.    The lungs are clear. Bony structures are unremarkable.  Retrocardiac  density consistent with a hiatal hernia.      Impression    Impression:   No acute abnormality. No evidence of acute or active cardiopulmonary  disease.    EDWARD BLANCA MD         SYSTEM ID:  [id number]       Medications   ibuprofen (ADVIL/MOTRIN) tablet 600 mg (600 mg Oral $Given 3/28/24 6125)   acetaminophen (TYLENOL) tablet 325 mg (325 mg Oral $Given 3/28/24 1242)       Assessments & Plan (with Medical Decision Making)     I have reviewed the nursing notes.    I have reviewed the findings, diagnosis, plan and need for follow up with the patient.    Ill appearing 64 year old female initially presenting to the urgent care with complaints of fever, fatigue, cough, some shortness of breath, and mid chest pressure. History of immunosuppression, takes mycophenolate. Also has history of interstitial lung disease. Her history of clinical presentation warrant further workup with possible admission. She is agreeable to be transferred to ED. Discussed with Dr. Lott in the ED and care transferred to ED.       See ED Course      Discharge Medication List as of 3/28/2024  2:49 PM          Final diagnoses:   Influenza A       3/28/2024   [facility]       Michael Lott MD  03/28/24 7150

## 2024-03-29 ENCOUNTER — PATIENT OUTREACH (OUTPATIENT)
Dept: PULMONOLOGY | Facility: CLINIC | Age: 64
End: 2024-03-29
Payer: COMMERCIAL

## 2024-03-29 NOTE — PROGRESS NOTES
Patient contacted regarding diagnosed with Flu and was in the ER yesterday. Doing poorly still, spiking fevers, diarrhea, delirium, SOB, cough.  is at home with her but she is struggling. Currently on Mycophenolate for her lung disease, told her okay to hold and recommend that she go back to hospital to see if  she needs to be admitted.     Message sent to Dr Duque.

## 2024-04-02 ENCOUNTER — TELEPHONE (OUTPATIENT)
Dept: FAMILY MEDICINE | Facility: OTHER | Age: 64
End: 2024-04-02

## 2024-04-02 NOTE — TELEPHONE ENCOUNTER
8:21 AM    Reason for Call: OVERBOOK    Patient is ER follow up / [facility] [place] [facility] / 3-27 / flu pt still not feeling well.    The patient is requesting an appointment for today with Dr. Rose.    Was an appointment offered for this call? No  If yes : Appointment type              Date    Preferred method for responding to this message: Telephone Call  What is your phone number ? [phone]     If we cannot reach you directly, may we leave a detailed response at the number you provided? Yes    Can this message wait until your PCP/provider returns, if unavailable today? Zoila Barrera

## 2024-04-03 ENCOUNTER — OFFICE VISIT (OUTPATIENT)
Dept: FAMILY MEDICINE | Facility: OTHER | Age: 64
End: 2024-04-03
Attending: STUDENT IN AN ORGANIZED HEALTH CARE EDUCATION/TRAINING PROGRAM
Payer: COMMERCIAL

## 2024-04-03 ENCOUNTER — ANCILLARY PROCEDURE (OUTPATIENT)
Dept: GENERAL RADIOLOGY | Facility: OTHER | Age: 64
End: 2024-04-03
Attending: STUDENT IN AN ORGANIZED HEALTH CARE EDUCATION/TRAINING PROGRAM
Payer: COMMERCIAL

## 2024-04-03 VITALS
OXYGEN SATURATION: 95 % | WEIGHT: 214.4 LBS | TEMPERATURE: 97.7 F | DIASTOLIC BLOOD PRESSURE: 80 MMHG | SYSTOLIC BLOOD PRESSURE: 110 MMHG | BODY MASS INDEX: 36.8 KG/M2 | HEART RATE: 85 BPM

## 2024-04-03 DIAGNOSIS — J45.20 MILD INTERMITTENT ASTHMA WITHOUT COMPLICATION: Chronic | ICD-10-CM

## 2024-04-03 DIAGNOSIS — J84.9 ILD (INTERSTITIAL LUNG DISEASE) (H): Chronic | ICD-10-CM

## 2024-04-03 DIAGNOSIS — J10.1 INFLUENZA A: Primary | ICD-10-CM

## 2024-04-03 DIAGNOSIS — R79.82 ELEVATED C-REACTIVE PROTEIN (CRP): ICD-10-CM

## 2024-04-03 DIAGNOSIS — D84.9 IMMUNOSUPPRESSION (H): ICD-10-CM

## 2024-04-03 DIAGNOSIS — J10.1 INFLUENZA A: ICD-10-CM

## 2024-04-03 DIAGNOSIS — J45.31 MILD PERSISTENT ASTHMA WITH EXACERBATION: ICD-10-CM

## 2024-04-03 PROBLEM — N81.6 RECTOCELE: Status: ACTIVE | Noted: 2023-08-10

## 2024-04-03 LAB
ALBUMIN SERPL BCG-MCNC: 3.7 G/DL (ref 3.5–5.2)
ALP SERPL-CCNC: 73 U/L (ref 40–150)
ALT SERPL W P-5'-P-CCNC: 24 U/L (ref 0–50)
ANION GAP SERPL CALCULATED.3IONS-SCNC: 12 MMOL/L (ref 7–15)
AST SERPL W P-5'-P-CCNC: 38 U/L (ref 0–45)
BASOPHILS # BLD MANUAL: 0 10E3/UL (ref 0–0.2)
BASOPHILS NFR BLD MANUAL: 0 %
BILIRUB SERPL-MCNC: 0.4 MG/DL
BUN SERPL-MCNC: 7.3 MG/DL (ref 8–23)
CALCIUM SERPL-MCNC: 8.7 MG/DL (ref 8.8–10.2)
CHLORIDE SERPL-SCNC: 105 MMOL/L (ref 98–107)
CREAT SERPL-MCNC: 0.82 MG/DL (ref 0.51–0.95)
CRP SERPL-MCNC: 36.79 MG/L
DEPRECATED HCO3 PLAS-SCNC: 23 MMOL/L (ref 22–29)
EGFRCR SERPLBLD CKD-EPI 2021: 79 ML/MIN/1.73M2
EOSINOPHIL # BLD MANUAL: 0.2 10E3/UL (ref 0–0.7)
EOSINOPHIL NFR BLD MANUAL: 3 %
ERYTHROCYTE [DISTWIDTH] IN BLOOD BY AUTOMATED COUNT: 14.1 % (ref 10–15)
GLUCOSE SERPL-MCNC: 116 MG/DL (ref 70–99)
HCT VFR BLD AUTO: 40.2 % (ref 35–47)
HGB BLD-MCNC: 13.2 G/DL (ref 11.7–15.7)
LYMPHOCYTES # BLD MANUAL: 3 10E3/UL (ref 0.8–5.3)
LYMPHOCYTES NFR BLD MANUAL: 47 %
MCH RBC QN AUTO: 29.5 PG (ref 26.5–33)
MCHC RBC AUTO-ENTMCNC: 32.8 G/DL (ref 31.5–36.5)
MCV RBC AUTO: 90 FL (ref 78–100)
MONOCYTES # BLD MANUAL: 0.4 10E3/UL (ref 0–1.3)
MONOCYTES NFR BLD MANUAL: 7 %
NEUTROPHILS # BLD MANUAL: 2.7 10E3/UL (ref 1.6–8.3)
NEUTROPHILS NFR BLD MANUAL: 43 %
NRBC # BLD AUTO: 0 10E3/UL
NRBC BLD AUTO-RTO: 0 /100
PLAT MORPH BLD: ABNORMAL
PLATELET # BLD AUTO: 156 10E3/UL (ref 150–450)
POTASSIUM SERPL-SCNC: 3.4 MMOL/L (ref 3.4–5.3)
PROCALCITONIN SERPL IA-MCNC: 0.47 NG/ML
PROT SERPL-MCNC: 7.1 G/DL (ref 6.4–8.3)
RBC # BLD AUTO: 4.48 10E6/UL (ref 3.8–5.2)
RBC MORPH BLD: ABNORMAL
SODIUM SERPL-SCNC: 140 MMOL/L (ref 135–145)
VARIANT LYMPHS BLD QL SMEAR: PRESENT
WBC # BLD AUTO: 6.3 10E3/UL (ref 4–11)

## 2024-04-03 PROCEDURE — 85007 BL SMEAR W/DIFF WBC COUNT: CPT | Mod: ZL,XU | Performed by: STUDENT IN AN ORGANIZED HEALTH CARE EDUCATION/TRAINING PROGRAM

## 2024-04-03 PROCEDURE — 71046 X-RAY EXAM CHEST 2 VIEWS: CPT | Mod: TC

## 2024-04-03 PROCEDURE — 84145 PROCALCITONIN (PCT): CPT | Mod: ZL | Performed by: STUDENT IN AN ORGANIZED HEALTH CARE EDUCATION/TRAINING PROGRAM

## 2024-04-03 PROCEDURE — 86140 C-REACTIVE PROTEIN: CPT | Mod: ZL | Performed by: STUDENT IN AN ORGANIZED HEALTH CARE EDUCATION/TRAINING PROGRAM

## 2024-04-03 PROCEDURE — G0463 HOSPITAL OUTPT CLINIC VISIT: HCPCS | Mod: 25

## 2024-04-03 PROCEDURE — 250N000009 HC RX 250: Performed by: STUDENT IN AN ORGANIZED HEALTH CARE EDUCATION/TRAINING PROGRAM

## 2024-04-03 PROCEDURE — 80053 COMPREHEN METABOLIC PANEL: CPT | Mod: ZL | Performed by: STUDENT IN AN ORGANIZED HEALTH CARE EDUCATION/TRAINING PROGRAM

## 2024-04-03 PROCEDURE — 99214 OFFICE O/P EST MOD 30 MIN: CPT | Performed by: STUDENT IN AN ORGANIZED HEALTH CARE EDUCATION/TRAINING PROGRAM

## 2024-04-03 PROCEDURE — 85025 COMPLETE CBC W/AUTO DIFF WBC: CPT | Mod: ZL | Performed by: STUDENT IN AN ORGANIZED HEALTH CARE EDUCATION/TRAINING PROGRAM

## 2024-04-03 PROCEDURE — 36415 COLL VENOUS BLD VENIPUNCTURE: CPT | Mod: ZL | Performed by: STUDENT IN AN ORGANIZED HEALTH CARE EDUCATION/TRAINING PROGRAM

## 2024-04-03 RX ORDER — ALBUTEROL SULFATE 0.83 MG/ML
2.5 SOLUTION RESPIRATORY (INHALATION) ONCE
Status: COMPLETED | OUTPATIENT
Start: 2024-04-03 | End: 2024-04-03

## 2024-04-03 RX ORDER — PREDNISONE 20 MG/1
TABLET ORAL
Qty: 20 TABLET | Refills: 0 | Status: SHIPPED | OUTPATIENT
Start: 2024-04-03 | End: 2024-07-24

## 2024-04-03 RX ORDER — ALBUTEROL SULFATE 90 UG/1
2 AEROSOL, METERED RESPIRATORY (INHALATION) EVERY 4 HOURS PRN
Qty: 18 G | Refills: 3 | Status: SHIPPED | OUTPATIENT
Start: 2024-04-03

## 2024-04-03 RX ORDER — AZITHROMYCIN 250 MG/1
TABLET, FILM COATED ORAL
Qty: 6 TABLET | Refills: 0 | Status: SHIPPED | OUTPATIENT
Start: 2024-04-03 | End: 2024-04-08

## 2024-04-03 RX ADMIN — ALBUTEROL SULFATE 2.5 MG: 2.5 SOLUTION RESPIRATORY (INHALATION) at 10:58

## 2024-04-03 ASSESSMENT — PAIN SCALES - GENERAL: PAINLEVEL: MILD PAIN (3)

## 2024-04-03 NOTE — PROGRESS NOTES
Assessment & Plan     1. Influenza A    2. ILD (interstitial lung disease) (H)    3. Mild intermittent asthma without complication    4. Mild persistent asthma with exacerbation    5. Immunosuppression (H24)    6. Elevated C-reactive protein (CRP)        Persistent cough, over a week after diagnosis with influenza A.  Significant bronchospasm on examination today.  Reassuring vitals are normal and fever broke at home.  Chest x-ray completed today without obvious acute infiltrate, although does show interstitial prominence, this was present on her prior emergency department evaluation but no other priors for comparison.  CBC back to baseline, CMP stable.  CRP is slightly elevated, which is expected.  Procalcitonin less than 0.5 although borderline.  She does not have features of significant systemic bacterial infection but may have a touch of community-acquired pneumonia developing with her persistent cough in the setting of influenza A.  Did give a neb treatment here today, and did have significant improvement in her wheezing and rhonchi.    With her asthma and interstitial lung disease, recommend we do a 12-day prednisone taper.  Discussed to take with food, monitor for side effects.  Start azithromycin. If not improving in 2 days, would consider addition of Augmentin to cover for community-acquired pneumonia.  Recommend scheduled use of inhaler for any dyspnea.  Needs to ensure that she is using her Advair, as has not been as much when she has been ill.  Continue to hold her mycophenolate.  As she is not eating much, would also hold her Zepp bound dosing.  Reviewed signs and symptoms that would indicate need for urgent follow-up.  Plan follow-up in 5 days otherwise.  Can cancel if symptoms completely resolved.    - CBC with Platelets & Differential; Future  - Comprehensive metabolic panel; Future  - CRP inflammation; Future  - Procalcitonin; Future  - XR CHEST 2 VW (Clinic Performed); Future  - CBC with  Platelets & Differential  - Comprehensive metabolic panel  - CRP inflammation  - Procalcitonin  - azithromycin (ZITHROMAX) 250 MG tablet; Take 2 tablets (500 mg) by mouth daily for 1 day, THEN 1 tablet (250 mg) daily for 4 days.  - predniSONE (DELTASONE) 20 MG tablet; Take 3 tabs by mouth daily x 3 days, then 2 tabs daily x 3 days, then 1 tab daily x 3 days, then 1/2 tab daily x 3 days.  Take with food.          Subjective   Ju is a 64 year old, presenting for the following health issues:  Follow Up        4/3/2024    10:09 AM   Additional Questions   Roomed by Jake Matt   Accompanied by          4/3/2024    10:09 AM   Patient Reported Additional Medications   Patient reports taking the following new medications none     History of Present Illness       Reason for visit:  Flu        ED/UC Followup:    Facility:  [facility]   Date of visit: 3/28/2024  Reason for visit: Influenza A  Current Status: States she is doing horrible. Mostly has been sleeping. Having fatigue, headache, loose stools, and cough. States she has not been taking her blood pressure medication for the last 2 days due to feeling faint.       Some improvement since urgent care. No fever. Fever broke yesterday. Had bad emesis, now resolved.   Continually taking ibuprofen or tylenol. None in system today.   Still cough with mucous production. Poor appetite. Hydrating well though.   Symptoms started 3/26 or so. Seen in urgent care 3/28.   Is immunosuppressed. Holding mycophenolate.         Objective    /80 (BP Location: Right arm, Patient Position: Sitting, Cuff Size: Adult Large)   Pulse 85   Temp 97.7  F (36.5  C) (Tympanic)   Wt 97.3 kg (214 lb 6.4 oz)   SpO2 95%   BMI 36.80 kg/m    Body mass index is 36.8 kg/m .  Physical Exam  Constitutional:       General: She is not in acute distress.     Appearance: Normal appearance. She is ill-appearing. She is not toxic-appearing.   HENT:      Right Ear: Tympanic  membrane and external ear normal.      Left Ear: Tympanic membrane and external ear normal.      Nose: No mucosal edema, congestion or rhinorrhea.      Right Sinus: No maxillary sinus tenderness or frontal sinus tenderness.      Left Sinus: No maxillary sinus tenderness or frontal sinus tenderness.      Mouth/Throat:      Mouth: Mucous membranes are moist.      Pharynx: Oropharynx is clear. No oropharyngeal exudate or posterior oropharyngeal erythema.      Tonsils: No tonsillar exudate or tonsillar abscesses.   Eyes:      General: No scleral icterus.        Right eye: No discharge.         Left eye: No discharge.      Extraocular Movements: Extraocular movements intact.      Conjunctiva/sclera: Conjunctivae normal.      Pupils: Pupils are equal, round, and reactive to light.   Cardiovascular:      Rate and Rhythm: Normal rate and regular rhythm.      Heart sounds: No murmur heard.  Pulmonary:      Effort: Pulmonary effort is normal.      Breath sounds: Wheezing and rhonchi present.      Comments: Diffuse rhonchorous sounding lungs with increased coarse sounds throughout.  Normal work of breathing.  Talking in complete sentences.    Significant improvement in wheezing and rhonchi after albuterol neb today.   Musculoskeletal:      Cervical back: Normal range of motion and neck supple. No tenderness.      Right lower leg: No edema.      Left lower leg: No edema.   Lymphadenopathy:      Cervical: No cervical adenopathy.   Skin:     General: Skin is warm and dry.      Capillary Refill: Capillary refill takes less than 2 seconds.      Findings: No rash.   Neurological:      General: No focal deficit present.      Mental Status: She is alert and oriented to person, place, and time.   Psychiatric:         Mood and Affect: Mood normal.         Behavior: Behavior normal.        Results for orders placed or performed in visit on 04/03/24   XR CHEST 2 VW (Clinic Performed)     Status: None    Narrative    PROCEDURE: XR CHEST 2  VIEWS 4/3/2024 10:39 AM    HISTORY: influenza a, persistent cough, immunosuppressed; Influenza A    COMPARISONS: 3/28/2024.    TECHNIQUE: 2 views.    FINDINGS: Heart is stable in size. There is no pleural effusion or  large confluent infiltrate. There is some generalized interstitial  prominence similar to the prior exam.    There is a small hiatal hernia. There is extensive degenerative change  in the spine. There is a right convex scoliosis.         Impression    IMPRESSION: Relatively stable interstitial prominence which may be  acute or chronic.    AMBROSE MCDONALD MD         SYSTEM ID:  [id number]   Results for orders placed or performed in visit on 04/03/24   Comprehensive metabolic panel     Status: Abnormal   Result Value Ref Range    Sodium 140 135 - 145 mmol/L    Potassium 3.4 3.4 - 5.3 mmol/L    Carbon Dioxide (CO2) 23 22 - 29 mmol/L    Anion Gap 12 7 - 15 mmol/L    Urea Nitrogen 7.3 (L) 8.0 - 23.0 mg/dL    Creatinine 0.82 0.51 - 0.95 mg/dL    GFR Estimate 79 >60 mL/min/1.73m2    Calcium 8.7 (L) 8.8 - 10.2 mg/dL    Chloride 105 98 - 107 mmol/L    Glucose 116 (H) 70 - 99 mg/dL    Alkaline Phosphatase 73 40 - 150 U/L    AST 38 0 - 45 U/L    ALT 24 0 - 50 U/L    Protein Total 7.1 6.4 - 8.3 g/dL    Albumin 3.7 3.5 - 5.2 g/dL    Bilirubin Total 0.4 <=1.2 mg/dL   CRP inflammation     Status: Abnormal   Result Value Ref Range    CRP Inflammation 36.79 (H) <5.00 mg/L   Procalcitonin     Status: Normal   Result Value Ref Range    Procalcitonin 0.47 <0.50 ng/mL   CBC with platelets and differential     Status: None   Result Value Ref Range    WBC Count 6.3 4.0 - 11.0 10e3/uL    RBC Count 4.48 3.80 - 5.20 10e6/uL    Hemoglobin 13.2 11.7 - 15.7 g/dL    Hematocrit 40.2 35.0 - 47.0 %    MCV 90 78 - 100 fL    MCH 29.5 26.5 - 33.0 pg    MCHC 32.8 31.5 - 36.5 g/dL    RDW 14.1 10.0 - 15.0 %    Platelet Count 156 150 - 450 10e3/uL    NRBCs per 100 WBC 0 <1 /100    Absolute NRBCs 0.0 10e3/uL   Manual Differential      Status: Abnormal   Result Value Ref Range    % Neutrophils 43 %    % Lymphocytes 47 %    % Monocytes 7 %    % Eosinophils 3 %    % Basophils 0 %    Absolute Neutrophils 2.7 1.6 - 8.3 10e3/uL    Absolute Lymphocytes 3.0 0.8 - 5.3 10e3/uL    Absolute Monocytes 0.4 0.0 - 1.3 10e3/uL    Absolute Eosinophils 0.2 0.0 - 0.7 10e3/uL    Absolute Basophils 0.0 0.0 - 0.2 10e3/uL    RBC Morphology Confirmed RBC Indices     Platelet Assessment  Automated Count Confirmed. Platelet morphology is normal.     Automated Count Confirmed. Platelet morphology is normal.    Reactive Lymphocytes Present (A) None Seen   CBC with Platelets & Differential     Status: Abnormal    Narrative    The following orders were created for panel order CBC with Platelets & Differential.  Procedure                               Abnormality         Status                     ---------                               -----------         ------                     CBC with platelets and d...[[id number]]                      Final result               RBC and Platelet Morphology[[id number]]                                                 Manual Differential[[id number]]          Abnormal            Final result                 Please view results for these tests on the individual orders.             Signed Electronically by: Jessie Rose MD

## 2024-04-03 NOTE — PATIENT INSTRUCTIONS
Make sure using advair twice daily.   Prednisone taper ordered. Take with food in the morning.   Use albuterol every 4-6 as needed.   Start Z pack antibiotic   Continue to hold mycophenolate   While on antibiotics, it's recommended you take probiotics, eat more yogurt or drink Kefir, to promote good bacteria in your gut. Doing this for 2 weeks after finishing the antibiotics is also recommended to minimize GI side effects, like diarrhea. For probiotics, ones that have >1,000,000 colony forming units (CFUs) are good to take.    Follow up sooner (Friday) if not improving or worsening.

## 2024-04-04 ENCOUNTER — TELEPHONE (OUTPATIENT)
Dept: ENDOCRINOLOGY | Facility: CLINIC | Age: 64
End: 2024-04-04
Payer: COMMERCIAL

## 2024-04-04 ENCOUNTER — TELEPHONE (OUTPATIENT)
Dept: SURGERY | Facility: CLINIC | Age: 64
End: 2024-04-04
Payer: COMMERCIAL

## 2024-04-04 DIAGNOSIS — E66.01 CLASS 2 SEVERE OBESITY WITH SERIOUS COMORBIDITY AND BODY MASS INDEX (BMI) OF 38.0 TO 38.9 IN ADULT, UNSPECIFIED OBESITY TYPE (H): ICD-10-CM

## 2024-04-04 DIAGNOSIS — R73.03 PREDIABETES: Chronic | ICD-10-CM

## 2024-04-04 DIAGNOSIS — E66.812 CLASS 2 SEVERE OBESITY WITH SERIOUS COMORBIDITY AND BODY MASS INDEX (BMI) OF 38.0 TO 38.9 IN ADULT, UNSPECIFIED OBESITY TYPE (H): ICD-10-CM

## 2024-04-04 NOTE — TELEPHONE ENCOUNTER
General Call    Contacts         Type Contact Phone/Fax    04/04/2024 10:00 AM CDT Phone (Incoming) Ju Rubi (Self)           Reason for Call:  Zepbound    What are your questions or concerns:  pt has some questions about Zepbound and would like a call back        Could we send this information to you in [place] or would you prefer to receive a phone call?:   Patient would prefer a phone call   Okay to leave a detailed message?: Yes at Cell number on file:    Telephone Information:   Mobile [phone]

## 2024-04-04 NOTE — TELEPHONE ENCOUNTER
Patient wondering about refills.  Notified she has 5 mg dose that was sent in.     Patient has had sufficient appetite suppression with 2.5 mg dose.  Has lost from 225 to 209 lb.  Patient would like to stay at 2.5 mg.  Discussed getting in proper amounts of protein and other nutrition along with hydration.    Refill of 2.5 mg sent in.

## 2024-04-04 NOTE — TELEPHONE ENCOUNTER
General Call    Contacts         Type Contact Phone/Fax    04/04/2024 10:46 AM CDT Phone (Incoming) Ju Rubi (Self)           Reason for Call:  Zepbound    What are your questions or concerns:  pt has questions about her Zepbound and would like a call back      Could we send this information to you in [place] or would you prefer to receive a phone call?:   Patient would prefer a phone call   Okay to leave a detailed message?: Yes at Cell number on file:    Telephone Information:   Mobile [phone]

## 2024-04-05 ENCOUNTER — VIRTUAL VISIT (OUTPATIENT)
Dept: ENDOCRINOLOGY | Facility: CLINIC | Age: 64
End: 2024-04-05

## 2024-04-05 DIAGNOSIS — E66.3 OVERWEIGHT: Primary | ICD-10-CM

## 2024-04-05 PROCEDURE — 99207 PR NO CHARGE LOS: CPT | Mod: 95

## 2024-04-05 NOTE — LETTER
4/5/2024       RE: Elsie Rubi  [address] [facility] [address]     Dear Colleague,    Thank you for referring your patient, Elsie Rubi, to the [facility] [place] at [facility]. Please see a copy of my visit note below.    Healthy Lifestyle Support Group  3 participants  TOPIC: Let's Talk    The 3 patients received coaching; group coaching and sharing with pt challenges  TOPICS covered; not eating enough due to not having a schedule; not being hungry due to medication; self compassion and kindness to make healthy changes; starting a work-out and going back to   What works; what I know and am comfortable doing; setting up a schedule day to day and including meal times; modifying a goal and having a plan b.     TIME SPENT: 60 minutes    Shea DAIGLE-[facility], National Board Certified Health &   Lead Health   [facility]  [email or web address]   To schedule: [phone]   Gender pronouns: she/her

## 2024-04-06 NOTE — PROGRESS NOTES
Healthy Lifestyle Support Group  3 participants  TOPIC: Let's Talk    The 3 patients received coaching; group coaching and sharing with pt challenges  TOPICS covered; not eating enough due to not having a schedule; not being hungry due to medication; self compassion and kindness to make healthy changes; starting a work-out and going back to   What works; what I know and am comfortable doing; setting up a schedule day to day and including meal times; modifying a goal and having a plan b.     TIME SPENT: 60 minutes    Shea Toure  [facility], National Board Certified Health &   Lead Health   [facility]  [email or web address]   To schedule: [phone]   Gender pronouns: she/her

## 2024-04-08 ENCOUNTER — OFFICE VISIT (OUTPATIENT)
Dept: FAMILY MEDICINE | Facility: OTHER | Age: 64
End: 2024-04-08
Attending: STUDENT IN AN ORGANIZED HEALTH CARE EDUCATION/TRAINING PROGRAM
Payer: COMMERCIAL

## 2024-04-08 VITALS
TEMPERATURE: 98.6 F | SYSTOLIC BLOOD PRESSURE: 110 MMHG | HEART RATE: 83 BPM | OXYGEN SATURATION: 97 % | WEIGHT: 213.3 LBS | BODY MASS INDEX: 36.41 KG/M2 | RESPIRATION RATE: 18 BRPM | HEIGHT: 64 IN | DIASTOLIC BLOOD PRESSURE: 70 MMHG

## 2024-04-08 DIAGNOSIS — J10.1 INFLUENZA A: Primary | ICD-10-CM

## 2024-04-08 DIAGNOSIS — I10 ESSENTIAL HYPERTENSION: Chronic | ICD-10-CM

## 2024-04-08 DIAGNOSIS — J84.9 ILD (INTERSTITIAL LUNG DISEASE) (H): ICD-10-CM

## 2024-04-08 DIAGNOSIS — J45.20 MILD INTERMITTENT ASTHMA WITHOUT COMPLICATION: ICD-10-CM

## 2024-04-08 PROCEDURE — G0463 HOSPITAL OUTPT CLINIC VISIT: HCPCS

## 2024-04-08 PROCEDURE — 99214 OFFICE O/P EST MOD 30 MIN: CPT | Performed by: STUDENT IN AN ORGANIZED HEALTH CARE EDUCATION/TRAINING PROGRAM

## 2024-04-08 ASSESSMENT — PAIN SCALES - GENERAL: PAINLEVEL: NO PAIN (0)

## 2024-04-08 NOTE — PATIENT INSTRUCTIONS
Continue with advair.   Finish prednisone and azithromycin.   Wait at least one more week to resume mycophenolate.

## 2024-04-08 NOTE — PROGRESS NOTES
Assessment & Plan     Influenza A  Mild intermittent asthma without complication  ILD (interstitial lung disease) (H)  Much improved from 5 days ago with the prednisone, azithromycin, and scheduled nebulizer treatments.  Lungs are clear today, oxygenation improved, overall better appearing.  Recommend to continue the prednisone and azithromycin until gone.  Continue with her daily Symbicort Advair inhaler for her asthma -now stable.  Use albuterol as needed.  Would hold off an additional week before restarting her mycophenolate.  Should follow-up with pulmonology for her interstitial lung disease, especially if she has concerns with continuing on the mycophenolate.  Follow-up if symptoms worsen or fail to further improve.    Essential hypertension  Well-controlled now.  No further elevations since readding metoprolol 1 month ago.  Tolerating metoprolol 25 mg daily.  Continue present dose.          Subjective   Ju is a 64 year old, presenting for the following health issues:  RECHECK        4/8/2024     2:11 PM   Additional Questions   Roomed by Susie Ruiz LPN, CCP, MHP   Accompanied by self     History of Present Illness       Reason for visit:  Flu        Influenza A follow up  Acute illness concerns: Influenza A   Onset/Duration: 03/28/2024  Symptoms:  Fever: No  Chills/Sweats: No  Headache (location?): No  Sinus Pressure: No  Conjunctivitis:  No  Ear Pain: no  Rhinorrhea: No  Congestion: No  Sore Throat: No  Cough: YES-productive of yellow sputum  Wheeze: YES  Decreased Appetite: No  Nausea: No  Vomiting: No  Diarrhea: No  Dysuria/Freq.: No  Dysuria or Hematuria: No  Fatigue/Achiness: No  Sick/Strep Exposure: YES  Therapies tried and outcome: sudafed this morning     Fever, body aches, sore throat resolved.   Cough and rhinorrhea still there.   Normal level of activity.   1-2 days left of steroid and 1 day abx.   Felt she bounced back quickly with the steroids.    Blood pressure has been well  "controlled on metoprolol 25mg.  Tolerating without any issues.        Objective    /70 (BP Location: Right arm, Patient Position: Sitting, Cuff Size: Adult Large)   Pulse 83   Temp 98.6  F (37  C) (Tympanic)   Resp 18   Ht 1.626 m (5' 4\")   Wt 96.8 kg (213 lb 4.8 oz)   SpO2 97%   BMI 36.61 kg/m    Body mass index is 36.61 kg/m .    Physical Exam  Constitutional:       General: She is not in acute distress.     Appearance: Normal appearance. She is not ill-appearing.   HENT:      Right Ear: Tympanic membrane and external ear normal.      Left Ear: Tympanic membrane and external ear normal.      Nose: No mucosal edema.      Right Sinus: No maxillary sinus tenderness or frontal sinus tenderness.      Left Sinus: No maxillary sinus tenderness or frontal sinus tenderness.      Mouth/Throat:      Mouth: Mucous membranes are moist.      Pharynx: Oropharynx is clear. No oropharyngeal exudate or posterior oropharyngeal erythema.      Tonsils: No tonsillar exudate or tonsillar abscesses.   Eyes:      General: No scleral icterus.        Right eye: No discharge.         Left eye: No discharge.      Extraocular Movements: Extraocular movements intact.      Conjunctiva/sclera: Conjunctivae normal.      Pupils: Pupils are equal, round, and reactive to light.   Cardiovascular:      Rate and Rhythm: Normal rate and regular rhythm.      Heart sounds: No murmur heard.  Pulmonary:      Effort: Pulmonary effort is normal.      Breath sounds: Normal breath sounds. No wheezing, rhonchi or rales.      Comments: Lung significantly improved from last week.  The cough is much less harsh.  Musculoskeletal:      Cervical back: Normal range of motion and neck supple. No tenderness.   Lymphadenopathy:      Cervical: No cervical adenopathy.   Skin:     General: Skin is warm and dry.      Capillary Refill: Capillary refill takes less than 2 seconds.   Neurological:      General: No focal deficit present.      Mental Status: She is " alert and oriented to person, place, and time.   Psychiatric:         Mood and Affect: Mood normal.         Behavior: Behavior normal.                    Signed Electronically by: Jessie Rose MD

## 2024-04-15 ENCOUNTER — VIRTUAL VISIT (OUTPATIENT)
Dept: CARDIOLOGY | Facility: CLINIC | Age: 64
End: 2024-04-15
Payer: COMMERCIAL

## 2024-04-15 VITALS — HEIGHT: 64 IN | WEIGHT: 210 LBS | BODY MASS INDEX: 35.85 KG/M2

## 2024-04-15 DIAGNOSIS — E66.01 CLASS 2 SEVERE OBESITY WITH SERIOUS COMORBIDITY AND BODY MASS INDEX (BMI) OF 38.0 TO 38.9 IN ADULT, UNSPECIFIED OBESITY TYPE (H): Primary | ICD-10-CM

## 2024-04-15 DIAGNOSIS — E66.812 CLASS 2 SEVERE OBESITY WITH SERIOUS COMORBIDITY AND BODY MASS INDEX (BMI) OF 38.0 TO 38.9 IN ADULT, UNSPECIFIED OBESITY TYPE (H): Primary | ICD-10-CM

## 2024-04-15 ASSESSMENT — PAIN SCALES - GENERAL: PAINLEVEL: NO PAIN (0)

## 2024-04-15 NOTE — Clinical Note
Henrry Perdomo! ROBERTA Umana is scheduled with you in roughly 2 weeks, I am advising she remain on Zepbound at 2.5 mg once weekly, effects of Zepbound are nearing a degree of over restriction on calorie intake, however currently is improving.  I advised her to reach out if she does have any regression in appetite.  Please let me know if you have any questions or concerns  Thank you, Adams

## 2024-04-15 NOTE — NURSING NOTE
Is the patient currently in the state of [place]? YES    Visit mode:VIDEO    If the visit is dropped, the patient can be reconnected by: VIDEO VISIT: Text to cell phone:   Telephone Information:   Mobile [phone]       Will anyone else be joining the visit? NO  (If patient encounters technical issues they should call [phone])    How would you like to obtain your AVS? MyChart    Are changes needed to the allergy or medication list? No    Are refills needed on medications prescribed by this physician? NO    Reason for visit: Consult    Maliha ASHLEY

## 2024-04-15 NOTE — Clinical Note
4/15/2024      RE: Elsie Rubi  [address] [facility] [address]       Dear Colleague,    Thank you for the opportunity to participate in the care of your patient, Elsie Rubi, at the [facility] [place] at [facility]. Please see a copy of my visit note below.    Medication Therapy Management (MTM) Encounter    ASSESSMENT:                            Medication Adherence/Access: No issues identified    Weight management: Zepbound therapy has yielded at times over restrictive effects from an appetite standpoint, especially in setting of recent complications related to influenza in the setting of interstitial lung disease.  With gradual improvement, and continued use of Zepbound, appetite has improved enough to allow for close to adequate calorie intake on a day-to-day basis.  There are no other overt GI adverse effects.  Additionally, planned return to usual dietary intake along for animal products in the near future to allow for likely improvement in reaching calorie goals.  For now, advise continuation of Zepbound to 2.5 mg, consider extended dosing interval if there are any continued troubles with appetite.    Interstitial Lung Disease: Recently worsened as a result of influenza, gradually improving.  Working with pulmonology with follow-up next month.  Mycophenolate currently held.    HTN: Recently reported dizziness, self monitored blood pressure does not identify hypotension or bradycardia.  If any continued dizziness or orthostasis, she will report to clinic.    Depression/Insomnia: Stable, controlled    Gastritis: Stable, recent dose increase of omeprazole    OTC/Supplements: Stable    PLAN:                            Remain on Zepbound 2.5 mg once weekly.  As discussed, if effects of Zepbound become more pronounced and further reduce calorie intake from this point, we should consider extending the dosing interval or discontinuing the  medication for the time being until you return to your usual dietary intake.  Additionally, if you have any ongoing dizziness that is not associated with low blood pressure or low heart rate, this may also be a reason to discontinue Zepbound.    Try to gradually increase your water intake, closer to eventually 60 ounces per day.    Follow-up with Leanne Stanford PA-C as scheduled.    SUBJECTIVE/OBJECTIVE:                          Ju Rubi is a 64 year old female contacted via secure video for an initial visit. She was referred to me from Leanne Stanford PA-C.      Reason for visit: Zepbound follow up.    Allergies/ADRs: Reviewed in chart  Past Medical History: Reviewed in chart  Tobacco: She reports that she has never smoked. She has never been exposed to tobacco smoke. She has never used smokeless tobacco.      Medication Adherence/Access: no issues reported    Weight management:  Zepbound 2.5 mg weekly     Initial 4 weeks with Zepbound was having troubles meeting adequate calorie intake. Notes that now at this point the effects have reduced to an extent. Does comment that due to being an [employer] Jewish is currently following a Lent based diet, but not eating any animal products. Eating eggs, yogurt, supplementing with plant based protein powder. Target Calorie intake is 1500 calories, currently around 8072-7943/day. Landy is in May, at which point usual diet with return, feels it will become easier to meet calorie target. Notes that her energy levels have been normal per her estimation, though is still recovering from influenza during which time she had some significant limitations in calorie intake. No history of low blood sugars. Notes that the first two weeks of Zepbound did have a lot of GI symptoms, stomach pain specifically. Since that time, this has resolved. Denies nausea, vomiting, constipation, diarrhea specifically, but does note hard to discern based on encountering influenza. Does not drink  "much water.  Continues to work towards sleeve gastrectomy.    Wt Readings from Last 4 Encounters:   04/15/24 95.3 kg (210 lb)   04/08/24 96.8 kg (213 lb 4.8 oz)   04/03/24 97.3 kg (214 lb 6.4 oz)   03/28/24 98.4 kg (217 lb)     Body Mass Index (BMI) Body mass index is 36.05 kg/m .    Today's Vitals: Ht 1.626 m (5' 4\")   Wt 95.3 kg (210 lb)   BMI 36.05 kg/m      Lab Results   Component Value Date    A1C 5.9 08/24/2023     Interstitial Lung Disease:  Mycophenolate 500 mg, 2 tabs twice daily  Advair 250/50 mcg 1 puff twice daily  Albuterol 90 mcg prn     Is still off of mycophenolate recovering from influenza. Works with pulmonology, follow up planned in 1-2 months. Completed course of prednisone.     HTN:  Metoprolol succinate 25 mg every day     Has had some light headedness upon standing recently. Measuring blood pressures at home during dizzy episodes, recalls 120/80 recently. Notes that heart rate is typically around 65 bpm, recently dose reduced Metoprolol.     BP Readings from Last 3 Encounters:   04/08/24 110/70   04/03/24 110/80   03/28/24 133/89      Depression/Insomnia:  Fluoxetine 40 mg every day   Trazodone 50 mg at bedtime prn    Trazodone only required a few times/month. Did require recently. Current symptoms of depression are well controlled.     Gastritis:  Omeprazole 40 mg every day     Was taking 20 mg otc, did have recent EGD and was prescribed 40 mg due to gastritis.    OTC/Supplements:   Vitamin D3, unsure of dose daily   Robitussin DM PRN   Citrical petites 200 mg calcium, 250 international unit(s) Vitamin d3 twice daily       ----------------      I spent 40 minutes with this patient today. All changes were made via collaborative practice agreement with Leanne Stanford PA-C. A copy of the visit note was provided to the patient's provider(s).    A summary of these recommendations was sent via [employer].    Alec Jules, PharmD, BCACP  Medication Therapy Management Pharmacist  [facility]     Telemedicine Visit Details  Type of service:  Manas  Joined the call at 4/15/2024, 2:56:33 pm.  Left the call at 4/15/2024, 3:36:34 pm.  You were on the call for 40 minutes .     Medication Therapy Recommendations  No medication therapy recommendations to display         Please do not hesitate to contact me if you have any questions/concerns.     Sincerely,     ELFEGO CASTELLANOS RPH

## 2024-04-15 NOTE — PATIENT INSTRUCTIONS
"Recommendations from today's MTM visit:                                                    MTM (medication therapy management) is a service provided by a clinical pharmacist designed to help you get the most of out of your medicines.      Remain on Zepbound 2.5 mg once weekly.  As discussed, if effects of Zepbound become more pronounced and further reduce calorie intake from this point, we should consider extending the dosing interval or discontinuing the medication for the time being until you return to your usual dietary intake.  Additionally, if you have any ongoing dizziness that is not associated with low blood pressure or low heart rate, this may also be a reason to discontinue Zepbound.     Try to gradually increase your water intake, closer to eventually 60 ounces per day.     Follow-up with Leanne Stanford PA-C as scheduled.    It was great speaking with you today.  I value your experience and would be very thankful for your time in providing feedback in our clinic survey. In the next few days, you may receive an email or text message from [employer] with a link to a survey related to your  clinical pharmacist.\"     To schedule another MTM appointment, please call the clinic directly or you may call the MTM scheduling line at [phone].    My Clinical Pharmacist's contact information:                                                      Please feel free to contact me with any questions or concerns you have.      Alec Jules, PharmD, BCACP  Medication Therapy Management Pharmacist  [facility]    "

## 2024-04-15 NOTE — PROGRESS NOTES
Medication Therapy Management (MTM) Encounter    ASSESSMENT:                            Medication Adherence/Access: No issues identified    Weight management: Zepbound therapy has yielded at times over restrictive effects from an appetite standpoint, especially in setting of recent complications related to influenza in the setting of interstitial lung disease.  With gradual improvement, and continued use of Zepbound, appetite has improved enough to allow for close to adequate calorie intake on a day-to-day basis.  There are no other overt GI adverse effects.  Additionally, planned return to usual dietary intake along for animal products in the near future to allow for likely improvement in reaching calorie goals.  For now, advise continuation of Zepbound to 2.5 mg, consider extended dosing interval if there are any continued troubles with appetite.    Interstitial Lung Disease: Recently worsened as a result of influenza, gradually improving.  Working with pulmonology with follow-up next month.  Mycophenolate currently held.    HTN: Recently reported dizziness, self monitored blood pressure does not identify hypotension or bradycardia.  If any continued dizziness or orthostasis, she will report to clinic.    Depression/Insomnia: Stable, controlled    Gastritis: Stable, recent dose increase of omeprazole    OTC/Supplements: Stable    PLAN:                            Remain on Zepbound 2.5 mg once weekly.  As discussed, if effects of Zepbound become more pronounced and further reduce calorie intake from this point, we should consider extending the dosing interval or discontinuing the medication for the time being until you return to your usual dietary intake.  Additionally, if you have any ongoing dizziness that is not associated with low blood pressure or low heart rate, this may also be a reason to discontinue Zepbound.    Try to gradually increase your water intake, closer to eventually 60 ounces per  "day.    Follow-up with Leanne Stanford PA-C as scheduled.    SUBJECTIVE/OBJECTIVE:                          Ju Rubi is a 64 year old female contacted via secure video for an initial visit. She was referred to me from Leanne Stanford PA-C.      Reason for visit: Zepbound follow up.    Allergies/ADRs: Reviewed in chart  Past Medical History: Reviewed in chart  Tobacco: She reports that she has never smoked. She has never been exposed to tobacco smoke. She has never used smokeless tobacco.      Medication Adherence/Access: no issues reported    Weight management:  Zepbound 2.5 mg weekly     Initial 4 weeks with Zepbound was having troubles meeting adequate calorie intake. Notes that now at this point the effects have reduced to an extent. Does comment that due to being an [employer] Rastafari is currently following a Lent based diet, but not eating any animal products. Eating eggs, yogurt, supplementing with plant based protein powder. Target Calorie intake is 1500 calories, currently around 5179-3788/day. Landy is in May, at which point usual diet with return, feels it will become easier to meet calorie target. Notes that her energy levels have been normal per her estimation, though is still recovering from influenza during which time she had some significant limitations in calorie intake. No history of low blood sugars. Notes that the first two weeks of Zepbound did have a lot of GI symptoms, stomach pain specifically. Since that time, this has resolved. Denies nausea, vomiting, constipation, diarrhea specifically, but does note hard to discern based on encountering influenza. Does not drink much water.  Continues to work towards sleeve gastrectomy.    Wt Readings from Last 4 Encounters:   04/15/24 95.3 kg (210 lb)   04/08/24 96.8 kg (213 lb 4.8 oz)   04/03/24 97.3 kg (214 lb 6.4 oz)   03/28/24 98.4 kg (217 lb)     Body Mass Index (BMI) Body mass index is 36.05 kg/m .    Today's Vitals: Ht 1.626 m (5' 4\")   Wt " 95.3 kg (210 lb)   BMI 36.05 kg/m      Lab Results   Component Value Date    A1C 5.9 08/24/2023     Interstitial Lung Disease:  Mycophenolate 500 mg, 2 tabs twice daily  Advair 250/50 mcg 1 puff twice daily  Albuterol 90 mcg prn     Is still off of mycophenolate recovering from influenza. Works with pulmonology, follow up planned in 1-2 months. Completed course of prednisone.     HTN:  Metoprolol succinate 25 mg every day     Has had some light headedness upon standing recently. Measuring blood pressures at home during dizzy episodes, recalls 120/80 recently. Notes that heart rate is typically around 65 bpm, recently dose reduced Metoprolol.     BP Readings from Last 3 Encounters:   04/08/24 110/70   04/03/24 110/80   03/28/24 133/89      Depression/Insomnia:  Fluoxetine 40 mg every day   Trazodone 50 mg at bedtime prn    Trazodone only required a few times/month. Did require recently. Current symptoms of depression are well controlled.     Gastritis:  Omeprazole 40 mg every day     Was taking 20 mg otc, did have recent EGD and was prescribed 40 mg due to gastritis.    OTC/Supplements:   Vitamin D3, unsure of dose daily   Robitussin DM PRN   Citrical petites 200 mg calcium, 250 international unit(s) Vitamin d3 twice daily       ----------------      I spent 40 minutes with this patient today. All changes were made via collaborative practice agreement with Leanne Stanford PA-C. A copy of the visit note was provided to the patient's provider(s).    A summary of these recommendations was sent via [employer].    Alec Jules, PharmD, BCACP  Medication Therapy Management Pharmacist  [facility]     Telemedicine Visit Details  Type of service:  Manas  Joined the call at 4/15/2024, 2:56:33 pm.  Left the call at 4/15/2024, 3:36:34 pm.  You were on the call for 40 minutes .     Medication Therapy Recommendations  No medication therapy recommendations to display

## 2024-04-26 DIAGNOSIS — R09.02 HYPOXIA: ICD-10-CM

## 2024-04-26 DIAGNOSIS — J84.9 ILD (INTERSTITIAL LUNG DISEASE) (H): ICD-10-CM

## 2024-04-26 RX ORDER — FLUTICASONE PROPIONATE AND SALMETEROL 250; 50 UG/1; UG/1
1 POWDER RESPIRATORY (INHALATION) EVERY 12 HOURS
Qty: 60 EACH | Refills: 3 | Status: SHIPPED | OUTPATIENT
Start: 2024-04-26 | End: 2024-09-05

## 2024-04-30 ENCOUNTER — VIRTUAL VISIT (OUTPATIENT)
Dept: ENDOCRINOLOGY | Facility: CLINIC | Age: 64
End: 2024-04-30
Payer: COMMERCIAL

## 2024-04-30 ENCOUNTER — TELEPHONE (OUTPATIENT)
Dept: ENDOCRINOLOGY | Facility: CLINIC | Age: 64
End: 2024-04-30

## 2024-04-30 VITALS — BODY MASS INDEX: 35.34 KG/M2 | HEIGHT: 64 IN | WEIGHT: 207 LBS

## 2024-04-30 DIAGNOSIS — E66.01 CLASS 2 SEVERE OBESITY WITH SERIOUS COMORBIDITY AND BODY MASS INDEX (BMI) OF 38.0 TO 38.9 IN ADULT, UNSPECIFIED OBESITY TYPE (H): Primary | ICD-10-CM

## 2024-04-30 DIAGNOSIS — R73.03 PREDIABETES: ICD-10-CM

## 2024-04-30 DIAGNOSIS — E66.812 CLASS 2 SEVERE OBESITY WITH SERIOUS COMORBIDITY AND BODY MASS INDEX (BMI) OF 38.0 TO 38.9 IN ADULT, UNSPECIFIED OBESITY TYPE (H): Primary | ICD-10-CM

## 2024-04-30 PROCEDURE — 99215 OFFICE O/P EST HI 40 MIN: CPT | Mod: 95

## 2024-04-30 RX ORDER — SEMAGLUTIDE 0.5 MG/.5ML
0.5 INJECTION, SOLUTION SUBCUTANEOUS WEEKLY
Qty: 2 ML | Refills: 2 | Status: SHIPPED | OUTPATIENT
Start: 2024-04-30 | End: 2024-07-24

## 2024-04-30 ASSESSMENT — PAIN SCALES - GENERAL: PAINLEVEL: NO PAIN (0)

## 2024-04-30 NOTE — PROGRESS NOTES
Virtual Visit Details    Type of service:  Video Visit   Video Start Time:  10:00am  Video End Time: 10:20am    Originating Location (pt. Location): Home    Distant Location (provider location):  Off-site  Platform used for Video Visit: Kelsy

## 2024-04-30 NOTE — TELEPHONE ENCOUNTER
PA Initiation    Medication: WEGOVY 0.5 MG/0.5ML SC SOAJ  Insurance Company: [facility] - Phone [phone] Fax [phone]  Pharmacy Filling the Rx: [facility] - [address] [facility]  Filling Pharmacy Phone: [phone]  Filling Pharmacy Fax: [phone]  Start Date: 4/30/2024

## 2024-04-30 NOTE — PATIENT INSTRUCTIONS
"Thank you for allowing us the privilege of caring for you. We hope we provided you with the excellent service you deserve.   Please let us know if there is anything else we can do for you so that we can be sure you are completely satisfied with your care experience.    To ensure the quality of our services you may be receiving a patient satisfaction survey from an independent patient satisfaction monitoring company.    The greatest compliment you can give is a \"Likely to Recommend\"    Your visit was with Leanne Stanford PA-C today.    Instructions per today's visit:     Henrry Rubi, it was great to visit with you today.  Here is a review of our visit.  If our clinic scheduler is not able to reach you please call [phone] to schedule your next appointments.    Plan  Switch from zepbound to wegovy 0.5mg, prescription sent. If zepbound supply improves, can consider switching back   Goals we discussed today: working towards getting 60-90g protein daily while working on weightloss, it is important to do this even if it means supplementing with animal products to reach this goal   Per Dr. Cabrera, no EGD prior to sleeve gastrectomy needed as no ulcers were seen on EGD   Continue omeprazole 40mg daily, contact clinic if your heart burn symptoms are worsening   Follow up with Leanne in 3 months   [facility] pharmacy in 4-6 weeks to check in on wegovy switch   Dietician appointment with Caroline Mcduffie   Keep up the excellent work!        Information about Video Visits with [email or web address]: video visit information  _________________________________________________________________________________________________________________________________________________________  If you are asked by your clinic team to have your blood pressure checked:  [facility] do offer several locations for blood pressure checks. Please follow the below link to schedule an appointment. Scheduling an appointment at the pharmacy for a " blood pressure check is now preferred.    Appointment Plus ([email or web address])  _________________________________________________________________________________________________________________________________________________________  Important contact and scheduling information:  Please call our contact center at [phone] to schedule your next appointments.  To find a lab location near you, please call [phone].  For any nursing questions or concerns call Deyanira Schuler LPN at [phone] or Katarina Leonard RN at [phone]  Please call during clinic hours Monday through Friday 8:00a - 4:00p if you have questions or you can contact us via [employer] at anytime and we will reply during clinic hours.    Lab results will be communicated through My Chart or letter (if My Chart not used). Please call the clinic if you have not received communication after 1 week or if you have any questions.?  Clinic Fax: [phone]    _________________________________________________________________________________________________________________________________________________________  Meal Replacement Products:    Here is the link to our new e-store where you can purchase our meal replacement products    [facility] E-Store  [email or web address]    The one week starter kit is a great way to sample a variety of products and see what works for you.    If you want more information about the product go to: [employer]    If you are an employee or [facility] or [facility] please contact your care team for a 10% estore discount    Free Shipping for orders over $75     Benefits of meal replacements products:    Portion and calorie control  Improved nutrition  Structured eating  Simplified food choices  Avoid contact with trigger  foods  _________________________________________________________________________________________________________________________________________________________  Interested in working with a health ?  Health coaches work with you to improve your overall health and wellbeing.  They look at the whole person, and may involve discussion of different areas of life, including, but not limited to the four pillars of health (sleep, exercise, nutrition, and stress management). Discuss with your care team if you would like to start working a health .  Health Coaching-3 Pack: Schedule by calling [phone]    $99 for three health coaching visits    Visits may be done in person or via phone    Coaching is a partnership between the  and the client; Coaches do not prescribe or diagnose    Coaching helps inspire the client to reach his/her personal goals   _________________________________________________________________________________________________________________________________________________________  24 Week Healthy Lifestyle Plan:    Our mission in the 24-week Healthy Lifestyle Plan is to provide you with individualized care by giving you the tools, education and support you need to lose weight and maintain a healthy lifestyle. In your 24-week journey, you ll be supported by a dedicated weight loss team that includes registered dietitians, medical weight management providers, health coaches, and nurses -- all with special expertise in weight loss -- to help you every step of the way.     Monthly meetings with your registered dietician or medical weight management provider help to review your progress, update your care plan, and make any adjustments needed to ensure success. Between these visits, weekly and bi-weekly health  visits will help you focus on the four pillars of weight loss -- stress, sleep, nutrition, and exercise -- and how you can best adapt each to achieve sustainable weight loss  results.    In addition, you will be given exclusive access to online wellbeing classes through [employer].  Your initial visit will be with a medical weight management provider who will help to understand your weight loss goals and ensure this program is the right fit for you. Please let our team know if you are interested in the 24 week plan by sending a message to your care team or calling [phone] to schedule.  _________________________________________________________________________________________________________________________________________________________  __________  [facility] Get Moving Program  Our team of physical therapists is trained to help you understand and take control of your condition. They will perform a thorough evaluation to determine your ability for activity and develop a customized plan to fit your goals and physical ability.  Scheduling: Unsure if the Get Moving program is right for you? Discuss the program with your medical provider or diabetes educator. You can also call us at [phone] to ask questions or schedule an appointment.   [facility] Get Moving Program  ____________________________________________________________________________________________________________________________________________________________________________   [employer] [facility] Diabetes Prevention Program (DPP)  If you have prediabetes and Medicare please contact us via [employer] to learn more about the Diabetes Prevention Program (DPP)  Program Details:   [employer] [place] offers the year-long Diabetes Prevention Program (DPP). The program helps you to make lifestyle changes that prevent or delay type 2 diabetes by supporting healthy eating, increased physical activity, stress reduction and use of coping skills.   On average, previous [facility] DPP cohorts have lost and maintained at least 5% of their starting weight throughout the program and averaged more than 150 minutes of physical  activity per week.  Participants meet weekly for one-hour group sessions over sixteen weeks, every other week for the next 8 weeks, and monthly for the last six months.   A year-long maintenance program is also available for participants who complete the first year.   Location & Cost:   During the COVID-19 Public Health Emergency, the program is offered virtually. When in-person classes can resume, they will be held at [facility].  For people with Medicare, the program is covered in full. A self-pay option will also be available for those with non-Medicare insurance plans.   ______________________________________________________________________________________________________________________________________________________________________________________________________________________________    To work with a Behavioral Health Psychologist:    Call to schedule:    Tu Navarrete - [phone]  Sonia Ku - [phone]  Luiza Zuniga - [phone]  Lou Franklin - [phone]   Love Ye PhD (cannot accept Medicare) [phone]        Thank you,   [facility] Comprehensive Weight Management Team

## 2024-04-30 NOTE — PROGRESS NOTES
Pre-Bariatric Surgery Note    Jessie Rose    Date: 2024     RE: Elsie Rubi    MR#: [id number]   : 1960   Date of Visit: 2024    REASON FOR VISIT: Preoperative evaluation for possible weight loss surgery    Dear Jessie Hills,    I had the pleasure of seeing your patient, Elsie Rubi, in my preoperative bariatric clinic.    As you know, she has morbid obesity and is considering weight loss surgery to treat obesity in association with her medical conditions of obesity.  Her consult weight was 222.  She has lost 15 pounds since her consult weight. She has met her required pre-surgery weight. Please refer to initial consult note from date 24 for patient's weight history and co-morbidities.    Wt Readings from Last 5 Encounters:   24 93.9 kg (207 lb)   04/15/24 95.3 kg (210 lb)   24 96.8 kg (213 lb 4.8 oz)   24 97.3 kg (214 lb 6.4 oz)   24 98.4 kg (217 lb)       Assessment & Plan   Problem List Items Addressed This Visit       Prediabetes (Chronic)    Relevant Medications    Semaglutide-Weight Management (WEGOVY) 0.5 MG/0.5ML pen     Other Visit Diagnoses       Class 2 severe obesity with serious comorbidity and body mass index (BMI) of 38.0 to 38.9 in adult, unspecified obesity type (H)    -  Primary    Relevant Medications    Semaglutide-Weight Management (WEGOVY) 0.5 MG/0.5ML pen             Plan  Switch from zepbound to wegovy 0.5mg, prescription sent. If zepbound supply improves, can consider switching back   Goals we discussed today: working towards getting 60-90g protein daily while working on weightloss, it is important to do this even if it means supplementing with animal products to reach this goal   Per Dr. Cabrera, no EGD prior to sleeve gastrectomy needed as no ulcers were seen on EGD   Continue omeprazole 40mg daily, contact clinic if your heart burn symptoms are worsening   Follow up with Leanne in 3 months   [facility] pharmacy in 4-6 weeks  to check in on wegovy switch   Dietician appointment with Caroline Mcduffie   Keep up the excellent work!      Interval History  Last seen on 1/22/24 for NBS. Since then:  -Has seen 15lbs successful weight loss   Currently in [employer] lent which has made it hard to eat any animal products (though she has been eating some fish). Has been tracking protein and calories, estimates 1000 calories a day and 30-40g of protein. Diet goes back to normal after [employer] easter.       AOMs    Started zepbound 2.5mg for the last 2 months, last dose Saturday April 27th. Some stomach pain the day after zepbound dose, but is still able to eat food. None.       Reviewed tasklist with patient   Psych eval-  to be scheduled   Pcp- completed   Cardiology- completed stress test (no concerns), in need of clearance letter   Sleep medicine-   Pulmonology- patient reports it has been completed, unable to locate letter   EGD- completed by Dr. Cabrera   Impression:            - LA Grade A reflux esophagitis with no bleeding.                          Biopsied.                          - Large hiatal hernia.                          - A single gastric polyp. Incomplete resection.                          Resected tissue retrieved.                          - Erosive gastropathy with stigmata of recent                          bleeding. Biopsied.                          - Normal examined duodenum.                          - A medium amount of a phytobezoar in the stomach.   Recommendation:        - Await pathology results.   A. STOMACH, GREATER CURVATURE, BIOPSY OF ULCER:  Gastric body types mucosa with ulceration, otherwise significant inflammation; negative for intestinal metaplasia, dysplasia, or malignancy; no H. Pylori like organisms identified on routine staining  (See Comment)     B. STOMACH, POLYP INSIDE HIATAL HERNIA, BIOPSY:  Gastric body/fundic-type mucosa with focal foveolar hyperplasia; no other morphologic abnormality      C. DISTAL  ESOPHAGUS, BIOPSY:  Squamous esophageal mucosa with no intraepithelial eosinophils or other abnormality     -Dr. Cabrera increased dose to 40mg of omeprazole, feels heart burn symptoms are better.       GERD symptoms: no breakthrough symptoms with increase of 40g omeprazole     Constipation/Diarrhea: some issues with loose stools, sometimes bowel movements 3-4 times a day, sometimes not having one in 2 days     Recent diet changes: Spirit[employer] lent, unable to eat animal products. Discussed that from a medical perspective it is highly important for her to get between 60-90g protein daily         Vitamins/Labs: Did not discuss today, [employer] message sent after appointment. Labs showed slight vitamin d deficiency, recommended starting a vitamin d supplement 2000iu daily         Most recent weights:  Wt Readings from Last 4 Encounters:   04/30/24 93.9 kg (207 lb)   04/15/24 95.3 kg (210 lb)   04/08/24 96.8 kg (213 lb 4.8 oz)   04/03/24 97.3 kg (214 lb 6.4 oz)         ROS    Past Medical History:   Diagnosis Date    Benign essential hypertension     Borderline personality disorder (H) 08/13/2020    Depression     Depressive disorder 1978    ILD (interstitial lung disease) (H)     Primary osteoarthritis of left foot 08/13/2020    Uncomplicated asthma 2019       Past Surgical History:   Procedure Laterality Date    BUNIONECTOMY Left     COLONOSCOPY  2015    ESOPHAGOSCOPY, GASTROSCOPY, DUODENOSCOPY (EGD), COMBINED N/A 3/13/2024    Procedure: ESOPHAGOGASTRODUODENOSCOPY, WITH BIOPSY;  Surgeon: Jerel Cabrera MD;  Location:  GI    GYN SURGERY  1996    ORTHOPEDIC SURGERY  2021       Current Outpatient Medications   Medication Sig Dispense Refill    Semaglutide-Weight Management (WEGOVY) 0.5 MG/0.5ML pen Inject 0.5 mg Subcutaneous once a week 2 mL 2    albuterol (PROAIR HFA/PROVENTIL HFA/VENTOLIN HFA) 108 (90 Base) MCG/ACT inhaler Inhale 2 puffs into the lungs every 4 hours as needed for shortness of breath, wheezing or  "cough 18 g 3    cyclobenzaprine (FLEXERIL) 5 MG tablet Take 1 tablet (5 mg) by mouth 2 times daily as needed for muscle spasms 30 tablet 0    FLUoxetine (PROZAC) 40 MG capsule TAKE ONE CAPSULE BY MOUTH DAILY 90 capsule 3    fluticasone-salmeterol (ADVAIR) 250-50 MCG/ACT inhaler Inhale 1 puff into the lungs every 12 hours 60 each 3    metoprolol succinate ER (TOPROL XL) 25 MG 24 hr tablet Take 1 tablet (25 mg) by mouth daily 90 tablet 1    mycophenolate (GENERIC EQUIVALENT) 500 MG tablet Take 2 tablets (1,000 mg) by mouth 2 times daily 120 tablet 3    omeprazole (PRILOSEC) 40 MG DR capsule Take 1 capsule (40 mg) by mouth daily 90 capsule 3    predniSONE (DELTASONE) 20 MG tablet Take 3 tabs by mouth daily x 3 days, then 2 tabs daily x 3 days, then 1 tab daily x 3 days, then 1/2 tab daily x 3 days.  Take with food. 20 tablet 0    tirzepatide-Weight Management (ZEPBOUND) 2.5 MG/0.5ML prefilled pen Inject 0.5 mLs (2.5 mg) Subcutaneous every 7 days For 4 weeks 2 mL 2    tirzepatide-Weight Management (ZEPBOUND) 5 MG/0.5ML prefilled pen Inject 0.5 mLs (5 mg) Subcutaneous every 7 days After completing 4 weeks of taking the 2.5mg dose 2 mL 2    traZODone (DESYREL) 50 MG tablet Take 1 tablet (50 mg) by mouth nightly as needed for sleep 90 tablet 1     No current facility-administered medications for this visit.       No Known Allergies        PHYSICAL EXAM:  Objective    Ht 1.626 m (5' 4.02\")   Wt 93.9 kg (207 lb)   BMI 35.51 kg/m    Vitals - Patient Reported  Pain Score: No Pain (0)      Vitals:  No vitals were obtained today due to virtual visit.    Physical Exam   GENERAL: alert and no distress  EYES: Eyes grossly normal to inspection.  No discharge or erythema, or obvious scleral/conjunctival abnormalities.  RESP: No audible wheeze, cough, or visible cyanosis.    SKIN: Visible skin clear. No significant rash, abnormal pigmentation or lesions.  NEURO: Cranial nerves grossly intact.  Mentation and speech appropriate for " age.  PSYCH: Appropriate affect, tone, and pace of words    Sleeve Gastrectomy: Risks and Side Effects    The complications or risks of surgery include but are not limited to: death, heart attack, infection in the surgical site (wound infection), abdomen (abscess), bladder (urinary tract infection), lungs (pneumonia), clots in legs (deep vein thrombosis) or lungs (pulmonary emboli),  injury to the bowels or other organs, bowel obstruction, hernia at the incision and gastrointestional bleeding.    More specific risks related to vertical sleeve gastrectomy were detailed at the bariatric informational seminar and include the following: leak at the vertical sleeve staple line, leak at the anastomoses,  nausea, vomiting, and dehydration for several months,  adhesions causing bowel obstruction, rapid weight loss causing a higher rate of gallstone formation during the first 6 months after surgery, decreased absorption of vitamins and protein because of the reduced stomach size, weight regain if inappropriate food intake occurs, stricture, injury to other organs, hernia,  and ulcers.       Side effects of bariatric surgery include but are not limited to: abdominal pain, cramping, bloating, constipation, nausea, vomiting, diarrhea, difficulty swallowing,  dehydration, hair loss, excess skin, protein, iron and vitamin deficiencies, heartburn, transfer of addictions, increased anxiety and worsening depression.       I emphasized exercise and activity behavior along with appropriate food choice as the main foundation for weight loss with surgery providing surgical reinforcement of the appropriate behavior set.        Review of general surgery weight loss process    1. Complete preoperative requirements, including weight loss.  Final weight check to confirm MANDATORY weight loss requirement must be documented on a clinic scale.    2. Discuss prior authorization with .    3. History and physical evaluation by  PCP of PAC clinic within 30 days of surgery date, preoperative class, and weight check (weigh-in visit) to be scheduled by patient.  Pre-anesthesia clinic for risk evaluation to be scheduled by anesthesia clinic.    4. We cannot guarantee that patient will qualify for surgery unless all preoperative requirements are met, prior authorization from primary insurance company is granted, and insurance changes do not occur.    5. It is possible for patients to regain all weight after weight loss surgery unless they follow guidelines prescribed by our bariatric center.    6. All patients with gastrointestinal complaints after weight loss surgery must have complaints conveyed to the bariatric team for appropriate treatment.    7. Vitamin deficiencies may develop post-bariatric surgery and annual laboratory testing should be performed.    8. Persistent nausea/vomiting after bariatric surgery entails risk of thiamine deficiency and should be treated early.  Vitamin B12 deficiency may develop, especially after gastric bypass surgery and must be recognized.        If you have any questions about our plans please don't hesitate to contact me.    Sincerely,    Leanne Stanford PA-C      51 minutes spent by me on the date of the encounter doing chart review, history and exam, documentation and further activities per the note

## 2024-04-30 NOTE — LETTER
2024       RE: Elsie Rubi  [address] [facility] [address]     Dear Colleague,    Thank you for referring your patient, Elsie Rubi, to the [facility] WEIGHT MANAGEMENT CLINIC [place] at [facility]. Please see a copy of my visit note below.      Pre-Bariatric Surgery Note    Jessie Rose    Date: 2024     RE: Elsie Rubi    MR#: [id number]   : 1960   Date of Visit: 2024    REASON FOR VISIT: Preoperative evaluation for possible weight loss surgery    Dear Jessie Hills,    I had the pleasure of seeing your patient, Elsie Rubi, in my preoperative bariatric clinic.    As you know, she has morbid obesity and is considering weight loss surgery to treat obesity in association with her medical conditions of obesity.  Her consult weight was 222.  She has lost 15 pounds since her consult weight. She has met her required pre-surgery weight. Please refer to initial consult note from date 24 for patient's weight history and co-morbidities.    Wt Readings from Last 5 Encounters:   24 93.9 kg (207 lb)   04/15/24 95.3 kg (210 lb)   24 96.8 kg (213 lb 4.8 oz)   24 97.3 kg (214 lb 6.4 oz)   24 98.4 kg (217 lb)       Assessment & Plan  Problem List Items Addressed This Visit       Prediabetes (Chronic)    Relevant Medications    Semaglutide-Weight Management (WEGOVY) 0.5 MG/0.5ML pen     Other Visit Diagnoses       Class 2 severe obesity with serious comorbidity and body mass index (BMI) of 38.0 to 38.9 in adult, unspecified obesity type (H)    -  Primary    Relevant Medications    Semaglutide-Weight Management (WEGOVY) 0.5 MG/0.5ML pen             Plan  Switch from zepbound to wegovy 0.5mg, prescription sent. If zepbound supply improves, can consider switching back   Goals we discussed today: working towards getting 60-90g protein daily while working on weightloss, it is important to do  this even if it means supplementing with animal products to reach this goal   Per Dr. Cabrera, no EGD prior to sleeve gastrectomy needed as no ulcers were seen on EGD   Continue omeprazole 40mg daily, contact clinic if your heart burn symptoms are worsening   Follow up with Leanne in 3 months   [facility] pharmacy in 4-6 weeks to check in on wegovy switch   Dietician appointment with Caroline Mcduffie   Keep up the excellent work!      Interval History  Last seen on 1/22/24 for NBS. Since then:  -Has seen 15lbs successful weight loss   Currently in [employer] lent which has made it hard to eat any animal products (though she has been eating some fish). Has been tracking protein and calories, estimates 1000 calories a day and 30-40g of protein. Diet goes back to normal after Protestant easter.       AOMs    Started zepbound 2.5mg for the last 2 months, last dose Saturday April 27th. Some stomach pain the day after zepbound dose, but is still able to eat food. None.       Reviewed tasklist with patient   Psych eval-  to be scheduled   Pcp- completed   Cardiology- completed stress test (no concerns), in need of clearance letter   Sleep medicine-   Pulmonology- patient reports it has been completed, unable to locate letter   EGD- completed by Dr. Cabrera   Impression:            - LA Grade A reflux esophagitis with no bleeding.                          Biopsied.                          - Large hiatal hernia.                          - A single gastric polyp. Incomplete resection.                          Resected tissue retrieved.                          - Erosive gastropathy with stigmata of recent                          bleeding. Biopsied.                          - Normal examined duodenum.                          - A medium amount of a phytobezoar in the stomach.   Recommendation:        - Await pathology results.   A. STOMACH, GREATER CURVATURE, BIOPSY OF ULCER:  Gastric body types mucosa with ulceration, otherwise significant  inflammation; negative for intestinal metaplasia, dysplasia, or malignancy; no H. Pylori like organisms identified on routine staining  (See Comment)     B. STOMACH, POLYP INSIDE HIATAL HERNIA, BIOPSY:  Gastric body/fundic-type mucosa with focal foveolar hyperplasia; no other morphologic abnormality      C. DISTAL ESOPHAGUS, BIOPSY:  Squamous esophageal mucosa with no intraepithelial eosinophils or other abnormality     -Dr. Cabrera increased dose to 40mg of omeprazole, feels heart burn symptoms are better.       GERD symptoms: no breakthrough symptoms with increase of 40g omeprazole     Constipation/Diarrhea: some issues with loose stools, sometimes bowel movements 3-4 times a day, sometimes not having one in 2 days     Recent diet changes: Rastafarian lent, unable to eat animal products. Discussed that from a medical perspective it is highly important for her to get between 60-90g protein daily         Vitamins/Labs: Did not discuss today, LS9t message sent after appointment. Labs showed slight vitamin d deficiency, recommended starting a vitamin d supplement 2000iu daily         Most recent weights:  Wt Readings from Last 4 Encounters:   04/30/24 93.9 kg (207 lb)   04/15/24 95.3 kg (210 lb)   04/08/24 96.8 kg (213 lb 4.8 oz)   04/03/24 97.3 kg (214 lb 6.4 oz)         ROS    Past Medical History:   Diagnosis Date    Benign essential hypertension     Borderline personality disorder (H) 08/13/2020    Depression     Depressive disorder 1978    ILD (interstitial lung disease) (H)     Primary osteoarthritis of left foot 08/13/2020    Uncomplicated asthma 2019       Past Surgical History:   Procedure Laterality Date    BUNIONECTOMY Left     COLONOSCOPY  2015    ESOPHAGOSCOPY, GASTROSCOPY, DUODENOSCOPY (EGD), COMBINED N/A 3/13/2024    Procedure: ESOPHAGOGASTRODUODENOSCOPY, WITH BIOPSY;  Surgeon: Jerel Cabrera MD;  Location:  GI    GYN SURGERY  1996    ORTHOPEDIC SURGERY  2021       Current Outpatient Medications  "  Medication Sig Dispense Refill    Semaglutide-Weight Management (WEGOVY) 0.5 MG/0.5ML pen Inject 0.5 mg Subcutaneous once a week 2 mL 2    albuterol (PROAIR HFA/PROVENTIL HFA/VENTOLIN HFA) 108 (90 Base) MCG/ACT inhaler Inhale 2 puffs into the lungs every 4 hours as needed for shortness of breath, wheezing or cough 18 g 3    cyclobenzaprine (FLEXERIL) 5 MG tablet Take 1 tablet (5 mg) by mouth 2 times daily as needed for muscle spasms 30 tablet 0    FLUoxetine (PROZAC) 40 MG capsule TAKE ONE CAPSULE BY MOUTH DAILY 90 capsule 3    fluticasone-salmeterol (ADVAIR) 250-50 MCG/ACT inhaler Inhale 1 puff into the lungs every 12 hours 60 each 3    metoprolol succinate ER (TOPROL XL) 25 MG 24 hr tablet Take 1 tablet (25 mg) by mouth daily 90 tablet 1    mycophenolate (GENERIC EQUIVALENT) 500 MG tablet Take 2 tablets (1,000 mg) by mouth 2 times daily 120 tablet 3    omeprazole (PRILOSEC) 40 MG DR capsule Take 1 capsule (40 mg) by mouth daily 90 capsule 3    predniSONE (DELTASONE) 20 MG tablet Take 3 tabs by mouth daily x 3 days, then 2 tabs daily x 3 days, then 1 tab daily x 3 days, then 1/2 tab daily x 3 days.  Take with food. 20 tablet 0    tirzepatide-Weight Management (ZEPBOUND) 2.5 MG/0.5ML prefilled pen Inject 0.5 mLs (2.5 mg) Subcutaneous every 7 days For 4 weeks 2 mL 2    tirzepatide-Weight Management (ZEPBOUND) 5 MG/0.5ML prefilled pen Inject 0.5 mLs (5 mg) Subcutaneous every 7 days After completing 4 weeks of taking the 2.5mg dose 2 mL 2    traZODone (DESYREL) 50 MG tablet Take 1 tablet (50 mg) by mouth nightly as needed for sleep 90 tablet 1     No current facility-administered medications for this visit.       No Known Allergies        PHYSICAL EXAM:  Objective   Ht 1.626 m (5' 4.02\")   Wt 93.9 kg (207 lb)   BMI 35.51 kg/m    Vitals - Patient Reported  Pain Score: No Pain (0)      Vitals:  No vitals were obtained today due to virtual visit.    Physical Exam   GENERAL: alert and no distress  EYES: Eyes grossly " normal to inspection.  No discharge or erythema, or obvious scleral/conjunctival abnormalities.  RESP: No audible wheeze, cough, or visible cyanosis.    SKIN: Visible skin clear. No significant rash, abnormal pigmentation or lesions.  NEURO: Cranial nerves grossly intact.  Mentation and speech appropriate for age.  PSYCH: Appropriate affect, tone, and pace of words    Sleeve Gastrectomy: Risks and Side Effects    The complications or risks of surgery include but are not limited to: death, heart attack, infection in the surgical site (wound infection), abdomen (abscess), bladder (urinary tract infection), lungs (pneumonia), clots in legs (deep vein thrombosis) or lungs (pulmonary emboli),  injury to the bowels or other organs, bowel obstruction, hernia at the incision and gastrointestional bleeding.    More specific risks related to vertical sleeve gastrectomy were detailed at the bariatric informational seminar and include the following: leak at the vertical sleeve staple line, leak at the anastomoses,  nausea, vomiting, and dehydration for several months,  adhesions causing bowel obstruction, rapid weight loss causing a higher rate of gallstone formation during the first 6 months after surgery, decreased absorption of vitamins and protein because of the reduced stomach size, weight regain if inappropriate food intake occurs, stricture, injury to other organs, hernia,  and ulcers.       Side effects of bariatric surgery include but are not limited to: abdominal pain, cramping, bloating, constipation, nausea, vomiting, diarrhea, difficulty swallowing,  dehydration, hair loss, excess skin, protein, iron and vitamin deficiencies, heartburn, transfer of addictions, increased anxiety and worsening depression.       I emphasized exercise and activity behavior along with appropriate food choice as the main foundation for weight loss with surgery providing surgical reinforcement of the appropriate behavior  set.        Review of general surgery weight loss process    1. Complete preoperative requirements, including weight loss.  Final weight check to confirm MANDATORY weight loss requirement must be documented on a clinic scale.    2. Discuss prior authorization with .    3. History and physical evaluation by PCP of PAC clinic within 30 days of surgery date, preoperative class, and weight check (weigh-in visit) to be scheduled by patient.  Pre-anesthesia clinic for risk evaluation to be scheduled by anesthesia clinic.    4. We cannot guarantee that patient will qualify for surgery unless all preoperative requirements are met, prior authorization from primary insurance company is granted, and insurance changes do not occur.    5. It is possible for patients to regain all weight after weight loss surgery unless they follow guidelines prescribed by our bariatric center.    6. All patients with gastrointestinal complaints after weight loss surgery must have complaints conveyed to the bariatric team for appropriate treatment.    7. Vitamin deficiencies may develop post-bariatric surgery and annual laboratory testing should be performed.    8. Persistent nausea/vomiting after bariatric surgery entails risk of thiamine deficiency and should be treated early.  Vitamin B12 deficiency may develop, especially after gastric bypass surgery and must be recognized.        If you have any questions about our plans please don't hesitate to contact me.    Sincerely,    Leanne Stanford PA-C      51 minutes spent by me on the date of the encounter doing chart review, history and exam, documentation and further activities per the note           Virtual Visit Details    Type of service:  Video Visit   Video Start Time:  10:00am  Video End Time: 10:20am    Originating Location (pt. Location): Home    Distant Location (provider location):  Off-site  Platform used for Video Visit: Kelsy

## 2024-04-30 NOTE — NURSING NOTE
Is the patient currently in the state of [place]? YES    Visit mode:VIDEO    If the visit is dropped, the patient can be reconnected by: VIDEO VISIT: Text to cell phone:   Telephone Information:   Mobile [phone]       Will anyone else be joining the visit? NO  (If patient encounters technical issues they should call [phone]    How would you like to obtain your AVS? MyChart    Are changes needed to the allergy or medication list? No    Are refills needed on medications prescribed by this physician? YES, but would like to discuss with provider on zepbound    Reason for visit: BRENDA AVILAF

## 2024-05-02 NOTE — TELEPHONE ENCOUNTER
Prior Authorization Approval    Medication: WEGOVY 0.5 MG/0.5ML SC SOAJ  Authorization Effective Date: 4/30/2024  Authorization Expiration Date: 10/30/2024  Approved Dose/Quantity: 2  Reference #: [id number]   Insurance Company: [employer] - Phone [phone] Fax [phone]  Expected CoPay: $    CoPay Card Available:      Financial Assistance Needed:    Which Pharmacy is filling the prescription: [facility] [address] [facility]  Pharmacy Notified: yes  Patient Notified: yes

## 2024-05-03 ENCOUNTER — VIRTUAL VISIT (OUTPATIENT)
Dept: ENDOCRINOLOGY | Facility: CLINIC | Age: 64
End: 2024-05-03

## 2024-05-03 DIAGNOSIS — E66.3 OVERWEIGHT: Primary | ICD-10-CM

## 2024-05-03 PROCEDURE — 99207 PR NO CHARGE LOS: CPT | Mod: 95

## 2024-05-03 NOTE — LETTER
5/3/2024       RE: Elsie Rubi  [address] [facility] [address]     Dear Colleague,    Thank you for referring your patient, Elsie Rubi, to the [facility] WEIGHT MANAGEMENT CLINIC [place] at [facility]. Please see a copy of my visit note below.    May 3  Coaching Group  60 minutes  4 participants    TOPIC: ESSENTIALISM, and mostly open sharing time.    Shea Toure  [facility], National Board Certified Health &   Lead Health   [facility] Comprehensive Weight Management  [email or web address]   To schedule: [phone]   Gender pronouns: she/her

## 2024-05-03 NOTE — PROGRESS NOTES
May 3  Coaching Group  60 minutes  4 participants    TOPIC: ESSENTIALISM, and mostly open sharing time.    Shea Toure  [facility], National Board Certified Health &   Lead Health   [facility] Comprehensive Weight Management  [email or web address]  [email or web address]   To schedule: [phone]   Gender pronouns: she/her

## 2024-05-06 ENCOUNTER — TELEPHONE (OUTPATIENT)
Dept: ENDOCRINOLOGY | Facility: CLINIC | Age: 64
End: 2024-05-06
Payer: COMMERCIAL

## 2024-05-06 NOTE — TELEPHONE ENCOUNTER
Left Voicemail (1st Attempt) and Sent Mychart (1st Attempt) for the patient to call back and schedule the following:    Appointment type: [facility]  Provider: Radha Stanford PA-C   Return date: 07/30/2024  Specialty phone number: [phone]  Additional appointment(s) needed: yes  Additonal Notes:  NEW [facility] pharmacy, 4-6 weeks, check in on wegovy switch from zepbound    [facility] Nutrition, Next Available, Caroline Mcduffie RD

## 2024-05-13 DIAGNOSIS — E66.01 CLASS 2 SEVERE OBESITY WITH SERIOUS COMORBIDITY AND BODY MASS INDEX (BMI) OF 38.0 TO 38.9 IN ADULT, UNSPECIFIED OBESITY TYPE (H): Primary | ICD-10-CM

## 2024-05-13 DIAGNOSIS — E66.812 CLASS 2 SEVERE OBESITY WITH SERIOUS COMORBIDITY AND BODY MASS INDEX (BMI) OF 38.0 TO 38.9 IN ADULT, UNSPECIFIED OBESITY TYPE (H): Primary | ICD-10-CM

## 2024-05-14 ENCOUNTER — VIRTUAL VISIT (OUTPATIENT)
Dept: PULMONOLOGY | Facility: OTHER | Age: 64
End: 2024-05-14
Attending: FAMILY MEDICINE
Payer: COMMERCIAL

## 2024-05-14 VITALS — WEIGHT: 205 LBS | BODY MASS INDEX: 35 KG/M2 | HEIGHT: 64 IN

## 2024-05-14 DIAGNOSIS — G47.33 OSA (OBSTRUCTIVE SLEEP APNEA): Primary | ICD-10-CM

## 2024-05-14 DIAGNOSIS — G47.34 NOCTURNAL HYPOXIA: ICD-10-CM

## 2024-05-14 PROCEDURE — G2211 COMPLEX E/M VISIT ADD ON: HCPCS | Mod: 95 | Performed by: FAMILY MEDICINE

## 2024-05-14 PROCEDURE — 99213 OFFICE O/P EST LOW 20 MIN: CPT | Mod: 95 | Performed by: FAMILY MEDICINE

## 2024-05-14 ASSESSMENT — PAIN SCALES - GENERAL: PAINLEVEL: NO PAIN (0)

## 2024-05-14 ASSESSMENT — PATIENT HEALTH QUESTIONNAIRE - PHQ9: SUM OF ALL RESPONSES TO PHQ QUESTIONS 1-9: 12

## 2024-05-14 NOTE — PROGRESS NOTES
"Virtual Visit Details    Type of service:  Video Visit   Video Start Time: {video visit start/end time for provider to select:086942}  Video End Time:{video visit start/end time for provider to select:871433}    Originating Location (pt. Location): {video visit patient location:405291::\"Home\"}  {PROVIDER LOCATION On-site should be selected for visits conducted from your clinic location or adjoining [facility] hospital, academic office, or other nearby [facility] building. Off-site should be selected for all other provider locations, including home:484466}  Distant Location (provider location):  {virtual location provider:479899}  Platform used for Video Visit: {Virtual Visit Platforms:678901::\"IActive\"}    "

## 2024-05-14 NOTE — NURSING NOTE
Is the patient currently in the state of [place]? YES    Visit mode:VIDEO    If the visit is dropped, the patient can be reconnected by: VIDEO VISIT: Text to cell phone:   Telephone Information:   Mobile [phone]       Will anyone else be joining the visit? NO  (If patient encounters technical issues they should call [phone] :[phone])    How would you like to obtain your AVS? MyChart    Are changes needed to the allergy or medication list? Pt stated no changes to allergies and Pt stated no med changes    Are refills needed on medications prescribed by this physician? NO  Has patient had flu shot for current/most recent flu season? If so, when? Yes: 09/25/2023  Depression Response    Patient completed the PHQ-9 assessment for depression and scored >9? Yes-12  Question 9 on the PHQ-9 was positive for suicidality? No  Does patient have current mental health provider? No    Is this a virtual visit? Yes   Does patient have suicidal ideation (positive question 9)? No - offer to place Mental Health Referral.  Patient declined referral/not needed  Is going to have psych eval pre bariatric surgery  I personally notified the following: visit provider          Reason for visit: BRENDA AVILAF

## 2024-05-14 NOTE — PROGRESS NOTES
"Elsie Rubi is a 64 year old female who is being evaluated via a billable video visit.       The patient has been notified of following:      \"This video visit will be conducted via a call between you and your physician/provider. We have found that certain health care needs can be provided without the need for an in-person physical exam.  This service lets us provide the care you need with a video conversation.  If a prescription is necessary we can send it directly to your pharmacy.  If lab work is needed we can place an order for that and you can then stop by our lab to have the test done at a later time.     Video visits are billed at different rates depending on your insurance coverage.  Please reach out to your insurance provider with any questions.     If during the course of the call the physician/provider feels a video visit is not appropriate, you will not be charged for this service.\"     Patient has given verbal consent for Video visit? Yes  How would you like to obtain your AVS? Mail a copy  If you are dropped from the video visit, the video invite should be resent to: Text to cell phone: -  Will anyone else be joining your video visit? No  If patient encounters technical issues they should call [phone]      Video-Visit Details     Type of service:  Video Visit     Start Time:  2pm  End Time:  2:15pm    Originating Location (pt. Location): Home     Distant Location (provider location):  Off-site, [facility]       Platform used for Video Visit: [employer]    Virtual visit for follow-up of nocturnal supplemental oxygen for treatment of mild obstructive sleep apnea with mild sleep associated hypoxemia.     A/P:     1.)  Mild MIC (AHI 12) with mild sleep-associated hypoxemia (SpO2 <= 88% for 8.7 minutes)     I do suspect that her mild obstructive sleep apnea and mild hypoxemia will improve following weight loss with planned upcoming bariatric surgery.  She is also certain " "that she be unable to tolerate PAP therapy.  But since she currently does have supplemental oxygen at home available, we will start treatment with nocturnal supplemental oxygen at 2 L/min.    Clinically, she appears to be doing well with nocturnal supplemental oxygen at 2 L/min per nasal cannula.  Plan to continue on the settings, I would recommend continuing supplemental oxygen following bariatric surgery and to follow-up with us approximately 2 months after surgery to reassess the likely resolution of mild apnea and likely resolution of hypoxemia.    From my perspective, she is cleared for bariatric surgery from a sleep medicine perspective.    The longitudinal plan of care for the diagnosis(es)/condition(s) as documented were addressed during this visit. Due to the added complexity in care, I will continue to support Ju in the subsequent management and with ongoing continuity of care.      SUBJECTIVE:  Elsie Rubi is a 64 year old female.    Pertinent PMHx of chronic fatigue syndrome, fibromyalgia, interstitial lung disease, obesity, HTN.     Prior Sleep Testing:  3/12/2024 - PSG with weight 220 lbs, BMI 38.  Mild MIC (AHI 12) with mild sleep-associated hypoxemia (SpO2 <= 88% for 8.7 minutes).     Six minute walk tolerance test on 8/2/2023 that did show need for supplemental oxygen at 2 LPM starting at ~3.5 minutes.    3/21/2024 - Initial consult.  Chief concern per questionnaire: \"Overweight and I snore. \"     Duration of symptoms:  \"6 years \"     Goals for visit per questionnaire: \"Evaluation \"     Sleep pattern:  Workdays.  ? To 9am.  Weekends.  11ish - 9am.  Time to fall asleep: ~up to 60 minutes.  Awakenings: 2 times per night, 1 minutes up to 1-2 hours to return to sleep.  Average total sleep time:  7-8 hours  Napping.  0 days per week, - hours per nap.     Yes for phone in bed.     Yes for RLS screen.  No for sleep walking.  No for dream enactment behavior.  No for bruxism.     No for morning " headaches.  Yes for snoring.  Yes for observed apnea.  No for FHx of MIC.     Currently taking trazodone 50mg and benadryl (unknown dose) at bedtime.     Caffeine use:  Yes for 3+ per day.  No for within 6 hours of bed.    A/P to start nocturnal O2 at 2 LPM, patient felt she would be unable to tolerate PAP therapy, hopeful improvement with upcoming planned bariatric procedure.    Today -she feels that the nighttime oxygen continues to work well and is easy to use.  She did not complete the home overnight oximetry, though my concerns are relatively low given the very mild nature of her hypoxemia.    Past medical history:    Patient Active Problem List    Diagnosis Date Noted    ILD (interstitial lung disease) (H) 03/12/2024     Priority: Medium    Prediabetes 08/25/2023     Priority: Medium    Mixed hyperlipidemia 08/25/2023     Priority: Medium     The 10-year ASCVD risk score (Radha DENT, et al., 2019) is: 5.8%    Values used to calculate the score:      Age: 63 years      Sex: Female      Is Non- : No      Diabetic: No      Tobacco smoker: No      Systolic Blood Pressure: 123 mmHg      Is BP treated: Yes      HDL Cholesterol: 57 mg/dL      Total Cholesterol: 218 mg/dL        Primary insomnia 08/10/2023     Priority: Medium    Rectocele 08/10/2023     Priority: Medium    Primary stress urinary incontinence 08/10/2023     Priority: Medium     OB G[facility] 2021 - declined surgery      MIC (obstructive sleep apnea) 10/14/2021     Priority: Medium    Chronic fatigue syndrome 08/13/2020     Priority: Medium    Dyspepsia 08/13/2020     Priority: Medium    Essential hypertension 08/13/2020     Priority: Medium    Fibromyalgia 08/13/2020     Priority: Medium    Mild episode of recurrent major depressive disorder (H24) 08/13/2020     Priority: Medium    Mild intermittent asthma without complication 08/13/2020     Priority: Medium    Severe obesity (BMI 35.0-39.9) with comorbidity (H) 08/13/2020      Priority: Medium       10 point ROS of systems including Constitutional, Eyes, Respiratory, Cardiovascular, Gastroenterology, Genitourinary, Integumentary, Muscularskeletal, Psychiatric were all negative except for pertinent positives noted in my HPI.    Current Outpatient Medications   Medication Sig Dispense Refill    albuterol (PROAIR HFA/PROVENTIL HFA/VENTOLIN HFA) 108 (90 Base) MCG/ACT inhaler Inhale 2 puffs into the lungs every 4 hours as needed for shortness of breath, wheezing or cough 18 g 3    cyclobenzaprine (FLEXERIL) 5 MG tablet Take 1 tablet (5 mg) by mouth 2 times daily as needed for muscle spasms 30 tablet 0    FLUoxetine (PROZAC) 40 MG capsule TAKE ONE CAPSULE BY MOUTH DAILY 90 capsule 3    fluticasone-salmeterol (ADVAIR) 250-50 MCG/ACT inhaler Inhale 1 puff into the lungs every 12 hours 60 each 3    metoprolol succinate ER (TOPROL XL) 25 MG 24 hr tablet Take 1 tablet (25 mg) by mouth daily 90 tablet 1    mycophenolate (GENERIC EQUIVALENT) 500 MG tablet Take 2 tablets (1,000 mg) by mouth 2 times daily 120 tablet 3    omeprazole (PRILOSEC) 40 MG DR capsule Take 1 capsule (40 mg) by mouth daily 90 capsule 3    predniSONE (DELTASONE) 20 MG tablet Take 3 tabs by mouth daily x 3 days, then 2 tabs daily x 3 days, then 1 tab daily x 3 days, then 1/2 tab daily x 3 days.  Take with food. 20 tablet 0    Semaglutide-Weight Management (WEGOVY) 0.5 MG/0.5ML pen Inject 0.5 mg Subcutaneous once a week 2 mL 2    tirzepatide-Weight Management (ZEPBOUND) 2.5 MG/0.5ML prefilled pen Inject 0.5 mLs (2.5 mg) Subcutaneous every 7 days For 4 weeks 2 mL 2    tirzepatide-Weight Management (ZEPBOUND) 5 MG/0.5ML prefilled pen Inject 0.5 mLs (5 mg) Subcutaneous every 7 days After completing 4 weeks of taking the 2.5mg dose 2 mL 2    traZODone (DESYREL) 50 MG tablet Take 1 tablet (50 mg) by mouth nightly as needed for sleep 90 tablet 1       OBJECTIVE:  There were no vitals taken for this visit.    Physical Exam     ---  This  note was written with the assistance of the Dragon voice-dictation technology software. The final document, although reviewed, may contain errors. For corrections, please contact the office.    Total time spent preparing to see the patient, review of chart, obtaining history and physical examination, review of sleep testing, review of treatment options, education, discussion with patient and documenting in Epic / EMR was 20 minutes.  All time involved was spent on the day of service for the patient (the same day as the patient's appointment).    Rigoberto Nielson MD    Sleep Medicine  [place], [place]  Main Office: [phone]  [facility] [address], [address]  Schedule visits: [phone]  Main Office: [phone]  Fax: [phone]

## 2024-05-28 NOTE — PROGRESS NOTES
"Video-Visit Details    Type of service:  Video Visit    Video Start Time: 1:36 PM  Video End Time: 1:58 PM     Originating Location (pt. Location): Home    Distant Location (provider location): Offsite (providers home) [facility] [place]     [facility]: [facility]    Return Bariatric Nutrition Consultation Note    Reason For Visit: Nutrition Assessment    Elsie Rubi is a 64 year old presenting today for return bariatric nutrition consult.   Patient is interested in laparoscopic sleeve gastrectomy with Dr. Cabrera expected surgery in TBD.  Patient is accompanied by self.  This is patient's 3rd of 3 required nutrition visits prior to surgery. Completed the W[facility] nutrition class on 1/24/24.     Pt referred by EARL Varela on January 22, 2024.    CO-MORBIDITIES OF OBESITY INCLUDE:        1/16/2024    11:27 AM   --   I have the following health issues associated with obesity Pre-Diabetes    High Blood Pressure    GERD (Reflux)    Asthma    Stress Incontinence     SUPPORT:      1/16/2024    11:27 AM   Support System Reviewed With Patient   Who do you have in your support network that can be available to help you for the first 2 weeks after surgery?    Who can you count on for support throughout your weight loss surgery journey?      ANTHROPOMETRICS:  Initial consult weight: 222 lb on 1/22/24   Estimated body mass index is 35.17 kg/m  as calculated from the following:    Height as of this encounter: 1.626 m (5' 4.02\").    Weight as of this encounter: 93 kg (205 lb).  Current Weight: 205 lb per pt report.     Required weight loss goal pre-op: -10 lbs from initial consult weight (goal weight 212 lbs or less before surgery) - met         1/16/2024    11:27 AM   --   I have tried the following methods to lose weight Watching portions or calories    Exercise    Atkins type diet (low carb/high protein)           1/16/2024    11:27 AM   Weight Loss " Questions Reviewed With Patient   How long have you been overweight? From Middle age and beyond     MEDICATION FOR WEIGHT LOSS: Wegovy - 0.5 mg     VITAMIN/MINERAL SUPPLEMENTS: Vitamin D and Calcium with Vitamin D.     NUTRITION HISTORY:  Food allergies: NKFA   Food intolerances: None   Previous experience with diet modification for weight loss: Mediterranean style diet, high protein, high fat, non-starchy vegetables, low carb.     Focuses on a mediterranean style diet currently.     Per Leanne's visit: Regarding eating patterns and diet,  she typically eats 2-3 meals a day. Is able to get full. Can stay full. Eats out/ gets take out 2 times a month. Drinks coffee with half and half and splenda (2 cups every morning). Drinks diet pepsi a few time a week. Drinks a few glasses of water throughout the day. Drinks 2 glasses of wine every night. Is open to having 1 glass of wine instead.      Gnosticist Jain, doesn't eat meat any Wednesday or Friday, and then for 7 weeks before christmas, 7 weeks before easter.     Carb Manager  Katy - tracking daily intake are around 900-1300 calories per day. Encouraged to aim for around 1550 calories which is her estimated BMR.     Recent Diet Recall:  Breakfast: delays this till around noon.   Lunch: Eats around noon.   Dinner: 7 PM   Snacks: little piece of chocolate   Beverages: Drink coffee, tea, water but doesn't love the taste of plain water. Has gotten away from diet Pepsi.   Alcohol: did not discuss     Lent starts on Monday for patient and pretty much follows a vegan diet during this time.     May 2024: Feels her weight loss has slowed down since Lent completed. Trying to get in around 1500 calories and 60-70 grams of protein daily.  Plans to try to incorporate some physical activity, especially strength and resistance training. Just has her psych evaluation and pulmonology consults left to do on tasklist.         1/16/2024    11:27 AM   Recall Diet Questions Reviewed With  Patient   Describe what you typically consume for breakfast (typical or most recent) Cheesy eggs   Describe what you typically consume for lunch (typical or most recent) Skip or munch on leftovers   Describe what you typically consume for supper (typical or most recent) 5 oz Protein with 1/2 baked potato or vegetable   Describe what you typically consume as snacks (typical or most recent) Cheese, nuts   How many ounces of water, or other low calorie drinks, do you drink daily (8 oz=1 glass)? 24 oz   How many ounces of caffeine (coffee, tea, pop) do you drink daily (8 oz=1 glass)? 16 oz   How many ounces of carbonated (pop, beer, sparkling water) drinks do you drinky daily (8 oz=1 glass)? 0 oz   How many ounces of juice, pop, sweet tea, sports drinks, protein drinks, other sweetened drinks, do you drink daily (8 oz=1 glass)? 0 oz   How many ounces of milk do you drink daily (8 oz=1 glass) 0 oz   How often do you drink alcohol? 4 or more times a week   If you do drink alcohol, how many drinks might you have in a day? (one drink = 5 oz. wine, 1 can/bottle of beer, 1 shot liquor) 1 or 2           1/16/2024    11:27 AM   Eating Habits   What foods do you crave? Salty           1/16/2024    11:27 AM   Dining Out History Reviewed With Patient   How often do you dine out? Rarely.   Where do you dine out? (select all that apply) sit-down restaurants   What types of food do you order when you dine out? Entree     EXERCISE: has a very sedentary lifestyle. 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  Doesn't typically like to leave house, loves to be at home. Bought a modified rowing machine that she had enjoyed using in the past with pulmonary rehab, hasn't been using. Is struggling to find the motivation to exercise more and also has big issues with activity due to anxiety related to becoming out of breath as a result of her interstitial lung disease.          1/16/2024    11:27 AM   --   How often do you exercise? Less than  1 time per week   What is the duration of your exercise (in minutes)? 10 Minutes   What keeps you from being more active? Pain    I should be more active but I just have not gotten around to it    Shortness of breath     NUTRITION DIAGNOSIS:  Obesity r/t long history of positive energy balance aeb BMI >30 kg/m2.    INTERVENTION:  Intervention Provided/Education Provided on post-op diet guidelines, vitamins/minerals essential post-operatively, GI anatomy of bariatric surgeries, ways to help prepare for post-op diet guidelines pre-operatively, portion/calorie-control, mindful eating and sources of protein.  Patient demonstrates understanding.     Personal barriers to making and continuing required life changes have been identified, and strategies to overcome those barriers have been recommended AND family and social supports have been assessed and strategies to strengthen those supports have been recommended.    Provided pt with list of goals, RD contact information and resources listed below via [employer].       Provided education on nutrition considerations when starting GLP1 medication including, changes in meal/snack volumes; meeting protein, hydration and micronutrient needs; limiting high-fat foods; and diet/lifestyle strategies for preventing constipation.         1/16/2024    11:27 AM   Questions Reviewed With Patient   How ready are you to make changes regarding your weight? Number 1 = Not ready at all to make changes up to 10 = very ready. 10   How confident are you that you can change? 1 = Not confident that you will be successful making changes up to 10 = very confident. 8     Expected Engagement: good    GOALS:  Relating To Eating:  Eat slowly (20-30 minutes per meal), chewing foods well (25 chews per bite/applesauce consistency)  Eat 3 meals per day  Consume 60-90 g protein per day  Aim for 1530 calories per day.     Relating to beverages:  Reduce caffeine/carbonation/calorie containing beverages  Separate  fluids from meals by 30 minutes before, during, and after eating  Drink 48-64 ounces of fluid per day    Relating to dietary supplements:  Be thinking about post op vitamins and mineral supplements you will want to have.     Relating to activity:  Increase activity as able     Post-op Diet Handouts:  Diet Guidelines after Weight-loss Surgery  [email or web address]     Your Stage 1 Diet: Clear Liquids  [email or web address]     Your Stage 2 Diet: Low-fat Full Liquids  [email or web address]     Your Stage 3 Diet: Pureed Foods  [email or web address]     Pureed Recipes  [email or web address]    Your Stage 4 Diet: Soft Foods  [email or web address]    Your Stage 5 Diet: Regular Foods  [email or web address]    Supplements after Sleeve Gastrectomy, Gastric Bypass or Single Anastomosis Duodenal Switch  [email or web address]    Keeping Track of Fluids  [email or web address]    Follow Up: June 27.     Time spent with patient: 22 minutes.  Caroline Mcduffie RD, LD

## 2024-05-29 ENCOUNTER — VIRTUAL VISIT (OUTPATIENT)
Dept: ENDOCRINOLOGY | Facility: CLINIC | Age: 64
End: 2024-05-29
Payer: COMMERCIAL

## 2024-05-29 VITALS — HEIGHT: 64 IN | WEIGHT: 205 LBS | BODY MASS INDEX: 35 KG/M2

## 2024-05-29 DIAGNOSIS — Z71.3 NUTRITIONAL COUNSELING: Primary | ICD-10-CM

## 2024-05-29 DIAGNOSIS — E66.9 OBESITY: ICD-10-CM

## 2024-05-29 PROCEDURE — 97803 MED NUTRITION INDIV SUBSEQ: CPT | Mod: 95

## 2024-05-29 PROCEDURE — 99207 PR NO CHARGE LOS: CPT | Mod: 95

## 2024-05-29 ASSESSMENT — PAIN SCALES - GENERAL: PAINLEVEL: NO PAIN (0)

## 2024-05-29 NOTE — LETTER
"5/29/2024       RE: Elsie Rubi  [address] [facility] [address]     Dear Colleague,    Thank you for referring your patient, Elsie Rubi, to the [facility] [place] at [facility]. Please see a copy of my visit note below.    Video-Visit Details    Type of service:  Video Visit    Video Start Time: 1:36 PM  Video End Time: 1:58 PM     Originating Location (pt. Location): Home    Distant Location (provider location): Offsite (providers home) [facility] [place]     Platform used for Video Visit: AmWell    Return Bariatric Nutrition Consultation Note    Reason For Visit: Nutrition Assessment    Elsie Rubi is a 64 year old presenting today for return bariatric nutrition consult.   Patient is interested in laparoscopic sleeve gastrectomy with Dr. Cabrera expected surgery in D.  Patient is accompanied by self.  This is patient's 3rd of 3 required nutrition visits prior to surgery. Completed the [facility] nutrition class on 1/24/24.     Pt referred by EARL Varela on January 22, 2024.    CO-MORBIDITIES OF OBESITY INCLUDE:        1/16/2024    11:27 AM   --   I have the following health issues associated with obesity Pre-Diabetes    High Blood Pressure    GERD (Reflux)    Asthma    Stress Incontinence     SUPPORT:      1/16/2024    11:27 AM   Support System Reviewed With Patient   Who do you have in your support network that can be available to help you for the first 2 weeks after surgery?    Who can you count on for support throughout your weight loss surgery journey?      ANTHROPOMETRICS:  Initial consult weight: 222 lb on 1/22/24   Estimated body mass index is 35.17 kg/m  as calculated from the following:    Height as of this encounter: 1.626 m (5' 4.02\").    Weight as of this encounter: 93 kg (205 lb).  Current Weight: 205 lb per pt report.     Required weight loss goal " pre-op: -10 lbs from initial consult weight (goal weight 212 lbs or less before surgery) - met         1/16/2024    11:27 AM   --   I have tried the following methods to lose weight Watching portions or calories    Exercise    Atkins type diet (low carb/high protein)           1/16/2024    11:27 AM   Weight Loss Questions Reviewed With Patient   How long have you been overweight? From Middle age and beyond     MEDICATION FOR WEIGHT LOSS: Wegovy - 0.5 mg     VITAMIN/MINERAL SUPPLEMENTS: Vitamin D and Calcium with Vitamin D.     NUTRITION HISTORY:  Food allergies: NKFA   Food intolerances: None   Previous experience with diet modification for weight loss: Mediterranean style diet, high protein, high fat, non-starchy vegetables, low carb.     Focuses on a mediterranean style diet currently.     Per Leanne's visit: Regarding eating patterns and diet,  she typically eats 2-3 meals a day. Is able to get full. Can stay full. Eats out/ gets take out 2 times a month. Drinks coffee with half and half and splenda (2 cups every morning). Drinks diet pepsi a few time a week. Drinks a few glasses of water throughout the day. Drinks 2 glasses of wine every night. Is open to having 1 glass of wine instead.      [employer], doesn't eat meat any Wednesday or Friday, and then for 7 weeks before christmas, 7 weeks before easter.     Carb Manager  Katy - tracking daily intake are around 900-1300 calories per day. Encouraged to aim for around 1550 calories which is her estimated BMR.     Recent Diet Recall:  Breakfast: delays this till around noon.   Lunch: Eats around noon.   Dinner: 7 PM   Snacks: little piece of chocolate   Beverages: Drink coffee, tea, water but doesn't love the taste of plain water. Has gotten away from diet Pepsi.   Alcohol: did not discuss     Lent starts on Monday for patient and pretty much follows a vegan diet during this time.     May 2024: Feels her weight loss has slowed down since Lent completed.  Trying to get in around 1500 calories and 60-70 grams of protein daily.  Plans to try to incorporate some physical activity, especially strength and resistance training. Just has her psych evaluation and pulmonology consults left to do on tasklist.         1/16/2024    11:27 AM   Recall Diet Questions Reviewed With Patient   Describe what you typically consume for breakfast (typical or most recent) Cheesy eggs   Describe what you typically consume for lunch (typical or most recent) Skip or munch on leftovers   Describe what you typically consume for supper (typical or most recent) 5 oz Protein with 1/2 baked potato or vegetable   Describe what you typically consume as snacks (typical or most recent) Cheese, nuts   How many ounces of water, or other low calorie drinks, do you drink daily (8 oz=1 glass)? 24 oz   How many ounces of caffeine (coffee, tea, pop) do you drink daily (8 oz=1 glass)? 16 oz   How many ounces of carbonated (pop, beer, sparkling water) drinks do you drinky daily (8 oz=1 glass)? 0 oz   How many ounces of juice, pop, sweet tea, sports drinks, protein drinks, other sweetened drinks, do you drink daily (8 oz=1 glass)? 0 oz   How many ounces of milk do you drink daily (8 oz=1 glass) 0 oz   How often do you drink alcohol? 4 or more times a week   If you do drink alcohol, how many drinks might you have in a day? (one drink = 5 oz. wine, 1 can/bottle of beer, 1 shot liquor) 1 or 2           1/16/2024    11:27 AM   Eating Habits   What foods do you crave? Salty           1/16/2024    11:27 AM   Dining Out History Reviewed With Patient   How often do you dine out? Rarely.   Where do you dine out? (select all that apply) sit-down restaurants   What types of food do you order when you dine out? Entree     EXERCISE: has a very sedentary lifestyle. 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  Doesn't typically like to leave house, loves to be at home. Bought a modified rowing machine that she had enjoyed  using in the past with pulmonary rehab, hasn't been using. Is struggling to find the motivation to exercise more and also has big issues with activity due to anxiety related to becoming out of breath as a result of her interstitial lung disease.          1/16/2024    11:27 AM   --   How often do you exercise? Less than 1 time per week   What is the duration of your exercise (in minutes)? 10 Minutes   What keeps you from being more active? Pain    I should be more active but I just have not gotten around to it    Shortness of breath     NUTRITION DIAGNOSIS:  Obesity r/t long history of positive energy balance aeb BMI >30 kg/m2.    INTERVENTION:  Intervention Provided/Education Provided on post-op diet guidelines, vitamins/minerals essential post-operatively, GI anatomy of bariatric surgeries, ways to help prepare for post-op diet guidelines pre-operatively, portion/calorie-control, mindful eating and sources of protein.  Patient demonstrates understanding.     Personal barriers to making and continuing required life changes have been identified, and strategies to overcome those barriers have been recommended AND family and social supports have been assessed and strategies to strengthen those supports have been recommended.    Provided pt with list of goals, RD contact information and resources listed below via [employer].       Provided education on nutrition considerations when starting GLP1 medication including, changes in meal/snack volumes; meeting protein, hydration and micronutrient needs; limiting high-fat foods; and diet/lifestyle strategies for preventing constipation.         1/16/2024    11:27 AM   Questions Reviewed With Patient   How ready are you to make changes regarding your weight? Number 1 = Not ready at all to make changes up to 10 = very ready. 10   How confident are you that you can change? 1 = Not confident that you will be successful making changes up to 10 = very confident. 8     Expected  Engagement: good    GOALS:  Relating To Eating:  Eat slowly (20-30 minutes per meal), chewing foods well (25 chews per bite/applesauce consistency)  Eat 3 meals per day  Consume 60-90 g protein per day  Aim for 1530 calories per day.     Relating to beverages:  Reduce caffeine/carbonation/calorie containing beverages  Separate fluids from meals by 30 minutes before, during, and after eating  Drink 48-64 ounces of fluid per day    Relating to dietary supplements:  Be thinking about post op vitamins and mineral supplements you will want to have.     Relating to activity:  Increase activity as able     Post-op Diet Handouts:  Diet Guidelines after Weight-loss Surgery  [email or web address]     Your Stage 1 Diet: Clear Liquids  [email or web address]     Your Stage 2 Diet: Low-fat Full Liquids  [email or web address]     Your Stage 3 Diet: Pureed Foods  [email or web address]     Pureed Recipes  [email or web address]    Your Stage 4 Diet: Soft Foods  [email or web address]    Your Stage 5 Diet: Regular Foods  [email or web address]    Supplements after Sleeve Gastrectomy, Gastric Bypass or Single Anastomosis Duodenal Switch  [email or web address]    Keeping Track of Fluids  [email or web address]    Follow Up: June 27.     Time spent with patient: 22 minutes.  Caroline Mcduffie RD, LD

## 2024-05-29 NOTE — PATIENT INSTRUCTIONS
Henrry Dodd,     Follow-up with RD on June 27.     Thank you,    Caroline Mcduffie, ANDREW, LD  If you would like to schedule or reschedule an appointment with the RD, please call [phone]    Nutrition Goals  Relating To Eating:  Eat slowly (20-30 minutes per meal), chewing foods well (25 chews per bite/applesauce consistency)  Eat 3 meals per day  Consume 60-90 g protein per day  Aim for 1530 calories per day.     Relating to beverages:  Reduce caffeine/carbonation/calorie containing beverages  Separate fluids from meals by 30 minutes before, during, and after eating  Drink 48-64 ounces of fluid per day    Relating to dietary supplements:  Be thinking about post op vitamins and mineral supplements you will want to have.     Relating to activity:  Increase activity as able     Post-op Diet Handouts:  Diet Guidelines after Weight-loss Surgery  [email or web address]     Your Stage 1 Diet: Clear Liquids  [email or web address]     Your Stage 2 Diet: Low-fat Full Liquids  [email or web address]     Your Stage 3 Diet: Pureed Foods  [email or web address]     Pureed Recipes  [email or web address]    Your Stage 4 Diet: Soft Foods  [email or web address]    Your Stage 5 Diet: Regular Foods  [email or web address]    Supplements after Sleeve Gastrectomy, Gastric Bypass or Single Anastomosis Duodenal Switch  [email or web address]    Keeping Track of Fluids  [email or web address]    COMPREHENSIVE WEIGHT MANAGEMENT PROGRAM  VIRTUAL SUPPORT GROUPS    At [facility], our Comprehensive Weight Management program offers on-line support groups for patients who are working on weight loss and considering, preparing for, or have had weight loss surgery.     There is no cost for this opportunity.  You are invited to attend the?Virtual Support Groups?provided by any of the following locations:    [facility] via combionic Teams with Sandra Hopper RN  2.    [place] via Microsoft Teams with Lalit Keenan, PhD, LP  3.   [place] via Dynamic Organic Light with Shea Winston RN  4.   [facility] via a Zoom Meeting with LLUVIA Ng    The following Support Group information can also be found on our website:  [email or web address]      [facility] Weight Loss Surgery Support Group  The support group is a patient-lead forum that meets monthly to share experiences, encouragement and education. It is open to those who have had weight loss surgery, are scheduled for surgery, or are considering surgery.   WHEN: This group meets on the 3rd Wednesday of each month from 5:00PM - 6:00PM virtually using Microsoft Teams.   FACILITATOR: Led by Sandra Covarrubias RD, MADHURI, RN, the program's Clinical Coordinator.   TO REGISTER: Please contact the clinic via [employer] or call the nurse line directly at [phone] to inform our staff that you would like an invite sent to you and to let us know the email you would like the invite sent to. Prior to the meeting, a link with directions on how to join the meeting will be sent to you.    2024 Meetings   January 17  February 21  March 20  April 17  May 15  Alexandra 19      [facility] Bariatric Care Support Group?  This is open to all pre- and post- operative bariatric surgery patients as well as their support system.   WHEN: This group meets the 3rd Tuesday of each month from 6:30 PM - 8:00 PM virtually using Microsoft Teams.   FACILITATOR: Led by Lalit Keenan, Ph.D who is a Licensed Psychologist with the [facility] Comprehensive Weight Management Program.   TO REGISTER: Please send an email to Lalit Keenan, Ph.D., LP at?[email or web address]?if you would like an invitation to the group. Prior to the meeting, a link with directions on how to join the meeting will be sent to you.    2024  "Meetings  January 16: \"Medication Management and Bariatric Surgery\", Jackie Gambino, PharmD, Pharmacy Resident at [facility], [facility]  February 20: \"A Bariatric Surgery Patient's Perspective\", ELENA Torres, [facility], Behavioral Health Clinician at [facility]  March 19  April 16  May 21  Alexandra 18: \"Nutritional Labeling\", Dietitian speaker to be announced.  November 19: \"Holiday Eating\", Dietitian speaker to be announced.    [facility] Post-Operative Bariatric Surgery Support Group  This is a support group for [facility] bariatric patients (and those external to [facility]) who have had bariatric surgery and are at least 3 months post-surgery.  WHEN: This support group meets the 4th Wednesday of the month from 11:00 AM - 12:00 PM virtually using Microsoft Teams.   FACILITATOR: Led by Certified Bariatric Nurse, Shea Winston RN.   TO REGISTER: Please send an email to Shea at [email or web address] if you would like an invitation to the group.  Prior to the meeting, a link with directions on how to join the meeting will be sent to you.    2024 Meetings  January 24  February 28  March 27  April 24  May 22  Alexandra 26    [facility] Healthy Lifestyle Group?  This is a 60 minute virtual coaching group for those who want to lead a healthier lifestyle. Come together to set goals and overcome barriers in a supportive group environment. We will address the four pillars of health: nutrition, exercise, sleep and emotional well-being.  This group is highly recommended for those who are participating in the 24 week Healthy Lifestyle Plan and our Health Coaching sessions.  WHEN: This group meets the 1st Friday of the month, 12:30 PM - 1:30 PM online, via a zoom meeting.    FACILITATOR: Led by National Board Certified Health and , Shea Toure [facility].   TO REGISTER: " "Please call the Call Center at [phone] to register. You will get an appointment to attend in [email or web address]. Fifteen minutes prior to the meeting, complete the e-check in and you will get the link to join the meeting.    There is no charge to attend this group and space is limited.     2024 Meetings  January 5: \"New Years Vision: Manifest your Best 2024!\" (guided imagery,  journaling and discussion)  February 2: \"Let's Talk\"  March 1: \"10 Percent Happier\" by Priyank Christensen (Book Bites - a guided discussion on the nuggets of wisdom from favorite wellness books, no need to read the book but highly encouraged)  April 5: \"Let's Talk\"  May 3: \"Essentialism: The Disciplined Pursuit of Less\" by Jorge Rosario (Book Bites discussion)  June 7: \"Let's Talk\"  July 5: NO MEETING, off for the 4th of July Holiday  August 2: \"The Blue Zones, Secrets for Living a Longer Life\" by Priyank Sofia (Book Bites discussion)        "

## 2024-06-13 ENCOUNTER — VIRTUAL VISIT (OUTPATIENT)
Dept: PSYCHOLOGY | Facility: CLINIC | Age: 64
End: 2024-06-13
Payer: COMMERCIAL

## 2024-06-13 DIAGNOSIS — F33.0 MAJOR DEPRESSIVE DISORDER, RECURRENT EPISODE, MILD (H): Primary | ICD-10-CM

## 2024-06-13 PROCEDURE — 90834 PSYTX W PT 45 MINUTES: CPT | Mod: 95 | Performed by: PSYCHOLOGIST

## 2024-06-13 ASSESSMENT — ANXIETY QUESTIONNAIRES
IF YOU CHECKED OFF ANY PROBLEMS ON THIS QUESTIONNAIRE, HOW DIFFICULT HAVE THESE PROBLEMS MADE IT FOR YOU TO DO YOUR WORK, TAKE CARE OF THINGS AT HOME, OR GET ALONG WITH OTHER PEOPLE: VERY DIFFICULT
1. FEELING NERVOUS, ANXIOUS, OR ON EDGE: SEVERAL DAYS
GAD7 TOTAL SCORE: 8
3. WORRYING TOO MUCH ABOUT DIFFERENT THINGS: NOT AT ALL
6. BECOMING EASILY ANNOYED OR IRRITABLE: NEARLY EVERY DAY
7. FEELING AFRAID AS IF SOMETHING AWFUL MIGHT HAPPEN: NEARLY EVERY DAY
GAD7 TOTAL SCORE: 8
5. BEING SO RESTLESS THAT IT IS HARD TO SIT STILL: SEVERAL DAYS
2. NOT BEING ABLE TO STOP OR CONTROL WORRYING: NOT AT ALL

## 2024-06-13 ASSESSMENT — COLUMBIA-SUICIDE SEVERITY RATING SCALE - C-SSRS
3. HAVE YOU BEEN THINKING ABOUT HOW YOU MIGHT KILL YOURSELF?: YES
ATTEMPT LIFETIME: NO
2. HAVE YOU ACTUALLY HAD ANY THOUGHTS OF KILLING YOURSELF?: NO
2. HAVE YOU ACTUALLY HAD ANY THOUGHTS OF KILLING YOURSELF?: YES
TOTAL  NUMBER OF ABORTED OR SELF INTERRUPTED ATTEMPTS LIFETIME: NO
1. HAVE YOU WISHED YOU WERE DEAD OR WISHED YOU COULD GO TO SLEEP AND NOT WAKE UP?: YES
REASONS FOR IDEATION PAST MONTH: COMPLETELY TO END OR STOP THE PAIN (YOU COULDN'T GO ON LIVING WITH THE PAIN OR HOW YOU WERE FEELING)
4. HAVE YOU HAD THESE THOUGHTS AND HAD SOME INTENTION OF ACTING ON THEM?: NO
5. HAVE YOU STARTED TO WORK OUT OR WORKED OUT THE DETAILS OF HOW TO KILL YOURSELF? DO YOU INTEND TO CARRY OUT THIS PLAN?: NO
TOTAL  NUMBER OF INTERRUPTED ATTEMPTS LIFETIME: NO
6. HAVE YOU EVER DONE ANYTHING, STARTED TO DO ANYTHING, OR PREPARED TO DO ANYTHING TO END YOUR LIFE?: NO
REASONS FOR IDEATION LIFETIME: COMPLETELY TO END OR STOP THE PAIN (YOU COULDN'T GO ON LIVING WITH THE PAIN OR HOW YOU WERE FEELING)

## 2024-06-13 ASSESSMENT — PATIENT HEALTH QUESTIONNAIRE - PHQ9
5. POOR APPETITE OR OVEREATING: NOT AT ALL
SUM OF ALL RESPONSES TO PHQ QUESTIONS 1-9: 7
10. IF YOU CHECKED OFF ANY PROBLEMS, HOW DIFFICULT HAVE THESE PROBLEMS MADE IT FOR YOU TO DO YOUR WORK, TAKE CARE OF THINGS AT HOME, OR GET ALONG WITH OTHER PEOPLE: SOMEWHAT DIFFICULT
SUM OF ALL RESPONSES TO PHQ QUESTIONS 1-9: 7

## 2024-06-13 NOTE — PROGRESS NOTES
[facility]   Mental Health & Addiction Services     Progress Note - Initial Visit    Patient  Name:  Elsie Rubi Date: 24         Service Type: Individual     Visit Start Time: 1:00pm Visit End Time: 1:52pm    Visit #: 1 52 minutes    Attendees: Client attended alone    Service Modality:  Video Visit:      Provider verified identity through the following two step process.  Patient provided:  Patient     Telemedicine Visit: The patient's condition can be safely assessed and treated via synchronous audio and visual telemedicine encounter.      Reason for Telemedicine Visit: Services only offered telehealth    Originating Site (Patient Location): Patient's home    Distant Site (Provider Location): Provider Remote Setting- Home Office    Consent:  The patient/guardian has verbally consented to: the potential risks and benefits of telemedicine (video visit) versus in person care; bill my insurance or make self-payment for services provided; and responsibility for payment of non-covered services.     Patient would like the video invitation sent by:  Send to e-mail at: [email or web address]    Mode of Communication:  Video Conference via [facility]    Distant Location (Provider):  Off-site    As the provider I attest to compliance with applicable laws and regulations related to telemedicine.       DATA:   Interactive Complexity: No   Crisis: No     Presenting Concerns/  Current Stressors:   Client presents for her first session for a pre-bariatric surgery psychological evaluation.      ASSESSMENT:  Mental Status Assessment:  Appearance:   Appropriate   Eye Contact:   Good   Psychomotor Behavior: Normal   Attitude:   Cooperative  Friendly Pleasant Guarded   Orientation:   All  Speech   Rate / Production: Normal/ Responsive   Volume:  Normal   Mood:    Normal  Affect:    Appropriate   Thought Content:  Clear   Thought Form:  Coherent   Insight:    Good     Assessments completed prior to this  visit:  The following assessments were completed by patient for this visit:  PHQ9:       8/10/2023     4:07 PM 3/12/2024     1:56 PM 5/14/2024     1:50 PM 6/13/2024     9:17 AM   PHQ-9 SCORE   PHQ-9 Total Score MyChart 8 (Mild depression) 6 (Mild depression)  7 (Mild depression)   PHQ-9 Total Score 8 6 12 7     CAGE-AID:       6/13/2024     9:42 AM   CAGE-AID Total Score   Total Score 1   Total Score MyChart 1 (A total score of 2 or greater is considered clinically significant)     PROMIS 10-Global Health (all questions and answers displayed):       4/30/2024     9:54 AM 6/13/2024     9:42 AM   PROMIS 10   In general, would you say your health is: Good Good   In general, would you say your quality of life is: Very good Good   In general, how would you rate your physical health? Good Fair   In general, how would you rate your mental health, including your mood and your ability to think? Good Good   In general, how would you rate your satisfaction with your social activities and relationships? Good Good   In general, please rate how well you carry out your usual social activities and roles Good Fair   To what extent are you able to carry out your everyday physical activities such as walking, climbing stairs, carrying groceries, or moving a chair? Mostly Mostly   In the past 7 days, how often have you been bothered by emotional problems such as feeling anxious, depressed, or irritable? Sometimes Often   In the past 7 days, how would you rate your fatigue on average? Moderate Moderate   In the past 7 days, how would you rate your pain on average, where 0 means no pain, and 10 means worst imaginable pain? 3 3   In general, would you say your health is: 3 3   In general, would you say your quality of life is: 4 3   In general, how would you rate your physical health? 3 2   In general, how would you rate your mental health, including your mood and your ability to think? 3 3   In general, how would you rate your  "satisfaction with your social activities and relationships? 3 3   In general, please rate how well you carry out your usual social activities and roles. (This includes activities at home, at work and in your community, and responsibilities as a parent, child, spouse, employee, friend, etc.) 3 2   To what extent are you able to carry out your everyday physical activities such as walking, climbing stairs, carrying groceries, or moving a chair? 4 4   In the past 7 days, how often have you been bothered by emotional problems such as feeling anxious, depressed, or irritable? 3 4   In the past 7 days, how would you rate your fatigue on average? 3 3   In the past 7 days, how would you rate your pain on average, where 0 means no pain, and 10 means worst imaginable pain? 3 3   Global Mental Health Score 13 11   Global Physical Health Score 14 13   PROMIS TOTAL - SUBSCORES 27 24         Safety Issues and Plan for Safety and Risk Management:   Zearing Suicide Severity Rating Scale (Lifetime/Recent)      3/13/2024     1:10 PM 3/28/2024    12:44 PM 6/13/2024     1:38 PM   Zearing Suicide Severity Rating (Lifetime/Recent)   Q1 Wished to be Dead (Past Month) 0-->no 0-->no    Q2 Suicidal Thoughts (Past Month) 0-->no 0-->no    Q6 Suicide Behavior (Lifetime) 0-->no 0-->no    Level of Risk per Screen no risks indicated no risks indicated    Q1 Wish to be Dead (Lifetime)   Y   Q2 Non-Specific Active Suicidal Thoughts (Lifetime)   Y   Non-Specific Active Suicidal Thought Description (Lifetime)   MRE: \"Not in the last 10 years. In the past, thoughts of killing myself have been in response to emotional pain.\"   2. Non-Specific Active Suicidal Thoughts (Past 1 Month)   N   3. Active Suicidal Ideation with any Methods (Not Plan) Without Intent to Act (Lifetime)   Y   Active Suicidal Ideation with any Methods (Not Plan) Description (Lifetime)   \"I was going to bring the BBQ grill inside, light it, and suffocate.\"   Q3 Active Suicidal " Ideation with any Methods (Not Plan) Without Intent to Act (Past 1 Month)   N   Q4 Active Suicidal Ideation with Some Intent to Act, Without Specific Plan (Lifetime)   N   Q5 Active Suicidal Ideation with Specific Plan and Intent (Lifetime)   N   Most Severe Ideation Rating (Lifetime)   4   Frequency (Lifetime)   1   Duration (Lifetime)   1   Controllability (Lifetime)   1   Deterrents (Lifetime)   1   Deterrents (Past 1 Month)   0   Reasons for Ideation (Lifetime)   5   Reasons for Ideation (Past 1 Month)   5   Actual Attempt (Lifetime)   N   Has subject engaged in non-suicidal self-injurious behavior? (Lifetime)   N   Interrupted Attempts (Lifetime)   N   Aborted or Self-Interrupted Attempt (Lifetime)   N   Preparatory Acts or Behavior (Lifetime)   N   Calculated C-SSRS Risk Score (Lifetime/Recent)   No Risk Indicated     Patient denies current fears or concerns for personal safety.  Patient denies current or recent suicidal ideation or behaviors.  Patient denies current or recent homicidal ideation or behaviors.  Patient denies current or recent self injurious behavior or ideation.  Patient denies other safety concerns.  Recommended that patient call 911 or go to the local ED should there be a change in any of these risk factors.  Patient reports there are firearms in the house. They are not secured .     Diagnostic Criteria:  Major Depressive Disorder  CRITERIA (A-C) REPRESENT A MAJOR DEPRESSIVE EPISODE - SELECT THESE CRITERIA  A) Recurrent episode(s) - symptoms have been present during the same 2-week period and represent a change from previous functioning 5 or more symptoms (required for diagnosis)   - Depressed mood. Note: In children and adolescents, can be irritable mood.     - Diminished interest or pleasure in all, or almost all, activities.    - Fatigue or loss of energy.    - Diminished ability to think or concentrate, or indecisiveness.   B) The symptoms cause clinically significant distress or  impairment in social, occupational, or other important areas of functioning  C) The episode is not attributable to the physiological effects of a substance or to another medical condition  D) The occurence of major depressive episode is not better explained by other thought / psychotic disorders  E) There has never been a manic episode or hypomanic episode      DSM5 Diagnoses: (Sustained by DSM5 Criteria Listed Above)  Diagnoses: 296.31 (F33.0) Major Depressive Disorder, Recurrent Episode, Mild With melancholic features  Psychosocial & Contextual Factors: Client lives 3 hours from her health care team, including this provider; recently fired from work.  Intervention:   Psychoeducation; Skill review/identification & recommendation; Pattern recognition; Socratic Questioning; Active listening, validation, and other rapport building skills; Administer and explain screeners; Answered questions; Provided some statistics regarding bariatric surgery long-term success; Safety check (no concerns); Assigned homework; Emailed MMPI-3 screener.  Collateral Reports Completed:  Not Applicable      PLAN: (Homework, other):  1. Provider will continue Diagnostic Assessment.  Patient was given the following to do until next session:  The client was sent the MMPI-3 to complete and return by next session, if possible.  Client will also engaged in 5-10 minutes of strength training each day.    2. Provider recommended the following referrals: N/A.      3.  Suicide Risk and Safety Concerns were assessed for Elsie Rubi.    Patient meets the following risk assessment and triage: Patient denied any current/recent/lifetime history of suicidal ideation and/or behaviors.  No safety plan indicated at this time.       Rigoberto Castelan PsyD  June 13, 2024          Clinical Interview for Bariatric Surgery    *Review paperwork and sign release for [facility]  *Outline reasons for evaluation (i.e.-discuss  "risks/benefits, create optimal surgical outcome, identify factors that may reduce optimal outcome, make behavior change recommendations prior to surgery)    \"I've had trouble buying in to the medical machinery. Trusting they have my best at heart or hiding possible complications or they're just doing surgery for profit.  I also worry how I'll handle it if things don't go well.  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  \"I've only been overweight since I was in my 40s. Before that I was slender.\"     Problematic Eating (Binge Eating/Overeating/Grazing/Night Eating):    Weight Loss History (Rx/OTC meds, Weight Watchers, Heike Roosevelt, Atkins, etc. And #'s lost).    Typical Activity/Exercise; Current Activity/Exercise Pattern:    Reason for Pursuing Bariatric Surgery/Goals:    Preferred Procedure: Lap Band Darcie-en-UY Gastric Sleeve    Awareness of Lifestyle Changes (diet/activity/vitamins; how confident are you in making them/what will be challenging for you?):    Social Support Network (who have you told/what is their opinion/who will help after surgery/who do you know that has had the procedure and what was their experience?):    Current Stressors/Coping?:    Current goals with your nutritionist?    Anorexia Screen:   Ever weigh much less than people thought you ought to?   Ever had an intense fear of gaining weight, even though underweight?   Body weight or shape dysmorphia?   Amenorrhea for at least three cycles?    Binge Eating screen:   Eating an unusually large amount in a short period &    Lack of control during that episode (can't stop or control)?      Bulimia screen:   Recurrent binge eating episodes (binge + lack of control):   Recurrent compensatory behavior (vomiting, laxatives, excessive exercise)?   At least twice a week for three months?   Weight/shape strongly effect how you feel about yourself?  "

## 2024-06-20 ENCOUNTER — MYC MEDICAL ADVICE (OUTPATIENT)
Dept: PULMONOLOGY | Facility: CLINIC | Age: 64
End: 2024-06-20
Payer: COMMERCIAL

## 2024-06-20 ENCOUNTER — VIRTUAL VISIT (OUTPATIENT)
Dept: PSYCHOLOGY | Facility: CLINIC | Age: 64
End: 2024-06-20
Payer: COMMERCIAL

## 2024-06-20 DIAGNOSIS — F33.0 MAJOR DEPRESSIVE DISORDER, RECURRENT EPISODE, MILD (H): Primary | ICD-10-CM

## 2024-06-20 DIAGNOSIS — J84.9 ILD (INTERSTITIAL LUNG DISEASE) (H): Primary | ICD-10-CM

## 2024-06-20 PROCEDURE — 90834 PSYTX W PT 45 MINUTES: CPT | Mod: 95 | Performed by: PSYCHOLOGIST

## 2024-06-20 ASSESSMENT — PATIENT HEALTH QUESTIONNAIRE - PHQ9
SUM OF ALL RESPONSES TO PHQ QUESTIONS 1-9: 3
10. IF YOU CHECKED OFF ANY PROBLEMS, HOW DIFFICULT HAVE THESE PROBLEMS MADE IT FOR YOU TO DO YOUR WORK, TAKE CARE OF THINGS AT HOME, OR GET ALONG WITH OTHER PEOPLE: SOMEWHAT DIFFICULT
SUM OF ALL RESPONSES TO PHQ QUESTIONS 1-9: 3

## 2024-06-20 NOTE — PROGRESS NOTES
[facility]                                     Progress Note    Patient Name: Elsie Rubi  Date: 6/20/24         Service Type: Individual      Session Start Time: 9:01am Session End Time: 9:53am     Session Length: 52 minutes    Session #: 2    Attendees: Client attended alone    Service Modality:  Video Visit:      Provider verified identity through the following two step process.  Patient provided:  Patient photo    Telemedicine Visit: The patient's condition can be safely assessed and treated via synchronous audio and visual telemedicine encounter.      Reason for Telemedicine Visit: Services only offered telehealth    Originating Site (Patient Location): Patient's home    Distant Site (Provider Location): Provider Remote Setting- Home Office    Consent:  The patient/guardian has verbally consented to: the potential risks and benefits of telemedicine (video visit) versus in person care; bill my insurance or make self-payment for services provided; and responsibility for payment of non-covered services.     Patient would like the video invitation sent by:  Send to e-mail at: [email or web address]    Mode of Communication:  Video Conference via [facility]    Distant Location (Provider):  Off-site    As the provider I attest to compliance with applicable laws and regulations related to telemedicine.    DATA  Interactive Complexity: No  Crisis: No        Progress Since Last Session (Related to Symptoms / Goals / Homework):   Symptoms: No change : Stable.    Homework: Partially completed      Episode of Care Goals: Satisfactory progress - ACTION (Actively working towards change); Intervened by reinforcing change plan / affirming steps taken     Current / Ongoing Stressors and Concerns:   Client presents for her first session for a pre-bariatric surgery psychological evaluation; Client still undecided on surgery.     Treatment Objective(s) Addressed in This Session:   Finalized Weight History;  Discussed Risks and Benefits of surgery; Talked to client regarding ambivalence regarding surgery or not.     Intervention:   CBT: Psychoeducation; Socratic Questioning; Pattern recognition; Pros & Cons; Reviewed and assigned homework.  Active listening, validation, and other rapport building skills; Reflecting; Scheduled future appt.; Weight History.    Assessments completed prior to visit:  The following assessments were completed by patient for this visit: None.     ASSESSMENT: Current Emotional / Mental Status (status of significant symptoms):   Risk status (Self / Other harm or suicidal ideation)   Patient denies current fears or concerns for personal safety.   Patient denies current or recent suicidal ideation or behaviors.   Patient denies current or recent homicidal ideation or behaviors.   Patient denies current or recent self injurious behavior or ideation.   Patient denies other safety concerns.   Patient reports there has been no change in risk factors since their last session.     Patient reports there has been no change in protective factors since their last session.     Recommended that patient call 911 or go to the local ED should there be a change in any of these risk factors.     Appearance:   Appropriate    Eye Contact:   Fair    Psychomotor Behavior: Normal    Attitude:   Cooperative  Pleasant   Orientation:   All   Speech    Rate / Production: Talkative Normal     Volume:  Normal    Mood:    Anxious  Normal   Affect:    Appropriate    Thought Content:  Clear    Thought Form:  Coherent  Logical    Insight:    Good  and Fair      Medication Review:   No changes to current psychiatric medication(s)     Medication Compliance:   Yes     Changes in Health Issues:   None reported     Chemical Use Review:   Substance Use: Chemical use reviewed, no active concerns identified      Tobacco Use: No current tobacco use.      Diagnosis:  1. Major Depressive Disorder, Recurrent, Mild, With melancholic features  "(H24)        Collateral Reports Completed:   Not Applicable    PLAN: (Patient Tasks / Therapist Tasks / Other)  Client will continue to engage in 5-10 minutes of strength training/activity each day.  Client made a follow-up appointment for July 11, 2024.        Rigoberto Castelan PsyD  June 20, 2024          Clinical Interview for Bariatric Surgery    *Review paperwork and sign release for [facility]  *Outline reasons for evaluation (i.e.-discuss risks/benefits, create optimal surgical outcome, identify factors that may reduce optimal outcome, make behavior change recommendations prior to surgery)    \"I've had trouble buying in to the medical machinery. Trusting they have my best at heart or hiding possible complications or they're just doing surgery for profit.  I also worry how I'll handle it if things don't go well.  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  \"I've only been overweight since I was in my 40s. Before that I was slender.\"     Problematic Eating (Binge Eating/Overeating/Grazing/Night Eating):  \"I'm not a compulsive eating.  I can tell you what was going on when the weight gain started.  I had 6 kids in my 20s.  That marriage didn't work out.  I went back to school and during that time we were super super poor.  Then I graduated from college and I had to work.  I was working nights.  For the first time in my adult life I had money but I didn't have time and I wasn't sleeping.  Then I was only cooking weekends.  A lot of frozen foods and fast foods.  The stress of only getting 3-4 hours sleep for years.  And I am not a well adapted person and all that stress....  I was moving my family from one place to another.  Life was hectic.  And we had some inter-family difficulties because all my children were traumatized.  And everybody was always talking about 'me' time, and there wasn't anything I wanted to " "experience that I didn't want to experience with my children.  So, I never really had that balance maybe, I don't know if that's the right word.\"  Cl weighed 120lbs, \"and that was after having 6 kids.  Then the 6 years of school (college for 6 years), I gained 70 lbs.  I was diagnosed with ADHD and the doctor put me on a high dose of Adderall and the weight came flying off.  Then I cut back on Adderall and the weight came back on.  I've lost 25 lbs since last year and that's through more healthy eating.\"    Weight Loss History (Rx/OTC meds, Weight Watchers, Heike Roosevelt, Atkins, etc. And #'s lost).  \"Mostly Atkins (first tried around 2001.  Lost 30 lbs but then didn't lose another ounce) and the Mediterranean (It's not so much a diet but an eating style.  I love it. I combine it with low carbs). Then I got on the Adderall and lost 40 lbs. I'm good on them.  When I'm working and have money in my pocket, I'll stop and get food.\"    Typical Activity/Exercise; Current Activity/Exercise Pattern:  \"I've always been very sedentary.  Even when I was a child, I read a book every single day. I wasn't athletic. I have a very active mind but my body is very uncooperative.  Back in the 2000s I was exercising.\"  Client's fibromyalgia and Chronic Fatigue Syndrome make exercise difficult.  \"I started thinking that exercise might help me speed up the weight loss.\"    Reason for Pursuing Bariatric Surgery/Goals:  \"it's a little shallow.  I hate being fat.  I want to be cute.  I don't do any of stereotypical things of what femininity is.  I want to be healthy and light on my feet.  Not look slovenly.  In a lot of ways it's cosmetic.  But, given my health concerns, I'm on blood pressure medicine, shortness of breath, sleep apnea.  I've lost 25 lbs and I've had a decrease in sleep apnea and blood pressure.  I want to put less pressure on my joints.  Beside the cosmetic benefits, there are a lot of health benefits, too.\"    Preferred " "Procedure: Lap Band Darcie-en-UY Gastric Sleeve    Awareness of Lifestyle Changes (diet/activity/vitamins; how confident are you in making them/what will be challenging for you?):  Client stated she has always been more sedentary.  \"That's part of the motivation to really clean up my diet.  I know that eating without discipline will be harmful, it will be painful.\"  Discussed risks and benefits.  Client does worry about the risks.  Reminded client to keep in mind the benefits.    Social Support Network (who have you told/what is their opinion/who will help after surgery/who do you know that has had the procedure and what was their experience?):  Client's  will support client; however, he does believe if she just disciplined herself, she could lose the weight.  \"My  could fill that role.\"    \"I got a dog 5 years with the idea I'd walk the dog 5 minutes a day.  But she doesn't want to walk, she wants to run, and she got too strong for me.\"    Current Stressors/Coping?:  \"Prayer.  Especially if I feel someone has harmed me.  I withdraw a lot.  I can go play my game.  The games I tend to play are games you have to plan, keep track of things (strategies).  I'm gardening.  Boredom, I think, is my biggest stressor.\"    May need to learn coping mechanisms or just becoming more mindful of what she does do - expressing oneself.    Current goals with your nutritionist?  1)  Eating 60 grams protein a day   2)  Eat 1500 calories per day    Anorexia Screen:   Ever weigh much less than people thought you ought to?  Yes.  \"In my family of origin.  My mother had been on a diet my entire life and was very very strict about food intake.  We were taught that fat was bad, skinny was good. So, me and my sisters were always trying to outdo each other on who wore the smallest dress.  I was not athletic, so, I lost all those. I was the smart one.\"   Ever had an intense fear of gaining weight, even though underweight?  \"No.  I " "just felt I was doing what I was suppose to do. I wasn't trying to lost weight.  I did take what steps I needed to maintain weight.\"   Body weight or shape dysmorphia?  Yes   Amenorrhea for at least three cycles?  No.    Binge Eating screen:   Eating an unusually large amount in a short period &    Lack of control during that episode (can't stop or control)?  No    Bulimia screen:   Recurrent binge eating episodes (binge + lack of control):  No   Recurrent compensatory behavior (vomiting, laxatives, excessive exercise)?  No   At least twice a week for three months?  No   Weight/shape strongly effect how you feel about yourself?  Yes  "

## 2024-06-27 ENCOUNTER — TELEPHONE (OUTPATIENT)
Dept: ENDOCRINOLOGY | Facility: CLINIC | Age: 64
End: 2024-06-27

## 2024-06-27 NOTE — PROGRESS NOTES
"Video-Visit Details    Type of service:  Video Visit    Video Start Time: 11:31 AM  Video End Time: 12:02 PM     Originating Location (pt. Location): Home    Distant Location (provider location): Offsite (providers home) [facility] [place]     Platform [facility] for [facility] Visit: [facility]    Return Bariatric Nutrition Consultation Note    Reason For Visit: Nutrition Assessment    Elsie Rubi is a 64 year old presenting today for return bariatric nutrition consult.   Patient is interested in laparoscopic sleeve gastrectomy with Dr. Cabrera expected surgery in TBD.  Patient is accompanied by self.  This is patient's 4th of 3 required nutrition visits prior to surgery. Completed the WLS nutrition class on 1/24/24.     Pt referred by EARL Varela on January 22, 2024.    CO-MORBIDITIES OF OBESITY INCLUDE:        1/16/2024    11:27 AM   --   I have the following health issues associated with obesity Pre-Diabetes    High Blood Pressure    GERD (Reflux)    Asthma    Stress Incontinence     SUPPORT:      1/16/2024    11:27 AM   Support System Reviewed With Patient   Who do you have in your support network that can be available to help you for the first 2 weeks after surgery?    Who can you count on for support throughout your weight loss surgery journey?      ANTHROPOMETRICS:  Initial consult weight: 222 lb on 1/22/24   Estimated body mass index is 35.17 kg/m  as calculated from the following:    Height as of this encounter: 1.626 m (5' 4.02\").    Weight as of this encounter: 93 kg (205 lb).  Current Weight: 205 lb per pt report.     Required weight loss goal pre-op: -10 lbs from initial consult weight (goal weight 212 lbs or less before surgery) - met         1/16/2024    11:27 AM   --   I have tried the following methods to lose weight Watching portions or calories    Exercise    Atkins type diet (low carb/high protein)           1/16/2024    11:27 AM   Weight Loss " Questions Reviewed With Patient   How long have you been overweight? From Middle age and beyond     MEDICATION FOR WEIGHT LOSS: Wegovy - 0.5 mg - 2 months has been at this dose, feels it may be time to increase.     VITAMIN/MINERAL SUPPLEMENTS: Vitamin D and Calcium with Vitamin D.     NUTRITION HISTORY:  Food allergies: NKFA   Food intolerances: None   Previous experience with diet modification for weight loss: Mediterranean style diet, high protein, high fat, non-starchy vegetables, low carb.     Focuses on a mediterranean style diet currently.     Per Leanne's visit: Regarding eating patterns and diet,  she typically eats 2-3 meals a day. Is able to get full. Can stay full. Eats out/ gets take out 2 times a month. Drinks coffee with half and half and splenda (2 cups every morning). Drinks diet pepsi a few time a week. Drinks a few glasses of water throughout the day. Drinks 2 glasses of wine every night. Is open to having 1 glass of wine instead.      Mu-ism Rastafari, doesn't eat meat any Wednesday or Friday, and then for 7 weeks before christmas, 7 weeks before easter.     Carb Manager  Katy - tracking daily intake are around 900-1300 calories per day. Encouraged to aim for around 1550 calories which is her estimated BMR.     Recent Diet Recall:  Breakfast: delays this till around noon.   Lunch: Eats around noon.   Dinner: 7 PM   Snacks: little piece of chocolate   Beverages: Drink coffee, tea, water but doesn't love the taste of plain water. Has gotten away from diet Pepsi.   Alcohol: did not discuss     Lent starts on Monday for patient and pretty much follows a vegan diet during this time.     May 2024: Feels her weight loss has slowed down since Lent completed. Trying to get in around 1500 calories and 60-70 grams of protein daily.  Plans to try to incorporate some physical activity, especially strength and resistance training. Just has her psych evaluation and pulmonology consults left to do on tasklist.      June 2024: Feels her current Wegovy dose isn't as effective and is interested in upping the dose. Eating more protein, likes cottage cheese/yogurt daily.  Eating 3 meals a day. Slight up tick in her physical activity, rode her stationary bike. Has been using her oxygen more throughout the day. Has been working with a psychologist. Has completely cut out seed oils. Still thinking about if surgery is right for her but continues to work through the requirements that are necessary for surgery.     Has a pulmonary appointment next month at the [facility] and is thinking about stopping at the weight management clinic to step on the body composition scale to assess muscle mass.         1/16/2024    11:27 AM   Recall Diet Questions Reviewed With Patient   Describe what you typically consume for breakfast (typical or most recent) Cheesy eggs   Describe what you typically consume for lunch (typical or most recent) Skip or munch on leftovers   Describe what you typically consume for supper (typical or most recent) 5 oz Protein with 1/2 baked potato or vegetable   Describe what you typically consume as snacks (typical or most recent) Cheese, nuts   How many ounces of water, or other low calorie drinks, do you drink daily (8 oz=1 glass)? 24 oz   How many ounces of caffeine (coffee, tea, pop) do you drink daily (8 oz=1 glass)? 16 oz   How many ounces of carbonated (pop, beer, sparkling water) drinks do you drinky daily (8 oz=1 glass)? 0 oz   How many ounces of juice, pop, sweet tea, sports drinks, protein drinks, other sweetened drinks, do you drink daily (8 oz=1 glass)? 0 oz   How many ounces of milk do you drink daily (8 oz=1 glass) 0 oz   How often do you drink alcohol? 4 or more times a week   If you do drink alcohol, how many drinks might you have in a day? (one drink = 5 oz. wine, 1 can/bottle of beer, 1 shot liquor) 1 or 2           1/16/2024    11:27 AM   Eating Habits   What foods do you crave? Salty           1/16/2024     11:27 AM   Dining Out History Reviewed With Patient   How often do you dine out? Rarely.   Where do you dine out? (select all that apply) sit-down restaurants   What types of food do you order when you dine out? Entree     EXERCISE: has a very sedentary lifestyle. 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  Doesn't typically like to leave house, loves to be at home. Bought a modified rowing machine that she had enjoyed using in the past with pulmonary rehab, hasn't been using. Is struggling to find the motivation to exercise more and also has big issues with activity due to anxiety related to becoming out of breath as a result of her interstitial lung disease.          1/16/2024    11:27 AM   --   How often do you exercise? Less than 1 time per week   What is the duration of your exercise (in minutes)? 10 Minutes   What keeps you from being more active? Pain    I should be more active but I just have not gotten around to it    Shortness of breath     NUTRITION DIAGNOSIS:  Obesity r/t long history of positive energy balance aeb BMI >30 kg/m2.    INTERVENTION:  Intervention Provided/Education Provided on post-op diet guidelines, vitamins/minerals essential post-operatively, GI anatomy of bariatric surgeries, ways to help prepare for post-op diet guidelines pre-operatively, portion/calorie-control, mindful eating and sources of protein.  Patient demonstrates understanding.     Personal barriers to making and continuing required life changes have been identified, and strategies to overcome those barriers have been recommended AND family and social supports have been assessed and strategies to strengthen those supports have been recommended.    Provided pt with list of goals, RD contact information and resources listed below via [employer].       Provided education on nutrition considerations when starting GLP1 medication including, changes in meal/snack volumes; meeting protein, hydration and micronutrient needs;  limiting high-fat foods; and diet/lifestyle strategies for preventing constipation.         1/16/2024    11:27 AM   Questions Reviewed With Patient   How ready are you to make changes regarding your weight? Number 1 = Not ready at all to make changes up to 10 = very ready. 10   How confident are you that you can change? 1 = Not confident that you will be successful making changes up to 10 = very confident. 8     Expected Engagement: good    GOALS:  Relating To Eating:  Eat slowly (20-30 minutes per meal), chewing foods well (25 chews per bite/applesauce consistency)  Eat 3 meals per day  Consume 60-90 g protein per day  Aim for 1530 calories per day.     Relating to beverages:  Reduce caffeine/carbonation/calorie containing beverages  Separate fluids from meals by 30 minutes before, during, and after eating  Drink 48-64 ounces of fluid per day    Relating to dietary supplements:  Be thinking about post op vitamins and mineral supplements you will want to have.     Relating to activity:  Increase activity as able, incorporate some strength and resistance training types of workouts.      Post-op Diet Handouts:  Diet Guidelines after Weight-loss Surgery  [email or web address]     Your Stage 1 Diet: Clear Liquids  [email or web address]     Your Stage 2 Diet: Low-fat Full Liquids  [email or web address]     Your Stage 3 Diet: Pureed Foods  [email or web address]     Pureed Recipes  [email or web address]    Your Stage 4 Diet: Soft Foods  [email or web address]    Your Stage 5 Diet: Regular Foods  [email or web address]    Supplements after Sleeve Gastrectomy, Gastric Bypass or Single Anastomosis Duodenal Switch  [email or web address]    Keeping Track of Fluids  [email or web address]    Follow Up: July 30    Time spent with patient: 31 minutes.  Caroline Mcduffie RD, LD

## 2024-06-27 NOTE — TELEPHONE ENCOUNTER
Patient needs to be rescheduled for their virtual visit due to Reason for Reschedule: Patient Request    Appointment mode: Video  Provider: Caroline Mcduffie RD    Pt forgot about appt on 6/27, rescheduled for 6/28.

## 2024-06-28 ENCOUNTER — VIRTUAL VISIT (OUTPATIENT)
Dept: ENDOCRINOLOGY | Facility: CLINIC | Age: 64
End: 2024-06-28
Payer: COMMERCIAL

## 2024-06-28 VITALS — HEIGHT: 64 IN | WEIGHT: 205 LBS | BODY MASS INDEX: 35 KG/M2

## 2024-06-28 DIAGNOSIS — E66.812 CLASS 2 SEVERE OBESITY WITH SERIOUS COMORBIDITY AND BODY MASS INDEX (BMI) OF 38.0 TO 38.9 IN ADULT, UNSPECIFIED OBESITY TYPE (H): Primary | ICD-10-CM

## 2024-06-28 DIAGNOSIS — Z71.3 NUTRITIONAL COUNSELING: Primary | ICD-10-CM

## 2024-06-28 DIAGNOSIS — E66.01 CLASS 2 SEVERE OBESITY WITH SERIOUS COMORBIDITY AND BODY MASS INDEX (BMI) OF 38.0 TO 38.9 IN ADULT, UNSPECIFIED OBESITY TYPE (H): Primary | ICD-10-CM

## 2024-06-28 DIAGNOSIS — R73.03 PREDIABETES: ICD-10-CM

## 2024-06-28 DIAGNOSIS — R10.13 DYSPEPSIA: ICD-10-CM

## 2024-06-28 DIAGNOSIS — E66.9 OBESITY: ICD-10-CM

## 2024-06-28 PROCEDURE — 97803 MED NUTRITION INDIV SUBSEQ: CPT | Mod: 95

## 2024-06-28 PROCEDURE — 99207 PR NO CHARGE LOS: CPT | Mod: 95

## 2024-06-28 RX ORDER — SEMAGLUTIDE 1 MG/.5ML
1 INJECTION, SOLUTION SUBCUTANEOUS WEEKLY
Qty: 2 ML | Refills: 2 | Status: ON HOLD | OUTPATIENT
Start: 2024-06-28 | End: 2024-09-11

## 2024-06-28 ASSESSMENT — PAIN SCALES - GENERAL: PAINLEVEL: NO PAIN (0)

## 2024-06-28 NOTE — LETTER
"6/28/2024       RE: Elsie Rubi  [address] [place] [address]     Dear Colleague,    Thank you for referring your patient, Elsie Rubi, to the [facility] [place] at [facility]. Please see a copy of my visit note below.    Video-Visit Details    Type of service:  Video Visit    Video Start Time: 11:31 AM  Video End Time: 12:02 PM     Originating Location (pt. Location): Home    Distant Location (provider location): Offsite (providers home) [facility] [place]     Platform used for Video Visit: [employer]    Return Bariatric Nutrition Consultation Note    Reason For Visit: Nutrition Assessment    Elsie Rubi is a 64 year old presenting today for return bariatric nutrition consult.   Patient is interested in laparoscopic sleeve gastrectomy with Dr. Cabrera expected surgery in [place].  Patient is accompanied by self.  This is patient's 4th of 3 required nutrition visits prior to surgery. Completed the WLS nutrition class on 1/24/24.     Pt referred by EARL Varela on January 22, 2024.    CO-MORBIDITIES OF OBESITY INCLUDE:        1/16/2024    11:27 AM   --   I have the following health issues associated with obesity Pre-Diabetes    High Blood Pressure    GERD (Reflux)    Asthma    Stress Incontinence     SUPPORT:      1/16/2024    11:27 AM   Support System Reviewed With Patient   Who do you have in your support network that can be available to help you for the first 2 weeks after surgery?    Who can you count on for support throughout your weight loss surgery journey?      ANTHROPOMETRICS:  Initial consult weight: 222 lb on 1/22/24   Estimated body mass index is 35.17 kg/m  as calculated from the following:    Height as of this encounter: 1.626 m (5' 4.02\").    Weight as of this encounter: 93 kg (205 lb).  Current Weight: 205 lb per pt report.     Required weight loss " goal pre-op: -10 lbs from initial consult weight (goal weight 212 lbs or less before surgery) - met         1/16/2024    11:27 AM   --   I have tried the following methods to lose weight Watching portions or calories    Exercise    Atkins type diet (low carb/high protein)           1/16/2024    11:27 AM   Weight Loss Questions Reviewed With Patient   How long have you been overweight? From Middle age and beyond     MEDICATION FOR WEIGHT LOSS: Wegovy - 0.5 mg - 2 months has been at this dose, feels it may be time to increase.     VITAMIN/MINERAL SUPPLEMENTS: Vitamin D and Calcium with Vitamin D.     NUTRITION HISTORY:  Food allergies: NKFA   Food intolerances: None   Previous experience with diet modification for weight loss: Mediterranean style diet, high protein, high fat, non-starchy vegetables, low carb.     Focuses on a mediterranean style diet currently.     Per Leanne's visit: Regarding eating patterns and diet,  she typically eats 2-3 meals a day. Is able to get full. Can stay full. Eats out/ gets take out 2 times a month. Drinks coffee with half and half and splenda (2 cups every morning). Drinks diet pepsi a few time a week. Drinks a few glasses of water throughout the day. Drinks 2 glasses of wine every night. Is open to having 1 glass of wine instead.      Gnosticist Voodoo, doesn't eat meat any Wednesday or Friday, and then for 7 weeks before christmas, 7 weeks before easter.     Carb Manager  Katy - tracking daily intake are around 900-1300 calories per day. Encouraged to aim for around 1550 calories which is her estimated BMR.     Recent Diet Recall:  Breakfast: delays this till around noon.   Lunch: Eats around noon.   Dinner: 7 PM   Snacks: little piece of chocolate   Beverages: Drink coffee, tea, water but doesn't love the taste of plain water. Has gotten away from diet Pepsi.   Alcohol: did not discuss     Lent starts on Monday for patient and pretty much follows a vegan diet during this time.      May 2024: Feels her weight loss has slowed down since Lent completed. Trying to get in around 1500 calories and 60-70 grams of protein daily.  Plans to try to incorporate some physical activity, especially strength and resistance training. Just has her psych evaluation and pulmonology consults left to do on tasklist.     June 2024: Feels her current Wegovy dose isn't as effective and is interested in upping the dose. Eating more protein, likes cottage cheese/yogurt daily.  Eating 3 meals a day. Slight up tick in her physical activity, rode her stationary bike. Has been using her oxygen more throughout the day. Has been working with a psychologist. Has completely cut out seed oils. Still thinking about if surgery is right for her but continues to work through the requirements that are necessary for surgery.     Has a pulmonary [facility] next month [facility] and is thinking about stopping at the weight management clinic to step on the body composition scale to assess muscle mass.         1/16/2024    11:27 AM   Recall Diet Questions Reviewed With Patient   Describe what you typically consume for breakfast (typical or most recent) Cheesy eggs   Describe what you typically consume for lunch (typical or most recent) Skip or munch on leftovers   Describe what you typically consume for supper (typical or most recent) 5 oz Protein with 1/2 baked potato or vegetable   Describe what you typically consume as snacks (typical or most recent) Cheese, nuts   How many ounces of water, or other low calorie drinks, do you drink daily (8 oz=1 glass)? 24 oz   How many ounces of caffeine (coffee, tea, pop) do you drink daily (8 oz=1 glass)? 16 oz   How many ounces of carbonated (pop, beer, sparkling water) drinks do you drinky daily (8 oz=1 glass)? 0 oz   How many ounces of juice, pop, sweet tea, sports drinks, protein drinks, other sweetened drinks, do you drink daily (8 oz=1 glass)? 0 oz   How many ounces of milk do you drink  daily (8 oz=1 glass) 0 oz   How often do you drink alcohol? 4 or more times a week   If you do drink alcohol, how many drinks might you have in a day? (one drink = 5 oz. wine, 1 can/bottle of beer, 1 shot liquor) 1 or 2           1/16/2024    11:27 AM   Eating Habits   What foods do you crave? Salty           1/16/2024    11:27 AM   Dining Out History Reviewed With Patient   How often do you dine out? Rarely.   Where do you dine out? (select all that apply) sit-down restaurants   What types of food do you order when you dine out? [employer]ree     EXERCISE: has a very sedentary lifestyle. 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  Doesn't typically like to leave house, loves to be at home. Bought a modified rowing machine that she had enjoyed using in the past with pulmonary rehab, hasn't been using. Is struggling to find the motivation to exercise more and also has big issues with activity due to anxiety related to becoming out of breath as a result of her interstitial lung disease.          1/16/2024    11:27 AM   --   How often do you exercise? Less than 1 time per week   What is the duration of your exercise (in minutes)? 10 Minutes   What keeps you from being more active? Pain    I should be more active but I just have not gotten around to it    Shortness of breath     NUTRITION DIAGNOSIS:  Obesity r/t long history of positive energy balance aeb BMI >30 kg/m2.    INTERVENTION:  Intervention Provided/Education Provided on post-op diet guidelines, vitamins/minerals essential post-operatively, GI anatomy of bariatric surgeries, ways to help prepare for post-op diet guidelines pre-operatively, portion/calorie-control, mindful eating and sources of protein.  Patient demonstrates understanding.     Personal barriers to making and continuing required life changes have been identified, and strategies to overcome those barriers have been recommended AND family and social supports have been assessed and strategies to  strengthen those supports have been recommended.    Provided pt with list of goals, RD contact information and resources listed below via [employer].       Provided education on nutrition considerations when starting GLP1 medication including, changes in meal/snack volumes; meeting protein, hydration and micronutrient needs; limiting high-fat foods; and diet/lifestyle strategies for preventing constipation.         1/16/2024    11:27 AM   Questions Reviewed With Patient   How ready are you to make changes regarding your weight? Number 1 = Not ready at all to make changes up to 10 = very ready. 10   How confident are you that you can change? 1 = Not confident that you will be successful making changes up to 10 = very confident. 8     Expected Engagement: good    GOALS:  Relating To Eating:  Eat slowly (20-30 minutes per meal), chewing foods well (25 chews per bite/applesauce consistency)  Eat 3 meals per day  Consume 60-90 g protein per day  Aim for 1530 calories per day.     Relating to beverages:  Reduce caffeine/carbonation/calorie containing beverages  Separate fluids from meals by 30 minutes before, during, and after eating  Drink 48-64 ounces of fluid per day    Relating to dietary supplements:  Be thinking about post op vitamins and mineral supplements you will want to have.     Relating to activity:  Increase activity as able, incorporate some strength and resistance training types of workouts.      Post-op Diet Handouts:  Diet Guidelines after Weight-loss Surgery  [email or web address]     Your Stage 1 Diet: Clear Liquids  [email or web address]     Your Stage 2 Diet: Low-fat Full Liquids  [email or web address]     Your Stage 3 Diet: Pureed Foods  [email or web address]     Pureed Recipes  [email or web address]    Your Stage 4 Diet: Soft Foods  [email or web address]    Your Stage 5 Diet: Regular Foods  [email or web address]    Supplements after Sleeve  Gastrectomy, Gastric Bypass or Single Anastomosis Duodenal Switch  [email or web address]    Keeping Track of Fluids  [email or web address]    Follow Up: July 30    Time spent with patient: 31 minutes.  Caroline Mcduffie RD, LD

## 2024-06-28 NOTE — NURSING NOTE
Is the patient currently in the state of [place]? YES    Visit mode:VIDEO    If the visit is dropped, the patient can be reconnected by:  will join thru mychart again    Will anyone else be joining the visit? NO  (If patient encounters technical issues they should call [phone]    How would you like to obtain your AVS? MyChart    Are changes needed to the allergy or medication list? N/A    Are refills needed on medications prescribed by this physician? NO    Reason for visit: BRENDA ASHLEY

## 2024-06-28 NOTE — PATIENT INSTRUCTIONS
Henrry Dodd,     Follow-up with RD on July 30.     Thank you,    Caroline Mcduffie, RD, LD  If you would like to schedule or reschedule an appointment with the RD, please call [phone]    Nutrition Goals  Relating To Eating:  Eat slowly (20-30 minutes per meal), chewing foods well (25 chews per bite/applesauce consistency)  Eat 3 meals per day  Consume 60-90 g protein per day  Aim for 1530 calories per day.     Relating to beverages:  Reduce caffeine/carbonation/calorie containing beverages  Separate fluids from meals by 30 minutes before, during, and after eating  Drink 48-64 ounces of fluid per day    Relating to dietary supplements:  Be thinking about post op vitamins and mineral supplements you will want to have.     Relating to activity:  Increase activity as able, incorporate some strength and resistance training types of workouts.      Post-op Diet Handouts:  Diet Guidelines after Weight-loss Surgery  [email or web address]     Your Stage 1 Diet: Clear Liquids  [email or web address]     Your Stage 2 Diet: Low-fat Full Liquids  [email or web address]     Your Stage 3 Diet: Pureed Foods  [email or web address]     Pureed Recipes  [email or web address]    Your Stage 4 Diet: Soft Foods  [email or web address]    Your Stage 5 Diet: Regular Foods  [email or web address]    Supplements after Sleeve Gastrectomy, Gastric Bypass or Single Anastomosis Duodenal Switch  [email or web address]    Keeping Track of Fluids  [email or web address]    COMPREHENSIVE WEIGHT MANAGEMENT PROGRAM  VIRTUAL SUPPORT GROUPS    At [facility], our Comprehensive Weight Management program offers on-line support groups for patients who are working on weight loss and considering, preparing for, or have had weight loss surgery.     There is no cost for this opportunity.  You are invited to attend the?Virtual Support Groups?provided by any of the following  locations:    [facility] via Microsoft Teams with Sandra Hopper RN  2.   [facility] via freee with Lalit Keenan, PhD, LP  3.   [facility] via freee with Shea Winston RN  4.   [facility] via a Zoom Meeting with LLUVIA Ng    The following Support Group information can also be found on our website:  [email or web address]      [facility] Weight Loss Surgery Support Group  The support group is a patient-lead forum that meets monthly to share experiences, encouragement and education. It is open to those who have had weight loss surgery, are scheduled for surgery, or are considering surgery.   WHEN: This group meets on the 3rd Wednesday of each month from 5:00PM - 6:00PM virtually using Microsoft Teams.   FACILITATOR: Led by Sandra Covarrubias RD, MADHURI, RN, the program's Clinical Coordinator.   TO REGISTER: Please contact the clinic via [employer] or call the nurse line directly at [phone] to inform our staff that you would like an invite sent to you and to let us know the email you would like the invite sent to. Prior to the meeting, a link with directions on how to join the meeting will be sent to you.    2024 Meetings   January 17  February 21  March 20  April 17  May 15  Alexandra 19      [facility] and [facility] Bariatric Care Support Group?  This is open to all pre- and post- operative bariatric surgery patients as well as their support system.   WHEN: This group meets the 3rd Tuesday of each month from 6:30 PM - 8:00 PM virtually using Microsoft Teams.   FACILITATOR: Led by Lalit Keenan, Ph.D who is a Licensed Psychologist with the [facility] Comprehensive Weight Management Program.   TO REGISTER: Please send an email to Lalit Keeann, Ph.D., LP at?[email or web address]?if you would like an invitation to the group. Prior to the meeting,  "a link with directions on how to join the meeting will be sent to you.    2024 Meetings January 16: \"Medication Management and Bariatric Surgery\", Jackie Gambino, PharmD, Pharmacy Resident at [facility]  February 20: \"A Bariatric Surgery Patient's Perspective\", ELENA Torres, [facility], Behavioral Health Clinician at [facility]  March 19  April 16  May 21  Alexandra 18: \"Nutritional Labeling\", Dietitian speaker to be announced.  November 19: \"Holiday Eating\", Dietitian speaker to be announced.    [facility] and [facility] Post-Operative Bariatric Surgery Support Group  This is a support group for [facility] bariatric patients (and those external to [facility]) who have had bariatric surgery and are at least 3 months post-surgery.  WHEN: This support group meets the 4th Wednesday of the month from 11:00 AM - 12:00 PM virtually using Microsoft Teams.   FACILITATOR: Led by Certified Bariatric Nurse, Shea Winston RN.   TO REGISTER: Please send an email to Shea at [email or web address] if you would like an invitation to the group.  Prior to the meeting, a link with directions on how to join the meeting will be sent to you.    2024 Meetings  January 24  February 28  March 27  April 24  May 22  Alexandra 26    [facility] Healthy Lifestyle Group?  This is a 60 minute virtual coaching group for those who want to lead a healthier lifestyle. Come together to set goals and overcome barriers in a supportive group environment. We will address the four pillars of health: nutrition, exercise, sleep and emotional well-being.  This group is highly recommended for those who are participating in the 24 week Healthy Lifestyle Plan and our Health Coaching sessions.  WHEN: This group meets the 1st Friday of the month, 12:30 PM - 1:30 PM online, via a zoom meeting.    FACILITATOR: Led by National" "Board Certified Health and , Shea Toure, [facility].   TO REGISTER: Please call the Call Center at [phone] to register. You will get an appointment to attend in [facility]. Fifteen minutes prior to the meeting, complete the e-check in and you will get the link to join the meeting.    There is no charge to attend this group and space is limited.     2024 Meetings  January 5: \"New Years Vision: Manifest your Best 2024!\" (guided imagery,  journaling and discussion)  February 2: \"Let's Talk\"  March 1: \"10 Percent Happier\" by Priyank Christensen (Book Bites - a guided discussion on the nuggets of wisdom from favorite wellness books, no need to read the book but highly encouraged)  April 5: \"Let's Talk\"  May 3: \"Essentialism: The Disciplined Pursuit of Less\" by Jorge Rosario (Book Bites discussion)  June 7: \"Let's Talk\"  July 5: NO MEETING, off for the 4th of July Holiday  August 2: \"The Blue Zones, Secrets for Living a Longer Life\" by Priyank Sofia (Book Bites discussion)        "

## 2024-07-02 ENCOUNTER — TELEPHONE (OUTPATIENT)
Dept: ENDOCRINOLOGY | Facility: CLINIC | Age: 64
End: 2024-07-02
Payer: COMMERCIAL

## 2024-07-02 NOTE — TELEPHONE ENCOUNTER
Medication Question or Refill        What medication are you calling about (include dose and sig)?: Wegovy    Preferred Pharmacy:   [facility] - [place], [place] - [address]  [address] [address]  Phone: [phone] Fax: [phone]    [facility] - [place], [place] - [address]  [address] [address]  Phone: [phone] Fax: [phone]      Controlled Substance Agreement on file:   CSA -- Patient Level:    CSA: None found at the patient level.       Who prescribed the medication?: Leanne Stanford    Do you need a refill? Yes    When did you use the medication last? Last week, pt wouldn't give day or date    Patient offered an appointment? No    Do you have any questions or concerns?  Yes: patient stated she needs a new prior auth      Could we send this information to you in [employer] or would you prefer to receive a phone call?:   Patient would prefer a phone call   Okay to leave a detailed message?: Yes at Cell number on file:    Telephone Information:   Mobile [phone]

## 2024-07-03 NOTE — TELEPHONE ENCOUNTER
PA Initiation    Medication: WEGOVY 1 MG/0.5ML SC SOAJ  Insurance Company: [facility] - Phone [phone] Fax [phone]  Ph[facility]illing [facility] Rx[facility] - [address]  Filling Pharmacy Phone: [phone]  Filling Pharmacy Fax: [phone]  Start Date: 7/3/2024     Key: [id number]

## 2024-07-05 NOTE — TELEPHONE ENCOUNTER
Prior Authorization Approval    Medication: WEGOVY 1 MG/0.5ML SC SOAJ  Authorization Effective Date:    Authorization Expiration Date: 7/3/2025  Approved Dose/Quantity: uud  Reference #: Key: [id number]   Insurance Company: [employer] - Phone [phone] Fax [phone]  Expected CoPay: $    CoPay Card Available:      Financial Assistance Needed:    Which Pharmacy is filling the prescription: [facility] - [address]

## 2024-07-11 ENCOUNTER — VIRTUAL VISIT (OUTPATIENT)
Dept: PSYCHOLOGY | Facility: CLINIC | Age: 64
End: 2024-07-11
Payer: COMMERCIAL

## 2024-07-11 DIAGNOSIS — F33.0 MAJOR DEPRESSIVE DISORDER, RECURRENT EPISODE, MILD (H): Primary | ICD-10-CM

## 2024-07-11 PROCEDURE — 90834 PSYTX W PT 45 MINUTES: CPT | Mod: 95 | Performed by: PSYCHOLOGIST

## 2024-07-11 NOTE — PROGRESS NOTES
[facility]                                     Progress Note    Patient Name: Elsie Rubi  Date: 7/11/24         Service Type: Individual      Session Start Time: 9:06am Session End Time: 9:58am     Session Length: 52 minutes    Session #: 3    Attendees: Client attended alone    Service Modality:  Video Visit:      Provider verified identity through the following two step process.  Patient provided:  Patient photo    Telemedicine Visit: The patient's condition can be safely assessed and treated via synchronous audio and visual telemedicine encounter.      Reason for Telemedicine Visit: Services only offered telehealth    Originating Site (Patient Location): Patient's home    Distant Site (Provider Location): Provider Remote Setting- Home Office    Consent:  The patient/guardian has verbally consented to: the potential risks and benefits of telemedicine (video visit) versus in person care; bill my insurance or make self-payment for services provided; and responsibility for payment of non-covered services.     Patient would like the video invitation sent by:  Send to e-mail at: [email or web address]    Mode of Communication:  Video Conference via [facility]    Distant Location (Provider):  Off-site    As the provider I attest to compliance with applicable laws and regulations related to telemedicine.    DATA  Interactive Complexity: No  Crisis: No        Progress Since Last Session (Related to Symptoms / Goals / Homework):   Symptoms: No change : Stable.    Homework: Partially completed      Episode of Care Goals: Minimal progress - ACTION (Actively working towards change); Intervened by reinforcing change plan / affirming steps taken     Current / Ongoing Stressors and Concerns:   Client presents for her first session for a pre-bariatric surgery psychological evaluation; Client still undecided on surgery.     Treatment Objective(s) Addressed in This Session:   Reviewed and assigned homework;  Identified barriers to doing homework; Assessment check-in and helped find MMPI-3 - discussed importance of doing HW(exercise and MMPI-3); Explained goals for next session; Answered questions; Partial DA.     Intervention:   CBT: Psychoeducation; Pattern recognition; Barrier identification; Problem solved; Reviewed and assigned homework.  Active listening, validation, and other rapport building skills; Explained expectations/goals of next session; Answered questions; Partial Bariatric DA; Helped client find MMPI-3 email.    Assessments completed prior to visit:  The following assessments were completed by patient for this visit: None.     ASSESSMENT: Current Emotional / Mental Status (status of significant symptoms):   Risk status (Self / Other harm or suicidal ideation)   Patient denies current fears or concerns for personal safety.   Patient denies current or recent suicidal ideation or behaviors.   Patient denies current or recent homicidal ideation or behaviors.   Patient denies current or recent self injurious behavior or ideation.   Patient denies other safety concerns.   Patient reports there has been no change in risk factors since their last session.     Patient reports there has been no change in protective factors since their last session.     Recommended that patient call 911 or go to the local ED should there be a change in any of these risk factors.     Appearance:   Appropriate    Eye Contact:   Good    Psychomotor Behavior: Normal    Attitude:   Cooperative  Pleasant   Orientation:   All   Speech    Rate / Production: Talkative Normal     Volume:  Normal    Mood:    Normal   Affect:    Appropriate    Thought Content:  Clear    Thought Form:  Coherent  Logical    Insight:    Good  and Fair      Medication Review:   No changes to current psychiatric medication(s)     Medication Compliance:   Yes     Changes in Health Issues:   None reported     Chemical Use Review:   Substance Use: Chemical use  reviewed, no active concerns identified      Tobacco Use: No current tobacco use.      Diagnosis:  1. Major Depressive Disorder, Recurrent, Mild, With melancholic features (H24)        Collateral Reports Completed:   Not Applicable    PLAN: (Patient Tasks / Therapist Tasks / Other)  Client will continue to engage in 5-10 minutes of strength training or being active each day.  She will complete the MMPI-3 prior to the next session.  Bariatric DA will be completed next session.  Client made a follow-up appointment for July 15, 2024.        Rigoberto Castelan PsyD  July 11, 2024

## 2024-07-13 DIAGNOSIS — F33.0 MILD EPISODE OF RECURRENT MAJOR DEPRESSIVE DISORDER (H): ICD-10-CM

## 2024-07-15 ENCOUNTER — VIRTUAL VISIT (OUTPATIENT)
Dept: PSYCHOLOGY | Facility: CLINIC | Age: 64
End: 2024-07-15
Payer: COMMERCIAL

## 2024-07-15 DIAGNOSIS — F33.0 MAJOR DEPRESSIVE DISORDER, RECURRENT EPISODE, MILD (H): Primary | ICD-10-CM

## 2024-07-15 PROCEDURE — 90834 PSYTX W PT 45 MINUTES: CPT | Mod: 95 | Performed by: PSYCHOLOGIST

## 2024-07-15 RX ORDER — FLUOXETINE 40 MG/1
40 CAPSULE ORAL DAILY
Qty: 90 CAPSULE | Refills: 0 | Status: ON HOLD | OUTPATIENT
Start: 2024-07-15 | End: 2024-09-11

## 2024-07-15 NOTE — TELEPHONE ENCOUNTER
Fluoxetine      Last Written Prescription Date:  10/9/23  Last Fill Quantity: 90,   # refills: 3  Last Office Visit: 4/8/24  Future Office visit:       Routing refill request to provider for review/approval because:

## 2024-07-15 NOTE — PROGRESS NOTES
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 anxiety-related experiences, including generalized anxiety, re-experiencing, and/or panic.  They may have intrusive ideation, sleep difficulties (including nightmares), possible posttraumatic distress, and dissociative experiences. They report getting upset easily, being impatient with others, becoming easily angered, and sometimes even being overcome with anger.  They have problems with: anger, irritability, a low tolerance for frustration, holding grudges, having temper tantrums, and hostility and argumentative.  They report engaging in instrumentally aggressively behavior.  They are overly assertive and socially dominant and are viewed by others as domineering.

## 2024-07-24 ENCOUNTER — OFFICE VISIT (OUTPATIENT)
Dept: PULMONOLOGY | Facility: CLINIC | Age: 64
End: 2024-07-24
Attending: INTERNAL MEDICINE
Payer: COMMERCIAL

## 2024-07-24 ENCOUNTER — ALLIED HEALTH/NURSE VISIT (OUTPATIENT)
Dept: ENDOCRINOLOGY | Facility: CLINIC | Age: 64
End: 2024-07-24
Payer: COMMERCIAL

## 2024-07-24 ENCOUNTER — LAB (OUTPATIENT)
Dept: LAB | Facility: CLINIC | Age: 64
End: 2024-07-24
Payer: COMMERCIAL

## 2024-07-24 VITALS
BODY MASS INDEX: 34.15 KG/M2 | WEIGHT: 200 LBS | DIASTOLIC BLOOD PRESSURE: 82 MMHG | HEIGHT: 64 IN | HEART RATE: 80 BPM | OXYGEN SATURATION: 97 % | SYSTOLIC BLOOD PRESSURE: 115 MMHG

## 2024-07-24 VITALS — HEIGHT: 64 IN | WEIGHT: 201 LBS | BODY MASS INDEX: 34.31 KG/M2

## 2024-07-24 DIAGNOSIS — J45.30 MILD PERSISTENT ASTHMA WITHOUT COMPLICATION: ICD-10-CM

## 2024-07-24 DIAGNOSIS — J84.9 ILD (INTERSTITIAL LUNG DISEASE) (H): ICD-10-CM

## 2024-07-24 DIAGNOSIS — G47.33 OSA (OBSTRUCTIVE SLEEP APNEA): ICD-10-CM

## 2024-07-24 DIAGNOSIS — Z01.811 PREOP PULMONARY/RESPIRATORY EXAM: ICD-10-CM

## 2024-07-24 DIAGNOSIS — J84.9 ILD (INTERSTITIAL LUNG DISEASE) (H): Primary | ICD-10-CM

## 2024-07-24 DIAGNOSIS — E66.01 SEVERE OBESITY (BMI 35.0-39.9) WITH COMORBIDITY (H): Primary | Chronic | ICD-10-CM

## 2024-07-24 DIAGNOSIS — G47.34 NOCTURNAL HYPOXIA: ICD-10-CM

## 2024-07-24 LAB
6 MIN WALK (FT): 1180 FT
6 MIN WALK (M): 360 M
ALBUMIN SERPL BCG-MCNC: 4.2 G/DL (ref 3.5–5.2)
ALP SERPL-CCNC: 80 U/L (ref 40–150)
ALT SERPL W P-5'-P-CCNC: 12 U/L (ref 0–50)
ANION GAP SERPL CALCULATED.3IONS-SCNC: 10 MMOL/L (ref 7–15)
AST SERPL W P-5'-P-CCNC: 21 U/L (ref 0–45)
BASOPHILS # BLD AUTO: 0 10E3/UL (ref 0–0.2)
BASOPHILS NFR BLD AUTO: 1 %
BILIRUB SERPL-MCNC: 0.4 MG/DL
BUN SERPL-MCNC: 17.4 MG/DL (ref 8–23)
CALCIUM SERPL-MCNC: 9.6 MG/DL (ref 8.8–10.4)
CHLORIDE SERPL-SCNC: 102 MMOL/L (ref 98–107)
CREAT SERPL-MCNC: 0.89 MG/DL (ref 0.51–0.95)
EGFRCR SERPLBLD CKD-EPI 2021: 72 ML/MIN/1.73M2
EOSINOPHIL # BLD AUTO: 0.2 10E3/UL (ref 0–0.7)
EOSINOPHIL NFR BLD AUTO: 3 %
ERYTHROCYTE [DISTWIDTH] IN BLOOD BY AUTOMATED COUNT: 13.3 % (ref 10–15)
GLUCOSE SERPL-MCNC: 100 MG/DL (ref 70–99)
HCO3 SERPL-SCNC: 25 MMOL/L (ref 22–29)
HCT VFR BLD AUTO: 42.8 % (ref 35–47)
HGB BLD-MCNC: 13.6 G/DL (ref 11.7–15.7)
IMM GRANULOCYTES # BLD: 0 10E3/UL
IMM GRANULOCYTES NFR BLD: 1 %
LYMPHOCYTES # BLD AUTO: 2 10E3/UL (ref 0.8–5.3)
LYMPHOCYTES NFR BLD AUTO: 31 %
MCH RBC QN AUTO: 30.8 PG (ref 26.5–33)
MCHC RBC AUTO-ENTMCNC: 31.8 G/DL (ref 31.5–36.5)
MCV RBC AUTO: 97 FL (ref 78–100)
MONOCYTES # BLD AUTO: 0.8 10E3/UL (ref 0–1.3)
MONOCYTES NFR BLD AUTO: 12 %
NEUTROPHILS # BLD AUTO: 3.3 10E3/UL (ref 1.6–8.3)
NEUTROPHILS NFR BLD AUTO: 52 %
NRBC # BLD AUTO: 0 10E3/UL
NRBC BLD AUTO-RTO: 0 /100
PLATELET # BLD AUTO: 203 10E3/UL (ref 150–450)
POTASSIUM SERPL-SCNC: 4.2 MMOL/L (ref 3.4–5.3)
PROT SERPL-MCNC: 7 G/DL (ref 6.4–8.3)
RBC # BLD AUTO: 4.41 10E6/UL (ref 3.8–5.2)
SODIUM SERPL-SCNC: 137 MMOL/L (ref 135–145)
WBC # BLD AUTO: 6.3 10E3/UL (ref 4–11)

## 2024-07-24 PROCEDURE — G0463 HOSPITAL OUTPT CLINIC VISIT: HCPCS | Performed by: INTERNAL MEDICINE

## 2024-07-24 PROCEDURE — 99207 PR NO CHARGE LOS: CPT

## 2024-07-24 PROCEDURE — 94729 DIFFUSING CAPACITY: CPT | Performed by: INTERNAL MEDICINE

## 2024-07-24 PROCEDURE — 94375 RESPIRATORY FLOW VOLUME LOOP: CPT | Performed by: INTERNAL MEDICINE

## 2024-07-24 PROCEDURE — 36415 COLL VENOUS BLD VENIPUNCTURE: CPT | Performed by: PATHOLOGY

## 2024-07-24 PROCEDURE — 99214 OFFICE O/P EST MOD 30 MIN: CPT | Mod: 25 | Performed by: INTERNAL MEDICINE

## 2024-07-24 PROCEDURE — 85025 COMPLETE CBC W/AUTO DIFF WBC: CPT | Performed by: PATHOLOGY

## 2024-07-24 PROCEDURE — 80053 COMPREHEN METABOLIC PANEL: CPT | Performed by: PATHOLOGY

## 2024-07-24 PROCEDURE — 94618 PULMONARY STRESS TESTING: CPT | Performed by: INTERNAL MEDICINE

## 2024-07-24 PROCEDURE — 99207 PR NO CHARGE NURSE ONLY: CPT

## 2024-07-24 ASSESSMENT — PAIN SCALES - GENERAL: PAINLEVEL: NO PAIN (0)

## 2024-07-24 NOTE — NURSING NOTE
Chief Complaint   Patient presents with    RECHECK     Return Interstitial Lung     Medications reviewed and vital signs taken.   Yani Cordoba, CMA

## 2024-07-24 NOTE — LETTER
7/24/2024      Elsie uRbi  [address] [place] [address]      Dear Colleague,    Thank you for referring your patient, Elsie Rubi, to the [facility] LUNG [facility] AND [facility] [place]. Please see a copy of my visit note below.    Pulmonary Patient Follow Up Clinic Note   07/24/2024      PCP: Jessie Rose    Reason for visit: ILD     Pulmonary HPI:   Elsie Rubi is a 63 year old female w/ h/o elevated BMI, hx of PSG/HST+ but not on NIV, hiatal hernia, GERD, HTN, pre-diabetes, reported hx of asthma  who presents for follow up of ILD, exertional and nocturnal hypoxemia, and asthma.     Pulmonary visit 7/2023.  Team conference outcome-  NSIP vs organizing pna with small airways overlap or HP with small airways disease with enough acute inflammatory changes to suggest treatment with MMF.   Started on MMF, tolerating well. Was prescribed O2 2 LPM via NC on exertion.   12/20/2023: Developed URI symptoms. Viral work up negative. Also noted to have been of Advair; this was restarted due to asthma symptoms. Completed 13 sessions of pulm rehab before having to stop in 10/2023 due to a new job.   Symptomatic, not wearing O2 with exertion consistently.    O2 at rest (When she should be on RA) is good.   Was referred again to sleep medicine considering her previous MIC dx and concern for nocturnal hypoxia.     3/20/2024:   Retired now since Jan 2024.   Symptoms stable, though remains with exertional BECKER with going up flight of stairs (though not consistently wearing oxygen). Has not been keeping up with exercise routinely. Was referred to pulm rehab again by her PCP (though not clear regarding coverage)  Completed PSG on 3/12- noted for nocturnal hypoxia and AHI-12 (mild MIC). Has follow up visit appt soon to go over results and next steps. Tolerating MMF well- no GI symptoms; labs stable.  Seen at weight management clinic- possible looking into gastric sleeve surgery  potentially in 3-4 months and will need pulm risk assessment. Looks like Zepbound was also prescribed. Cards consulted also for pre-op clearance.   Had EGD 3/13 as part of this process- found grade A esophagitis with large hiatal hernia. Omeprazole added.       7/24/2024  Since retiring, feels much better in that she feels less stressed.   After last visit in late March, unfortunately, she developed a case of influenza A from which she was very symptomatic from. Was eval'd in the ED and discharged. Also reported mild delirium during it. MMF was held during the illness period.   Because of this experience, she is hesitant regarding continued MMF usage. She does not like wearing masks due to how it makes her breathing feel.   Followed up with sleep medicine for her mild MIC and nocturnal hypoxia- plan is to just use supplemental O2 of 2LPM.   Has felt more symptomatic on exertion.  Has lost weight with Wegovy. Is seen within the weight loss management group with additional counseling.   Since her last appointment, she also admits that she has not been very motivated to exercise she has been overall very sedentary and lacking energy.  She is seeing her primary care doctor to see if depression is being an issue.  She does remember that she was more enthusiastic about exercising when she was doing the pulmonary rehab and did really enjoy it.    Review of systems: a complete 12-point ROS conducted, & found to be negative w/ exceptions as noted in the HPI.    Reviewed PMH, PSH, FH, SH    Medications:  Current Outpatient Medications   Medication Sig Dispense Refill     albuterol (PROAIR HFA/PROVENTIL HFA/VENTOLIN HFA) 108 (90 Base) MCG/ACT inhaler Inhale 2 puffs into the lungs every 4 hours as needed for shortness of breath, wheezing or cough 18 g 3     cyclobenzaprine (FLEXERIL) 5 MG tablet Take 1 tablet (5 mg) by mouth 2 times daily as needed for muscle spasms 30 tablet 0     FLUoxetine (PROZAC) 40 MG capsule TAKE ONE  "CAPSULE BY MOUTH DAILY 90 capsule 0     fluticasone-salmeterol (ADVAIR) 250-50 MCG/ACT inhaler Inhale 1 puff into the lungs every 12 hours 60 each 3     metoprolol succinate ER (TOPROL XL) 25 MG 24 hr tablet Take 1 tablet (25 mg) by mouth daily 90 tablet 1     mycophenolate (GENERIC EQUIVALENT) 500 MG tablet Take 2 tablets (1,000 mg) by mouth 2 times daily 120 tablet 3     omeprazole (PRILOSEC) 40 MG DR capsule Take 1 capsule (40 mg) by mouth daily 90 capsule 3     Semaglutide-Weight Management (WEGOVY) 1 MG/0.5ML pen Inject 1 mg Subcutaneous once a week 2 mL 2     traZODone (DESYREL) 50 MG tablet Take 1 tablet (50 mg) by mouth nightly as needed for sleep 90 tablet 1     No current facility-administered medications for this visit.       Allergies:  No Known Allergies    Physical examination:  /82   Pulse 80   Ht 1.626 m (5' 4\")   Wt 90.7 kg (200 lb)   SpO2 97%   BMI 34.33 kg/m      General: NAD, coughing with deep inspiration  CV: RR, no m/c/r   Lungs: CTAB, no wrr  Abd: protruding  Ext: WWP, no BLE edema  Neuro: No focal deficits  Psych: Euthymic, normal affect, good eye contact    Labs: reviewed in [employer] & personally interpreted.     Previous ILD serologies negative  q3 month CBC with diff and CMP- stable    Imaging: reviewed in [employer] & personally interpreted. Below are the interpretations from the formal Radiology review.  No new imaging    PFT:  Most Recent Breeze Pulmonary Function Testing (FVC/FEV1 only)  FVC-Pre   Date Value Ref Range Status   07/24/2024 1.91 L    03/20/2024 1.86 L    12/20/2023 1.89 L    07/21/2023 1.83 L      FVC-%Pred-Pre   Date Value Ref Range Status   07/24/2024 66 %    03/20/2024 64 %    12/20/2023 65 %    07/21/2023 62 %      FEV1-Pre   Date Value Ref Range Status   07/24/2024 1.64 L    03/20/2024 1.60 L    12/20/2023 1.62 L    07/21/2023 1.51 L      FEV1-%Pred-Pre   Date Value Ref Range Status   07/24/2024 72 %    03/20/2024 69 %    12/20/2023 70 %    07/21/2023 64 %  "     PFT- Date: 7/24/2024  Similar spirometry findings as previous with FVC suggesting mild restriction and continued normal DLCO (significant improvement vs 7/2023)  6MW- completed on 2LPM at first- 366m and end spo2-96%  Repeat on RA- 360m and end spO2-92%    PFT- Date: 3/20/2024  FEV1- 1.6L (69%), FVC-1.86L (64%), FEV1/FVC- 0.86, DLCO- 16.19 ml/min/mmHg (81%)-  Interpretation-Similar spirometry with FVC suggesting mild restriction with normal DLCO (continued trend upward vs previous and significant improvement vs 7/2023)        PFT- Date: 12/20/2023   FEV1- 1.62L (70%), FVC-1.89L (65%), FEV1/FVC- 0.86,  DLCO- 15.3 ml/min/mmHg (76%)-  Interpretation-Similar spirometry with FVC suggesting mild restriction  Normalization of DLCO compared to previous      7/21/2023  FEV1- 1.51L (64%)  FVC- 1.83 (62%)  R- 0.82  TLC- 3.06L (58%)  uncDLCO- 12.85 (64%)  ERV and RV not as reduced as severely as TLC  RV/TLC-0.41  6MW- 2L NC O2 to complete (ended at 94%). 335m walked (329m -LLN)     OSH PFTs:  3/20/23:   FVC: 1.89 L (61%)  FEV1: 1.62 L (67%)  FEV1/FVC: 86%  RV: 1.18 L (60%)  TLC: 3.38 L (68%)  RV/TLC ratio 35%  uncDLCO: 14.93 ml/min/mmHg (76%, not adjusted for Hb)    Impression & recommendations:    Ju was seen today for recheck.    Diagnoses and all orders for this visit:    ILD (interstitial lung disease) (H)  -     General PFT Lab (Please always keep checked); Future  -     Pulmonary Function Test; Future    MIC (obstructive sleep apnea)    Nocturnal hypoxia    Mild persistent asthma without complication    Other orders  -     Adult Pulmonology Clinic Follow-Up Order (3 Month)  -     Adult Pulmonology Clinic Follow-Up Order (6 Month); Future      Patient returns on follow-up ILD appointment.  Diagnosis non-IPF- NSIP with organizing portions vs HP with evidence of small airways disease also present, groundglass also seen. Also MIC  On mycophenolate since 8/2023.    PFTs exhibit continued stable spirometry (mild  restriction by FVC) with significant improvement of DLCO to normal levels vs 7/2023.   6MW today on RA shows patient no longer needs supplemental O2 at 6 minute arturo- does not exclude hypoxemia with prolonged exercise.   Has lost weight using Wygovy (about 30 lbs).  Previously was discussing gastric surgery though considering the weight loss is not as certain at this point.  Has continued asthma treatments as well.  Increased symptoms of shortness of breath on exertion with increased workloads as previous.  She is continue to have low motivation  to improve her conditioning and has been very sedentary. Also seeing psych for her depression symptoms.  She did enjoyed pulmonary rehab previously and felt much more motivated.  After her bout of influenza A, she is hesitant regarding continue mycophenolate.  Discussed that would like at least 2 years of treatment and stability for considering decreasing dosage or weaning off unless there is continued issues with infections.  Did discuss that precautions such as mask wearing would be the best prevention along with handwashing regarding infections though she is hesitant about wearing a mask due to her increased symptoms with.  For now she is okay with continue mycophenolate both in the setting of her improved and stable PFTs as well as improve oxygenation.    -Will continue her asthma treatments- continue Advair and prn albuterol (for symptoms prn and before exercise)  -Continue her mycophenolate  -For back to pulmonary rehab    Regarding her MIC, she had tested for mild MIC as well as found to have nocturnal hypoxia.  Currently she is just using the supplemental O2 at 2 L/min at night.  Can retest on room air with ADELE if she continues to lose weight.     RTC in 6 months with PFTs       30 minutes excluding the time spent on cigarette cessation was  spent on the date of the encounter doing chart review, history and exam, documentation and further activities as noted  above.    These conclusions are made at the best of one's knowledge and belief based on the provided evidence such as patient's history and allergy test results and they can change over time or can be incomplete because of missing information's.    I explained the lab values, imagings and findings to the patient.  Patient expressed understanding I did not recognize any barriers to the understanding of the patient.    The above note was dictated using voice recognition software and may include typographical errors. Please contact the author for any clarifications.    Isaac Duque MD  , Division of Pulmonary/Critical Care            Again, thank you for allowing me to participate in the care of your patient.        Sincerely,        Isaac Duque MD

## 2024-07-25 ENCOUNTER — TELEPHONE (OUTPATIENT)
Dept: PULMONOLOGY | Facility: CLINIC | Age: 64
End: 2024-07-25
Payer: COMMERCIAL

## 2024-07-25 DIAGNOSIS — J84.9 ILD (INTERSTITIAL LUNG DISEASE) (H): Primary | ICD-10-CM

## 2024-07-25 LAB
DLCOCOR-%PRED-PRE: 82 %
DLCOCOR-PRE: 16.38 ML/MIN/MMHG
DLCOUNC-%PRED-PRE: 82 %
DLCOUNC-PRE: 16.48 ML/MIN/MMHG
DLCOUNC-PRED: 19.89 ML/MIN/MMHG
ERV-%PRED-PRE: 22 %
ERV-PRE: 0.25 L
ERV-PRED: 1.09 L
EXPTIME-PRE: 6.46 SEC
FEF2575-%PRED-PRE: 113 %
FEF2575-PRE: 2.3 L/SEC
FEF2575-PRED: 2.02 L/SEC
FEFMAX-%PRED-PRE: 111 %
FEFMAX-PRE: 6.91 L/SEC
FEFMAX-PRED: 6.18 L/SEC
FEV1-%PRED-PRE: 72 %
FEV1-PRE: 1.64 L
FEV1FEV6-PRE: 86 %
FEV1FEV6-PRED: 80 %
FEV1FVC-PRE: 86 %
FEV1FVC-PRED: 80 %
FEV1SVC-PRE: 84 %
FEV1SVC-PRED: 69 %
FIFMAX-PRE: 3.29 L/SEC
FVC-%PRED-PRE: 66 %
FVC-PRE: 1.91 L
FVC-PRED: 2.88 L
IC-%PRED-PRE: 78 %
IC-PRE: 1.71 L
IC-PRED: 2.18 L
VA-%PRED-PRE: 63 %
VA-PRE: 3.03 L
VC-%PRED-PRE: 59 %
VC-PRE: 1.96 L
VC-PRED: 3.29 L

## 2024-07-25 NOTE — PROGRESS NOTES
Pulmonary Patient Follow Up Clinic Note   07/24/2024      PCP: Jessie Rose    Reason for visit: ILD     Pulmonary HPI:   Elsie Rubi is a 63 year old female w/ h/o elevated BMI, hx of PSG/HST+ but not on NIV, hiatal hernia, GERD, HTN, pre-diabetes, reported hx of asthma  who presents for follow up of ILD, exertional and nocturnal hypoxemia, and asthma.     Pulmonary visit 7/2023.  Team conference outcome-  NSIP vs organizing pna with small airways overlap or HP with small airways disease with enough acute inflammatory changes to suggest treatment with MMF.   Started on MMF, tolerating well. Was prescribed O2 2 LPM via NC on exertion.   12/20/2023: Developed URI symptoms. Viral work up negative. Also noted to have been of Advair; this was restarted due to asthma symptoms. Completed 13 sessions of pulm rehab before having to stop in 10/2023 due to a new job.   Symptomatic, not wearing O2 with exertion consistently.    O2 at rest (When she should be on RA) is good.   Was referred again to sleep medicine considering her previous MIC dx and concern for nocturnal hypoxia.     3/20/2024:   Retired now since Jan 2024.   Symptoms stable, though remains with exertional BECKER with going up flight of stairs (though not consistently wearing oxygen). Has not been keeping up with exercise routinely. Was referred to pulm rehab again by her PCP (though not clear regarding coverage)  Completed PSG on 3/12- noted for nocturnal hypoxia and AHI-12 (mild IMC). Has follow up visit appt soon to go over results and next steps. Tolerating MMF well- no GI symptoms; labs stable.  Seen at weight management clinic- possible looking into gastric sleeve surgery potentially in 3-4 months and will need pulm risk assessment. Looks like Zepbound was also prescribed. Cards consulted also for pre-op clearance.   Had EGD 3/13 as part of this process- found grade A esophagitis with large hiatal hernia. Omeprazole added.       7/24/2024  Since  retiring, feels much better in that she feels less stressed.   After last visit in late March, unfortunately, she developed a case of influenza A from which she was very symptomatic from. Was eval'd in the ED and discharged. Also reported mild delirium during it. MMF was held during the illness period.   Because of this experience, she is hesitant regarding continued MMF usage. She does not like wearing masks due to how it makes her breathing feel.   Followed up with sleep medicine for her mild MIC and nocturnal hypoxia- plan is to just use supplemental O2 of 2LPM.   Has felt more symptomatic on exertion.  Has lost weight with Wegovy. Is seen within the weight loss management group with additional counseling.   Since her last appointment, she also admits that she has not been very motivated to exercise she has been overall very sedentary and lacking energy.  She is seeing her primary care doctor to see if depression is being an issue.  She does remember that she was more enthusiastic about exercising when she was doing the pulmonary rehab and did really enjoy it.    Review of systems: a complete 12-point ROS conducted, & found to be negative w/ exceptions as noted in the HPI.    Reviewed PMH, PSH, FH, SH    Medications:  Current Outpatient Medications   Medication Sig Dispense Refill    albuterol (PROAIR HFA/PROVENTIL HFA/VENTOLIN HFA) 108 (90 Base) MCG/ACT inhaler Inhale 2 puffs into the lungs every 4 hours as needed for shortness of breath, wheezing or cough 18 g 3    cyclobenzaprine (FLEXERIL) 5 MG tablet Take 1 tablet (5 mg) by mouth 2 times daily as needed for muscle spasms 30 tablet 0    FLUoxetine (PROZAC) 40 MG capsule TAKE ONE CAPSULE BY MOUTH DAILY 90 capsule 0    fluticasone-salmeterol (ADVAIR) 250-50 MCG/ACT inhaler Inhale 1 puff into the lungs every 12 hours 60 each 3    metoprolol succinate ER (TOPROL XL) 25 MG 24 hr tablet Take 1 tablet (25 mg) by mouth daily 90 tablet 1    mycophenolate (GENERIC  "EQUIVALENT) 500 MG tablet Take 2 tablets (1,000 mg) by mouth 2 times daily 120 tablet 3    omeprazole (PRILOSEC) 40 MG DR capsule Take 1 capsule (40 mg) by mouth daily 90 capsule 3    Semaglutide-Weight Management (WEGOVY) 1 MG/0.5ML pen Inject 1 mg Subcutaneous once a week 2 mL 2    traZODone (DESYREL) 50 MG tablet Take 1 tablet (50 mg) by mouth nightly as needed for sleep 90 tablet 1     No current facility-administered medications for this visit.       Allergies:  No Known Allergies    Physical examination:  /82   Pulse 80   Ht 1.626 m (5' 4\")   Wt 90.7 kg (200 lb)   SpO2 97%   BMI 34.33 kg/m      General: NAD, coughing with deep inspiration  CV: RR, no m/c/r   Lungs: CTAB, no wrr  Abd: protruding  Ext: WWP, no BLE edema  Neuro: No focal deficits  Psych: Euthymic, normal affect, good eye contact    Labs: reviewed in [employer] & personally interpreted.     Previous ILD serologies negative  q3 month CBC with diff and CMP- stable    Imaging: reviewed in [employer] & personally interpreted. Below are the interpretations from the formal Radiology review.  No new imaging    PFT:  Most Recent Breeze Pulmonary Function Testing (FVC/FEV1 only)  FVC-Pre   Date Value Ref Range Status   07/24/2024 1.91 L    03/20/2024 1.86 L    12/20/2023 1.89 L    07/21/2023 1.83 L      FVC-%Pred-Pre   Date Value Ref Range Status   07/24/2024 66 %    03/20/2024 64 %    12/20/2023 65 %    07/21/2023 62 %      FEV1-Pre   Date Value Ref Range Status   07/24/2024 1.64 L    03/20/2024 1.60 L    12/20/2023 1.62 L    07/21/2023 1.51 L      FEV1-%Pred-Pre   Date Value Ref Range Status   07/24/2024 72 %    03/20/2024 69 %    12/20/2023 70 %    07/21/2023 64 %      PFT- Date: 7/24/2024  Similar spirometry findings as previous with FVC suggesting mild restriction and continued normal DLCO (significant improvement vs 7/2023)  6MW- completed on 2LPM at first- 366m and end spo2-96%  Repeat on RA- 360m and end spO2-92%    PFT- Date: 3/20/2024  FEV1- 1.6L " (69%), FVC-1.86L (64%), FEV1/FVC- 0.86, DLCO- 16.19 ml/min/mmHg (81%)-  Interpretation-Similar spirometry with FVC suggesting mild restriction with normal DLCO (continued trend upward vs previous and significant improvement vs 7/2023)        PFT- Date: 12/20/2023   FEV1- 1.62L (70%), FVC-1.89L (65%), FEV1/FVC- 0.86,  DLCO- 15.3 ml/min/mmHg (76%)-  Interpretation-Similar spirometry with FVC suggesting mild restriction  Normalization of DLCO compared to previous      7/21/2023  FEV1- 1.51L (64%)  FVC- 1.83 (62%)  R- 0.82  TLC- 3.06L (58%)  uncDLCO- 12.85 (64%)  ERV and RV not as reduced as severely as TLC  RV/TLC-0.41  6MW- 2L NC O2 to complete (ended at 94%). 335m walked (329m -LLN)     OSH PFTs:  3/20/23:   FVC: 1.89 L (61%)  FEV1: 1.62 L (67%)  FEV1/FVC: 86%  RV: 1.18 L (60%)  TLC: 3.38 L (68%)  RV/TLC ratio 35%  uncDLCO: 14.93 ml/min/mmHg (76%, not adjusted for Hb)    Impression & recommendations:    Ju was seen today for recheck.    Diagnoses and all orders for this visit:    ILD (interstitial lung disease) (H)  -     General PFT Lab (Please always keep checked); Future  -     Pulmonary Function Test; Future    MIC (obstructive sleep apnea)    Nocturnal hypoxia    Mild persistent asthma without complication    Other orders  -     Adult Pulmonology Clinic Follow-Up Order (3 Month)  -     Adult Pulmonology Clinic Follow-Up Order (6 Month); Future      Patient returns on follow-up ILD appointment.  Diagnosis non-IPF- NSIP with organizing portions vs HP with evidence of small airways disease also present, groundglass also seen. Also MIC  On mycophenolate since 8/2023.    PFTs exhibit continued stable spirometry (mild restriction by FVC) with significant improvement of DLCO to normal levels vs 7/2023.   6MW today on RA shows patient no longer needs supplemental O2 at 6 minute arturo- does not exclude hypoxemia with prolonged exercise.   Has lost weight using Wygovy (about 30 lbs).  Previously was discussing gastric  surgery though considering the weight loss is not as certain at this point.  Has continued asthma treatments as well.  Increased symptoms of shortness of breath on exertion with increased workloads as previous.  She is continue to have low motivation  to improve her conditioning and has been very sedentary. Also seeing psych for her depression symptoms.  She did enjoyed pulmonary rehab previously and felt much more motivated.  After her bout of influenza A, she is hesitant regarding continue mycophenolate.  Discussed that would like at least 2 years of treatment and stability for considering decreasing dosage or weaning off unless there is continued issues with infections.  Did discuss that precautions such as mask wearing would be the best prevention along with handwashing regarding infections though she is hesitant about wearing a mask due to her increased symptoms with.  For now she is okay with continue mycophenolate both in the setting of her improved and stable PFTs as well as improve oxygenation.    -Will continue her asthma treatments- continue Advair and prn albuterol (for symptoms prn and before exercise)  -Continue her mycophenolate  -For back to pulmonary rehab    Regarding her MIC, she had tested for mild MIC as well as found to have nocturnal hypoxia.  Currently she is just using the supplemental O2 at 2 L/min at night.  Can retest on room air with ADELE if she continues to lose weight.     RTC in 6 months with PFTs       30 minutes excluding the time spent on cigarette cessation was  spent on the date of the encounter doing chart review, history and exam, documentation and further activities as noted above.    These conclusions are made at the best of one's knowledge and belief based on the provided evidence such as patient's history and allergy test results and they can change over time or can be incomplete because of missing information's.    I explained the lab values, imagings and findings to the  patient.  Patient expressed understanding I did not recognize any barriers to the understanding of the patient.    The above note was dictated using voice recognition software and may include typographical errors. Please contact the author for any clarifications.    Isaac Duque MD  , Division of Pulmonary/Critical Care

## 2024-07-25 NOTE — TELEPHONE ENCOUNTER
Pulmonary rehab order placed for [place]. Staff message sent to Michael. MyC update sent to pt.     Caroline Love, RN, BSN  ILD Nurse   [phone]      ----- Message from Isaac Duque sent at 7/25/2024 12:04 AM CDT -----  Regarding: pulm rehab  Babak Velazquez,    I saw Elsie today and she was noting for symptoms that seemed to have matched for deconditioning.   She previously started pulm rehab but stopped after 13 sessions in mid-Oct 2023 due to having to start a new job away from her home. She was getting it in [place].    She is now retired and has more time. Would you be able to help me get her reffered back to the pulm rehab near her home in [place] (maybe [place] again)?    Thanks!    AM

## 2024-07-29 NOTE — PROGRESS NOTES
"Video-Visit Details    Type of service:  Video Visit    Video Start Time: 11:31 AM  Video End Time: 12:01 AM     Originating Location (pt. Location): Home    Distant Location (provider location): Offsite (providers home) [facility] [place]     [facility]    Return Bariatric Nutrition Consultation Note    Reason For Visit: Nutrition Assessment    Elsie Rubi is a 64 year old presenting today for return bariatric nutrition consult.   Patient is interested in laparoscopic sleeve gastrectomy with Dr. Cabrera expected surgery in TBD.  Patient is accompanied by self.  This is patient's 4th of 3 required nutrition visits prior to surgery. Completed the WLS nutrition class on 1/24/24.     Pt referred by EARL Varela on January 22, 2024.    CO-MORBIDITIES OF OBESITY INCLUDE:        1/16/2024    11:27 AM   --   I have the following health issues associated with obesity Pre-Diabetes    High Blood Pressure    GERD (Reflux)    Asthma    Stress Incontinence     SUPPORT:      1/16/2024    11:27 AM   Support System Reviewed With Patient   Who do you have in your support network that can be available to help you for the first 2 weeks after surgery?    Who can you count on for support throughout your weight loss surgery journey?      ANTHROPOMETRICS:  Initial consult weight: 222 lb on 1/22/24   Estimated body mass index is 34.33 kg/m  as calculated from the following:    Height as of this encounter: 1.626 m (5' 4\").    Weight as of this encounter: 90.7 kg (200 lb).  Current Weight: 200 lb per pt report.     Required weight loss goal pre-op: -10 lbs from initial consult weight (goal weight 212 lbs or less before surgery) - met         1/16/2024    11:27 AM   --   I have tried the following methods to lose weight Watching portions or calories    Exercise    Atkins type diet (low carb/high protein)           1/16/2024    11:27 AM   Weight Loss " Questions Reviewed With Patient   How long have you been overweight? From Middle age and beyond     MEDICATION FOR WEIGHT LOSS: Wegovy - 1.0 mg - filled by the pharmacy just needs to pick it up.     VITAMIN/MINERAL SUPPLEMENTS: Vitamin D and Calcium with Vitamin D.     NUTRITION HISTORY:  Food allergies: NKFA   Food intolerances: None   Previous experience with diet modification for weight loss: Mediterranean style diet, high protein, high fat, non-starchy vegetables, low carb.     Focuses on a mediterranean style diet currently.     Per Leanne's visit: Regarding eating patterns and diet,  she typically eats 2-3 meals a day. Is able to get full. Can stay full. Eats out/ gets take out 2 times a month. Drinks coffee with half and half and splenda (2 cups every morning). Drinks diet pepsi a few time a week. Drinks a few glasses of water throughout the day. Drinks 2 glasses of wine every night. Is open to having 1 glass of wine instead.      [employer], doesn't eat meat any Wednesday or Friday, and then for 7 weeks before christmas, 7 weeks before easter.     Carb Manager  Katy - tracking daily intake are around 900-1300 calories per day. Encouraged to aim for around 1550 calories which is her estimated BMR.     Recent Diet Recall:  Breakfast: delays this till around noon.   Lunch: Eats around noon.   Dinner: 7 PM   Snacks: little piece of chocolate   Beverages: Drink coffee, tea, water but doesn't love the taste of plain water. Has gotten away from diet Pepsi.   Alcohol: did not discuss     Lent starts on Monday for patient and pretty much follows a vegan diet during this time.     May 2024: Feels her weight loss has slowed down since Lent completed. Trying to get in around 1500 calories and 60-70 grams of protein daily.  Plans to try to incorporate some physical activity, especially strength and resistance training. Just has her psych evaluation and pulmonology consults left to do on tasklist.     June 2024:  Feels her current Wegovy dose isn't as effective and is interested in upping the dose. Eating more protein, likes cottage cheese/yogurt daily.  Eating 3 meals a day. Slight up tick in her physical activity, rode her stationary bike. Has been using her oxygen more throughout the day. Has been working with a psychologist. Has completely cut out seed oils. Still thinking about if surgery is right for her but continues to work through the requirements that are necessary for surgery.     Has a pulmonary appointment next month at the [facility] and is thinking about stopping at the weight management clinic to step on the body composition scale to assess muscle mass.     July 2024: Has had some issues with getting her Wegovy dose increase prescription filled. Picking it up today. Reports she has noticed her appetite to increase a little bit and at times has been eating more freely.  Reports she is scheduling an appointment with her primary care doctor soon as she accidentally missed her appointment. Sees EARL Varela tomorrow. Last month patient was able to come to the [facility] clinic and use the Tanita scale. She is very interested in having this information. She plans on discussing it further with Leanne tomorrow.          1/16/2024    11:27 AM   Recall Diet Questions Reviewed With Patient   Describe what you typically consume for breakfast (typical or most recent) Cheesy eggs   Describe what you typically consume for lunch (typical or most recent) Skip or munch on leftovers   Describe what you typically consume for supper (typical or most recent) 5 oz Protein with 1/2 baked potato or vegetable   Describe what you typically consume as snacks (typical or most recent) Cheese, nuts   How many ounces of water, or other low calorie drinks, do you drink daily (8 oz=1 glass)? 24 oz   How many ounces of caffeine (coffee, tea, pop) do you drink daily (8 oz=1 glass)? 16 oz   How many ounces of carbonated (pop, beer, sparkling water)  drinks do you drinky daily (8 oz=1 glass)? 0 oz   How many ounces of juice, pop, sweet tea, sports drinks, protein drinks, other sweetened drinks, do you drink daily (8 oz=1 glass)? 0 oz   How many ounces of milk do you drink daily (8 oz=1 glass) 0 oz   How often do you drink alcohol? 4 or more times a week   If you do drink alcohol, how many drinks might you have in a day? (one drink = 5 oz. wine, 1 can/bottle of beer, 1 shot liquor) 1 or 2           1/16/2024    11:27 AM   Eating Habits   What foods do you crave? Salty           1/16/2024    11:27 AM   Dining Out History Reviewed With Patient   How often do you dine out? Rarely.   Where do you dine out? (select all that apply) sit-down restaurants   What types of food do you order when you dine out? Entree     EXERCISE: has a very sedentary lifestyle. 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  Doesn't typically like to leave house, loves to be at home. Bought a modified rowing machine that she had enjoyed using in the past with pulmonary rehab, hasn't been using. Is struggling to find the motivation to exercise more and also has big issues with activity due to anxiety related to becoming out of breath as a result of her interstitial lung disease.          1/16/2024    11:27 AM   --   How often do you exercise? Less than 1 time per week   What is the duration of your exercise (in minutes)? 10 Minutes   What keeps you from being more active? Pain    I should be more active but I just have not gotten around to it    Shortness of breath     NUTRITION DIAGNOSIS:  Obesity r/t long history of positive energy balance aeb BMI >30 kg/m2.    INTERVENTION:  Intervention Provided/Education Provided on post-op diet guidelines, vitamins/minerals essential post-operatively, GI anatomy of bariatric surgeries, ways to help prepare for post-op diet guidelines pre-operatively, portion/calorie-control, mindful eating and sources of protein.  Patient demonstrates understanding.      Personal barriers to making and continuing required life changes have been identified, and strategies to overcome those barriers have been recommended AND family and social supports have been assessed and strategies to strengthen those supports have been recommended.    Provided pt with list of goals, RD contact information and resources listed below via [employer]       Provided education on nutrition considerations when starting GLP1 medication including, changes in meal/snack volumes; meeting protein, hydration and micronutrient needs; limiting high-fat foods; and diet/lifestyle strategies for preventing constipation.         1/16/2024    11:27 AM   Questions Reviewed With Patient   How ready are you to make changes regarding your weight? Number 1 = Not ready at all to make changes up to 10 = very ready. 10   How confident are you that you can change? 1 = Not confident that you will be successful making changes up to 10 = very confident. 8     Expected Engagement: good    GOALS:  Relating To Eating:  Eat slowly (20-30 minutes per meal), chewing foods well (25 chews per bite/applesauce consistency)  Eat 3 meals per day  Consume 60-90 g protein per day  Aim for 1500 calories per day.     Relating to beverages:  Reduce caffeine/carbonation/calorie containing beverages  Separate fluids from meals by 30 minutes before, during, and after eating  Drink 48-64 ounces of fluid per day    Relating to dietary supplements:  Be thinking about post op vitamins and mineral supplements you will want to have.     Relating to activity:  Increase activity as able, incorporate some strength and resistance training types of workouts.      Post-op Diet Handouts:  Diet Guidelines after Weight-loss Surgery  [email or web address]     Your Stage 1 Diet: Clear Liquids  [email or web address]     Your Stage 2 Diet: Low-fat Full Liquids  [email or web address]     Your Stage 3 Diet: Pureed  Foods  [email or web address]     Pureed Recipes  [email or web address]    Your Stage 4 Diet: Soft Foods  [email or web address]    Your Stage 5 Diet: Regular Foods  [email or web address]    Supplements after Sleeve Gastrectomy, Gastric Bypass or Single Anastomosis Duodenal Switch  [email or web address]    Keeping Track of Fluids  [email or web address]    Follow Up: 1 month    Time spent with patient: 30 minutes.  Caroline Mcduffie RD, LD

## 2024-07-30 ENCOUNTER — VIRTUAL VISIT (OUTPATIENT)
Dept: ENDOCRINOLOGY | Facility: CLINIC | Age: 64
End: 2024-07-30
Payer: COMMERCIAL

## 2024-07-30 VITALS — WEIGHT: 200 LBS | BODY MASS INDEX: 34.15 KG/M2 | HEIGHT: 64 IN

## 2024-07-30 DIAGNOSIS — E66.9 OBESITY: ICD-10-CM

## 2024-07-30 DIAGNOSIS — Z71.3 NUTRITIONAL COUNSELING: Primary | ICD-10-CM

## 2024-07-30 PROCEDURE — 97803 MED NUTRITION INDIV SUBSEQ: CPT | Mod: 95

## 2024-07-30 PROCEDURE — 99207 PR NO CHARGE LOS: CPT | Mod: 95

## 2024-07-30 ASSESSMENT — PAIN SCALES - GENERAL: PAINLEVEL: NO PAIN (0)

## 2024-07-30 NOTE — LETTER
"7/30/2024       RE: Elsie Rubi  [address] [facility] [address]     Dear Colleague,    Thank you for referring your patient, Elsie Rubi, to the [facility] [place] at [facility]. Please see a copy of my visit note below.    Video-Visit Details    Type of service:  Video Visit    Video Start Time: 11:31 AM  Video End Time: 12:01 AM     Originating Location (pt. Location): Home    Distant Location (provider location): Offsite (providers home) [facility] [place]     Platform used for Video Visit: [employer]    Return Bariatric Nutrition Consultation Note    Reason For Visit: Nutrition Assessment    Elsie Rubi is a 64 year old presenting today for return bariatric nutrition consult.   Patient is interested in laparoscopic sleeve gastrectomy with Dr. Cabrera expected surgery in [place].  Patient is accompanied by self.  This is patient's 4th of 3 required nutrition visits prior to surgery. Completed the WLS nutrition class on 1/24/24.     Pt referred by EARL Varela on January 22, 2024.    CO-MORBIDITIES OF OBESITY INCLUDE:        1/16/2024    11:27 AM   --   I have the following health issues associated with obesity Pre-Diabetes    High Blood Pressure    GERD (Reflux)    Asthma    Stress Incontinence     SUPPORT:      1/16/2024    11:27 AM   Support System Reviewed With Patient   Who do you have in your support network that can be available to help you for the first 2 weeks after surgery?    Who can you count on for support throughout your weight loss surgery journey?      ANTHROPOMETRICS:  Initial consult weight: 222 lb on 1/22/24   Estimated body mass index is 34.33 kg/m  as calculated from the following:    Height as of this encounter: 1.626 m (5' 4\").    Weight as of this encounter: 90.7 kg (200 lb).  Current Weight: 200 lb per pt report.     Required weight loss goal " pre-op: -10 lbs from initial consult weight (goal weight 212 lbs or less before surgery) - met         1/16/2024    11:27 AM   --   I have tried the following methods to lose weight Watching portions or calories    Exercise    Atkins type diet (low carb/high protein)           1/16/2024    11:27 AM   Weight Loss Questions Reviewed With Patient   How long have you been overweight? From Middle age and beyond     MEDICATION FOR WEIGHT LOSS: Wegovy - 1.0 mg - filled by the pharmacy just needs to pick it up.     VITAMIN/MINERAL SUPPLEMENTS: Vitamin D and Calcium with Vitamin D.     NUTRITION HISTORY:  Food allergies: NKFA   Food intolerances: None   Previous experience with diet modification for weight loss: Mediterranean style diet, high protein, high fat, non-starchy vegetables, low carb.     Focuses on a mediterranean style diet currently.     Per Leanne's visit: Regarding eating patterns and diet,  she typically eats 2-3 meals a day. Is able to get full. Can stay full. Eats out/ gets take out 2 times a month. Drinks coffee with half and half and splenda (2 cups every morning). Drinks diet pepsi a few time a week. Drinks a few glasses of water throughout the day. Drinks 2 glasses of wine every night. Is open to having 1 glass of wine instead.      [employer], doesn't eat meat any Wednesday or Friday, and then for 7 weeks before christmas, 7 weeks before easter.     Carb Manager  Katy - tracking daily intake are around 900-1300 calories per day. Encouraged to aim for around 1550 calories which is her estimated BMR.     Recent Diet Recall:  Breakfast: delays this till around noon.   Lunch: Eats around noon.   Dinner: 7 PM   Snacks: little piece of chocolate   Beverages: Drink coffee, tea, water but doesn't love the taste of plain water. Has gotten away from diet Pepsi.   Alcohol: did not discuss     Lent starts on Monday for patient and pretty much follows a vegan diet during this time.     May 2024: Feels her  weight loss has slowed down since Lent completed. Trying to get in around 1500 calories and 60-70 grams of protein daily.  Plans to try to incorporate some physical activity, especially strength and resistance training. Just has her psych evaluation and pulmonology consults left to do on tasklist.     June 2024: Feels her current Wegovy dose isn't as effective and is interested in upping the dose. Eating more protein, likes cottage cheese/yogurt daily.  Eating 3 meals a day. Slight up tick in her physical activity, rode her stationary bike. Has been using her oxygen more throughout the day. Has been working with a psychologist. Has completely cut out seed oils. Still thinking about if surgery is right for her but continues to work through the requirements that are necessary for surgery.     Has a pulmonary appointment next month at the [facility] and is thinking about stopping at the weight management clinic to step on the body composition scale to assess muscle mass.     July 2024: Has had some issues with getting her Wegovy dose increase prescription filled. Picking it up today. Reports she has noticed her appetite to increase a little bit and at times has been eating more freely.  Reports she is scheduling an appointment with her primary care doctor soon as she accidentally missed her appointment. Sees EARL Varela tomorrow. Last month patient was able to come to the [facility] clinic and use the Tanita scale. She is very interested in having this information. She plans on discussing it further with Leanne tomorrow.          1/16/2024    11:27 AM   Recall Diet Questions Reviewed With Patient   Describe what you typically consume for breakfast (typical or most recent) Cheesy eggs   Describe what you typically consume for lunch (typical or most recent) Skip or munch on leftovers   Describe what you typically consume for supper (typical or most recent) 5 oz Protein with 1/2 baked potato or vegetable   Describe what you  typically consume as snacks (typical or most recent) Cheese, nuts   How many ounces of water, or other low calorie drinks, do you drink daily (8 oz=1 glass)? 24 oz   How many ounces of caffeine (coffee, tea, pop) do you drink daily (8 oz=1 glass)? 16 oz   How many ounces of carbonated (pop, beer, sparkling water) drinks do you drinky daily (8 oz=1 glass)? 0 oz   How many ounces of juice, pop, sweet tea, sports drinks, protein drinks, other sweetened drinks, do you drink daily (8 oz=1 glass)? 0 oz   How many ounces of milk do you drink daily (8 oz=1 glass) 0 oz   How often do you drink alcohol? 4 or more times a week   If you do drink alcohol, how many drinks might you have in a day? (one drink = 5 oz. wine, 1 can/bottle of beer, 1 shot liquor) 1 or 2           1/16/2024    11:27 AM   Eating Habits   What foods do you crave? Salty           1/16/2024    11:27 AM   Dining Out History Reviewed With Patient   How often do you dine out? Rarely.   Where do you dine out? (select all that apply) sit-down restaurants   What types of food do you order when you dine out? Entree     EXERCISE: has a very sedentary lifestyle. 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  Doesn't typically like to leave house, loves to be at home. Bought a modified rowing machine that she had enjoyed using in the past with pulmonary rehab, hasn't been using. Is struggling to find the motivation to exercise more and also has big issues with activity due to anxiety related to becoming out of breath as a result of her interstitial lung disease.          1/16/2024    11:27 AM   --   How often do you exercise? Less than 1 time per week   What is the duration of your exercise (in minutes)? 10 Minutes   What keeps you from being more active? Pain    I should be more active but I just have not gotten around to it    Shortness of breath     NUTRITION DIAGNOSIS:  Obesity r/t long history of positive energy balance aeb BMI >30  kg/m2.    INTERVENTION:  Intervention Provided/Education Provided on post-op diet guidelines, vitamins/minerals essential post-operatively, GI anatomy of bariatric surgeries, ways to help prepare for post-op diet guidelines pre-operatively, portion/calorie-control, mindful eating and sources of protein.  Patient demonstrates understanding.     Personal barriers to making and continuing required life changes have been identified, and strategies to overcome those barriers have been recommended AND family and social supports have been assessed and strategies to strengthen those supports have been recommended.    Provided pt with list of goals, RD contact information and resources listed below via [employer].       Provided education on nutrition considerations when starting GLP1 medication including, changes in meal/snack volumes; meeting protein, hydration and micronutrient needs; limiting high-fat foods; and diet/lifestyle strategies for preventing constipation.         1/16/2024    11:27 AM   Questions Reviewed With Patient   How ready are you to make changes regarding your weight? Number 1 = Not ready at all to make changes up to 10 = very ready. 10   How confident are you that you can change? 1 = Not confident that you will be successful making changes up to 10 = very confident. 8     Expected Engagement: good    GOALS:  Relating To Eating:  Eat slowly (20-30 minutes per meal), chewing foods well (25 chews per bite/applesauce consistency)  Eat 3 meals per day  Consume 60-90 g protein per day  Aim for 1500 calories per day.     Relating to beverages:  Reduce caffeine/carbonation/calorie containing beverages  Separate fluids from meals by 30 minutes before, during, and after eating  Drink 48-64 ounces of fluid per day    Relating to dietary supplements:  Be thinking about post op vitamins and mineral supplements you will want to have.     Relating to activity:  Increase activity as able, incorporate some strength and  resistance training types of workouts.      Post-op Diet Handouts:  Diet Guidelines after Weight-loss Surgery  [email or web address]     Your Stage 1 Diet: Clear Liquids  [email or web address]     Your Stage 2 Diet: Low-fat Full Liquids  [email or web address]     Your Stage 3 Diet: Pureed Foods  [email or web address]     Pureed Recipes  [email or web address]    Your Stage 4 Diet: Soft Foods  [email or web address]    Your Stage 5 Diet: Regular Foods  [email or web address]    Supplements after Sleeve Gastrectomy, Gastric Bypass or Single Anastomosis Duodenal Switch  [email or web address]    Keeping Track of Fluids  [email or web address]    Follow Up: 1 month    Time spent with patient: 30 minutes.  Caroline Mcduffie RD, LD      Again, thank you for allowing me to participate in the care of your patient.      Sincerely,    Caroline Mcduffie RD

## 2024-07-30 NOTE — NURSING NOTE
Current patient location: [address] [place] [id number]    Is the patient currently in the state of [place]? YES    Visit mode:VIDEO    If the visit is dropped, the patient can be reconnected by: VIDEO VISIT: Text to cell phone:   Telephone Information:   Mobile [phone]       Will anyone else be joining the visit? NO  (If patient encounters technical issues they should call [phone])    How would you like to obtain your AVS? MyChart    Are changes needed to the allergy or medication list? No    Are refills needed on medications prescribed by this physician? NO    Reason for visit: RECHECK    Brandy ASHLEY

## 2024-07-30 NOTE — PATIENT INSTRUCTIONS
Henrry Dodd,     Follow-up with RD in 1 month.     Thank you,    Caroline Mcduffie, RD, LD  If you would like to schedule or reschedule an appointment with the RD, please call [phone]    Nutrition Goals  Relating To Eating:  Eat slowly (20-30 minutes per meal), chewing foods well (25 chews per bite/applesauce consistency)  Eat 3 meals per day  Consume 60-90 g protein per day  Aim for 1500 calories per day.     Relating to beverages:  Reduce caffeine/carbonation/calorie containing beverages  Separate fluids from meals by 30 minutes before, during, and after eating  Drink 48-64 ounces of fluid per day    Relating to dietary supplements:  Be thinking about post op vitamins and mineral supplements you will want to have.     Relating to activity:  Increase activity as able, incorporate some strength and resistance training types of workouts.      Post-op Diet Handouts:  Diet Guidelines after Weight-loss Surgery  [email or web address]     Your Stage 1 Diet: Clear Liquids  [email or web address]     Your Stage 2 Diet: Low-fat Full Liquids  [email or web address]     Your Stage 3 Diet: Pureed Foods  [email or web address]     Pureed Recipes  [email or web address]    Your Stage 4 Diet: Soft Foods  [email or web address]    Your Stage 5 Diet: Regular Foods  [email or web address]    Supplements after Sleeve Gastrectomy, Gastric Bypass or Single Anastomosis Duodenal Switch  [email or web address]    Keeping Track of Fluids  [email or web address]    COMPREHENSIVE WEIGHT MANAGEMENT PROGRAM  VIRTUAL SUPPORT GROUPS    At [facility], our Comprehensive Weight Management program offers on-line support groups for patients who are working on weight loss and considering, preparing for, or have had weight loss surgery.     There is no cost for this opportunity.  You are invited to attend the?Virtual Support Groups?provided by any of the following  locations:    [facility] via Microsoft Teams with Sandra Hopper RN  2.   [place] via MailMag with Lalit Keenan, PhD, LP  3.   [place] via MailMag with Shea Winston RN  4.   [facility] via a Zoom Meeting with LLUVIA Ng    The following Support Group information can also be found on our website:  [email or web address]      [facility] Weight Loss Surgery Support Group  The support group is a patient-lead forum that meets monthly to share experiences, encouragement and education. It is open to those who have had weight loss surgery, are scheduled for surgery, or are considering surgery.   WHEN: This group meets on the 3rd Wednesday of each month from 5:00PM - 6:00PM virtually using Microsoft Teams.   FACILITATOR: Led by Sandra Covarrubias RD, MADHURI, RN, the program's Clinical Coordinator.   TO REGISTER: Please contact the clinic via HomeAway or call the nurse line directly at [phone] to inform our staff that you would like an invite sent to you and to let us know the email you would like the invite sent to. Prior to the meeting, a link with directions on how to join the meeting will be sent to you.    2024 Meetings   January 17  February 21  March 20  April 17  May 15  Alexandra 19      [facility] and [facility] Bariatric Care Support Group?  This is open to all pre- and post- operative bariatric surgery patients as well as their support system.   WHEN: This group meets the 3rd Tuesday of each month from 6:30 PM - 8:00 PM virtually using Microsoft Teams.   FACILITATOR: Led by Lalit Keenan, Ph.D who is a Licensed Psychologist with the [facility] Comprehensive Weight Management Program.   TO REGISTER: Please send an email to Lalit Keenan, Ph.D., LP at?[email or web address]?if you would like an invitation to the group. Prior to the meeting,  "a link with directions on how to join the meeting will be sent to you.    2024 Meetings January 16: \"Medication Management and Bariatric Surgery\", Jackie Gambino, PharmD, Pharmacy Resident at [facility]  February 20: \"A Bariatric Surgery Patient's Perspective\", ELENA Torres, [facility], Behavioral Health Clinician at [facility]  March 19  April 16  May 21  Alexandra 18: \"Nutritional Labeling\", Dietitian speaker to be announced.  November 19: \"Holiday Eating\", Dietitian speaker to be announced.    [facility] Post-Operative Bariatric Surgery Support Group  This is a support group for [facility] bariatric patients (and those external to [facility]) who have had bariatric surgery and are at least 3 months post-surgery.  WHEN: This support group meets the 4th Wednesday of the month from 11:00 AM - 12:00 PM virtually using Microsoft Teams.   FACILITATOR: Led by Certified Bariatric Nurse, Shea Winston RN.   TO REGISTER: Please send an email to Shea at [email or web address] if you would like an invitation to the group.  Prior to the meeting, a link with directions on how to join the meeting will be sent to you.    2024 Meetings  January 24  February 28  March 27  April 24  May 22  Alexandra 26    [facility] Healthy Lifestyle Group?  This is a 60 minute virtual coaching group for those who want to lead a healthier lifestyle. Come together to set goals and overcome barriers in a supportive group environment. We will address the four pillars of health: nutrition, exercise, sleep and emotional well-being.  This group is highly recommended for those who are participating in the 24 week Healthy Lifestyle Plan and our Health Coaching sessions.  WHEN: This group meets the 1st Friday of the month, 12:30 PM - 1:30 PM online, via a zoom meeting.    FACILITATOR: Led by National" "Board Certified Health and , Shea Toure, [facility].   TO REGISTER: Please call the Call Center at [phone] to register. You will get an appointment to attend in [facility]. Fifteen minutes prior to the meeting, complete the e-check in and you will get the link to join the meeting.    There is no charge to attend this group and space is limited.     2024 Meetings  January 5: \"New Years Vision: Manifest your Best 2024!\" (guided imagery,  journaling and discussion)  February 2: \"Let's Talk\"  March 1: \"10 Percent Happier\" by Priyank Christensen (Book Bites - a guided discussion on the nuggets of wisdom from favorite wellness books, no need to read the book but highly encouraged)  April 5: \"Let's Talk\"  May 3: \"Essentialism: The Disciplined Pursuit of Less\" by Jorge Rosario (Book Bites discussion)  June 7: \"Let's Talk\"  July 5: NO MEETING, off for the 4th of July Holiday  August 2: \"The Blue Zones, Secrets for Living a Longer Life\" by Priyank Sofia (Book Bites discussion)        "

## 2024-07-31 ENCOUNTER — TELEPHONE (OUTPATIENT)
Dept: PULMONOLOGY | Facility: CLINIC | Age: 64
End: 2024-07-31

## 2024-07-31 ENCOUNTER — VIRTUAL VISIT (OUTPATIENT)
Dept: ENDOCRINOLOGY | Facility: CLINIC | Age: 64
End: 2024-07-31
Payer: COMMERCIAL

## 2024-07-31 VITALS — BODY MASS INDEX: 34.15 KG/M2 | HEIGHT: 64 IN | WEIGHT: 200 LBS

## 2024-07-31 DIAGNOSIS — E66.01 CLASS 2 SEVERE OBESITY WITH SERIOUS COMORBIDITY AND BODY MASS INDEX (BMI) OF 38.0 TO 38.9 IN ADULT, UNSPECIFIED OBESITY TYPE (H): Primary | ICD-10-CM

## 2024-07-31 DIAGNOSIS — R73.03 PREDIABETES: ICD-10-CM

## 2024-07-31 DIAGNOSIS — E66.812 CLASS 2 SEVERE OBESITY WITH SERIOUS COMORBIDITY AND BODY MASS INDEX (BMI) OF 38.0 TO 38.9 IN ADULT, UNSPECIFIED OBESITY TYPE (H): Primary | ICD-10-CM

## 2024-07-31 PROCEDURE — G2211 COMPLEX E/M VISIT ADD ON: HCPCS | Mod: 95

## 2024-07-31 PROCEDURE — 99215 OFFICE O/P EST HI 40 MIN: CPT | Mod: 95

## 2024-07-31 ASSESSMENT — PAIN SCALES - GENERAL: PAINLEVEL: NO PAIN (0)

## 2024-07-31 NOTE — NURSING NOTE
Current patient location: home     Is the patient currently in the state of [place]? YES    Visit mode:VIDEO    If the visit is dropped, the patient can be reconnected by: VIDEO VISIT: Text to cell phone:   Telephone Information:   Mobile [phone]       Will anyone else be joining the visit? NO  (If patient encounters technical issues they should call [phone] :[phone])    How would you like to obtain your AVS? MyChart    Are changes needed to the allergy or medication list? Pt stated no med changes    Are refills needed on medications prescribed by this physician? NO    Rooming Documentation:  Patient will complete questionnaire(s) in MyChart    Reason for visit: RECHECK    Wt other than 24 hrs:  last week  Pain more than one location:    Jessi AVILAF

## 2024-07-31 NOTE — Clinical Note
I almost missed this- this patient also needs a meet and greet with Dr. Cabrera! I've reached out to schedulers to set that up.

## 2024-07-31 NOTE — PATIENT INSTRUCTIONS
"Thank you for allowing us the privilege of caring for you. We hope we provided you with the excellent service you deserve.   Please let us know if there is anything else we can do for you so that we can be sure you are completely satisfied with your care experience.    To ensure the quality of our services you may be receiving a patient satisfaction survey from an independent patient satisfaction monitoring company.    The greatest compliment you can give is a \"Likely to Recommend\"    Your visit was with Leanne Stanford PA-C today.    Instructions per today's visit:     Henrry Rubi, it was great to visit with you today.  Here is a review of our visit.  If our clinic scheduler is not able to reach you please call [phone] to schedule your next appointments.    Plan  Okay to switch to zepbound 5mg due to improved weight loss and preferred injection mechanism after completing remaining doses of wegovy 1mg, prescription sent   Goals we discussed today:   Strength training 2x a week   Labs ordered today: vitamin d   Task list items to do: meet with Dr. Cabrera for meet and greet, get pulmonology clearance letter, complete psych clearance   Dr. Deborah MICHAEL for NBS meet and greet   Follow up with Leanne in 3 months   Dietician appointment with Babs in 1 month   Keep up the excellent work!       Information about Video Visits with [employer]: video visit information  _________________________________________________________________________________________________________________________________________________________  If you are asked by your clinic team to have your blood pressure checked:  [facility] do offer several locations for blood pressure checks. Please follow the below link to schedule an appointment. Scheduling an appointment at the pharmacy for a blood pressure check is now preferred.    Appointment Plus " ([email or web address])  _________________________________________________________________________________________________________________________________________________________  Important contact and scheduling information:  Please call our contact center at [phone] to schedule your next appointments.  To find a lab location near you, please call [phone].  For any nursing questions or concerns call Deyanira Schuler LPN at [phone] or Katarina Leonard RN at [phone]  Please call during clinic hours Monday through Friday 8:00a - 4:00p if you have questions or you can contact us via GameCrush[employer] at anytime and we will reply during clinic hours.    Lab results will be communicated through My Chart or letter (if My Chart not used). Please call the clinic if you have not received communication after 1 week or if you have any questions.?  Clinic Fax: [phone]    _________________________________________________________________________________________________________________________________________________________  Meal Replacement Products:    Here is the link to our new e-store where you can purchase our meal replacement products    [facility] E-Store  [facility].[employer]store    The one week starter kit is a great way to sample a variety of products and see what works for you.    If you want more information about the product go to: Fresh Steps Meals  Status[employer] App[employer]    If you are an employee or [facility] or [facility] please contact your care team for a 10% estore discount    Free Shipping for orders over $75     Benefits of meal replacements products:    Portion and calorie control  Improved nutrition  Structured eating  Simplified food choices  Avoid contact with trigger foods  _________________________________________________________________________________________________________________________________________________________  Interested in working with a health  ?  Health coaches work with you to improve your overall health and wellbeing.  They look at the whole person, and may involve discussion of different areas of life, including, but not limited to the four pillars of health (sleep, exercise, nutrition, and stress management). Discuss with your care team if you would like to start working a health .  Health Coaching-3 Pack: Schedule by calling [phone]    $99 for three health coaching visits    Visits may be done in person or via phone    Coaching is a partnership between the  and the client; Coaches do not prescribe or diagnose    Coaching helps inspire the client to reach his/her personal goals   _________________________________________________________________________________________________________________________________________________________  24 Week Healthy Lifestyle Plan:    Our mission in the 24-week Healthy Lifestyle Plan is to provide you with individualized care by giving you the tools, education and support you need to lose weight and maintain a healthy lifestyle. In your 24-week journey, you ll be supported by a dedicated weight loss team that includes registered dietitians, medical weight management providers, health coaches, and nurses -- all with special expertise in weight loss -- to help you every step of the way.     Monthly meetings with your registered dietician or medical weight management provider help to review your progress, update your care plan, and make any adjustments needed to ensure success. Between these visits, weekly and bi-weekly health  visits will help you focus on the four pillars of weight loss -- stress, sleep, nutrition, and exercise -- and how you can best adapt each to achieve sustainable weight loss results.    In addition, you will be given exclusive access to online wellbeing classes through [employer].  Your initial visit will be with a medical weight management provider who will help to  understand your weight loss goals and ensure this program is the right fit for you. Please let our team know if you are interested in the 24 week plan by sending a message to your care team or calling [phone] to schedule.  _________________________________________________________________________________________________________________________________________________________  __________  [facility] Get Moving Program  Our team of physical therapists is trained to help you understand and take control of your condition. They will perform a thorough evaluation to determine your ability for activity and develop a customized plan to fit your goals and physical ability.  Scheduling: Unsure if the Get Moving program is right for you? Discuss the program with your medical provider or diabetes educator. You can also call us at [phone] to ask questions or schedule an appointment.   [facility] Get Moving Program  ____________________________________________________________________________________________________________________________________________________________________________  [facility] Diabetes Prevention Program (DPP)  If you have prediabetes and Medicare please contact us via [employer] to learn more about the Diabetes Prevention Program (DPP)  Program Details:   [facility] offers the year-long Diabetes Prevention Program (DPP). The program helps you to make lifestyle changes that prevent or delay type 2 diabetes by supporting healthy eating, increased physical activity, stress reduction and use of coping skills.   On average, previous [facility] DPP cohorts have lost and maintained at least 5% of their starting weight throughout the program and averaged more than 150 minutes of physical activity per week.  Participants meet weekly for one-hour group sessions over sixteen weeks, every other week for the next 8 weeks, and monthly for the last six months.   A year-long  maintenance program is also available for participants who complete the first year.   Location & Cost:   During the COVID-19 Public Health Emergency, the program is offered virtually. When in-person classes can resume, they will be held at [facility].  For people with Medicare, the program is covered in full. A self-pay option will also be available for those with non-Medicare insurance plans.   ______________________________________________________________________________________________________________________________________________________________________________________________________________________________    To work with a Behavioral Health Psychologist:    Call to schedule:    Tu Navarrete - [phone]  Sonia Ku - [phone]  Luiza Zuniga - [phone]  Lou Franklin - [phone]   Love Ye PhD (cannot accept Medicare) [phone]        Thank you,   [facility] Comprehensive Weight Management Team

## 2024-07-31 NOTE — PROGRESS NOTES
Virtual Visit Details    Type of service:  Video Visit   Video Start Time:  2:30pm  Video End Time: 3:00pm    Originating Location (pt. Location): Home    Distant Location (provider location):  Off-site  Platform used for Video Visit: Kelsy

## 2024-07-31 NOTE — TELEPHONE ENCOUNTER
Spoke with pt and reviewed she needs Dr. Duque to put a note in chart stating from pulmonary standpoint ok for Bariatric surgery. Pt needs this put in chart in a week.  Lastly, wondering if Dr. Duque would advise her to wear O2 while on a plane from [place] to [place]. Message sent to Dr. Duque to advise.   Karen Mahajan RN BSN

## 2024-07-31 NOTE — TELEPHONE ENCOUNTER
----- Message from Gayatri TAM sent at 7/31/2024  3:28 PM CDT -----  Regarding: LVM: Pre-op clearance  Pt LVM at 1511. Needs pre-op clearance for bariatric surgery placed into her chart. Also would like to know what her oxygen needs are for flying based on her last PFTs.

## 2024-07-31 NOTE — LETTER
2024       RE: Elsie Rubi  [address] [facility] [address]     Dear Colleague,    Thank you for referring your patient, Elsie Rubi, to the [facility] [place] at [facility]. Please see a copy of my visit note below.      Pre-bariatric surgery Visit      Elsie Rubi  MRN:  [id number]  :  1960  LYNETTE:  2024    Dear Jessie Rose MD,    I had the pleasure of seeing your patient, Elsie Rubi, in my preoperative bariatric clinic.     As you know, she has morbid obesity and is considering weight loss surgery to treat obesity in association with her medical conditions of obesity.  Her consult weight was 222.  She has lost 22 pounds since her consult weight. She has met her required pre-surgery weight. Please refer to initial consult note from date 24 for patient's weight history and co-morbidities.           2024    11:27 AM   --   I have the following health issues associated with obesity Pre-Diabetes    High Blood Pressure    GERD (Reflux)    Asthma    Stress Incontinence       Assessment & Plan  Problem List Items Addressed This Visit       Prediabetes (Chronic)    Relevant Medications    tirzepatide-Weight Management (ZEPBOUND) 5 MG/0.5ML prefilled pen    Other Relevant Orders    Vitamin D Deficiency     Other Visit Diagnoses       Class 2 severe obesity with serious comorbidity and body mass index (BMI) of 38.0 to 38.9 in adult, unspecified obesity type (H)    -  Primary    Relevant Medications    tirzepatide-Weight Management (ZEPBOUND) 5 MG/0.5ML prefilled pen    Other Relevant Orders    Vitamin D Deficiency           Plan  Okay to switch to zepbound 5mg due to improved weight loss and preferred injection mechanism after completing remaining doses of wegovy 1mg, prescription sent   Goals we discussed today:   Strength training 2x a week   Labs ordered today: vitamin d   Task list  "items to do: meet with Dr. Cabrera for augustina, get pulmonology clearance letter, complete psych clearance   Dr. Deborah MICHAEL for NBS cinthya and aiden   Follow up with Leanne in 3 months   Dietician appointment with Babs in 1 month   Keep up the excellent work!         INTERVAL HISTORY:  Last seen by me 4/30/24- doing well with zepbound, switched to wegovy due to supply issues. Was on [employer] lent where she wasn't eating any animal products, plan for diet to go back to normal after [employer] easter.     Today in visit (7/31/24)  Things are going well. Weight plateau recently.   In the middle of psych eval, reports therapist is encouraging her to increase physical exercise.     Lung health improving lately, she feels her weight loss has been helpful with this.   Busier around the house as well.   Tried exercising yesterday, stretching legs, modified crunches.   This was a big step for her.     In our appointment today Ju expressed some concern about the potential risk for long term side effects and complications related to surgery. Discussed these risks and benefits in detail.   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   She inquired how frequently Dr. Cabrera does these bariatric surgeries, discussed that he does several a month and has been doing so for several years.    Discussed that while there are risks with any major surgery, the majority of our patients have a good experience with the surgery with minimal complications and get to see health benefits associated with their weight loss. Discussed that the most common complications we see regularly in clinic are heart burn and weight regain, and that the risk for long term complication is reduced with regular follow up and adhering to post op guidelines from the team.     Task list:   Psych eval-  in process   Pcp- " completed   Cardiology- completed stress test (no concerns)  Sleep medicine- completed 5/14/24   Pulmonology- patient reports pulmonology has okay'd surgery, will contact clinic to ask for clearance in writing   EGD- completed by Dr. Cabrera 3/13/24    Wt Readings from Last 5 Encounters:   07/31/24 90.7 kg (200 lb)   07/30/24 90.7 kg (200 lb)   07/24/24 90.7 kg (200 lb)   07/24/24 91.2 kg (201 lb)   06/28/24 93 kg (205 lb)       Anti-obesity medication history    Current:   Wegovy 1mg- some nausea and malaise with dose increase to 1mg 3 weeks ago, has since resolved. Some minor nausea day after injection. Not liking the injection mechanism as much, is requesting to go back to zepbound.     Past:  Zepbound- prior to wegovy, stopped due to supply issues. Stomach pain with starting dose, but resolved over time. No vomiting.     Recent diet changes: working with Babs Mcduffie, focusing on upping protein and daily calories. Eating more greek yogurt or cottage cheese.     Recent exercise/activity changes: improved activity tolerance with breathing improving. Hoping to increase muscle mass, resources sent over [employer] for exercise resources. Recently bought strength training book .      Vitamins/Labs: low vitamin D seen on nbs labs, recommended starting a vitamin d supplement of 2,000iu daily. Has been taking daily, will recheck labs today.      GERD:  no concerns, symptoms are well controlled  with the 40mg omeprazole     CURRENT WEIGHT:   200 lbs 0 oz    Initial Weight (lbs): 222.2 lbs     Cumulative weight loss (lbs): 22.2  Weight Loss Percentage: 9.99%        4/30/2024     9:58 AM   Changes and Difficulties   I have made the following changes to my diet since my last visit: Less wine, added a daily protein shake, eliminated fried food and also processed food   With regards to my diet, I am still struggling with: Getting enough protein   I have made the following changes to my activity/exercise since my last visit: About  "the same   With regards to my activity/exercise, I am still struggling with: Motivation             MEDICATIONS:   Current Outpatient Medications   Medication Sig Dispense Refill     tirzepatide-Weight Management (ZEPBOUND) 5 MG/0.5ML prefilled pen Inject 0.5 mLs (5 mg) subcutaneously every 7 days After completing 4 weeks of taking the 2.5mg dose 2 mL 2     albuterol (PROAIR HFA/PROVENTIL HFA/VENTOLIN HFA) 108 (90 Base) MCG/ACT inhaler Inhale 2 puffs into the lungs every 4 hours as needed for shortness of breath, wheezing or cough 18 g 3     cyclobenzaprine (FLEXERIL) 5 MG tablet Take 1 tablet (5 mg) by mouth 2 times daily as needed for muscle spasms 30 tablet 0     FLUoxetine (PROZAC) 40 MG capsule TAKE ONE CAPSULE BY MOUTH DAILY 90 capsule 0     fluticasone-salmeterol (ADVAIR) 250-50 MCG/ACT inhaler Inhale 1 puff into the lungs every 12 hours 60 each 3     metoprolol succinate ER (TOPROL XL) 25 MG 24 hr tablet Take 1 tablet (25 mg) by mouth daily 90 tablet 1     mycophenolate (GENERIC EQUIVALENT) 500 MG tablet Take 2 tablets (1,000 mg) by mouth 2 times daily 120 tablet 3     omeprazole (PRILOSEC) 40 MG DR capsule Take 1 capsule (40 mg) by mouth daily 90 capsule 3     Semaglutide-Weight Management (WEGOVY) 1 MG/0.5ML pen Inject 1 mg Subcutaneous once a week 2 mL 2     traZODone (DESYREL) 50 MG tablet Take 1 tablet (50 mg) by mouth nightly as needed for sleep 90 tablet 1           4/30/2024     9:58 AM   Weight Loss Medication History Reviewed With Patient   Are you having any side effects from the weight loss medication that we have prescribed you? Yes   If you are having side effects please describe: Stomach pain for one or two days after shot.                No data to display                  PHYSICAL EXAM:  Objective   Ht 1.626 m (5' 4\")   Wt 90.7 kg (200 lb)   BMI 34.33 kg/m      Vitals - Patient Reported  Pain Score: No Pain (0)        Physical Exam   GENERAL: alert and no distress  EYES: Eyes grossly " normal to inspection.  No discharge or erythema, or obvious scleral/conjunctival abnormalities.  RESP: No audible wheeze, cough, or visible cyanosis.    SKIN: Visible skin clear. No significant rash, abnormal pigmentation or lesions.  NEURO: Cranial nerves grossly intact.  Mentation and speech appropriate for age.  PSYCH: Appropriate affect, tone, and pace of words    Sleeve Gastrectomy: Risks and Side Effects    The complications or risks of surgery include but are not limited to: death, heart attack, infection in the surgical site (wound infection), abdomen (abscess), bladder (urinary tract infection), lungs (pneumonia), clots in legs (deep vein thrombosis) or lungs (pulmonary emboli),  injury to the bowels or other organs, bowel obstruction, hernia at the incision and gastrointestional bleeding.    More specific risks related to vertical sleeve gastrectomy were detailed at the bariatric informational seminar and include the following: leak at the vertical sleeve staple line, leak at the anastomoses,  nausea, vomiting, and dehydration for several months,  adhesions causing bowel obstruction, rapid weight loss causing a higher rate of gallstone formation during the first 6 months after surgery, decreased absorption of vitamins and protein because of the reduced stomach size, weight regain if inappropriate food intake occurs, stricture, injury to other organs, hernia,  and ulcers.       Side effects of bariatric surgery include but are not limited to: abdominal pain, cramping, bloating, constipation, nausea, vomiting, diarrhea, difficulty swallowing,  dehydration, hair loss, excess skin, protein, iron and vitamin deficiencies, heartburn, transfer of addictions, increased anxiety and worsening depression.       I emphasized exercise and activity behavior along with appropriate food choice as the main foundation for weight loss with surgery providing surgical reinforcement of the appropriate behavior  set.        Review of general surgery weight loss process    1. Complete preoperative requirements, including weight loss.  Final weight check to confirm MANDATORY weight loss requirement must be documented on a clinic scale.    2. Discuss prior authorization with .    3. History and physical evaluation by PCP of PAC clinic within 30 days of surgery date, preoperative class, and weight check (weigh-in visit) to be scheduled by patient.  Pre-anesthesia clinic for risk evaluation to be scheduled by anesthesia clinic.    4. We cannot guarantee that patient will qualify for surgery unless all preoperative requirements are met, prior authorization from primary insurance company is granted, and insurance changes do not occur.    5. It is possible for patients to regain all weight after weight loss surgery unless they follow guidelines prescribed by our bariatric center.    6. All patients with gastrointestinal complaints after weight loss surgery must have complaints conveyed to the bariatric team for appropriate treatment.    7. Vitamin deficiencies may develop post-bariatric surgery and annual laboratory testing should be performed.    8. Persistent nausea/vomiting after bariatric surgery entails risk of thiamine deficiency and should be treated early.  Vitamin B12 deficiency may develop, especially after gastric bypass surgery and must be recognized.        If you have any questions about our plans please don't hesitate to contact me.    Sincerely,    Leanne Stanford PA-C      40 minutes spent by me on the date of the encounter doing chart review, history and exam, documentation and further activities per the note        Virtual Visit Details    Type of service:  Video Visit   Video Start Time:  2:30pm  Video End Time: 3:00pm    Originating Location (pt. Location): Home    Distant Location (provider location):  Off-site  Platform used for Video Visit: AmWell      Again, thank you for allowing me  to participate in the care of your patient.      Sincerely,    Leanne Stanford PA-C

## 2024-07-31 NOTE — PROGRESS NOTES
Pre-bariatric surgery Visit      Elsie Rubi  MRN:  [id number]  :  1960  LYNETTE:  2024    Dear Jessie Rose MD,    I had the pleasure of seeing your patient, Elsie Rubi, in my preoperative bariatric clinic.     As you know, she has morbid obesity and is considering weight loss surgery to treat obesity in association with her medical conditions of obesity.  Her consult weight was 222.  She has lost 22 pounds since her consult weight. She has met her required pre-surgery weight. Please refer to initial consult note from date 24 for patient's weight history and co-morbidities.           2024    11:27 AM   --   I have the following health issues associated with obesity Pre-Diabetes    High Blood Pressure    GERD (Reflux)    Asthma    Stress Incontinence       Assessment & Plan   Problem List Items Addressed This Visit       Prediabetes (Chronic)    Relevant Medications    tirzepatide-Weight Management (ZEPBOUND) 5 MG/0.5ML prefilled pen    Other Relevant Orders    Vitamin D Deficiency     Other Visit Diagnoses       Class 2 severe obesity with serious comorbidity and body mass index (BMI) of 38.0 to 38.9 in adult, unspecified obesity type (H)    -  Primary    Relevant Medications    tirzepatide-Weight Management (ZEPBOUND) 5 MG/0.5ML prefilled pen    Other Relevant Orders    Vitamin D Deficiency           Plan  Okay to switch to zepbound 5mg due to improved weight loss and preferred injection mechanism after completing remaining doses of wegovy 1mg, prescription sent   Goals we discussed today:   Strength training 2x a week   Labs ordered today: vitamin d   Task list items to do: meet with Dr. Cabrera for augustina, get pulmonology clearance letter, complete psych clearance   Dr. Cabrera ASA for NBS meet and greet   Follow up with Leanne in 3 months   Dietician appointment with Babs in 1 month   Keep up the excellent work!         INTERVAL HISTORY:  Last seen by me 24-  "doing well with zepbound, switched to wegovy due to supply issues. Was on [employer] lent where she wasn't eating any animal products, plan for diet to go back to normal after [employer] easter.     Today in visit (7/31/24)  Things are going well. Weight plateau recently.   In the middle of psych eval, reports therapist is encouraging her to increase physical exercise.     Lung health improving lately, she feels her weight loss has been helpful with this.   Busier around the house as well.   Tried exercising yesterday, stretching legs, modified crunches.   This was a big step for her.     In our appointment today Ju expressed some concern about the potential risk for long term side effects and complications related to surgery. Discussed these risks and benefits in detail.   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   She inquired how frequently Dr. Cabrera does these bariatric surgeries, discussed that he does several a month and has been doing so for several years.    Discussed that while there are risks with any major surgery, the majority of our patients have a good experience with the surgery with minimal complications and get to see health benefits associated with their weight loss. Discussed that the most common complications we see regularly in clinic are heart burn and weight regain, and that the risk for long term complication is reduced with regular follow up and adhering to post op guidelines from the team.     Task list:   Psych eval-  in process   Pcp- completed   Cardiology- completed stress test (no concerns)  Sleep medicine- completed 5/14/24   Pulmonology- patient reports pulmonology has okay'd surgery, will contact clinic to ask for clearance in writing   EGD- completed by Dr. Cabrera 3/13/24    Wt Readings from Last 5 Encounters:   07/31/24 90.7 kg (200 lb) "   07/30/24 90.7 kg (200 lb)   07/24/24 90.7 kg (200 lb)   07/24/24 91.2 kg (201 lb)   06/28/24 93 kg (205 lb)       Anti-obesity medication history    Current:   Wegovy 1mg- some nausea and malaise with dose increase to 1mg 3 weeks ago, has since resolved. Some minor nausea day after injection. Not liking the injection mechanism as much, is requesting to go back to zepbound.     Past:  Zepbound- prior to wegovy, stopped due to supply issues. Stomach pain with starting dose, but resolved over time. No vomiting.     Recent diet changes: working with Babs Mcduffie, focusing on upping protein and daily calories. Eating more greek yogurt or cottage cheese.     Recent exercise/activity changes: improved activity tolerance with breathing improving. Hoping to increase muscle mass, resources sent over [employer] for exercise resources. Recently bought strength training book .      Vitamins/Labs: low vitamin D seen on nbs labs, recommended starting a vitamin d supplement of 2,000iu daily. Has been taking daily, will recheck labs today.      GERD:  no concerns, symptoms are well controlled  with the 40mg omeprazole     CURRENT WEIGHT:   200 lbs 0 oz    Initial Weight (lbs): 222.2 lbs     Cumulative weight loss (lbs): 22.2  Weight Loss Percentage: 9.99%        4/30/2024     9:58 AM   Changes and Difficulties   I have made the following changes to my diet since my last visit: Less wine, added a daily protein shake, eliminated fried food and also processed food   With regards to my diet, I am still struggling with: Getting enough protein   I have made the following changes to my activity/exercise since my last visit: About the same   With regards to my activity/exercise, I am still struggling with: Motivation             MEDICATIONS:   Current Outpatient Medications   Medication Sig Dispense Refill    tirzepatide-Weight Management (ZEPBOUND) 5 MG/0.5ML prefilled pen Inject 0.5 mLs (5 mg) subcutaneously every 7 days After completing 4  "weeks of taking the 2.5mg dose 2 mL 2    albuterol (PROAIR HFA/PROVENTIL HFA/VENTOLIN HFA) 108 (90 Base) MCG/ACT inhaler Inhale 2 puffs into the lungs every 4 hours as needed for shortness of breath, wheezing or cough 18 g 3    cyclobenzaprine (FLEXERIL) 5 MG tablet Take 1 tablet (5 mg) by mouth 2 times daily as needed for muscle spasms 30 tablet 0    FLUoxetine (PROZAC) 40 MG capsule TAKE ONE CAPSULE BY MOUTH DAILY 90 capsule 0    fluticasone-salmeterol (ADVAIR) 250-50 MCG/ACT inhaler Inhale 1 puff into the lungs every 12 hours 60 each 3    metoprolol succinate ER (TOPROL XL) 25 MG 24 hr tablet Take 1 tablet (25 mg) by mouth daily 90 tablet 1    mycophenolate (GENERIC EQUIVALENT) 500 MG tablet Take 2 tablets (1,000 mg) by mouth 2 times daily 120 tablet 3    omeprazole (PRILOSEC) 40 MG DR capsule Take 1 capsule (40 mg) by mouth daily 90 capsule 3    Semaglutide-Weight Management (WEGOVY) 1 MG/0.5ML pen Inject 1 mg Subcutaneous once a week 2 mL 2    traZODone (DESYREL) 50 MG tablet Take 1 tablet (50 mg) by mouth nightly as needed for sleep 90 tablet 1           4/30/2024     9:58 AM   Weight Loss Medication History Reviewed With Patient   Are you having any side effects from the weight loss medication that we have prescribed you? Yes   If you are having side effects please describe: Stomach pain for one or two days after shot.                No data to display                  PHYSICAL EXAM:  Objective    Ht 1.626 m (5' 4\")   Wt 90.7 kg (200 lb)   BMI 34.33 kg/m      Vitals - Patient Reported  Pain Score: No Pain (0)        Physical Exam   GENERAL: alert and no distress  EYES: Eyes grossly normal to inspection.  No discharge or erythema, or obvious scleral/conjunctival abnormalities.  RESP: No audible wheeze, cough, or visible cyanosis.    SKIN: Visible skin clear. No significant rash, abnormal pigmentation or lesions.  NEURO: Cranial nerves grossly intact.  Mentation and speech appropriate for age.  PSYCH: " Appropriate affect, tone, and pace of words    Sleeve Gastrectomy: Risks and Side Effects    The complications or risks of surgery include but are not limited to: death, heart attack, infection in the surgical site (wound infection), abdomen (abscess), bladder (urinary tract infection), lungs (pneumonia), clots in legs (deep vein thrombosis) or lungs (pulmonary emboli),  injury to the bowels or other organs, bowel obstruction, hernia at the incision and gastrointestional bleeding.    More specific risks related to vertical sleeve gastrectomy were detailed at the bariatric informational seminar and include the following: leak at the vertical sleeve staple line, leak at the anastomoses,  nausea, vomiting, and dehydration for several months,  adhesions causing bowel obstruction, rapid weight loss causing a higher rate of gallstone formation during the first 6 months after surgery, decreased absorption of vitamins and protein because of the reduced stomach size, weight regain if inappropriate food intake occurs, stricture, injury to other organs, hernia,  and ulcers.       Side effects of bariatric surgery include but are not limited to: abdominal pain, cramping, bloating, constipation, nausea, vomiting, diarrhea, difficulty swallowing,  dehydration, hair loss, excess skin, protein, iron and vitamin deficiencies, heartburn, transfer of addictions, increased anxiety and worsening depression.       I emphasized exercise and activity behavior along with appropriate food choice as the main foundation for weight loss with surgery providing surgical reinforcement of the appropriate behavior set.        Review of general surgery weight loss process    1. Complete preoperative requirements, including weight loss.  Final weight check to confirm MANDATORY weight loss requirement must be documented on a clinic scale.    2. Discuss prior authorization with .    3. History and physical evaluation by PCP of PAC  clinic within 30 days of surgery date, preoperative class, and weight check (weigh-in visit) to be scheduled by patient.  Pre-anesthesia clinic for risk evaluation to be scheduled by anesthesia clinic.    4. We cannot guarantee that patient will qualify for surgery unless all preoperative requirements are met, prior authorization from primary insurance company is granted, and insurance changes do not occur.    5. It is possible for patients to regain all weight after weight loss surgery unless they follow guidelines prescribed by our bariatric center.    6. All patients with gastrointestinal complaints after weight loss surgery must have complaints conveyed to the bariatric team for appropriate treatment.    7. Vitamin deficiencies may develop post-bariatric surgery and annual laboratory testing should be performed.    8. Persistent nausea/vomiting after bariatric surgery entails risk of thiamine deficiency and should be treated early.  Vitamin B12 deficiency may develop, especially after gastric bypass surgery and must be recognized.        If you have any questions about our plans please don't hesitate to contact me.    Sincerely,    Leanne Stanford PA-C      40 minutes spent by me on the date of the encounter doing chart review, history and exam, documentation and further activities per the note

## 2024-07-31 NOTE — Clinical Note
Ju is close to qualifying for a surgery date! In the process of completing her psych eval and we just need something in writing from her pulmonologist (she reports he was fine with surgery but just hasn't given us a letter or message).  She is well past goal weight and has completed all visits with RD!

## 2024-08-02 ENCOUNTER — VIRTUAL VISIT (OUTPATIENT)
Dept: PSYCHOLOGY | Facility: CLINIC | Age: 64
End: 2024-08-02
Payer: COMMERCIAL

## 2024-08-02 DIAGNOSIS — F33.0 MAJOR DEPRESSIVE DISORDER, RECURRENT EPISODE, MILD (H): Primary | ICD-10-CM

## 2024-08-02 PROCEDURE — 96130 PSYCL TST EVAL PHYS/QHP 1ST: CPT | Mod: 95 | Performed by: PSYCHOLOGIST

## 2024-08-02 PROCEDURE — 90791 PSYCH DIAGNOSTIC EVALUATION: CPT | Mod: 95 | Performed by: PSYCHOLOGIST
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
## 2024-08-02 NOTE — PROGRESS NOTES
Addendum regarding patient's Pre-op clearance for bariatric surgery:    Patient is currently stable for bariatric surgery.  No additional medications or time needed to optimize her.    She has 2 respiratory disorders that are being managed.      Regarding her asthma, she should continue her inhalers as normal.  Can potentially just provide her an albuterol nebulizer just before surgery doubt bronchospasm in the OR.    Regarding her ILD, to help prevent post operative respiratory failure if she is intubated- it would be ideal to maintain tidal volumes <6-7cc/Kg of ideal body weight and maintain low airway pressures (<30 mmhg plateau).  She can continue the mycophenolate for now unless concern for infection. Following bariatric surgery, if she is having diarrhea/loose stools, can try Myfortec, but can determine later down the line.     She has mild MIC and nocturnal hypoxia is not on treatment with CPAP but is managed with supplemental O2 of 2LPM at night. If any concern for upper aiway obstruction post-operatively, can try auto-Pap 5-20cm.    Additional recommendations include postoperative incentive spirometry and early mobilization. If admitted, would need appropriate VTE prevention.      Isaac Duque MD  [facility],  of Medicine  Pulmonary/Critical Care Medicine  August 2, 2024

## 2024-08-05 NOTE — TELEPHONE ENCOUNTER
Isaac Duque MD [facility]-  I can add an addendum in her last clinic note in regards for clearance for bariatric surgery.  Regarding flying- I would advise taking her supplemental O2 with her on the plane if she still has it since ILD can be unpredictable.  I am hoping/wondering as she loses more weight, if her spo2 will continue to improve.  Thanks  -AM    Isaac Duque MD [facility]-  Babak Moctezuma,    I added the addendum to her last office visit on 7/24 that addresses her pre-op clearance.  Can you please forward this to her bariatric surgery team?    Thanks,    AM      Nurse placed call to patient  Let patient know, Dr. Duque added an addendum to patient note.   Patient reports her bariatric DrLisa Is out of [place], and will have her coordinator get the not from her chart.   Nurse advised the patient to bring oxygen onto the plane to be safe, patient reports she is happy to do that    Lottie Kohli RN

## 2024-08-08 ENCOUNTER — TELEPHONE (OUTPATIENT)
Dept: ENDOCRINOLOGY | Facility: CLINIC | Age: 64
End: 2024-08-08
Payer: COMMERCIAL

## 2024-08-08 ENCOUNTER — OFFICE VISIT (OUTPATIENT)
Dept: FAMILY MEDICINE | Facility: OTHER | Age: 64
End: 2024-08-08
Attending: STUDENT IN AN ORGANIZED HEALTH CARE EDUCATION/TRAINING PROGRAM
Payer: COMMERCIAL

## 2024-08-08 ENCOUNTER — TELEPHONE (OUTPATIENT)
Dept: SURGERY | Facility: CLINIC | Age: 64
End: 2024-08-08
Payer: COMMERCIAL

## 2024-08-08 VITALS
WEIGHT: 203 LBS | RESPIRATION RATE: 18 BRPM | OXYGEN SATURATION: 96 % | SYSTOLIC BLOOD PRESSURE: 110 MMHG | DIASTOLIC BLOOD PRESSURE: 68 MMHG | HEART RATE: 82 BPM | HEIGHT: 64 IN | TEMPERATURE: 96.9 F | BODY MASS INDEX: 34.66 KG/M2

## 2024-08-08 DIAGNOSIS — R25.1 TREMOR OF BOTH HANDS: ICD-10-CM

## 2024-08-08 DIAGNOSIS — J45.20 MILD INTERMITTENT ASTHMA WITHOUT COMPLICATION: Chronic | ICD-10-CM

## 2024-08-08 DIAGNOSIS — J84.9 ILD (INTERSTITIAL LUNG DISEASE) (H): Chronic | ICD-10-CM

## 2024-08-08 DIAGNOSIS — F33.1 MODERATE EPISODE OF RECURRENT MAJOR DEPRESSIVE DISORDER (H): Primary | ICD-10-CM

## 2024-08-08 PROBLEM — G47.33 OSA (OBSTRUCTIVE SLEEP APNEA): Chronic | Status: ACTIVE | Noted: 2021-10-14

## 2024-08-08 PROCEDURE — G0463 HOSPITAL OUTPT CLINIC VISIT: HCPCS

## 2024-08-08 PROCEDURE — G2211 COMPLEX E/M VISIT ADD ON: HCPCS | Performed by: STUDENT IN AN ORGANIZED HEALTH CARE EDUCATION/TRAINING PROGRAM

## 2024-08-08 PROCEDURE — 99214 OFFICE O/P EST MOD 30 MIN: CPT | Performed by: STUDENT IN AN ORGANIZED HEALTH CARE EDUCATION/TRAINING PROGRAM

## 2024-08-08 RX ORDER — BUPROPION HYDROCHLORIDE 150 MG/1
150 TABLET ORAL EVERY MORNING
Qty: 30 TABLET | Refills: 1 | Status: SHIPPED | OUTPATIENT
Start: 2024-08-08

## 2024-08-08 ASSESSMENT — ASTHMA QUESTIONNAIRES
QUESTION_5 LAST FOUR WEEKS HOW WOULD YOU RATE YOUR ASTHMA CONTROL: SOMEWHAT CONTROLLED
ACT_TOTALSCORE: 14
ACT_TOTALSCORE: 14
QUESTION_1 LAST FOUR WEEKS HOW MUCH OF THE TIME DID YOUR ASTHMA KEEP YOU FROM GETTING AS MUCH DONE AT WORK, SCHOOL OR AT HOME: ALL OF THE TIME
QUESTION_4 LAST FOUR WEEKS HOW OFTEN HAVE YOU USED YOUR RESCUE INHALER OR NEBULIZER MEDICATION (SUCH AS ALBUTEROL): ONCE A WEEK OR LESS
QUESTION_3 LAST FOUR WEEKS HOW OFTEN DID YOUR ASTHMA SYMPTOMS (WHEEZING, COUGHING, SHORTNESS OF BREATH, CHEST TIGHTNESS OR PAIN) WAKE YOU UP AT NIGHT OR EARLIER THAN USUAL IN THE MORNING: NOT AT ALL
ACUTE_EXACERBATION_TODAY: NO
QUESTION_2 LAST FOUR WEEKS HOW OFTEN HAVE YOU HAD SHORTNESS OF BREATH: MORE THAN ONCE A DAY

## 2024-08-08 ASSESSMENT — ANXIETY QUESTIONNAIRES
4. TROUBLE RELAXING: NOT AT ALL
6. BECOMING EASILY ANNOYED OR IRRITABLE: MORE THAN HALF THE DAYS
5. BEING SO RESTLESS THAT IT IS HARD TO SIT STILL: NOT AT ALL
GAD7 TOTAL SCORE: 4
IF YOU CHECKED OFF ANY PROBLEMS ON THIS QUESTIONNAIRE, HOW DIFFICULT HAVE THESE PROBLEMS MADE IT FOR YOU TO DO YOUR WORK, TAKE CARE OF THINGS AT HOME, OR GET ALONG WITH OTHER PEOPLE: VERY DIFFICULT
3. WORRYING TOO MUCH ABOUT DIFFERENT THINGS: NOT AT ALL
7. FEELING AFRAID AS IF SOMETHING AWFUL MIGHT HAPPEN: SEVERAL DAYS
1. FEELING NERVOUS, ANXIOUS, OR ON EDGE: SEVERAL DAYS
2. NOT BEING ABLE TO STOP OR CONTROL WORRYING: NOT AT ALL
8. IF YOU CHECKED OFF ANY PROBLEMS, HOW DIFFICULT HAVE THESE MADE IT FOR YOU TO DO YOUR WORK, TAKE CARE OF THINGS AT HOME, OR GET ALONG WITH OTHER PEOPLE?: VERY DIFFICULT
GAD7 TOTAL SCORE: 4
7. FEELING AFRAID AS IF SOMETHING AWFUL MIGHT HAPPEN: SEVERAL DAYS
GAD7 TOTAL SCORE: 4

## 2024-08-08 ASSESSMENT — PATIENT HEALTH QUESTIONNAIRE - PHQ9
SUM OF ALL RESPONSES TO PHQ QUESTIONS 1-9: 12
10. IF YOU CHECKED OFF ANY PROBLEMS, HOW DIFFICULT HAVE THESE PROBLEMS MADE IT FOR YOU TO DO YOUR WORK, TAKE CARE OF THINGS AT HOME, OR GET ALONG WITH OTHER PEOPLE: VERY DIFFICULT
SUM OF ALL RESPONSES TO PHQ QUESTIONS 1-9: 12

## 2024-08-08 ASSESSMENT — PAIN SCALES - GENERAL: PAINLEVEL: NO PAIN (0)

## 2024-08-08 NOTE — TELEPHONE ENCOUNTER
General Call      Reason for Call:  task list    What are your questions or concerns:  pt would like to discuss her task list and how long until surg    Could we send this information to you in Datical or would you prefer to receive a phone call?:   Patient would prefer a phone call   Okay to leave a detailed message?: Yes at Cell number on file:    Telephone Information:   Mobile [phone]

## 2024-08-08 NOTE — TELEPHONE ENCOUNTER
Patient confirmed scheduled appointment:  Date: 9/12/24  Time: 12 pm  Visit type: Meet and greet  Provider: Dr Cabrera  Location: virtual  Testing/imaging: n/a  Additional notes: Pt confirms will be in [place] for appt

## 2024-08-08 NOTE — PATIENT INSTRUCTIONS
Try mucinex/guaifenasin for congestion.   Start wellbutrin daily for mood. Take in morning.   Referral to neurology at  of  for tremor.

## 2024-08-08 NOTE — PROGRESS NOTES
Assessment & Plan     Moderate episode of recurrent major depressive disorder (H)  Not controlled.  Significant anhedonia, feeling down.  Feel Prozac is a good medication for her but may need some augmentation.  Would favor augmentation with Wellbutrin, as I suspect she may have some ADD that has been undiagnosed/untreated.  Reviewed possible side effects and time to benefit of Wellbutrin.  Will continue Prozac 40 mg.  Follow-up in 1 month, sooner if concerns.  If Wellbutrin is not effective, could discontinue and increase Prozac further  Versus changing to alternate medication.  - buPROPion (WELLBUTRIN XL) 150 MG 24 hr tablet; Take 1 tablet (150 mg) by mouth every morning    ILD (interstitial lung disease) (H)  Mild intermittent asthma without complication  Reviewed notes from the [facility].  Has had improvement.  Vitals and oxygen stable and reassuringly normal examination today.    Tremor of both hands  Worsening in the last year, bilateral, with intention making it harder for her to write.  Based on symptoms and exam I suspect more of a familial type of tremor.  No resting tremor or signs of PD.  Will plan referral to neurology to evaluate further.  Follow-up with me sooner than her neurology consult if symptoms worsen or new concerning symptoms arise.  - Adult Neurology  Referral        Return in about 4 weeks (around 9/5/2024) for 1mo mental health.    The longitudinal plan of care for the diagnosis(es)/condition(s) as documented were addressed during this visit. Due to the added complexity in care, I will continue to support Ju in the subsequent management and with ongoing continuity of care.    Subjective   Ju is a 64 year old, presenting for the following health issues:  Asthma and Depression        8/8/2024    10:00 AM   Additional Questions   Roomed by April   Accompanied by elva         8/8/2024    10:00 AM   Patient Reported Additional Medications   Patient reports  taking the following new medications none     HPI     Depression and Anxiety   How are you doing with your depression since your last visit? Worsened  How are you doing with your anxiety since your last visit?  No change  Are you having other symptoms that might be associated with depression or anxiety? Yes:  loss of motivation, inability to follow through with plans  Have you had a significant life event? No   Do you have any concerns with your use of alcohol or other drugs? No    Social History     Tobacco Use    Smoking status: Never     Passive exposure: Never    Smokeless tobacco: Never   Vaping Use    Vaping status: Never Used   Substance Use Topics    Alcohol use: Yes     Comment: Wiine daily with dinner    Drug use: Not Currently         6/13/2024     9:17 AM 6/20/2024     8:27 AM 8/8/2024     9:55 AM   PHQ   PHQ-9 Total Score 7 3 12   Q9: Thoughts of better off dead/self-harm past 2 weeks Not at all Not at all Not at all         3/12/2024     1:57 PM 6/13/2024     1:19 PM 8/8/2024     9:56 AM   EDMAR-7 SCORE   Total Score 4 (minimal anxiety)  4 (minimal anxiety)   Total Score 4 8 4         8/8/2024     9:55 AM   Last PHQ-9   1.  Little interest or pleasure in doing things 3   2.  Feeling down, depressed, or hopeless 0   3.  Trouble falling or staying asleep, or sleeping too much 2   4.  Feeling tired or having little energy 3   5.  Poor appetite or overeating 2   6.  Feeling bad about yourself 2   7.  Trouble concentrating 0   8.  Moving slowly or restless 0   Q9: Thoughts of better off dead/self-harm past 2 weeks 0   PHQ-9 Total Score 12         8/8/2024     9:56 AM   EDMAR-7    1. Feeling nervous, anxious, or on edge 1   2. Not being able to stop or control worrying 0   3. Worrying too much about different things 0   4. Trouble relaxing 0   5. Being so restless that it is hard to sit still 0   6. Becoming easily annoyed or irritable 2   7. Feeling afraid, as if something awful might happen 1   EDMAR-7 Total  "Score 4   If you checked any problems, how difficult have they made it for you to do your work, take care of things at home, or get along with other people? Very difficult     Therapist felt she was underreporting depression - he did her psych evaluation.   Very difficult to do things she once enjoyed.   Last remember \"loving life\" when she was on adderall.   Routine activities are \"dull\" to her.   Very inactive.   Less stress than in the past.   A  lot of trouble with follow through on tasks  History of 80mg of Prozac in the past.   No history of seizures.     Feels muscle are more stiff. Worse in hands, harder to write. More stiff with going up and down stairs. More of tremor in hands with writing.both hands. Minimal in legs. Muscle pain in neck is chronic. Ongoing months. No abrupt change. Uncle had ALS.   TSH level normal 3/2024  CBC.CMP normal 7/24/24  956}  Asthma Follow-Up  Was ACT completed today?  Yes        8/8/2024    10:04 AM   ACT Total Scores   ACT TOTAL SCORE (Goal Greater than or Equal to 20) 14   In the past 12 months, how many times did you visit the emergency room for your asthma without being admitted to the hospital? 0   In the past 12 months, how many times were you hospitalized overnight because of your asthma? 0       How many days per week do you miss taking your asthma controller medication?  0  Please describe any recent triggers for your asthma: humidity  Have you had any Emergency Room Visits, Urgent Care Visits, or Hospital Admissions since your last office visit?  No  How many servings of fruits and vegetables do you eat daily?  2-3    Wondering about a peak flow meter.   Just had pulmonology visit. 6min walk was better. Lungs improved.   Has been trying to be more active.   Feel more exhausted today but no sure that it's her lungs. ?mood.   Always bronchospasm with cough - ongoing a long time     On average, how many sweetened beverages do you drink each day (Examples: soda, juice, " "sweet tea, etc.  Do NOT count diet or artificially sweetened beverages)?   0  How many days per week do you exercise enough to make your heart beat faster? 3 or less  How many minutes a day do you exercise enough to make your heart beat faster? 9 or less  How many days per week do you miss taking your medication? 0          Objective    /68 (BP Location: Right arm, Patient Position: Sitting, Cuff Size: Adult Regular)   Pulse 82   Temp 96.9  F (36.1  C) (Tympanic)   Resp 18   Ht 1.626 m (5' 4\")   Wt 92.1 kg (203 lb)   SpO2 96%   BMI 34.84 kg/m    Body mass index is 34.84 kg/m .    Physical Exam  Constitutional:       General: She is not in acute distress.     Appearance: Normal appearance. She is not ill-appearing.   Cardiovascular:      Rate and Rhythm: Normal rate and regular rhythm.   Pulmonary:      Effort: Pulmonary effort is normal. No respiratory distress.      Breath sounds: Normal breath sounds. No wheezing, rhonchi or rales.   Skin:     General: Skin is warm and dry.   Neurological:      General: No focal deficit present.      Mental Status: She is alert and oriented to person, place, and time.      Motor: Tremor (Bilateral with intention) present. No abnormal muscle tone.      Gait: Gait is intact.      Comments: No resting tremor   Psychiatric:         Mood and Affect: Mood normal.         Behavior: Behavior normal.         Thought Content: Thought content normal.         Judgment: Judgment normal.                Signed Electronically by: Jessie Rose MD      Answers submitted by the patient for this visit:  Patient Health Questionnaire (Submitted on 8/8/2024)  If you checked off any problems, how difficult have these problems made it for you to do your work, take care of things at home, or get along with other people?: Very difficult  PHQ9 TOTAL SCORE: 12  EDMAR-7 (Submitted on 8/8/2024)  EDMAR 7 TOTAL SCORE: 4    "

## 2024-08-08 NOTE — TELEPHONE ENCOUNTER
Patient confirmed scheduled appointment:  Date: 11/20/24  Time: 12:30 pm  Visit type: RET EDWIN (pre-surg)  Provider: Leanne Stanford  Location: virtual  Testing/imaging: n/a  Additional notes: n/a

## 2024-08-09 ENCOUNTER — PREP FOR PROCEDURE (OUTPATIENT)
Dept: ENDOCRINOLOGY | Facility: CLINIC | Age: 64
End: 2024-08-09
Payer: COMMERCIAL

## 2024-08-09 ENCOUNTER — TELEPHONE (OUTPATIENT)
Dept: ENDOCRINOLOGY | Facility: CLINIC | Age: 64
End: 2024-08-09
Payer: COMMERCIAL

## 2024-08-09 ENCOUNTER — CARE COORDINATION (OUTPATIENT)
Dept: ENDOCRINOLOGY | Facility: CLINIC | Age: 64
End: 2024-08-09
Payer: COMMERCIAL

## 2024-08-09 DIAGNOSIS — E66.01 MORBID OBESITY (H): Primary | ICD-10-CM

## 2024-08-09 RX ORDER — ENOXAPARIN SODIUM 100 MG/ML
40 INJECTION SUBCUTANEOUS
Status: CANCELLED | OUTPATIENT
Start: 2024-08-09

## 2024-08-09 RX ORDER — CEFAZOLIN SODIUM 2 G/50ML
2 SOLUTION INTRAVENOUS SEE ADMIN INSTRUCTIONS
Status: CANCELLED | OUTPATIENT
Start: 2024-08-09

## 2024-08-09 RX ORDER — CEFAZOLIN SODIUM 2 G/50ML
2 SOLUTION INTRAVENOUS
Status: CANCELLED | OUTPATIENT
Start: 2024-08-09

## 2024-08-09 RX ORDER — ACETAMINOPHEN 325 MG/1
975 TABLET ORAL ONCE
Status: CANCELLED | OUTPATIENT
Start: 2024-08-09 | End: 2024-08-09

## 2024-08-09 RX ORDER — ONDANSETRON 2 MG/ML
4 INJECTION INTRAMUSCULAR; INTRAVENOUS
Status: CANCELLED | OUTPATIENT
Start: 2024-08-09

## 2024-08-09 NOTE — TELEPHONE ENCOUNTER
Patient confirmed scheduled appointment:  Date: 8/30/24  Time: 10:30 am  Visit type: RET EDWIN Nutrition  Provider: Caroline Mcduffie  Location: virtual  Testing/imaging: n/a  Additional notes: Pt confirmed will be in [place] for appt

## 2024-08-09 NOTE — PROGRESS NOTES
Tasklist updated and reviewed with patient over the phone.    Bariatric Task List    Fax:  Please fax all paperwork to: [phone] -     Status:  Is patient a candidate for bariatric surgery?:  patient is a candidate for bariatric surgery -     Cleared to schedule surgeon consult?:  cleared to schedule surgeon consult - 8/22/24 silvana chong   Status:  surgery evaluation in process -     Surgeon: Dr. Cabrera -     Tentative surgery month/year: 9/10/24 -        Insurance: Insurance:  [facility] -      Contact insurance to discuss coverage: Needed -       [employer]: PCP Recommendation and Medical Clearance:    -     HP Referral:    -      Advanced beneficiary notification (ABN) for Medicare patients for RD visits   and surgery:   -      Weight history:   -     Other:    -        Patient Info: Initial Weight:  222 -     Date of Initial Weight/Height:  1/22/2024 -     Goal Weight (lbs):  212 -     Required Weight Loss:  10 -     Surgery Type:  sleeve gastrectomy -     Multidisciplinary Meeting:    -        Dietician Visits: Structured weight loss required by insurance?:    -     Dietician Visit 1:  Completed - 1/24/24 -    Dietician Visit 2:  Completed - 3/15/24-    Dietician Visit 3:  Completed - 5/29/24 -    Dietician Visit 4:  Completed - 6/28/24 [facility]   Dietician Visit 5:  Completed - 7/30/24 [facility]   Dietician Visit 6:  Completed - 8/30/24 [facility]   Dietician Visit additional:  Needed - Monthly until surgery   Clearance from dietician to see surgeon?:    -     Dietician Notes:    -        Psychological Evaluation: Psych eval:  Completed - scheduling info sent 1/22/24; 8/2/24 Cleared by Dr Castelan. bks   Therapist letter of support:    -     Psychiatrist letter of support:    -     Establish care with therapist:    -     Complete eating disorder evaluation:    -     Letter of clearance from therapist/eating disorder program:    -     Other:    -        Lab Work: Complete Blood Count:  Completed -     Comprehensive Metabolic  Panel:  Completed - Ordered 1/22/24 - AS   Vitamin D:  Completed - Ordered 1/22/24 - AS   PTH:  Completed - Ordered 1/22/24 - AS   Hgb A1c:  Needed - Outdated last done 8/24/23 bks please check. bks    Lipids: Needed - Outdated last done 8/24/23 bks please check. bks    TSH ([facility], [facility], [place] [facility]): Completed - Ordered 1/22/24 - AS     Ferritin:   -       Folate:   -       Testosterone, Total and Free:   -     Thiamine:   -     Vitamin A: Completed - Ordered 1/22/24 - AS   Vitamin B12: Completed - Ordered 1/22/24 - AS   Zinc:   -     C-peptide:   -     H. pylori:    -     MRSA (2 swabs, minimum 48 hours apart):   -     Nicotine Testing:    -     Recheck Vitamin D:   -     Other:    -        Consults/ Clearance Sleep Medicine:  Completed - template sent 1/22/24 [facility]; clearance received 5/14/24   Cardiac:  Completed - template sent 1/22/24 [facility], done on 3/14/24 - JF   Pain:   -     Dental:    -     Endocrine:    -     Gastroenterology:    -     Vascular Medicine:    -     Hematology:    -     Medical Weight Management:   -     Physical Therapy/Exercise:    -     Nephrology:    -     Neurology:    -     Pulmonology:  Completed - template sent 1/22/24 [facility]; see 7/24/24 Notes Isaac Duque MD -see recommendations. bks   Rheumatology:    -     Other:    -     Other:    -     Other:    -        Testing: UGI:    -     EGD:  Completed - Scheduled for 3/13/24 - AS   Sleep Study:   -     Other:   -     Other:    -        PCP: Establish care with PCP:  Completed -     Follow up with PCP:    -     PCP letter of support:  Completed - Dr. Jessie Rose, 3/12/24      Health Maintenance: Colonoscopy(> 50 yrs or family hx):    -     Mammogram (> 40 yrs or family hx):    -     Pap Smear (women):   -     Other:   -     Other:    -        Stopping Smoking/ Alcohol Use/Cannabis Use: Quit tobacco use (3 months smoke free)?:    -     Quit date:    -     Quit alcohol use:   -     Quit date:   -     Other:   -     Quit date:   -        Patient  "Education:  Information Session:  Completed -     Attended New Consult Class?: Completed - 1/23/24 - AS   Given \"Making your decision\" handout?:  Yes -     Given \"A Roadmap to you Weight Loss Surgery\" handout?: Yes -     Given \"Epic Care Companion\" information?: Yes -     Attended support group?:  Needed -     Support plan in place?:  Needed -     Research consents signed?:    -     Avoid NSAIDS/ Alternate Plan for Pain:   -        Additional Surgery Requirements: Review Coag plan:    -     HgA1c <8:    -     Inpatient pain consult:    -     Final nicotine screen:    -     Dental work complete:    -     Birth control plan:    -     Gallstone prevention plan (Actigall for 6 months postop): Needed -     Other:   -     Other:   -        Final Tasks:  Before surgery online preop class:  Needed -     After surgery online class:    -     Nurse visit for information:  Needed - Pre-op class.   Weight Check: Needed -     History and Physical: Pre-Assessment Clinic (PAC) -   Needed -     Final labs per clinic: Needed -     See MTM Pharmacist for medication review and plan for after surgery (if DM, transplant hx, greater than 10 meds): Needed -     Chest xray per clinic:   -     Electrocardiogram (ECG) per clinic:   -     Schedule postop appointments: Needed -     Other:   -   -        Notes: Please register for the Weight Loss Surgery Care Companion Pathway through the [employer] mobile celestino or via web browser. You register by answering \"yes\" to the following question, \"Do you agree to take part in this free, online program?\"  Information from this Pathway can help you be more successful before surgery and for up to one year after surgery.  This Pathway through [employer] can also answer questions you may have about your surgery.   The Pathway will start when you get your Tasklist after your initial consultation or when your surgery is scheduled.               -            "

## 2024-08-09 NOTE — TELEPHONE ENCOUNTER
Patient Contacted  regarding the following:    Appointment type: POST OP GLOBAL 90 DAY/ RET [facility]   Provider: Kaya Whiteside PA-C, Radha Stanford PA-C, Caroline Mcduffie RD   Return date: 1 wk, 1 mo, 3 mo   Specialty phone number: [phone]  Additional appointment(s) needed: n/a  Additonal Notes: Alll post op appointments have been confirmed and scheduled

## 2024-08-12 ENCOUNTER — TELEPHONE (OUTPATIENT)
Dept: CARDIOLOGY | Facility: CLINIC | Age: 64
End: 2024-08-12
Payer: COMMERCIAL

## 2024-08-12 NOTE — TELEPHONE ENCOUNTER
Patient confirmed scheduled appointment:  Date: 08/20/2024  Time: 100 pm   Visit type: NEW MTM  Provider: Rin Cohen RPH   Location: virtual   Testing/imaging: n/a  Additional notes: n/a

## 2024-08-16 ENCOUNTER — VIRTUAL VISIT (OUTPATIENT)
Dept: PSYCHOLOGY | Facility: CLINIC | Age: 64
End: 2024-08-16
Payer: COMMERCIAL

## 2024-08-16 DIAGNOSIS — F33.0 MAJOR DEPRESSIVE DISORDER, RECURRENT EPISODE, MILD (H): Primary | ICD-10-CM

## 2024-08-16 PROCEDURE — 90832 PSYTX W PT 30 MINUTES: CPT | Mod: 95 | Performed by: PSYCHOLOGIST

## 2024-08-16 ASSESSMENT — PATIENT HEALTH QUESTIONNAIRE - PHQ9
10. IF YOU CHECKED OFF ANY PROBLEMS, HOW DIFFICULT HAVE THESE PROBLEMS MADE IT FOR YOU TO DO YOUR WORK, TAKE CARE OF THINGS AT HOME, OR GET ALONG WITH OTHER PEOPLE: SOMEWHAT DIFFICULT
SUM OF ALL RESPONSES TO PHQ QUESTIONS 1-9: 5
SUM OF ALL RESPONSES TO PHQ QUESTIONS 1-9: 5

## 2024-08-16 NOTE — PROGRESS NOTES
[facility]                                     Progress Note    Patient Name: Elsie Rubi  Date: 8/16/24         Service Type: Individual      Session Start Time: 1:30pm Session End Time: 1:52pm     Session Length: 22    Session #: 4    Attendees: Client attended alone    Service Modality:  Video Visit:      Provider verified identity through the following two step process.  Patient provided:  Patient photo    Telemedicine Visit: The patient's condition can be safely assessed and treated via synchronous audio and visual telemedicine encounter.      Reason for Telemedicine Visit: Patient has requested telehealth visit    Originating Site (Patient Location): Patient's home    Distant Site (Provider Location): [facility]    Consent:  The patient/guardian has verbally consented to: the potential risks and benefits of telemedicine (video visit) versus in person care; bill my insurance or make self-payment for services provided; and responsibility for payment of non-covered services.     Patient would like the video invitation sent by:  Send to e-mail at: [email or web address]    Mode of Communication:  Video Conference via [employer]    Distant Location (Provider):  On-site    As the provider I attest to compliance with applicable laws and regulations related to telemedicine.    DATA  Interactive Complexity: No  Crisis: No        Progress Since Last Session (Related to Symptoms / Goals / Homework):   Symptoms: No change : Stable.    Homework: Partially completed      Episode of Care Goals: Satisfactory progress - ACTION (Actively working towards change); Intervened by reinforcing change plan / affirming steps taken     Current / Ongoing Stressors and Concerns:   Client's son is staying with her temporarily until he can find a new job.    Notes from 8/16/24 Session:  Client making progress with activity and resistance training.  She  "uses a \"machine\" in her livingroom, \"It's a mix between a row machine and elliptical.\"  She can set the tension.  Client is working her way up to using it daily for at least 7 minutes.        Treatment Objective(s) Addressed in This Session:   To check-in on client's homework of increasing her activity level and adding resistance training to her workout.     Intervention:   CBT: Psychoeducation; Socratic Questioning; Reviewed homework.  Active listening, validation, and other rapport building skills; Introduced her to Doctoral Intern who was shadowing today with client's written permission.    Assessments completed prior to visit:  The following assessments were completed by patient for this visit:  PHQ9:       8/10/2023     4:07 PM 3/12/2024     1:56 PM 5/14/2024     1:50 PM 6/13/2024     9:17 AM 6/20/2024     8:27 AM 8/8/2024     9:55 AM 8/16/2024     1:19 PM   PHQ-9 SCORE   PHQ-9 Total Score MyChart 8 (Mild depression) 6 (Mild depression)  7 (Mild depression) 3 (Minimal depression) 12 (Moderate depression) 5 (Mild depression)   PHQ-9 Total Score 8 6 12 7 3 12 5         ASSESSMENT: Current Emotional / Mental Status (status of significant symptoms):   Risk status (Self / Other harm or suicidal ideation)   Patient denies current fears or concerns for personal safety.   Patient denies current or recent suicidal ideation or behaviors.   Patient denies current or recent homicidal ideation or behaviors.   Patient denies current or recent self injurious behavior or ideation.   Patient denies other safety concerns.   Patient reports there has been no change in risk factors since their last session.     Patient reports there has been no change in protective factors since their last session.     Recommended that patient call 911 or go to the local ED should there be a change in any of these risk factors.     Appearance:   Appropriate    Eye Contact:   Good    Psychomotor Behavior: Normal    Attitude:   Cooperative  Friendly " "Pleasant   Orientation:   All   Speech    Rate / Production: Normal     Volume:  Normal    Mood:    Normal   Affect:    Appropriate    Thought Content:  Clear    Thought Form:  Coherent  Logical    Insight:    Good      Medication Review:   No current psychiatric medications prescribed Client started taking Wellbutrin \"about a week ago.\"     Medication Compliance:   Yes     Changes in Health Issues:   None reported     Chemical Use Review:   Substance Use: Chemical use reviewed, no active concerns identified      Tobacco Use: No current tobacco use.      Diagnosis:  1. Major Depressive Disorder, Recurrent, Mild, With melancholic features (H24)      Collateral Reports Completed:   Not Applicable    PLAN: (Patient Tasks / Therapist Tasks / Other)  Client has been approved for bariatric surgery \"in 3 weeks.\"  She will continue to work on being active and increasing her resistance training.  No further session required until after surgery.  Then it is protocol to meet with the client 1 and 3 months after surgery, minimum.        Rigoberto Castelan PsyD  August 16, 2024    "

## 2024-08-19 NOTE — PROGRESS NOTES
"Medication Therapy Management (MTM) Encounter    ASSESSMENT:                            Medication Adherence/Access: No issues identified    Weight Management:   Assessed and discussed potential administration changes of medications and potential holding/adjustment of medications post sleeve gastrectomy.  An alternative administration plan was formulated for medications that were greater than approximately ~6-8 mm for the first 3 months post Sleeve Gastrectomy.   Medication sizes were identified utilizing the medication's NDC # or drug identifier. Otherwise if medication size was not able to be identified, medications were compared to that of \"size of m&m\" for easy comparison for patient.  Medications found to be larger than specified below (plan below in plan section):   Mycophenolate, omeprazole, fluoxetine   Evidence regarding medication plan for medications post-bariatric surgery is largely centralized around Darcie-en-Y gastric bypass per guidelines recommendations. Guideline recommendations are less known for medication adjustments for sleeve gastrectomy specifically.  As the sleeve gastrectomy includes solely removing a portion of stomach and does not impact small intestinal tract (compared to other bariatric surgeries like the Darcie-en-Y Gastric Bypass), medication adjustments may vary from guidelines and follow study data/expert recommendations/center protocol. Therefore, extended release or sustained release medications may be continued post-operatively if the benefit was found to outweigh the risk.   Discussed no NSAIDs post op, can use acetaminophen post-operatively   Per post-bariatric protocol, patient to hold all supplements 1 week before surgery. Supplements that are larger than allowable post op, should be held for at least 3 months post op. Post-bariatric vitamin regimen start will be discussed at 1 week post op dietitian follow up appointment.   Multivitamin and Calcium supplementation should be in " chewable formulation    Mental Health   Depression and Insomnia  Stable on current regimen, see discussion using meds post op above.     Supplements   Reasonable for use at this time. Briefly discussed supplementation needs post Sleeve Gastretomy and further information coming from dietitian team.     Pain/Fibromyalgia:   Flexeril reasonable for pain management. Discussed no NSAIDS after surgery, Tylenol can be used for pain management if needed.     ILD:   Inhalers are effective for symptomatic control. Size of mychophenolate tablet is too large for post sleeve gastrectomy. Option to switch to liquid formulation available at 200 mg/mL.     GI upset:   Stable with PPI use. Close monitoring post sleeve gastrectomy needed.       PLAN:                            Hold the following medications after Sleeve Gastrectomy:   Hold Wegovy at least 1 week before surgery and do not restart after surgery - Last dose will be 8/29/2024, do not take on 9/5/2024    Changed how you take the following medications for the first 3 months after Sleeve Gastrectomy:   CAPSULES: Fluoxetine, Omeprazole - Open capsule and sprinkle contents over 1 tablespoon of applesauce, yogurt or sugar free pudding and swallow immediately (do not chew).      Continue taking the following medications as you are currently:   Bupropion, Flexeril, Metoprolol, Trazodone    Considerations or surgical team after Sleeve Gastrectomy:  Consider holding metoprolol for now if blood pressure/pulse well within goal after surgery     Pharmacist to follow up with Pulmonology regarding changing mycophenolate from tablet to liquid for first 3 months post Sleeve Gastrectomy.     Patient to schedule PAC appointment.       Follow-up: 10/8/2024 at 11am via phone with Marcus FriasD      SUBJECTIVE/OBJECTIVE:                          Ju Rubi is a 64 year old female seen for an initial visit. She was referred to me from Dr. Cabrera.      Reason for visit:  "Discuss plan for medications s/p Sleeve Gastrectomy.    Allergies/ADRs: Reviewed in chart  Past Medical History: Reviewed in chart  Tobacco: She reports that she has never smoked. She has never been exposed to tobacco smoke. She has never used smokeless tobacco.  Alcohol: 7-9 beverages / week  Other Substance Use: not currently     Medication Adherence/Access: Patient uses pill box(es).  Patient takes medications 2 time(s) per day.   Per patient, misses medication 0 times per week.   Medication barriers: none.     Weight Management   Wegovy 1 mg once weekly - had discussed switching back to Zepbound with care team, holding off now until after surgery  Patient reports no current medication side effects.  Feeling a little nervous about upcoming surgery, hoping all goes smoothly.   Initial Consult Weight: 214 lbs     Current weight today: 198 lbs 0 oz  Cumulative Weight Loss: -16 lb, -7.5% from baseline    Wt Readings from Last 4 Encounters:   08/20/24 89.8 kg (198 lb)   08/08/24 92.1 kg (203 lb)   07/31/24 90.7 kg (200 lb)   07/30/24 90.7 kg (200 lb)     Estimated body mass index is 34.84 kg/m  as calculated from the following:    Height as of 8/8/24: 1.626 m (5' 4\").    Weight as of 8/8/24: 92.1 kg (203 lb).    Hypertension   Metoprolol ER 25 mg daily     Patient reports no current medication side effects  Patient does not self-monitor blood pressure.       BP Readings from Last 3 Encounters:   08/08/24 110/68   07/24/24 115/82   04/08/24 110/70       Mental Health   Depression and Insomnia  Bupropion  mg once daily  Fluoxetine 40 mg once daily  Trazodone 50 mg prn, using ~1x/week.   Patient reports no current medication side effects.  Current symptoms include: difficulty sleeping - working on sleep hygiene, specifically not looking at phone before bed. Understands impacts on sleep. Muscle pain inhibits sleep - using Flexeril prn (see below). Reports using Benadryl in the past infrequently for sleep.    "   Supplements   Vit D 1000 international unit(s) daily   Citracal petite - 2 tablets daily (400 mg calcium, 500 international unit(s) vit D per 2 tablets)  No reported issues at this time - understands there will be specific supplement recommendations post surgery.        Pain/Fribromyalgia:  Flexeril 5 mg prn at bedtime for general muscle discomfort using ~2x/month - effective for muscle pain and discomfort if inhibiting sleep  Aleve OTC - using infrequently - discussed no NSAID use post op    ILD:   Advair 250/50 mcg 1 puff BID  Albuterol prn - using 1x/week   Supplemental oxygen at home, portable device - hasn't needed to use recently, has some symptoms but able to do daily activities   Mycophenolate 500 mg - 2 tablets BID - feels this is burdensome given need for taking on empty stomach, large tablets, etc - restricts eating window currently. Was off for 1-2 months last winter due to influenza A infection. Using prophylactically to prevent scar tissue, would prefer not to take.     Patient rinses their mouth after using steroid inhaler.   Patient reports no current medication side effects.      Patient reports the following symptoms: increased shortness of breath upon exertion .    GI upset:   Omeprazole 40 mg daily  No other medication use - focusing on balanced diet   Bowel habit inconsistency, tends toward more diarrhea   Hx endoscopy - some irritation, inflammation >> omeprazole dose increased to 40 mg       Today's Vitals: Wt 89.8 kg (198 lb)   BMI 33.99 kg/m    ----------------    I spent 54 minutes with this patient today. All changes were made via collaborative practice agreement with Leanne Stanford. A copy of the visit note was provided to the patient's provider(s).    A summary of these recommendations was sent via [employer].    Rin Cohen, PharmD, BCACP  Medication Therapy Management Pharmacist  [facility]      Telemedicine Visit Details  Type of  service:  Telephone visit  Start Time:  1:06 PM  End Time: 2:00 PM     Medication Therapy Recommendations  Severe obesity (BMI 35.0-39.9) with comorbidity (H)    Current Medication: FLUoxetine (PROZAC) 40 MG capsule   Rationale: Dosage form inappropriate - Ineffective medication - Effectiveness   Recommendation: Provide Education   Status: Patient Agreed - Adherence/Education          Current Medication: mycophenolate (GENERIC EQUIVALENT) 500 MG tablet   Rationale: Dosage form inappropriate - Ineffective medication - Effectiveness   Recommendation: Change Medication Formulation    Status: Contact Provider - Awaiting Response          Current Medication: omeprazole (PRILOSEC) 40 MG DR capsule   Rationale: Dosage form inappropriate - Ineffective medication - Effectiveness   Recommendation: Provide Education   Status: Patient Agreed - Adherence/Education

## 2024-08-20 ENCOUNTER — VIRTUAL VISIT (OUTPATIENT)
Dept: CARDIOLOGY | Facility: CLINIC | Age: 64
End: 2024-08-20
Payer: COMMERCIAL

## 2024-08-20 VITALS — WEIGHT: 198 LBS | BODY MASS INDEX: 33.99 KG/M2

## 2024-08-20 DIAGNOSIS — M79.7 FIBROMYALGIA: ICD-10-CM

## 2024-08-20 DIAGNOSIS — E66.01 SEVERE OBESITY (BMI 35.0-39.9) WITH COMORBIDITY (H): Primary | Chronic | ICD-10-CM

## 2024-08-20 DIAGNOSIS — F33.0 MILD EPISODE OF RECURRENT MAJOR DEPRESSIVE DISORDER (H): Chronic | ICD-10-CM

## 2024-08-20 DIAGNOSIS — I10 ESSENTIAL HYPERTENSION: Chronic | ICD-10-CM

## 2024-08-20 DIAGNOSIS — J84.9 ILD (INTERSTITIAL LUNG DISEASE) (H): Chronic | ICD-10-CM

## 2024-08-20 ASSESSMENT — PAIN SCALES - GENERAL: PAINLEVEL: NO PAIN (0)

## 2024-08-20 NOTE — TELEPHONE ENCOUNTER
FUTURE VISIT INFORMATION      SURGERY INFORMATION:  Date: 9/10/24  Location: [facility]  Surgeon:  Jerel Cabrera MD   Anesthesia Type:  general  Procedure: GASTRECTOMY, SLEEVE, LAPAROSCOPIC HERNIORRHAPHY, HIATAL, LAPAROSCOPIC     RECORDS REQUESTED FROM:       Primary Care Provider: Jessie Rose MD - [facility] [place]    Pertinent Medical History: hypertension, robert    Most recent EKG+ Tracing: 3/14/24    Most recent ECHO: 4/18/23- [facility]    Most recent Cardiac Stress Test: 3/25/24    Most recent PFT's: 7/24/24

## 2024-08-20 NOTE — NURSING NOTE
Current patient location: [address] [place] [id number]    Is the patient currently in the state of [place]? YES    Visit mode:VIDEO    If the visit is dropped, the patient can be reconnected by: VIDEO VISIT: Text to cell phone:   Telephone Information:   Mobile [phone]       Will anyone else be joining the visit? NO  (If patient encounters technical issues they should call [phone])    How would you like to obtain your AVS? MyChart    Are changes needed to the allergy or medication list? No, Pt stated no changes to allergies, and Pt stated no med changes    Are refills needed on medications prescribed by this physician? NO    Rooming Documentation:  Not applicable      Reason for visit: Medication Therapy Management    Lauren ASHLEY

## 2024-08-20 NOTE — LETTER
"8/20/2024      RE: Elsie Rubi  [address] [facility] [address]       Dear Colleague,    Thank you for the opportunity to participate in the care of your patient, Elsie Rubi, at the [facility] [place] at [facility]. Please see a copy of my visit note below.    Medication Therapy Management (MTM) Encounter    ASSESSMENT:                            Medication Adherence/Access: No issues identified    Weight Management:   Assessed and discussed potential administration changes of medications and potential holding/adjustment of medications post sleeve gastrectomy.  An alternative administration plan was formulated for medications that were greater than approximately ~6-8 mm for the first 3 months post Sleeve Gastrectomy.   Medication sizes were identified utilizing the medication's NDC # or drug identifier. Otherwise if medication size was not able to be identified, medications were compared to that of \"size of m&m\" for easy comparison for patient.  Medications found to be larger than specified below (plan below in plan section):   Mycophenolate, omeprazole, fluoxetine   Evidence regarding medication plan for medications post-bariatric surgery is largely centralized around Darcie-en-Y gastric bypass per guidelines recommendations. Guideline recommendations are less known for medication adjustments for sleeve gastrectomy specifically.  As the sleeve gastrectomy includes solely removing a portion of stomach and does not impact small intestinal tract (compared to other bariatric surgeries like the Darcie-en-Y Gastric Bypass), medication adjustments may vary from guidelines and follow study data/expert recommendations/center protocol. Therefore, extended release or sustained release medications may be continued post-operatively if the benefit was found to outweigh the risk.   Discussed no NSAIDs post op, can use acetaminophen post-operatively   Per " post-bariatric protocol, patient to hold all supplements 1 week before surgery. Supplements that are larger than allowable post op, should be held for at least 3 months post op. Post-bariatric vitamin regimen start will be discussed at 1 week post op dietitian follow up appointment.   Multivitamin and Calcium supplementation should be in chewable formulation    Mental Health   Depression and Insomnia  Stable on current regimen, see discussion using meds post op above.     Supplements   Reasonable for use at this time. Briefly discussed supplementation needs post Sleeve Gastretomy and further information coming from dietitian team.     Pain/Fibromyalgia:   Flexeril reasonable for pain management. Discussed no NSAIDS after surgery, Tylenol can be used for pain management if needed.     ILD:   Inhalers are effective for symptomatic control. Size of mychophenolate tablet is too large for post sleeve gastrectomy. Option to switch to liquid formulation available at 200 mg/mL.     GI upset:   Stable with PPI use. Close monitoring post sleeve gastrectomy needed.       PLAN:                            Hold the following medications after Sleeve Gastrectomy:   Hold Wegovy at least 1 week before surgery and do not restart after surgery - Last dose will be 8/29/2024, do not take on 9/5/2024    Changed how you take the following medications for the first 3 months after Sleeve Gastrectomy:   CAPSULES: Fluoxetine, Omeprazole - Open capsule and sprinkle contents over 1 tablespoon of applesauce, yogurt or sugar free pudding and swallow immediately (do not chew).      Continue taking the following medications as you are currently:   Bupropion, Flexeril, Metoprolol, Trazodone    Considerations or surgical team after Sleeve Gastrectomy:  Consider holding metoprolol for now if blood pressure/pulse well within goal after surgery     Pharmacist to follow up with Pulmonology regarding changing mycophenolate from tablet to liquid for first  "3 months post Sleeve Gastrectomy.     Patient to schedule PAC appointment.       Follow-up: 10/8/2024 at 11am via phone with Marcus FriasD      SUBJECTIVE/OBJECTIVE:                          Ju Rubi is a 64 year old female seen for an initial visit. She was referred to me from Dr. Cabrera.      Reason for visit: Discuss plan for medications s/p Sleeve Gastrectomy.    Allergies/ADRs: Reviewed in chart  Past Medical History: Reviewed in chart  Tobacco: She reports that she has never smoked. She has never been exposed to tobacco smoke. She has never used smokeless tobacco.  Alcohol: 7-9 beverages / week  Other Substance Use: not currently     Medication Adherence/Access: Patient uses pill box(es).  Patient takes medications 2 time(s) per day.   Per patient, misses medication 0 times per week.   Medication barriers: none.     Weight Management  Wegovy 1 mg once weekly - had discussed switching back to Zepbound with care team, holding off now until after surgery  Patient reports no current medication side effects.  Feeling a little nervous about upcoming surgery, hoping all goes smoothly.   Initial Consult Weight: 214 lbs     Current weight today: 198 lbs 0 oz  Cumulative Weight Loss: -16 lb, -7.5% from baseline    Wt Readings from Last 4 Encounters:   08/20/24 89.8 kg (198 lb)   08/08/24 92.1 kg (203 lb)   07/31/24 90.7 kg (200 lb)   07/30/24 90.7 kg (200 lb)     Estimated body mass index is 34.84 kg/m  as calculated from the following:    Height as of 8/8/24: 1.626 m (5' 4\").    Weight as of 8/8/24: 92.1 kg (203 lb).    Hypertension  Metoprolol ER 25 mg daily     Patient reports no current medication side effects  Patient does not self-monitor blood pressure.       BP Readings from Last 3 Encounters:   08/08/24 110/68   07/24/24 115/82   04/08/24 110/70       Mental Health  Depression and Insomnia  Bupropion  mg once daily  Fluoxetine 40 mg once daily  Trazodone 50 mg prn, using ~1x/week. "   Patient reports no current medication side effects.  Current symptoms include: difficulty sleeping - working on sleep hygiene, specifically not looking at phone before bed. Understands impacts on sleep. Muscle pain inhibits sleep - using Flexeril prn (see below). Reports using Benadryl in the past infrequently for sleep.      Supplements  Vit D 1000 international unit(s) daily   Citracal petite - 2 tablets daily (400 mg calcium, 500 international unit(s) vit D per 2 tablets)  No reported issues at this time - understands there will be specific supplement recommendations post surgery.        Pain/Fribromyalgia:  Flexeril 5 mg prn at bedtime for general muscle discomfort using ~2x/month - effective for muscle pain and discomfort if inhibiting sleep  Aleve OTC - using infrequently - discussed no NSAID use post op    ILD:   Advair 250/50 mcg 1 puff BID  Albuterol prn - using 1x/week   Supplemental oxygen at home, portable device - hasn't needed to use recently, has some symptoms but able to do daily activities   Mycophenolate 500 mg - 2 tablets BID - feels this is burdensome given need for taking on empty stomach, large tablets, etc - restricts eating window currently. Was off for 1-2 months last winter due to influenza A infection. Using prophylactically to prevent scar tissue, would prefer not to take.     Patient rinses their mouth after using steroid inhaler.   Patient reports no current medication side effects.      Patient reports the following symptoms: increased shortness of breath upon exertion .    GI upset:   Omeprazole 40 mg daily  No other medication use - focusing on balanced diet   Bowel habit inconsistency, tends toward more diarrhea   Hx endoscopy - some irritation, inflammation >> omeprazole dose increased to 40 mg       Today's Vitals: Wt 89.8 kg (198 lb)   BMI 33.99 kg/m    ----------------    I spent 54 minutes with this patient today. All changes were made via collaborative practice agreement  with Leanne Stanford A copy of the visit note was provided to the patient's provider(s).    A summary of these recommendations was sent via [employer] ([place]).    Rin Cohen, PharmD, BCACP  Medication Therapy Management Pharmacist  [facility]      Telemedicine Visit Details  Type of service:  Telephone visit  Start Time:  1:06 PM  End Time: 2:00 PM     Medication Therapy Recommendations  Severe obesity (BMI 35.0-39.9) with comorbidity (H)    Current Medication: FLUoxetine (PROZAC) 40 MG capsule   Rationale: Dosage form inappropriate - Ineffective medication - Effectiveness   Recommendation: Provide Education   Status: Patient Agreed - Adherence/Education          Current Medication: mycophenolate (GENERIC EQUIVALENT) 500 MG tablet   Rationale: Dosage form inappropriate - Ineffective medication - Effectiveness   Recommendation: Change Medication Formulation    Status: Contact Provider - Awaiting Response          Current Medication: omeprazole (PRILOSEC) 40 MG DR capsule   Rationale: Dosage form inappropriate - Ineffective medication - Effectiveness   Recommendation: Provide Education   Status: Patient Agreed - Adherence/Education                Please do not hesitate to contact me if you have any questions/concerns.     Sincerely,     Rin Cohen, [facility]

## 2024-08-21 NOTE — PATIENT INSTRUCTIONS
"Recommendations from today's MTM visit:                                                    MTM (medication therapy management) is a service provided by a clinical pharmacist designed to help you get the most of out of your medicines.  Hold the following medications after Sleeve Gastrectomy:   Hold Wegovy at least 1 week before surgery and do not restart after surgery - Last dose will be 8/29/2024, do not take on 9/5/2024    Changed how you take the following medications for the first 3 months after Sleeve Gastrectomy:   CAPSULES: Fluoxetine, Omeprazole - Open capsule and sprinkle contents over 1 tablespoon of applesauce, yogurt or sugar free pudding and swallow immediately (do not chew).      Continue taking the following medications as you are currently:   Bupropion, Flexeril, Metoprolol, Trazodone    Considerations or surgical team after Sleeve Gastrectomy:  Consider holding metoprolol for now if blood pressure/pulse well within goal after surgery     Pharmacist to follow up with Pulmonology regarding changing mycophenolate from tablet to liquid for first 3 months post Sleeve Gastrectomy.   Message sent to Dr. Duque, I will let you know when I hear back from the team.     Patient to schedule PAC appointment.       Follow-up: 10/8/2024 at 11am via phone with Rin Cohen, PharmD    It was great speaking with you today.  I value your experience and would be very thankful for your time in providing feedback in our clinic survey. In the next few days, you may receive an email or text message from [employer] with a link to a survey related to your  clinical pharmacist.\"     To schedule another MTM appointment, please call the clinic directly or you may call the MTM scheduling line at [phone] or toll-free at [phone].     My Clinical Pharmacist's contact information:                                                      Please feel free to contact me with any questions or concerns you have.    "   Rin Cohen, PharmD, BCACP  Medication Therapy Management (MTM) Pharmacist   [facility]

## 2024-08-22 ENCOUNTER — VIRTUAL VISIT (OUTPATIENT)
Dept: ENDOCRINOLOGY | Facility: CLINIC | Age: 64
End: 2024-08-22
Payer: COMMERCIAL

## 2024-08-22 VITALS — HEIGHT: 64 IN | WEIGHT: 197 LBS | BODY MASS INDEX: 33.63 KG/M2

## 2024-08-22 DIAGNOSIS — J84.9 ILD (INTERSTITIAL LUNG DISEASE) (H): ICD-10-CM

## 2024-08-22 DIAGNOSIS — E66.01 MORBID OBESITY (H): Primary | ICD-10-CM

## 2024-08-22 PROCEDURE — 99213 OFFICE O/P EST LOW 20 MIN: CPT | Mod: 95 | Performed by: SURGERY

## 2024-08-22 RX ORDER — MYCOPHENOLATE MOFETIL 200 MG/ML
1000 POWDER, FOR SUSPENSION ORAL 2 TIMES DAILY
Qty: 300 ML | Refills: 2 | Status: SHIPPED | OUTPATIENT
Start: 2024-08-22

## 2024-08-22 ASSESSMENT — PAIN SCALES - GENERAL: PAINLEVEL: NO PAIN (0)

## 2024-08-22 NOTE — PROGRESS NOTES
"Elsie Rubi is a 64 year old who is being evaluated via a billable video visit.      How would you like to obtain your AVS? MyChart  If the video visit is dropped, the invitation should be resent by: Text to cell phone: [phone]  Will anyone else be joining your video visit? No  If patient encounters technical issues they should call [phone]    During this virtual visit the patient is located in [place], patient verifies this as the location during the entirety of this visit.     Video-Visit Details  Video Start Time:  1206    Type of service:  Video Visit    Video End Time: 1232    Originating Location (pt. Location): Home    Distant Location (provider location):  On-site    Platform used for Video Visit: Kelsy Carbajal, SAWYER 8/22/2024      9:42 AM      Dear Dr. Rose,      Referring provider:       1/16/2024    11:27 AM   --   Who referred you Dr. Duque     I had the pleasure of meeting with your patient Elsie Rubi in our weight loss surgery office.  This patient is a 64 year old female who has been undergoing our thorough preoperative screening process in anticipation of potential bariatric surgery.    At initial evaluation we recorded Elsie Rubi's height of 5' 4\", a weight of 222 lbs 3.2 oz, and calculated Body mass index is 38.14 kg/m .       The patient has been unsuccessful with other methods of permanent weight loss and suffers from multiple weight related medical conditions.  Due to lack of success in achieving weight loss through other methods, she is interested in undergoing bariatric surgery.    PREVIOUS WEIGHT LOSS ATTEMPTS:      1/16/2024    11:27 AM   --   I have tried the following methods to lose weight Watching portions or calories    Exercise    Atkins type diet (low carb/high protein)       CO-MORBIDITIES OF OBESITY INCLUDE:      1/16/2024    11:27 AM   --   I have the following health issues associated with obesity Pre-Diabetes    High Blood Pressure    GERD " "(Reflux)    Asthma    Stress Incontinence       VITALS:  Ht 1.626 m (5' 4\")   Wt 89.4 kg (197 lb)   BMI 33.81 kg/m      PE:  GENERAL: Alert and oriented x3. NAD    RESPIRATORY: Breathing unlabored    Rest of exam deferred due to virtual visit      In summary, she has undergone an appropriate medical evaluation, dietitian evaluation, as well as psychologic screening. The patient appears to be an appropriate candidate for bariatric surgery.    In the office today, I discussed the laparoscopic gastric sleeve AND hiatal hernia surgery.  Risks, benefits and anticipated outcomes were outlined including the risk of death, staple line leak (1-2%), PE, DVT, ulcer, worsening GERD, N/V, stricture, hernia, wound infection, weight regain, and vitamin deficiencies. This patient has a good chance of sustaining permanent weight loss due to this procedure.  This should also allow improvement if not resolution of his/her weight related medical conditions.    Some extra time in the OR due to large hiatal hernia.    At present we are going to present your patient's file for prior authorization to insurance.  Pending prior authorization, I anticipate a surgical date in the near future.  We will keep you updated on any progress.  If you have questions regarding care please feel free to contact me.  Total time spent in the clinic was 30 minutes with greater than 50% in face-to-face consultation.        Sincerely,    Jerel Cabrera MD    Please route or send letter to:  Primary Care Provider (PCP) and Referring Provider    "

## 2024-08-22 NOTE — NURSING NOTE
"(   Chief Complaint   Patient presents with    Consult     Consult, pending sleeve    )    ( Weight: 89.4 kg (197 lb) (Pt reported) )  ( Height: 162.6 cm (5' 4\") )  ( BMI (Calculated): 33.81 )  (   )  (   )  (   )  (   )  (   )  (   )    (   )  (   )  (   )  (   )  (   )  (   )  (   )    (   Patient Active Problem List   Diagnosis    Chronic fatigue syndrome    Dyspepsia    Essential hypertension    Fibromyalgia    Mild episode of recurrent major depressive disorder (H24)    Mild intermittent asthma without complication    MIC (obstructive sleep apnea)    Severe obesity (BMI 35.0-39.9) with comorbidity (H)    Primary insomnia    Rectocele    Primary stress urinary incontinence    Prediabetes    Mixed hyperlipidemia    ILD (interstitial lung disease) (H)    )  (   Current Outpatient Medications   Medication Sig Dispense Refill    albuterol (PROAIR HFA/PROVENTIL HFA/VENTOLIN HFA) 108 (90 Base) MCG/ACT inhaler Inhale 2 puffs into the lungs every 4 hours as needed for shortness of breath, wheezing or cough 18 g 3    buPROPion (WELLBUTRIN XL) 150 MG 24 hr tablet Take 1 tablet (150 mg) by mouth every morning 30 tablet 1    cyclobenzaprine (FLEXERIL) 5 MG tablet Take 1 tablet (5 mg) by mouth 2 times daily as needed for muscle spasms 30 tablet 0    FLUoxetine (PROZAC) 40 MG capsule TAKE ONE CAPSULE BY MOUTH DAILY 90 capsule 0    fluticasone-salmeterol (ADVAIR) 250-50 MCG/ACT inhaler Inhale 1 puff into the lungs every 12 hours 60 each 3    metoprolol succinate ER (TOPROL XL) 25 MG 24 hr tablet Take 1 tablet (25 mg) by mouth daily 90 tablet 1    mycophenolate (GENERIC EQUIVALENT) 200 MG/ML suspension Take 5 mLs (1,000 mg) by mouth 2 times daily. 300 mL 2    omeprazole (PRILOSEC) 40 MG DR capsule Take 1 capsule (40 mg) by mouth daily 90 capsule 3    Semaglutide-Weight Management (WEGOVY) 1 MG/0.5ML pen Inject 1 mg Subcutaneous once a week 2 mL 2    traZODone (DESYREL) 50 MG tablet Take 1 tablet (50 mg) by mouth nightly as " needed for sleep 90 tablet 1    )  ( Diabetes Eval:    )    ( Pain Eval:  No Pain (0) )    ( Wound Eval:       )    (   History   Smoking Status    Never   Smokeless Tobacco    Never    )    ( Signed By:  Abhay Carbajal, EMT; August 22, 2024; 11:36 AM )

## 2024-08-22 NOTE — LETTER
"8/22/2024       RE: Elsie Rubi  [address] [facility] [address]     Dear Colleague,    Thank you for referring your patient, Elsie Rubi, to the [facility] [place] at [facility]. Please see a copy of my visit note below.    Elsie Rubi is a 64 year old who is being evaluated via a billable video visit.      How would you like to obtain your AVS? MyChart  If the video visit is dropped, the invitation should be resent by: Text to cell phone: [phone]  Will anyone else be joining your video visit? No  If patient encounters technical issues they should call [phone]    During this virtual visit the patient is located in [place], patient verifies this as the location during the entirety of this visit.     Video-Visit Details  Video Start Time:  1206    Type of service:  Video Visit    Video End Time: 1232    Originating Location (pt. Location): Home    Distant Location (provider location):  On-site    Platform used for Video Visit: Kelsy Carbajal, EMT 8/22/2024      9:42 AM      Dear Dr. Rose,      Referring provider:       1/16/2024    11:27 AM   --   Who referred you Dr. Duque     I had the pleasure of meeting with your patient Elsie Rubi in our weight loss surgery office.  This patient is a 64 year old female who has been undergoing our thorough preoperative screening process in anticipation of potential bariatric surgery.    At initial evaluation we recorded Elsie Rubi's height of 5' 4\", a weight of 222 lbs 3.2 oz, and calculated Body mass index is 38.14 kg/m .       The patient has been unsuccessful with other methods of permanent weight loss and suffers from multiple weight related medical conditions.  Due to lack of success in achieving weight loss through other methods, she is interested in undergoing bariatric surgery.    PREVIOUS WEIGHT LOSS ATTEMPTS:      1/16/2024    11:27 AM   -- " "  I have tried the following methods to lose weight Watching portions or calories    Exercise    Atkins type diet (low carb/high protein)       CO-MORBIDITIES OF OBESITY INCLUDE:      1/16/2024    11:27 AM   --   I have the following health issues associated with obesity Pre-Diabetes    High Blood Pressure    GERD (Reflux)    Asthma    Stress Incontinence       VITALS:  Ht 1.626 m (5' 4\")   Wt 89.4 kg (197 lb)   BMI 33.81 kg/m      PE:  GENERAL: Alert and oriented x3. NAD    RESPIRATORY: Breathing unlabored    Rest of exam deferred due to virtual visit      In summary, she has undergone an appropriate medical evaluation, dietitian evaluation, as well as psychologic screening. The patient appears to be an appropriate candidate for bariatric surgery.    In the office today, I discussed the laparoscopic gastric sleeve AND hiatal hernia surgery.  Risks, benefits and anticipated outcomes were outlined including the risk of death, staple line leak (1-2%), PE, DVT, ulcer, worsening GERD, N/V, stricture, hernia, wound infection, weight regain, and vitamin deficiencies. This patient has a good chance of sustaining permanent weight loss due to this procedure.  This should also allow improvement if not resolution of his/her weight related medical conditions.    Some extra time in the OR due to large hiatal hernia.    At present we are going to present your patient's file for prior authorization to insurance.  Pending prior authorization, I anticipate a surgical date in the near future.  We will keep you updated on any progress.  If you have questions regarding care please feel free to contact me.  Total time spent in the clinic was 30 minutes with greater than 50% in face-to-face consultation.        Sincerely,    Jerel Cabrera MD    Please route or send letter to:  Primary Care Provider (PCP) and Referring Provider      Again, thank you for allowing me to participate in the care of your patient.  "     Sincerely,    Jerel Cabrera MD

## 2024-08-26 ENCOUNTER — VIRTUAL VISIT (OUTPATIENT)
Dept: SURGERY | Facility: CLINIC | Age: 64
End: 2024-08-26
Attending: SURGERY
Payer: COMMERCIAL

## 2024-08-26 ENCOUNTER — ANESTHESIA EVENT (OUTPATIENT)
Dept: SURGERY | Facility: CLINIC | Age: 64
End: 2024-08-26
Payer: COMMERCIAL

## 2024-08-26 ENCOUNTER — VIRTUAL VISIT (OUTPATIENT)
Dept: ENDOCRINOLOGY | Facility: CLINIC | Age: 64
End: 2024-08-26
Payer: COMMERCIAL

## 2024-08-26 ENCOUNTER — PRE VISIT (OUTPATIENT)
Dept: SURGERY | Facility: CLINIC | Age: 64
End: 2024-08-26

## 2024-08-26 VITALS — WEIGHT: 197 LBS | BODY MASS INDEX: 33.63 KG/M2 | HEIGHT: 64 IN

## 2024-08-26 VITALS — BODY MASS INDEX: 34.66 KG/M2 | HEIGHT: 64 IN | WEIGHT: 203 LBS

## 2024-08-26 DIAGNOSIS — E66.01 MORBID OBESITY (H): ICD-10-CM

## 2024-08-26 DIAGNOSIS — E66.812 CLASS 2 SEVERE OBESITY WITH SERIOUS COMORBIDITY AND BODY MASS INDEX (BMI) OF 38.0 TO 38.9 IN ADULT, UNSPECIFIED OBESITY TYPE (H): Primary | ICD-10-CM

## 2024-08-26 DIAGNOSIS — E66.01 CLASS 2 SEVERE OBESITY WITH SERIOUS COMORBIDITY AND BODY MASS INDEX (BMI) OF 38.0 TO 38.9 IN ADULT, UNSPECIFIED OBESITY TYPE (H): Primary | ICD-10-CM

## 2024-08-26 DIAGNOSIS — Z01.818 PRE-OP EVALUATION: Primary | ICD-10-CM

## 2024-08-26 PROCEDURE — 99207 PR NO CHARGE NURSE ONLY: CPT | Mod: 95

## 2024-08-26 PROCEDURE — 99203 OFFICE O/P NEW LOW 30 MIN: CPT | Mod: 95 | Performed by: PHYSICIAN ASSISTANT

## 2024-08-26 RX ORDER — ONDANSETRON 4 MG/1
4 TABLET, ORALLY DISINTEGRATING ORAL EVERY 6 HOURS PRN
Qty: 15 TABLET | Refills: 0 | Status: SHIPPED | OUTPATIENT
Start: 2024-08-26

## 2024-08-26 RX ORDER — CALCIUM CARBONATE 500(1250)
1 TABLET ORAL EVERY MORNING
Status: ON HOLD | COMMUNITY
End: 2024-09-11

## 2024-08-26 RX ORDER — AMOXICILLIN 250 MG
2 CAPSULE ORAL DAILY PRN
Qty: 30 TABLET | Refills: 1 | Status: SHIPPED | OUTPATIENT
Start: 2024-08-26 | End: 2024-09-16

## 2024-08-26 RX ORDER — HYOSCYAMINE SULFATE 0.125 MG
0.12 TABLET ORAL EVERY 4 HOURS PRN
Qty: 30 TABLET | Refills: 1 | Status: SHIPPED | OUTPATIENT
Start: 2024-08-26

## 2024-08-26 RX ORDER — ERGOCALCIFEROL (VITAMIN D2) 10 MCG
TABLET ORAL EVERY MORNING
Status: ON HOLD | COMMUNITY
End: 2024-09-11

## 2024-08-26 ASSESSMENT — PAIN SCALES - GENERAL: PAINLEVEL: NO PAIN (0)

## 2024-08-26 ASSESSMENT — LIFESTYLE VARIABLES: TOBACCO_USE: 0

## 2024-08-26 ASSESSMENT — ENCOUNTER SYMPTOMS: SEIZURES: 0

## 2024-08-26 NOTE — PROGRESS NOTES
PET order entered into chart   PREOP NURSE VISIT     This patient is having laparoscopic gastric sleeve with Dr. Priyank Cabrera.    The following handouts were reviewed with the patient:  Before Your Surgery, Patient Checklist, Weight Loss Surgery Pre-operative Class, Preop Recommendations Quick Reference Guide, History and Physical, Medications to Avoid, Shower or Bathing Before Surgery, Bowel Preparation, Powerful Choices, and [place] [facility] Directive.  Questions were addressed and understanding of content was verbalized.  Contact information was provided.    WEIGHT CHECK:  Patient goal weight: 212    Weight today: 197    Surgery Date and Time: 9/10/24 at 12:30 PM   Call [phone] Agustin Arreaga to confirm surgery date.     PAC Visit: 8/26/24  Call [phone] to schedule your pre-operative history and physical with our PAC clinic.    PROBLEM LIST:  Patient Active Problem List   Diagnosis    Chronic fatigue syndrome    Dyspepsia    Essential hypertension    Fibromyalgia    Mild episode of recurrent major depressive disorder (H24)    Mild intermittent asthma without complication    MIC (obstructive sleep apnea)    Severe obesity (BMI 35.0-39.9) with comorbidity (H)    Primary insomnia    Rectocele    Primary stress urinary incontinence    Prediabetes    Mixed hyperlipidemia    ILD (interstitial lung disease) (H)         MEDICAL CONDITIONS REVIEW:  Diabetic? No.   Diabetics who are on medications instructed to monitor blood sugar levels closely after surgery and contact prescribing provider if levels run low as they may need their medications adjusted.    On high blood pressure medications? Yes.   Patients on high blood pressure medications instructed to monitor blood pressure levels closely after surgery and contact prescribing provider if pressure are running low as they may need their medications adjusted.    CURRENT MEDICATIONS:   Current Outpatient Medications   Medication Sig Dispense Refill    albuterol (PROAIR HFA/PROVENTIL  HFA/VENTOLIN HFA) 108 (90 Base) MCG/ACT inhaler Inhale 2 puffs into the lungs every 4 hours as needed for shortness of breath, wheezing or cough 18 g 3    buPROPion (WELLBUTRIN XL) 150 MG 24 hr tablet Take 1 tablet (150 mg) by mouth every morning 30 tablet 1    cyclobenzaprine (FLEXERIL) 5 MG tablet Take 1 tablet (5 mg) by mouth 2 times daily as needed for muscle spasms 30 tablet 0    FLUoxetine (PROZAC) 40 MG capsule TAKE ONE CAPSULE BY MOUTH DAILY 90 capsule 0    fluticasone-salmeterol (ADVAIR) 250-50 MCG/ACT inhaler Inhale 1 puff into the lungs every 12 hours 60 each 3    metoprolol succinate ER (TOPROL XL) 25 MG 24 hr tablet Take 1 tablet (25 mg) by mouth daily 90 tablet 1    mycophenolate (GENERIC EQUIVALENT) 200 MG/ML suspension Take 5 mLs (1,000 mg) by mouth 2 times daily. 300 mL 2    omeprazole (PRILOSEC) 40 MG DR capsule Take 1 capsule (40 mg) by mouth daily 90 capsule 3    Semaglutide-Weight Management (WEGOVY) 1 MG/0.5ML pen Inject 1 mg Subcutaneous once a week 2 mL 2    traZODone (DESYREL) 50 MG tablet Take 1 tablet (50 mg) by mouth nightly as needed for sleep 90 tablet 1       Patient currently taking opioid/narcotic pain medications? No.    CURRENT ALLERGIES:   No Known Allergies    POSTOP MEDICATIONS:  The following postop medications were sent to the patient's pharmacy.  Patient was instructed to  medications before surgery and to have them at home for discharge.    HYSCYAMINE (LEVSIN), ODANSETRON (ZOFRAN), and SENNA (SENOKOT)    LOGISITICS:  Patient lives greater than 3 hours from hospital?  Yes.  If patient lives greater than 3 hours away, is patient planning on staying in the area after surgery?  Possibly   Patients instructed to take breaks each hour on car ride home.  To be out of vehicle, stretch and walk for at least 10 minutes.  To do ankle pump exercises in car.      SUPPORT SYSTEM   will be helping patient with postop care.    PAPERWORK  FMLA/Time OFF: Patient is  retired.    Bariatric Task List    Fax:  Please fax all paperwork to: [phone] -     Status:  Is patient a candidate for bariatric surgery?:  patient is a candidate for bariatric surgery -     Cleared to schedule surgeon consult?:  cleared to schedule surgeon consult - 8/22/24 silvana chong   Status:  surgery evaluation in process -     Surgeon: Dr. Cabrera -     Tentative surgery month/year: 9/10/24 -        Insurance: Insurance:  [facility] -      Contact insurance to discuss coverage: Needed -       [employer]: PCP Recommendation and Medical Clearance:    -     HP Referral:    -      Advanced beneficiary notification (ABN) for Medicare patients for RD visits   and surgery:   -      Weight history:   -     Other:    -        Patient Info: Initial Weight:  222 -     Date of Initial Weight/Height:  1/22/2024 -     Goal Weight (lbs):  212 -     Required Weight Loss:  10 -     Surgery Type:  sleeve gastrectomy -     Multidisciplinary Meeting:    -        Dietician Visits: Structured weight loss required by insurance?:    -     Dietician Visit 1:  Completed - 1/24/24 -    Dietician Visit 2:  Completed - 3/15/24-    Dietician Visit 3:  Completed - 5/29/24 -    Dietician Visit 4:  Completed - 6/28/24 bks   Dietician Visit 5:  Completed - 7/30/24 bks   Dietician Visit 6:  Completed - 8/30/24 bks   Dietician Visit additional:  Needed - Monthly until surgery   Clearance from dietician to see surgeon?:    -     Dietician Notes:    -        Psychological Evaluation: Psych eval:  Completed - scheduling info sent 1/22/24; 8/2/24 Cleared by Dr Castelan. bks   Therapist letter of support:    -     Psychiatrist letter of support:    -     Establish care with therapist:    -     Complete eating disorder evaluation:    -     Letter of clearance from therapist/eating disorder program:    -     Other:    -        Lab Work: Complete Blood Count:  Completed -     Comprehensive Metabolic Panel:  Completed - Ordered 1/22/24 - AS   Vitamin D:   Completed - Ordered 1/22/24 - AS   PTH:  Completed - Ordered 1/22/24 - AS   Hgb A1c:  Needed - Outdated last done 8/24/23 bks please check. bks    Lipids: Needed - Outdated last done 8/24/23 bks please check. bks    TSH (UCARE, [facility], [place] MA): Completed - Ordered 1/22/24 - AS     Ferritin:   -       Folate:   -       Testosterone, Total and Free:   -     Thiamine:   -     Vitamin A: Completed - Ordered 1/22/24 - AS   Vitamin B12: Completed - Ordered 1/22/24 - AS   Zinc:   -     C-peptide:   -     H. pylori:    -     MRSA (2 swabs, minimum 48 hours apart):   -     Nicotine Testing:    -     Recheck Vitamin D:   -     Other:    -        Consults/ Clearance Sleep Medicine:  Completed - template sent 1/22/24 HJS; clearance received 5/14/24   Cardiac:  Completed - template sent 1/22/24 HJS, done on 3/14/24 - JF   Pain:   -     Dental:    -     Endocrine:    -     Gastroenterology:    -     Vascular Medicine:    -     Hematology:    -     Medical Weight Management:   -     Physical Therapy/Exercise:    -     Nephrology:    -     Neurology:    -     Pulmonology:  Completed - template sent 1/22/24 HJS; see 7/24/24 Notes Isaac Duque MD -see recommendations. bks   Rheumatology:    -     Other:    -     Other:    -     Other:    -        Testing: UGI:    -     EGD:  Completed - Scheduled for 3/13/24 - AS   Sleep Study:   -     Other:   -     Other:    -        PCP: Establish care with PCP:  Completed -     Follow up with PCP:    -     PCP letter of support:  Completed - Dr. Jessie Rose, 3/12/24      Health Maintenance: Colonoscopy(> 50 yrs or family hx):    -     Mammogram (> 40 yrs or family hx):    -     Pap Smear (women):   -     Other:   -     Other:    -        Stopping Smoking/ Alcohol Use/Cannabis Use: Quit tobacco use (3 months smoke free)?:    -     Quit date:    -     Quit alcohol use:   -     Quit date:   -     Other:   -     Quit date:   -        Patient Education:  Information Session:  Completed -    "  Attended New Consult Class?: Completed - 1/23/24 - AS   Given \"Making your decision\" handout?:  Yes -     Given \"A Roadmap to you Weight Loss Surgery\" handout?: Yes -     Given \"Epic Care Companion\" information?: Yes -     Attended support group?:  Completed -     Support plan in place?:  Completed -     Research consents signed?:    -     Avoid NSAIDS/ Alternate Plan for Pain:   -        Additional Surgery Requirements: Review Coag plan:    -     HgA1c <8:    -     Inpatient pain consult:    -     Final nicotine screen:    -     Dental work complete:    -     Birth control plan:    -     Gallstone prevention plan (Actigall for 6 months postop): Needed -     Other:   -     Other:   -        Final Tasks:  Before surgery online preop class:  Completed - 8/26/24 - AS   After surgery online class:    -     Nurse visit for information:  Completed - 8/26/24 - AS   Weight Check: Completed - 8/26/24 - AS   History and Physical: Pre-Assessment Clinic (PAC) -   Completed - 8/26/24 - AS   Final labs per clinic: Needed -     See MTM Pharmacist for medication review and plan for after surgery (if DM, transplant hx, greater than 10 meds): Completed - 8/20/24 - AS   Chest xray per clinic:   -     Electrocardiogram (ECG) per clinic:   -     Schedule postop appointments: Completed -     Other:   -   -        Notes: Please register for the Weight Loss Surgery Care Companion Pathway through the [employer] mobile celestino or via web browser. You register by answering \"yes\" to the following question, \"Do you agree to take part in this free, online program?\"  Information from this Pathway can help you be more successful before surgery and for up to one year after surgery.  This Pathway through [employer] can also answer questions you may have about your surgery.   The Pathway will start when you get your Tasklist after your initial consultation or when your surgery is scheduled.               -                 "

## 2024-08-26 NOTE — PATIENT INSTRUCTIONS
Preparing for Your Surgery      Name:  Elsie Rubi   MRN:  [id number]   :  1960   Today's Date:  2024       Arriving for surgery:  Surgery date:  9/10/24  Arrival time:  10:30 am      Please come to:      [facility] [place] [facility]    [address] [place]  [address]     The [facility] [place] Patient/Visitor Ramp is at [address]. Patients and visitors who self-park will receive the reduced hospital parking rate. If the Patient /Visitor Ramp is full, please follow the signs to the [employer] car park located at the main hospital entrance.       parking is available (24 hours/ 7 days a week)      Discounted parking pass options are available for patients and visitors. They can be purchased at the [place] desk at the [facility] hospital entrance.     -    Stop at the security desk and they will direct surgery patients to the Surgery Check in and Family Lounge. [phone]        - If you need directions, a wheelchair or an escort please stop at the Information/security desk in the lobby.     What can I eat or drink?  -  You may eat per instructions from the surgeon.  -  You may have clear liquids until 2 hours prior to arrival time.     Examples of clear liquids:  Water  Clear broth  Juices (apple, white grape, white cranberry  and cider) without pulp  Noncarbonated, powder based beverages  (lemonade and Brian-Aid)  Sodas (Sprite, 7-Up, ginger ale and seltzer)  Coffee or tea (without milk or cream)  Gatorade    -  No Alcohol or cannabis products for at least 24 hours before surgery.     Which medicines can I take?    Hold Aspirin for 7 days before surgery.   Hold Multivitamins for 7 days before surgery.  Hold Supplements for 7 days before surgery.  Hold Ibuprofen (Advil, Motrin) for 1 day(s) before surgery--unless otherwise directed by surgeon.  Hold Naproxen (Aleve) for 4 days before  surgery.    HOLD WEGOVY X 7 DAYS BEFORE SURGERY.    -  DO NOT take these medications the day of surgery:  Senna and levsin, os-carmelita and Vitamin D.    -  PLEASE TAKE these medications the day of surgery:  Tylenol or flexeril if needed; take mycophenolate, prilosec, metoprolol, prozac, and wellbutrin.  Bring inhalers to the hospital if you are currently using them.    How do I prepare myself?  - Please take 2 showers (one the night prior to surgery and one the morning of surgery) using Scrubcare or Hibiclens soap.    Use this soap only from the neck to your toes.     Leave the soap on your skin for one minute--then rinse thoroughly.      You may use your own shampoo and conditioner. No other hair products.   - Please remove all jewelry and body piercings.  - No lotions, deodorants or fragrance.  - No makeup or fingernail polish.   - Bring your ID and insurance card.    -If you use a CPAP machine, please bring the CPAP machine, tubing, and mask to hospital.    -If you have a Deep Brain Stimulator, Spinal Cord Stimulator, or any Neuro Stimulator device---you must bring the remote control to the hospital.      ALL PATIENTS GOING HOME THE SAME DAY OF SURGERY ARE REQUIRED TO HAVE A RESPONSIBLE ADULT TO DRIVE AND BE IN ATTENDANCE WITH THEM FOR 24 HOURS FOLLOWING SURGERY.    Covid testing policy as of 12/06/2022  Your surgeon will notify and schedule you for a COVID test if one is needed before surgery--please direct any questions or COVID symptoms to your surgeon      Questions or Concerns:    - For any questions regarding the day of surgery or your hospital stay, please contact the Pre Admission Nursing Office at [phone].       - If you have health changes between today and your surgery, please call your surgeon.       - For questions after surgery, please call your surgeons office.           Current Visitor Guidelines    You may have 2 visitors in the pre op area.    Visiting hours: 8 a.m. to 8:30 p.m.    Patients  confirmed or suspected to have symptoms of COVID 19 or flu:     No visitors allowed for adult patients.   Children (under age 18) can have 1 named visitor.     People who are sick or showing symptoms of COVID 19 or flu:    Are not allowed to visit patients--we can only make exceptions in special situations.       Please follow these guidelines for your visit:          Please maintain social distance          Masking is optional--however at times you may be asked to wear a mask for the safety of yourself and others     Clean your hands with alcohol hand . Do this when you arrive at and leave the building and patient room,    And again after you touch your mask or anything in the room.     Go directly to and from the room you are visiting.     Stay in the patient s room during your visit. Limit going to other places in the hospital as much as possible     Leave bags and jackets at home or in the car.     For everyone s health, please don t come and go during your visit. That includes for smoking   during your visit.

## 2024-08-26 NOTE — NURSING NOTE
Current patient location: home     Is the patient currently in the state of [place]? YES    Visit mode:VIDEO    If the visit is dropped, the patient can be reconnected by: VIDEO VISIT: Text to cell phone:   Telephone Information:   Mobile [phone]       Will anyone else be joining the visit? NO  (If patient encounters technical issues they should call [phone] :[phone])    How would you like to obtain your AVS? MyChart    Are changes needed to the allergy or medication list? Pt stated no med changes    Are refills needed on medications prescribed by this physician? NO    Rooming Documentation:  N/A    Reason for visit: Nurse Visit    Wt other than 24 hrs:    Pain more than one location:  no  Jessi ASHLEY              done

## 2024-08-26 NOTE — LETTER
8/26/2024       RE: Elsie Rubi  [address] [facility] [address]     Dear Colleague,    Thank you for referring your patient, Elsie Rubi, to the [facility] [place] at [facility]. Please see a copy of my visit note below.    PREOP NURSE VISIT     This patient is having laparoscopic gastric sleeve with Dr. Priyank Cabrera.    The following handouts were reviewed with the patient:  Before Your Surgery, Patient Checklist, Weight Loss Surgery Pre-operative Class, Preop Recommendations Quick Reference Guide, History and Physical, Medications to Avoid, Shower or Bathing Before Surgery, Bowel Preparation, Powerful Choices, and [place] [facility] Directive.  Questions were addressed and understanding of content was verbalized.  Contact information was provided.    WEIGHT CHECK:  Patient goal weight: 212    Weight today: 197    Surgery Date and Time: 9/10/24 at 12:30 PM   Call [phone] Agustin Arreaga to confirm surgery date.     PAC Visit: 8/26/24  Call [phone] to schedule your pre-operative history and physical with our PAC clinic.    PROBLEM LIST:  Patient Active Problem List   Diagnosis     Chronic fatigue syndrome     Dyspepsia     Essential hypertension     Fibromyalgia     Mild episode of recurrent major depressive disorder (H24)     Mild intermittent asthma without complication     MIC (obstructive sleep apnea)     Severe obesity (BMI 35.0-39.9) with comorbidity (H)     Primary insomnia     Rectocele     Primary stress urinary incontinence     Prediabetes     Mixed hyperlipidemia     ILD (interstitial lung disease) (H)         MEDICAL CONDITIONS REVIEW:  Diabetic? No.   Diabetics who are on medications instructed to monitor blood sugar levels closely after surgery and contact prescribing provider if levels run low as they may need their medications adjusted.    On high blood pressure medications? Yes.   Patients on  high blood pressure medications instructed to monitor blood pressure levels closely after surgery and contact prescribing provider if pressure are running low as they may need their medications adjusted.    CURRENT MEDICATIONS:   Current Outpatient Medications   Medication Sig Dispense Refill     albuterol (PROAIR HFA/PROVENTIL HFA/VENTOLIN HFA) 108 (90 Base) MCG/ACT inhaler Inhale 2 puffs into the lungs every 4 hours as needed for shortness of breath, wheezing or cough 18 g 3     buPROPion (WELLBUTRIN XL) 150 MG 24 hr tablet Take 1 tablet (150 mg) by mouth every morning 30 tablet 1     cyclobenzaprine (FLEXERIL) 5 MG tablet Take 1 tablet (5 mg) by mouth 2 times daily as needed for muscle spasms 30 tablet 0     FLUoxetine (PROZAC) 40 MG capsule TAKE ONE CAPSULE BY MOUTH DAILY 90 capsule 0     fluticasone-salmeterol (ADVAIR) 250-50 MCG/ACT inhaler Inhale 1 puff into the lungs every 12 hours 60 each 3     metoprolol succinate ER (TOPROL XL) 25 MG 24 hr tablet Take 1 tablet (25 mg) by mouth daily 90 tablet 1     mycophenolate (GENERIC EQUIVALENT) 200 MG/ML suspension Take 5 mLs (1,000 mg) by mouth 2 times daily. 300 mL 2     omeprazole (PRILOSEC) 40 MG DR capsule Take 1 capsule (40 mg) by mouth daily 90 capsule 3     Semaglutide-Weight Management (WEGOVY) 1 MG/0.5ML pen Inject 1 mg Subcutaneous once a week 2 mL 2     traZODone (DESYREL) 50 MG tablet Take 1 tablet (50 mg) by mouth nightly as needed for sleep 90 tablet 1       Patient currently taking opioid/narcotic pain medications? No.    CURRENT ALLERGIES:   No Known Allergies    POSTOP MEDICATIONS:  The following postop medications were sent to the patient's pharmacy.  Patient was instructed to  medications before surgery and to have them at home for discharge.    HYSCYAMINE (LEVSIN), ODANSETRON (ZOFRAN), and SENNA (SENOKOT)    LOGISITICS:  Patient lives greater than 3 hours from hospital?  Yes.  If patient lives greater than 3 hours away, is patient planning  on staying in the area after surgery?  Possibly   Patients instructed to take breaks each hour on car ride home.  To be out of vehicle, stretch and walk for at least 10 minutes.  To do ankle pump exercises in car.      SUPPORT SYSTEM   will be helping patient with postop care.    PAPERWORK  FMLA/Time OFF: Patient is retired.    Bariatric Task List    Fax:  Please fax all paperwork to: [phone] -     Status:  Is patient a candidate for bariatric surgery?:  patient is a candidate for bariatric surgery -     Cleared to schedule surgeon consult?:  cleared to schedule surgeon consult - 8/22/24 appt arlette   Status:  surgery evaluation in process -     Surgeon: Dr. Cabrera -     Tentative surgery month/year: 9/10/24 -        Insurance: Insurance:  [facility] -      Contact insurance to discuss coverage: Needed -       [employer]: PCP Recommendation and Medical Clearance:    -     HP Referral:    -      Advanced beneficiary notification (ABN) for Medicare patients for RD visits   and surgery:   -      Weight history:   -     Other:    -        Patient Info: Initial Weight:  222 -     Date of Initial Weight/Height:  1/22/2024 -     Goal Weight (lbs):  212 -     Required Weight Loss:  10 -     Surgery Type:  sleeve gastrectomy -     Multidisciplinary Meeting:    -        Dietician Visits: Structured weight loss required by insurance?:    -     Dietician Visit 1:  Completed - 1/24/24 -    Dietician Visit 2:  Completed - 3/15/24-    Dietician Visit 3:  Completed - 5/29/24 -    Dietician Visit 4:  Completed - 6/28/24 s   Dietician Visit 5:  Completed - 7/30/24 [facility]   Dietician Visit 6:  Completed - 8/30/24 bks   Dietician Visit additional:  Needed - Monthly until surgery   Clearance from dietician to see surgeon?:    -     Dietician Notes:    -        Psychological Evaluation: Psych eval:  Completed - scheduling info sent 1/22/24; 8/2/24 Cleared by Dr Castelan. arlette   Therapist letter of support:    -     Psychiatrist  letter of support:    -     Establish care with therapist:    -     Complete eating disorder evaluation:    -     Letter of clearance from therapist/eating disorder program:    -     Other:    -        Lab Work: Complete Blood Count:  Completed -     Comprehensive Metabolic Panel:  Completed - Ordered 1/22/24 - AS   Vitamin D:  Completed - Ordered 1/22/24 - AS   PTH:  Completed - Ordered 1/22/24 - AS   Hgb A1c:  Needed - Outdated last done 8/24/23 bks please check. bks    Lipids: Needed - Outdated last done 8/24/23 bks please check. bks    TSH (UCARE, SCA, [place] MA): Completed - Ordered 1/22/24 - AS     Ferritin:   -       Folate:   -       Testosterone, Total and Free:   -     Thiamine:   -     Vitamin A: Completed - Ordered 1/22/24 - AS   Vitamin B12: Completed - Ordered 1/22/24 - AS   Zinc:   -     C-peptide:   -     H. pylori:    -     MRSA (2 swabs, minimum 48 hours apart):   -     Nicotine Testing:    -     Recheck Vitamin D:   -     Other:    -        Consults/ Clearance Sleep Medicine:  Completed - template sent 1/22/24 HJS; clearance received 5/14/24   Cardiac:  Completed - template sent 1/22/24 HJS, done on 3/14/24 - JF   Pain:   -     Dental:    -     Endocrine:    -     Gastroenterology:    -     Vascular Medicine:    -     Hematology:    -     Medical Weight Management:   -     Physical Therapy/Exercise:    -     Nephrology:    -     Neurology:    -     Pulmonology:  Completed - template sent 1/22/24 HJS; see 7/24/24 Notes Isaac Duque MD -see recommendations. bks   Rheumatology:    -     Other:    -     Other:    -     Other:    -        Testing: UGI:    -     EGD:  Completed - Scheduled for 3/13/24 - AS   Sleep Study:   -     Other:   -     Other:    -        PCP: Establish care with PCP:  Completed -     Follow up with PCP:    -     PCP letter of support:  Completed - Dr. Jessie Rose, 3/12/24      Health Maintenance: Colonoscopy(> 50 yrs or family hx):    -     Mammogram (> 40 yrs or family hx):   "  -     Pap Smear (women):   -     Other:   -     Other:    -        Stopping Smoking/ Alcohol Use/Cannabis Use: Quit tobacco use (3 months smoke free)?:    -     Quit date:    -     Quit alcohol use:   -     Quit date:   -     Other:   -     Quit date:   -        Patient Education:  Information Session:  Completed -     Attended New Consult Class?: Completed - 1/23/24 - AS   Given \"Making your decision\" handout?:  Yes -     Given \"A Roadmap to you Weight Loss Surgery\" handout?: Yes -     Given \"Epic Care Companion\" information?: Yes -     Attended support group?:  Completed -     Support plan in place?:  Completed -     Research consents signed?:    -     Avoid NSAIDS/ Alternate Plan for Pain:   -        Additional Surgery Requirements: Review Coag plan:    -     HgA1c <8:    -     Inpatient pain consult:    -     Final nicotine screen:    -     Dental work complete:    -     Birth control plan:    -     Gallstone prevention plan (Actigall for 6 months postop): Needed -     Other:   -     Other:   -        Final Tasks:  Before surgery online preop class:  Completed - 8/26/24 - AS   After surgery online class:    -     Nurse visit for information:  Completed - 8/26/24 - AS   Weight Check: Completed - 8/26/24 - AS   History and Physical: Pre-Assessment Clinic (PAC) -   Completed - 8/26/24 - AS   Final labs per clinic: Needed -     See MTM Pharmacist for medication review and plan for after surgery (if DM, transplant hx, greater than 10 meds): Completed - 8/20/24 - AS   Chest xray per clinic:   -     Electrocardiogram (ECG) per clinic:   -     Schedule postop appointments: Completed -     Other:   -   -        Notes: Please register for the Weight Loss Surgery Care Companion Pathway through the [employer] mobile [employer] or via web browser. You register by answering \"yes\" to the following question, \"Do you agree to take part in this free, online program?\"  Information from this Pathway can help you be more successful before " surgery and for up to one year after surgery.  This Pathway through [employer] can also answer questions you may have about your surgery.   The Pathway will start when you get your Tasklist after your initial consultation or when your surgery is scheduled.               -                   Again, thank you for allowing me to participate in the care of your patient.      Sincerely,    Katarina Cortez RN

## 2024-08-26 NOTE — H&P
Pre-Operative H & P     CC:  Preoperative exam to assess for increased cardiopulmonary risk while undergoing surgery and anesthesia.    Date of Encounter: 8/26/2024  Primary Care Physician:  Jessie Rose     Reason for visit:   Encounter Diagnoses   Name Primary?    Pre-op evaluation Yes    Morbid obesity (H)        HPI  Elsie Rubi is a 64 year old female who presents for pre-operative H & P in preparation for  Procedure Information       Case: [id number] Date/Time: 09/10/24 1230    Procedures:       GASTRECTOMY, SLEEVE, LAPAROSCOPIC (Abdomen)      HERNIORRHAPHY, HIATAL, LAPAROSCOPIC (Abdomen)    Anesthesia type: General    Diagnosis: Morbid obesity (H) [E66.01]    Pre-op diagnosis: Morbid obesity (H) [E66.01]    Location:  OR [address] [place] [address] OR    Providers: Jerel Cabrera MD            Patient is being evaluated for comorbid conditions of hypertension, ILD, asthma, MIC, depression, borderline personality disorder     Ms. Rubi has been following with the bariatric team in preparation for gastric sleeve.  She has been unsuccessful with other methods of permanent weight loss.  She is now scheduled for the above procedure.    History is obtained from the patient and chart review    Hx of abnormal bleeding or anti-platelet use: denies    Menstrual history: No LMP recorded. Patient is postmenopausal.     Past Medical History  Past Medical History:   Diagnosis Date    Benign essential hypertension     Borderline personality disorder (H) 08/13/2020    Depression     Depressive disorder 1978    ILD (interstitial lung disease) (H)     Primary osteoarthritis of left foot 08/13/2020    Uncomplicated asthma 2019       Past Surgical History  Past Surgical History:   Procedure Laterality Date    BUNIONECTOMY Left     COLONOSCOPY  2015    ESOPHAGOSCOPY, GASTROSCOPY, DUODENOSCOPY (EGD), COMBINED N/A 03/13/2024    Procedure: ESOPHAGOGASTRODUODENOSCOPY, WITH BIOPSY;  Surgeon: Jerel Cabrera MD;  Location:  GI     EXAM UNDER ANESTHESIA RECTUM      GYN SURGERY  1996    tubal ligation    ORTHOPEDIC SURGERY  2021       Prior to Admission Medications  Current Outpatient Medications   Medication Sig Dispense Refill    albuterol (PROAIR HFA/PROVENTIL HFA/VENTOLIN HFA) 108 (90 Base) MCG/ACT inhaler Inhale 2 puffs into the lungs every 4 hours as needed for shortness of breath, wheezing or cough 18 g 3    buPROPion (WELLBUTRIN XL) 150 MG 24 hr tablet Take 1 tablet (150 mg) by mouth every morning 30 tablet 1    calcium carbonate (OS-JANETTE) 500 MG tablet Take 1 tablet by mouth every morning.      cyclobenzaprine (FLEXERIL) 5 MG tablet Take 1 tablet (5 mg) by mouth 2 times daily as needed for muscle spasms 30 tablet 0    FLUoxetine (PROZAC) 40 MG capsule TAKE ONE CAPSULE BY MOUTH DAILY (Patient taking differently: Take 40 mg by mouth every morning.) 90 capsule 0    fluticasone-salmeterol (ADVAIR) 250-50 MCG/ACT inhaler Inhale 1 puff into the lungs every 12 hours (Patient taking differently: Inhale 1 puff into the lungs 2 times daily.) 60 each 3    metoprolol succinate ER (TOPROL XL) 25 MG 24 hr tablet Take 1 tablet (25 mg) by mouth daily (Patient taking differently: Take 25 mg by mouth every morning.) 90 tablet 1    mycophenolate (GENERIC EQUIVALENT) 200 MG/ML suspension Take 5 mLs (1,000 mg) by mouth 2 times daily. 300 mL 2    omeprazole (PRILOSEC) 40 MG DR capsule Take 1 capsule (40 mg) by mouth daily (Patient taking differently: Take 40 mg by mouth every morning.) 90 capsule 3    Semaglutide-Weight Management (WEGOVY) 1 MG/0.5ML pen Inject 1 mg Subcutaneous once a week (Patient taking differently: Inject 1 mg subcutaneously once a week. Thursday Last dose 8/22/24) 2 mL 2    traZODone (DESYREL) 50 MG tablet Take 1 tablet (50 mg) by mouth nightly as needed for sleep 90 tablet 1    Vitamin D, Cholecalciferol, 10 MCG (400 UNIT) TABS Take by mouth every morning.      hyoscyamine (LEVSIN) 0.125 MG tablet Take 1 tablet (125 mcg) by mouth  every 4 hours as needed for cramping. (Patient taking differently: Take 0.125 mg by mouth every 4 hours as needed for cramping. Post-op) 30 tablet 1    ondansetron (ZOFRAN ODT) 4 MG ODT tab Take 1 tablet (4 mg) by mouth every 6 hours as needed for nausea. (Patient taking differently: Take 4 mg by mouth every 6 hours as needed for nausea. Post-op) 15 tablet 0    senna-docusate (SENOKOT-S/PERICOLACE) 8.6-50 MG tablet Take 2 tablets by mouth daily as needed for constipation (While taking narcotic pain medications.  Stop taking if having loose stools.). (Patient taking differently: Take 2 tablets by mouth daily as needed for constipation (While taking narcotic pain medications.  Stop taking if having loose stools.). Post-op) 30 tablet 1       Allergies  No Known Allergies    Social History  Social History     Socioeconomic History    Marital status:      Spouse name: Delon    Number of children: Not on file    Years of education: Not on file    Highest education level: Not on file   Occupational History    Not on file   Tobacco Use    Smoking status: Never     Passive exposure: Never    Smokeless tobacco: Never   Vaping Use    Vaping status: Never Used   Substance and Sexual Activity    Alcohol use: Yes     Comment: 2 glasses wine night    Drug use: Not Currently    Sexual activity: Yes     Partners: Male     Birth control/protection: Post-menopausal   Other Topics Concern    Parent/sibling w/ CABG, MI or angioplasty before 65F 55M? Yes     Comment: Father heart attack and bypass around 55   Social History Narrative    Not on file     Social Determinants of Health     Financial Resource Strain: Low Risk  (3/9/2024)    Financial Resource Strain     Within the past 12 months, have you or your family members you live with been unable to get utilities (heat, electricity) when it was really needed?: No   Food Insecurity: Low Risk  (3/9/2024)    Food Insecurity     Within the past 12 months, did you worry that your  food would run out before you got money to buy more?: No     Within the past 12 months, did the food you bought just not last and you didn t have money to get more?: No   Transportation Needs: Low Risk  (3/9/2024)    Transportation Needs     Within the past 12 months, has lack of transportation kept you from medical appointments, getting your medicines, non-medical meetings or appointments, work, or from getting things that you need?: No   Physical Activity: Not on file   Stress: Not on file   Social Connections: Not on file   Interpersonal Safety: Unknown (3/12/2024)    Interpersonal Safety     Do you feel physically and emotionally safe where you currently live?: Patient declined     Within the past 12 months, have you been hit, slapped, kicked or otherwise physically hurt by someone?: Patient declined     Within the past 12 months, have you been humiliated or emotionally abused in other ways by your partner or ex-partner?: Patient declined   Housing Stability: Low Risk  (3/9/2024)    Housing Stability     Do you have housing? : Yes     Are you worried about losing your housing?: No       Family History  Family History   Problem Relation Age of Onset    Hypertension Mother     Osteoporosis Mother     Coronary Artery Disease Father     Hyperlipidemia Father     Cerebrovascular Disease Father     Obesity Sister     Deep Vein Thrombosis (DVT) Sister     Breast Cancer Paternal Grandmother     Coronary Artery Disease Paternal Grandfather     Anxiety Disorder Daughter     Mental Illness Daughter     Obesity Daughter     Obesity Daughter     Anxiety Disorder Son     Anesthesia Reaction No family hx of        Review of Systems  The complete review of systems is negative other than noted in the HPI or here.   Anesthesia Evaluation   Pt has had prior anesthetic.         ROS/MED HX  ENT/Pulmonary: Comment: ILD    (+) sleep apnea, doesn't use CPAP,                    asthma  Treatment: Inhaler prn and Inhaled steroids,               (-) tobacco use   Neurologic:  - neg neurologic ROS  (-) no seizures and no CVA   Cardiovascular:     (+)  hypertension- -   -  - -                                 Previous cardiac testing   Echo: Date: 4/2023 Results:  Interpretation Summary   A two-dimensional transthoracic echocardiogram with color flow and Doppler was performed.     The left ventricle is normal size. The left ventricular systolic function is normal. Wall motion is normal.   Qualitative ejection fraction is 65-70% (normal).   Left ventricular diastolic function is normal.   TR signal inadequate to allow accurate estimate RV systolic pressure.   Inferior vena cava size normal and collapsibility > 50% normal indicating normal right atrial pressure (3 mm Hg).   There is no pericardial effusion.   No color doppler evidence for atrial septal defect or patent foramen ovale. A bubble study was not performed.     No prior echocardiogram available for comparison.   Stress Test:  Date: 3/2024 Results:     The nuclear stress test is negative for inducible myocardial ischemia   or infarction.      Left ventricular function is normal.      The left ventricular ejection fraction at rest is 75%.  The left   ventricular ejection fraction at stress is 76%.      There is no prior study for comparison.   ECG Reviewed:  Date: Results:    Cath:  Date: Results:   (-) taking anticoagulants/antiplatelets   METS/Exercise Tolerance: 3 - Able to walk 1-2 blocks without stopping Comment: Limited activity. Can walk about 10 minutes without CP. Some BECKER   Hematologic:  - neg hematologic  ROS  (-) history of blood clots and history of blood transfusion   Musculoskeletal:  - neg musculoskeletal ROS     GI/Hepatic:     (+)      hiatal hernia,           (-) GERD   Renal/Genitourinary:  - neg Renal ROS     Endo:     (+)               Obesity,    (-) chronic steroid usage   Psychiatric/Substance Use:     (+) psychiatric history        Infectious Disease:  - neg infectious  disease ROS     Malignancy:  - neg malignancy ROS     Other:            Virtual visit -  No vitals were obtained    Physical Exam  Constitutional: Awake, alert, cooperative, no apparent distress, and appears stated age.  HENT: Normocephalic  Respiratory: non labored breathing   Neurologic: Awake, alert, oriented to name, place and time.   Neuropsychiatric: Calm, cooperative. Normal affect.      Prior Labs/Diagnostic Studies   All labs and imaging personally reviewed     EKG/ stress test - if available please see in ROS above   No results found.      Latest Ref Rng & Units 7/24/2024    12:01 PM   PFT   FVC L 1.91    FEV1 L 1.64    FVC% % 66    FEV1% % 72          The patient's records and results personally reviewed by this provider.     Outside records reviewed from: Care Everywhere      Assessment    Elsie Rubi is a 64 year old female seen as a PAC referral for risk assessment and optimization for anesthesia.    Plan/Recommendations  Pt will be optimized for the proposed procedure.  See below for details on the assessment, risk, and preoperative recommendations    NEUROLOGY  - No history of TIA, CVA or seizure  -Post Op delirium risk factors:  No risk identified    ENT  - No current airway concerns.  Will need to be reassessed day of surgery.  Mallampati: Unable to assess  TM: Unable to assess    CARDIAC  - No history of CAD and Afib  -hypertension using metoprolol  -reports longstanding BECKER associated with ILD, but denies CP or other cardiac symptoms  --previous cardiac testing above including stress test negative for ischemia   - METS (Metabolic Equivalents)  Patient CANNOT perform 4 METS exercise without symptoms             Total Score: 1    Functional Capacity: Unable to complete 4 METS      RCRI-Low risk: Class 2 0.9% complication rate             Total Score: 1    RCRI: High Risk Surgery        PULMONARY  - Obstructive Sleep Apnea, no CPAP.   -ILD, asthma. Follows with pulmonlogy. Was seen 7/24/24 for  "optimization for surgery with following recommendations:  \"Patient is currently stable for bariatric surgery.  No additional medications or time needed to optimize her.  She has 2 respiratory disorders that are being managed.    Regarding her asthma, she should continue her inhalers as normal.  Can potentially just provide her an albuterol nebulizer just before surgery doubt bronchospasm in the OR.  Regarding her ILD, to help prevent post operative respiratory failure if she is intubated- it would be ideal to maintain tidal volumes <6-7cc/Kg of ideal body weight and maintain low airway pressures (<30 mmhg plateau).  She can continue the mycophenolate for now unless concern for infection. Following bariatric surgery, if she is having diarrhea/loose stools, can try Myfortec, but can determine later down the line.   She has mild MIC and nocturnal hypoxia is not on treatment with CPAP but is managed with supplemental O2 of 2LPM at night. If any concern for upper aiway obstruction post-operatively, can try auto-Pap 5-20cm.  Additional recommendations include postoperative incentive spirometry and early mobilization. If admitted, would need appropriate VTE prevention.\"  - Tobacco History    History   Smoking Status    Never   Smokeless Tobacco    Never       GI  -hiatal hernia  -GERD using prilosec   PONV High Risk  Total Score: 3           1 AN PONV: Pt is Female    1 AN PONV: Patient is not a current smoker    1 AN PONV: Intended Post Op Opioids        /RENAL  - Baseline Creatinine WNL    ENDOCRINE    - BMI: Estimated body mass index is 34.84 kg/m  as calculated from the following:    Height as of this encounter: 1.626 m (5' 4\").    Weight as of this encounter: 92.1 kg (203 lb).  Obesity (BMI >30)-using wegovy and will hold x7 days prior to surgery   - No history of Diabetes Mellitus    HEME  VTE Medium Risk 1.8%             Total Score: 5    VTE: Greater than 59 yrs old    VTE: Family Hx of VTE      - No history of " abnormal bleeding or antiplatelet use.    MSK  Patient is NOT Frail             Total Score: 1    Frailty: Overall lack of energy      -PM&R referral not indicated       Different anesthesia methods/types have been discussed with the patient, but they are aware that the final plan will be decided by the assigned anesthesia provider on the date of service.    The patient is optimized for their procedure. AVS with information on surgery time/arrival time, meds and NPO status given by nursing staff. No further diagnostic testing indicated.    Please refer to the physical examination documented by the anesthesiologist in the anesthesia record on the day of surgery.    Video-Visit Details    Type of service:  Video Visit    Provider received verbal consent for a Video Visit from the patient? Yes     Originating Location (pt. Location): Home    Distant Location (provider location):  Off-site  Mode of Communication:  Video Conference via [employer]  On the day of service:     Prep time: 7 minutes  Visit time: 9 minutes  Documentation time: 4 minutes  ------------------------------------------  Total time: 20 minutes      Ayde Hobson PA-C  Preoperative Assessment Center  [facility]  [facility]  Phone: [phone]  Fax: [phone]

## 2024-08-26 NOTE — PROGRESS NOTES
Ju is a 64 year old who is being evaluated via a billable video visit.      lorena Dalton   Ju is a 64 year old, presenting for the following health issues:  Pre-Op Exam (/)        CORINNE Campos LPN

## 2024-08-26 NOTE — PATIENT INSTRUCTIONS
Elsie Rubi,    Below is a recap of our conversation regarding your upcoming Sleeve Gastrectomy on 9/10/24 at 12:30 PM with Dr. Priyank Cabrera.  You should receive a call from the hospital surgery department 1-2 days before surgery confirming you surgery time and arrival time.  You should arrive at the hospital 2 hours prior to your surgery time.    SURGERY CANCELLATION  If something occurs in which you need to cancel your surgery, please contact Agustin at [phone], as soon as possible.      BEFORE SURGERY    MEDICATIONS TO STOP BEFORE SURGERY  ASPIRIN  - stop 7 days before surgery.  BLOOD THINNERS - Need a bridging plan with your prescribing provider.  NSAIDS - stop 7 days before surgery and do not restart after surgery.  CHRONIC NARCOTIC PAIN MEDICATION  - need a plan with pain provider and surgeon.  HERBAL SUPPLEMENTS - Stop 7 days before surgery.  VITAMINS - Stop 7 days before surgery.    ANTIOBESITY MEDICATIONS TO STOP BEFORE SURGERY  TOPIRAMATE - take the day of surgery in the morning.  NALTREXONE - Stop 4 days before surgery. Do not restart.  CONTRAVE - Taper with 1 tablet twice a day for 1 week, then stop 4 days before surgery.  Do not restart.  QSYMIA (7.5 mg/46 mg) - Stop 7 days before surgery.  If on a larger dose, will need a plan for tapering.  Do not restart.  PHENTERMINE - Stop 7 days before surgery.  Do not restart.  GLP-1 (Victoza, Saxenda)  - Last dose the day before surgery.  Do not restart.  GLP-1 (Ozempic, Wegovy, Trulicity, Mounjaro) - Last dose no later than 1 week before surgery.  Do not restart.  METFORMIN - Last dose the day before surgery.  Do not restart.    MEDICATION REVIEW  FIRST 4 WEEKS POSTOP - Medications should be liquid, chewable, crushed or pills smaller than 1/4 inch.    AFTER 4 WEEKS - Take small pills or cut up larger pills to be smaller than 1/2 inch.  Do not crush or open medications that are long acting or extended relief. Check with your prescribing provider to see if  there is a different form or option.    DAY BEFORE YOUR SURGERY  Starting in the morning, follow a clear liquid diet.  Stay away from red liquids and liquids with pulp.  Ensure you are well hydrated all day!  Strive for at least 64 ounces.  Nothing after midnight except water!  You may have sips of water up to 3 hours before your surgery.   Take your medications as directed from the PAC clinic.  You may have approved medications up to 3 hours before your surgery time.    SHOWER  You will take a shower the night before surgery and a shower the morning of surgery.  Follow instructions for pre-op shower using the required soap (4% CHG,    Hibiclens, Exidine, chlorhexidine).  Let the soap sit on your skin for a minute and then rinse.  After your evening shower, dry off with a clean towel, put on clean pajamas and get into a bed with clean sheets.  After your morning shower, dry off with a clean towel and put on clean comfortable clothes.  The soap can be very drying.  Do not put any deodorant, lotion or powder on after your shower.    TRANSPORTATION & POSTOP CARE  You will need a  to take you to the hospital the day of surgery.  You will need a  to pick you up from the hospital the day of discharge (usually the afternoon/evening the day after surgery).  You will need someone to stay with you for the first couple of days after surgery.  If you live greater than an hour from the hospital, you will need to stop every 50-60 minutes to get out of the car and walk around.         PRESCRIPTIONS FROM YOUR PHARMACY:   Please plan to pick these up before surgery and have them at home for when you are discharged. Do not start taking them until after surgery. Do not bring them to the hospital with you!    Prilosec (omeprazole) OR Alternative:   This medication is for control of stomach acid.  You will take this medication daily for the first three months after surgery.  TO TAKE: Open the capsule and put the beads  in applesauce.  Take every morning.  If you are currently on a medication for GERD or Reflux, you will just continue that medication.    Levsin (hyoscyamine) or Alternative:   This medication is to help control spasms/cramping in the stomach and intestines.    Take one tablet every 4 hours as needed for cramping.    It may be helpful to take in the morning before taking any other medications.    Zofran (ondansetron) or Alternative:    This medication is for nausea.    To Take: Place one tablet on the tongue and let it dissolve.  You can take this every 6 hours, as needed.    Senna:   This medication is a stool softener:  Take as directed to help avoid constipation.  It is important to take this medication if you are taking a narcotic pain medicine as the pain medication can be constipating.    If you experience diarrhea, please stop taking the Senna.      IN THE HOSPITAL  Use your Incentive Spirometer  Get up. You should be up walking at least 3 times (or more) each day while in the hospital.  Trips to the bathroom are great but they are not a long enough distance.  Ask for an abdominal binder for extra abdominal support.  Wear your abdominal as needed, for comfort.  Sip through your fluids!  Frequent sips - about 1 tsp every couple of minutes.  Drinking more than that at a time can contribute to pain and cramping.   Ask for some medicine cups to take home to help you measure your fluids.  Remember to take your abdominal binder and incentive spirometer home with you when you are discharged!!      AFTER SURGERY    REGULARLY SCHEDULED MEDICATIONS    Depending on the size and number of medications you take, you may need to space/change the time you take your medications, so you do not overfill your stomach.  Make sure you follow-up with your primary provider to make medication changes needed.  DIABETES: If you have diabetes, monitor your blood sugars more frequently after surgery.  Your blood sugars may normalize  quickly.  Please contact your prescribing provider if you need your medication adjusted.  HIGH BLOOD PRESSURE: If you have high blood pressure, monitor your blood pressure after surgery.  Your blood pressure may normalize quickly.  Please contact your prescribing provider if you need your medication adjusted.      POSTOPERATIVE MEDICATIONS  Take your postop medications as prescribed.  You can start a chewable Multivitamin when you get home.  Do not start any additional vitamins until you meet with your provider and dietician to receive further instructions.        PAIN CONTROL  Your pain medication prescription at discharge is a different dosage and timing than what you were taking in the hospital!  Follow the new directions.Take your pain medicine as needed, as directed.  Start with the minimal amount prescribed and supplement with Tylenol or Extra Strength Tylenol.  Take the minimal amount as directed for severe pain. The pain medicine can cause constipation.  Do not drive while taking narcotic pain medication.    For mild to moderate pain, you can take Tylenol or Extra Strength Tylenol per the bottle instructions.  DO NOT TAKE NSAIDS: IBUPROFEN, ASPIRIN OR NAPROXEN.  Change positions frequently.  Walking around for 10-15 minutes once an hour will also help.ICE: Use an ice pack on the incisions for the first 24 - 48 hours:  Use a barrier between the ice pack and the skin.  Do not leave the ice on longer than 20 minutes at each time.    HEAT: You may also try a heating pad on your abdomen to help soothe cramping.  Use a barrier between the heating pad and your skin.  Do not leave on longer than 20 minutes at each time.  Wear your abdominal binder as needed.  You will need someone to stay with you for the first 1-2 days after surgery, especially if you are taking prescription pain medicine.  Please share the information regarding fluid intake and activity with your caregiver so they can help support you in your  efforts.  No driving until you have been off narcotic pain medications for at least 24 hours.    DIET  For Dr. Cabrera Patients:  You will be following the Stage 2 (Full Liquid Diet) - upon discharge.  Ensure you have a variety of proper full liquids at home to support you in taking in the proper nutrition.  Please refer to the Stage 2 - Full Liquid diet handout provided by the Dietician.  Stage 3 (Pureed) Diet Start Date: 9/25/24  Stage 4 (Soft Foods) Diet Start Date: 10/9/24  Stage 5 (Regular) Diet Start Date: 11/8/24       HYDRATION  Your goal is 48 to 64 ounces each day (4-6 ounces each hour).  This amount will help prevent dehydration.    It is important to measure and keep a tally sheet of your intake for the first month after surgery.  Keep your tally sheet next to you and arturo each time you drink one ounce of fluid.  You should track your fluids for the first month after surgery and if you are ill.  All fluids count in your fluid intake!  Keep a water bottle or thermal bottle by your bed.  Drink a little before going to sleep, if you wake in the middle of the night, and in the morning before getting out of bed.  Keep an eye on your urine.  It is a good indicator of your hydration status.  Your urine should be clear yellow or clear light yellow.    You will learn about advancing to a pureed diet at your first postop Dietician appointment.     BOWELS/CONSTIPATION   Movement is important to keep your bowels working.  Get up and walk at least each hour while you are awake.  It is not unusual to not have a bowel movement for a few days after surgery.  You should be passing gas each day.   Keep taking the SENNA until you have had a regular bowel movement and are off the narcotic pain medication.   If you haven't had a bowel movement by the 4th day after surgery, take one dose of Miralax (according to package directions).  Repeat dose if no results.  If you still don't have any results, use a Fleet Enema.  If still  no results, call your provider's office.   It is normal to have a little blood in your first couple of bowel movements.  If the blood continues, please contact our office.  If you are having gas pains and bloating, you can try Gas-X.    BREATHING EXERCISES  Use your incentive spirometer each hour while awake.  Sitting up or standing, take 5 - 10 slow, deep breaths each time, focusing on expanding your lungs.  This should be done for the first couple of weeks after surgery.  These exercises will help eliminate gases from your system (anesthesia and surgical).      ACTIVITY & EXERCISE  Get up and walk around each hour while you are awake - at least 10 minutes.  Walking will help with circulation, bowel regulation and will help you feel better.  Do not lift anything greater than 10-15 lb for the first 4 weeks.  The only exercise you can start right after surgery is WALKING.  Walking is good for the gut, the circulation and your breathing!   Seated Exercises for Arms and Legs (can be done before or after surgery) [email or web address]  Exercise Guidelines after Weight Loss Surgery (1st 4-6 weeks): [email or web address]  Exercises after Weight Loss Surgery (strengthening, when no weightlifting restrictions - after 4-6 weeks) :  [email or web address]       WOUND CARE  You will have steri-strips over your incision after surgery. They will fall off over the first 7-10 days after surgery.  If the steri strips fall off before your incisions are healed, replace them with a bandaid to pull the incision together.   You may have bruising around your wounds. This is normal. It will go away on its own. The skin around your incisions may be a little red. This is normal, too.   Showering: you may shower the day you get home unless your surgeon tells you differently.  Wash gently around incisions with warm soapy water, rinse well, and gently pat dry.    DO NOT wear tight clothing that rubs against  your incisions while they heal.   DO NOT soak in a bathtub, swimming pool, or hot tub until your incisions are healed completely, usually around 7-14 days. No tub baths or swimming until all incisions are healed.    CARE COMPANION  Track your fluid intake!  Report total fluids, not just water intake.  Reminders to set up follow up appointments.  Notifications of frequently asked questions.  Please use [employer] for any concerns regarding your health.    FOLLOW-UP CALL  You will receive a post-op phone call two to three days after surgery to check in and see how you are doing (If your surgery is on a Friday, your phone call will be on the Monday following your surgery).    You can always send us a message via [employer] if you have any questions or concerns.      CALL YOUR PROVIDER IF:      You have more redness, pain, warmth, swelling, or bleeding around your incision.     The wound is larger or deeper or looks dark or dried out.     The drainage from your incision does not decrease in 3 to 5 days or increases.     The drainage becomes thick, tan or yellow and has a bad smell (pus).     Your temperature is above 100 F (37.7 C) for more than 4 hours.     You have pain that your pain medicine is not helping.     You have chest and/or belly pain.     You have trouble breathing.     You have a cough that does not go away.     You cannot drink or eat.     You have a fast heartbeat.     You are dizzy or lightheaded.     You are not passing gas and have not had a bowel movement after following instructions above.     Your stools are loose, or you have diarrhea.     You are vomiting after eating.         Please let us know if you have any additional questions.    Sincerely,    Katarina Cortez RN, BSN  RN Care Coordinator  Bariatric Surgery and Comprehensive Weight Management  Phone: [phone]

## 2024-08-29 NOTE — PROGRESS NOTES
"Video-Visit Details    Type of service:  Video Visit    Video Start Time: 10:30 AM   Video End Time: 11:00 AM    Originating Location (pt. Location): Home    Distant Location (provider location): Offsite (providers home) [facility] [place]     Platform used for Video Visit: [facility]    Return Bariatric Nutrition Consultation Note    Reason For Visit: Nutrition Assessment    Elsie Rubi is a 64 year old presenting today for return bariatric nutrition consult and nutrition education regarding clear and low-fat full liquid diet for post-bariatric surgery. Patient is interested in laparoscopic sleeve gastrectomy with Dr. Cabrera expected surgery on 9/10/24.  Patient is accompanied by self.  This is patient's 5th of 3 required nutrition visits prior to surgery. Completed the WLS nutrition class on 1/24/24.     Pt referred by EARL Varela on January 22, 2024.    CO-MORBIDITIES OF OBESITY INCLUDE:        1/16/2024    11:27 AM   --   I have the following health issues associated with obesity Pre-Diabetes    High Blood Pressure    GERD (Reflux)    Asthma    Stress Incontinence     SUPPORT:      1/16/2024    11:27 AM   Support System Reviewed With Patient   Who do you have in your support network that can be available to help you for the first 2 weeks after surgery?    Who can you count on for support throughout your weight loss surgery journey?      ANTHROPOMETRICS:  Initial consult weight: 222 lb on 1/22/24   Estimated body mass index is 33.81 kg/m  as calculated from the following:    Height as of this encounter: 1.626 m (5' 4\").    Weight as of this encounter: 89.4 kg (197 lb).  Current Weight: 197 lb per pt report.     Required weight loss goal pre-op: -10 lbs from initial consult weight (goal weight 212 lbs or less before surgery) - met         1/16/2024    11:27 AM   --   I have tried the following methods to lose weight Watching portions or calories    " Exercise    Atkins type diet (low carb/high protein)           1/16/2024    11:27 AM   Weight Loss Questions Reviewed With Patient   How long have you been overweight? From Middle age and beyond     MEDICATION FOR WEIGHT LOSS: Wegovy - 1.0 mg - has one dose left and then will be done.     VITAMIN/MINERAL SUPPLEMENTS: Vitamin D and Calcium with Vitamin D.     NUTRITION HISTORY:  Food allergies: NKFA   Food intolerances: None   Previous experience with diet modification for weight loss: Mediterranean style diet, high protein, high fat, non-starchy vegetables, low carb.     Focuses on a mediterranean style diet currently.     Per Leanne's visit: Regarding eating patterns and diet,  she typically eats 2-3 meals a day. Is able to get full. Can stay full. Eats out/ gets take out 2 times a month. Drinks coffee with half and half and splenda (2 cups every morning). Drinks diet pepsi a few time a week. Drinks a few glasses of water throughout the day. Drinks 2 glasses of wine every night. Is open to having 1 glass of wine instead.      Religious Jainism, doesn't eat meat any Wednesday or Friday, and then for 7 weeks before christmas, 7 weeks before easter.     Carb Manager  Katy - tracking daily intake are around 900-1300 calories per day. Encouraged to aim for around 1550 calories which is her estimated BMR.     Recent Diet Recall:  Breakfast: delays this till around noon.   Lunch: Eats around noon.   Dinner: 7 PM   Snacks: little piece of chocolate   Beverages: Drink coffee, tea, water but doesn't love the taste of plain water. Has gotten away from diet Pepsi.   Alcohol: did not discuss     Lent starts on Monday for patient and pretty much follows a vegan diet during this time.     May 2024: Feels her weight loss has slowed down since Lent completed. Trying to get in around 1500 calories and 60-70 grams of protein daily.  Plans to try to incorporate some physical activity, especially strength and resistance training. Just  has her psych evaluation and pulmonology consults left to do on tasklist.     June 2024: Feels her current Wegovy dose isn't as effective and is interested in upping the dose. Eating more protein, likes cottage cheese/yogurt daily.  Eating 3 meals a day. Slight up tick in her physical activity, rode her stationary bike. Has been using her oxygen more throughout the day. Has been working with a psychologist. Has completely cut out seed oils. Still thinking about if surgery is right for her but continues to work through the requirements that are necessary for surgery.     Has a pulmonary appointment next month at the [facility] and is thinking about stopping at the weight management clinic to step on the body composition scale to assess muscle mass.     July 2024: Has had some issues with getting her Wegovy dose increase prescription filled. Picking it up today. Reports she has noticed her appetite to increase a little bit and at times has been eating more freely.  Reports she is scheduling an appointment with her primary care doctor soon as she accidentally missed her appointment. Sees EARL Varela tomorrow. Last month patient was able to come to the [facility] clinic and use the Tanita scale. She is very interested in having this information. She plans on discussing it further with Leanne tomorrow.      August 2024:  Surgery scheduled on 9/10/24. Final review of post op diet stages. Patient reported she recently found out that she was supposed to be avoiding caffeine. Not sure how that got missed, it has been stated in goals and handouts that have been provided each month. In efforts to start cutting this out patient started drinking half caffeine and half decaf.     Discussion on post op vitamin/minerals options.         1/16/2024    11:27 AM   Recall Diet Questions Reviewed With Patient   Describe what you typically consume for breakfast (typical or most recent) Cheesy eggs   Describe what you typically consume for lunch  (typical or most recent) Skip or munch on leftovers   Describe what you typically consume for supper (typical or most recent) 5 oz Protein with 1/2 baked potato or vegetable   Describe what you typically consume as snacks (typical or most recent) Cheese, nuts   How many ounces of water, or other low calorie drinks, do you drink daily (8 oz=1 glass)? 24 oz   How many ounces of caffeine (coffee, tea, pop) do you drink daily (8 oz=1 glass)? 16 oz   How many ounces of carbonated (pop, beer, sparkling water) drinks do you drinky daily (8 oz=1 glass)? 0 oz   How many ounces of juice, pop, sweet tea, sports drinks, protein drinks, other sweetened drinks, do you drink daily (8 oz=1 glass)? 0 oz   How many ounces of milk do you drink daily (8 oz=1 glass) 0 oz   How often do you drink alcohol? 4 or more times a week   If you do drink alcohol, how many drinks might you have in a day? (one drink = 5 oz. wine, 1 can/bottle of beer, 1 shot liquor) 1 or 2           1/16/2024    11:27 AM   Eating Habits   What foods do you crave? Salty           1/16/2024    11:27 AM   Dining Out History Reviewed With Patient   How often do you dine out? Rarely.   Where do you dine out? (select all that apply) sit-down restaurants   What types of food do you order when you dine out? Entree     EXERCISE: has a very sedentary lifestyle. 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  Doesn't typically like to leave house, loves to be at home. Bought a modified rowing machine that she had enjoyed using in the past with pulmonary rehab, hasn't been using. Is struggling to find the motivation to exercise more and also has big issues with activity due to anxiety related to becoming out of breath as a result of her interstitial lung disease.          1/16/2024    11:27 AM   --   How often do you exercise? Less than 1 time per week   What is the duration of your exercise (in minutes)? 10 Minutes   What keeps you from being more active? Pain    I should be  more active but I just have not gotten around to it    Shortness of breath     NUTRITION DIAGNOSIS:  Food- and Nutrition-related knowledge deficit r/t lack of prior exposure to information AEB pt scheduled for upcoming bariatric surgery and pt interest in diet education/review    INTERVENTION:  Intervention Provided/Education Provided/Reviewed previous goals and encouraged patient to continue goals prior to surgery.     Provided instruction on bariatric clear and low-fat full liquid diets.    Provided the following handouts: Diet Guidelines for Bariatric Surgery, Your Stage 1-5 Diet, Keeping Track of Your Fluids, list of recommended vitamin/mineral supplementation after sleeve gastrectomy surgery and RD contact information.           1/16/2024    11:27 AM   Questions Reviewed With Patient   How ready are you to make changes regarding your weight? Number 1 = Not ready at all to make changes up to 10 = very ready. 10   How confident are you that you can change? 1 = Not confident that you will be successful making changes up to 10 = very confident. 8     Expected Engagement: good    Goals after surgery:   1. Follow the bariatric post-op diet advancement schedule (see below)  2. Sip on 48-64 oz (or greater) fluids daily, recording intake to help stay on-track.  - Drink at least 1-2 oz of fluid every 15-30 min.  3. Stop vitamins/minerals 1 week before surgery. We will discuss starting a chewable multivitamin at the one week post-op visit.   4. Work towards consuming 60 gm protein daily.     Post-op Diet Advancement Schedule:  Clear Liquid Diet (stage 1): start 9/9  Low-Fat Full Liquid Diet (stage 2): start 9/11   Pureed Diet (stage 3): start 9/24  Soft Diet (stage 4): start 10/8   Regular Diet (stage 5): start 11/5      Post-op Diet Handouts:  Diet Guidelines after Weight-loss Surgery  [email or web address]     Your Stage 1 Diet: Clear Liquids  [email or web address]     Your Stage 2 Diet: Low-fat Full  Liquids  [email or web address]     Your Stage 3 Diet: Pureed Foods  [email or web address]     Pureed Recipes  [email or web address]    Your Stage 4 Diet: Soft Foods  [email or web address]    Your Stage 5 Diet: Regular Foods  [email or web address]    Supplements after Sleeve Gastrectomy, Gastric Bypass or Single Anastomosis Duodenal Switch  [email or web address]    Keeping Track of Fluids  [email or web address]    Baritastic Katy for tracking food and liquids.     Chewable Multivitamin Options for After Surgery:  Bariatric Advantage Advanced Multi EA chewable: https://www.Tookitaki.Hazelcast/item/chewable-advanced-multi-ea  Take 2 chews daily. Additional calcium citrate supplement needed.  - Validation code for Discount on Bariatric Advantage vitamin/mineral supplements: FSWLSBA    Celebrate Multi Complete 45: https://Fundology/products/weddf-wfqoogwf-85?zkuyiiz=[email or web address]  Take 2 chews daily. Additional calcium citrate supplement needed.    [place]'s Complete, or generic (with 10 mg iron per chew)   (https://www.Extenda-Dent.com/p/kids-39-complete-multivitamin-chewable-tablets-orange-grape-38-cherry-150ct-up-38-up-8482/-/A-16258367#lnk=sametab)   Take 2 chews per day. Additional B12 and calcium citrate supplements needed. Potential need for additional iron supplement (if needing more than 20 mg iron per day)    Vivek Melts   Bariatric Pal     Follow Up: September 17     Time spent with patient: 30 minutes.  Caroline Mcduffie RD, LD

## 2024-08-30 ENCOUNTER — VIRTUAL VISIT (OUTPATIENT)
Dept: ENDOCRINOLOGY | Facility: CLINIC | Age: 64
End: 2024-08-30
Payer: COMMERCIAL

## 2024-08-30 VITALS — BODY MASS INDEX: 33.63 KG/M2 | WEIGHT: 197 LBS | HEIGHT: 64 IN

## 2024-08-30 DIAGNOSIS — Z71.3 NUTRITIONAL COUNSELING: Primary | ICD-10-CM

## 2024-08-30 DIAGNOSIS — E66.9 OBESITY: ICD-10-CM

## 2024-08-30 PROCEDURE — 99207 PR NO CHARGE LOS: CPT | Mod: 95

## 2024-08-30 PROCEDURE — 97803 MED NUTRITION INDIV SUBSEQ: CPT | Mod: 95

## 2024-08-30 NOTE — LETTER
"8/30/2024       RE: Elsie Rubi  [address] [facility] [place] [address]     Dear Colleague,    Thank you for referring your patient, Elsie Rubi, to the [facility] [place] at [facility]. Please see a copy of my visit note below.    Video-Visit Details    Type of service:  Video Visit    Video Start Time: 10:30 AM   Video End Time: 11:00 AM    Originating Location (pt. Location): Home    Distant Location (provider location): Offsite (providers home) [facility] [place]     Platform used for Video Visit: AmWell    Return Bariatric Nutrition Consultation Note    Reason For Visit: Nutrition Assessment    Elsie Rubi is a 64 year old presenting today for return bariatric nutrition consult and nutrition education regarding clear and low-fat full liquid diet for post-bariatric surgery. Patient is interested in laparoscopic sleeve gastrectomy with Dr. Cabrera expected surgery on 9/10/24.  Patient is accompanied by self.  This is patient's 5th of 3 required nutrition visits prior to surgery. Completed the WLS nutrition class on 1/24/24.     Pt referred by EARL Varela on January 22, 2024.    CO-MORBIDITIES OF OBESITY INCLUDE:        1/16/2024    11:27 AM   --   I have the following health issues associated with obesity Pre-Diabetes    High Blood Pressure    GERD (Reflux)    Asthma    Stress Incontinence     SUPPORT:      1/16/2024    11:27 AM   Support System Reviewed With Patient   Who do you have in your support network that can be available to help you for the first 2 weeks after surgery?    Who can you count on for support throughout your weight loss surgery journey?      ANTHROPOMETRICS:  Initial consult weight: 222 lb on 1/22/24   Estimated body mass index is 33.81 kg/m  as calculated from the following:    Height as of this encounter: 1.626 m (5' 4\").    Weight as of this " encounter: 89.4 kg (197 lb).  Current Weight: 197 lb per pt report.     Required weight loss goal pre-op: -10 lbs from initial consult weight (goal weight 212 lbs or less before surgery) - met         1/16/2024    11:27 AM   --   I have tried the following methods to lose weight Watching portions or calories    Exercise    Atkins type diet (low carb/high protein)           1/16/2024    11:27 AM   Weight Loss Questions Reviewed With Patient   How long have you been overweight? From Middle age and beyond     MEDICATION FOR WEIGHT LOSS: Wegovy - 1.0 mg - has one dose left and then will be done.     VITAMIN/MINERAL SUPPLEMENTS: Vitamin D and Calcium with Vitamin D.     NUTRITION HISTORY:  Food allergies: NKFA   Food intolerances: None   Previous experience with diet modification for weight loss: Mediterranean style diet, high protein, high fat, non-starchy vegetables, low carb.     Focuses on a mediterranean style diet currently.     Per Leanne's visit: Regarding eating patterns and diet,  she typically eats 2-3 meals a day. Is able to get full. Can stay full. Eats out/ gets take out 2 times a month. Drinks coffee with half and half and splenda (2 cups every morning). Drinks diet pepsi a few time a week. Drinks a few glasses of water throughout the day. Drinks 2 glasses of wine every night. Is open to having 1 glass of wine instead.      Synagogue [employer]ology, doesn't eat meat any Wednesday or Friday, and then for 7 weeks before christmas, 7 weeks before easter.     Carb Manager  Katy - tracking daily intake are around 900-1300 calories per day. Encouraged to aim for around 1550 calories which is her estimated BMR.     Recent Diet Recall:  Breakfast: delays this till around noon.   Lunch: Eats around noon.   Dinner: 7 PM   Snacks: little piece of chocolate   Beverages: Drink coffee, tea, water but doesn't love the taste of plain water. Has gotten away from diet Pepsi.   Alcohol: did not discuss     Lent starts on Monday for  patient and pretty much follows a vegan diet during this time.     May 2024: Feels her weight loss has slowed down since Lent completed. Trying to get in around 1500 calories and 60-70 grams of protein daily.  Plans to try to incorporate some physical activity, especially strength and resistance training. Just has her psych evaluation and pulmonology consults left to do on tasklist.     June 2024: Feels her current Wegovy dose isn't as effective and is interested in upping the dose. Eating more protein, likes cottage cheese/yogurt daily.  Eating 3 meals a day. Slight up tick in her physical activity, rode her stationary bike. Has been using her oxygen more throughout the day. Has been working with a psychologist. Has completely cut out seed oils. Still thinking about if surgery is right for her but continues to work through the requirements that are necessary for surgery.     Has a pulmonary appointment next month [place] the [facility] and is thinking about stopping at the weight management clinic to step on the body composition scale to assess muscle mass.     July 2024: Has had some issues with getting her Wegovy dose increase prescription filled. Picking it up today. Reports she has noticed her appetite to increase a little bit and at times has been eating more freely.  Reports she is scheduling an appointment with her primary care doctor soon as she accidentally missed her appointment. Sees EARL Varela tomorrow. Last month patient was able to come to the [facility] [place] and use the Tanita scale. She is very interested in having this information. She plans on discussing it further with Leanne tomorrow.      August 2024:  Surgery scheduled on 9/10/24. Final review of post op diet stages. Patient reported she recently found out that she was supposed to be avoiding caffeine. Not sure how that got missed, it has been stated in goals and handouts that have been provided each month. In efforts to start cutting this out patient  started drinking half caffeine and half decaf.     Discussion on post op vitamin/minerals options.         1/16/2024    11:27 AM   Recall Diet Questions Reviewed With Patient   Describe what you typically consume for breakfast (typical or most recent) Cheesy eggs   Describe what you typically consume for lunch (typical or most recent) Skip or munch on leftovers   Describe what you typically consume for supper (typical or most recent) 5 oz Protein with 1/2 baked potato or vegetable   Describe what you typically consume as snacks (typical or most recent) Cheese, nuts   How many ounces of water, or other low calorie drinks, do you drink daily (8 oz=1 glass)? 24 oz   How many ounces of caffeine (coffee, tea, pop) do you drink daily (8 oz=1 glass)? 16 oz   How many ounces of carbonated (pop, beer, sparkling water) drinks do you drinky daily (8 oz=1 glass)? 0 oz   How many ounces of juice, pop, sweet tea, sports drinks, protein drinks, other sweetened drinks, do you drink daily (8 oz=1 glass)? 0 oz   How many ounces of milk do you drink daily (8 oz=1 glass) 0 oz   How often do you drink alcohol? 4 or more times a week   If you do drink alcohol, how many drinks might you have in a day? (one drink = 5 oz. wine, 1 can/bottle of beer, 1 shot liquor) 1 or 2           1/16/2024    11:27 AM   Eating Habits   What foods do you crave? Salty           1/16/2024    11:27 AM   Dining Out History Reviewed With Patient   How often do you dine out? Rarely.   Where do you dine out? (select all that apply) sit-down restaurants   What types of food do you order when you dine out? Entree     EXERCISE: has a very sedentary lifestyle. 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  Doesn't typically like to leave house, loves to be at home. Bought a modified rowing machine that she had enjoyed using in the past with pulmonary rehab, hasn't been using. Is struggling to find the motivation to exercise more and also has big issues with activity  due to anxiety related to becoming out of breath as a result of her interstitial lung disease.          1/16/2024    11:27 AM   --   How often do you exercise? Less than 1 time per week   What is the duration of your exercise (in minutes)? 10 Minutes   What keeps you from being more active? Pain    I should be more active but I just have not gotten around to it    Shortness of breath     NUTRITION DIAGNOSIS:  Food- and Nutrition-related knowledge deficit r/t lack of prior exposure to information AEB pt scheduled for upcoming bariatric surgery and pt interest in diet education/review    INTERVENTION:  Intervention Provided/Education Provided/Reviewed previous goals and encouraged patient to continue goals prior to surgery.     Provided instruction on bariatric clear and low-fat full liquid diets.    Provided the following handouts: Diet Guidelines for Bariatric Surgery, Your Stage 1-5 Diet, Keeping Track of Your Fluids, list of recommended vitamin/mineral supplementation after sleeve gastrectomy surgery and RD contact information.           1/16/2024    11:27 AM   Questions Reviewed With Patient   How ready are you to make changes regarding your weight? Number 1 = Not ready at all to make changes up to 10 = very ready. 10   How confident are you that you can change? 1 = Not confident that you will be successful making changes up to 10 = very confident. 8     Expected Engagement: good    Goals after surgery:   1. Follow the bariatric post-op diet advancement schedule (see below)  2. Sip on 48-64 oz (or greater) fluids daily, recording intake to help stay on-track.  - Drink at least 1-2 oz of fluid every 15-30 min.  3. Stop vitamins/minerals 1 week before surgery. We will discuss starting a chewable multivitamin at the one week post-op visit.   4. Work towards consuming 60 gm protein daily.     Post-op Diet Advancement Schedule:  Clear Liquid Diet (stage 1): start 9/9  Low-Fat Full Liquid Diet (stage 2): start 9/11    Pureed Diet (stage 3): start 9/24  Soft Diet (stage 4): start 10/8   Regular Diet (stage 5): start 11/5      Post-op Diet Handouts:  Diet Guidelines after Weight-loss Surgery  [email or web address]     Your Stage 1 Diet: Clear Liquids  [email or web address]     Your Stage 2 Diet: Low-fat Full Liquids  [email or web address]     Your Stage 3 Diet: Pureed Foods  [email or web address]     Pureed Recipes  [email or web address]    Your Stage 4 Diet: Soft Foods  [email or web address]    Your Stage 5 Diet: Regular Foods  [email or web address]    Supplements after Sleeve Gastrectomy, Gastric Bypass or Single Anastomosis Duodenal Switch  [email or web address]    Keeping Track of Fluids  [email or web address]    SciGit Katy for tracking food and liquids.     Chewable Multivitamin Options for After Surgery:  Bariatric Advantage Advanced Multi EA chewable: https://www.bariatricadClearbridge Biomedicsage.com/item/chewable-advanced-multi-ea  Take 2 chews daily. Additional calcium citrate supplement needed.  - Validation code for Discount on Bariatric Advantage vitamin/mineral supplements: FSWLSBA    Celebrate Multi Complete 45: https://OneFineMeal.Ubiquitous Energy/products/tdacq-vkydwhxd-29?[email or web address]  Take 2 chews daily. Additional calcium citrate supplement needed.    [place]'s Complete, or generic (with 10 mg iron per chew)   (https://www.Satmetrix.com/p/kids-39-complete-multivitamin-chewable-tablets-orange-grape-38-cherry-150ct-up-38-up-8482/-/A-99002882#lnk=sametab)   Take 2 chews per day. Additional B12 and calcium citrate supplements needed. Potential need for additional iron supplement (if needing more than 20 mg iron per day)    Vivek [facility]   Bariatric Pal     Follow Up: September 17     Time spent with patient: 30 minutes.  Caroline Mcduffie, ANDREW, LD      Again, thank you for allowing me to participate in the care of your patient.      Sincerely,    Caroline  ANDREW Mcduffie

## 2024-08-30 NOTE — NURSING NOTE
Current patient location: [address] [place] [id number]    Is the patient currently in the state of [place]? YES    Visit mode:VIDEO    If the visit is dropped, the patient can be reconnected by: VIDEO VISIT: Text to cell phone:   Telephone Information:   Mobile [phone]       Will anyone else be joining the visit? NO  (If patient encounters technical issues they should call [phone])    How would you like to obtain your AVS? MyChart    Are changes needed to the allergy or medication list? No    Are refills needed on medications prescribed by this physician? NO    Rooming Documentation:  Not applicable      Reason for visit: Medication Therapy Management    Bobbilynn Grossaint VVF

## 2024-08-30 NOTE — PATIENT INSTRUCTIONS
Henrry Dodd,     Good luck with your surgery!    Follow-up with RD on September 17.     Thank you,    Caroline Mcduffie, RD, LD  If you would like to schedule or reschedule an appointment with the RD, please call [phone]    Nutrition Goals  Goals after surgery:   1. Follow the bariatric post-op diet advancement schedule (see below)  2. Sip on 48-64 oz (or greater) fluids daily, recording intake to help stay on-track.  - Drink at least 1-2 oz of fluid every 15-30 min.  3. Stop vitamins/minerals 1 week before surgery. We will discuss starting a chewable multivitamin at the one week post-op visit.   4. Work towards consuming 60 gm protein daily.     Post-op Diet Advancement Schedule:  Clear Liquid Diet (stage 1): start 9/9  Low-Fat Full Liquid Diet (stage 2): start 9/11   Pureed Diet (stage 3): start 9/24  Soft Diet (stage 4): start 10/8   Regular Diet (stage 5): start 11/5      Post-op Diet Handouts:  Diet Guidelines after Weight-loss Surgery  [email or web address]     Your Stage 1 Diet: Clear Liquids  [email or web address]     Your Stage 2 Diet: Low-fat Full Liquids  [email or web address]     Your Stage 3 Diet: Pureed Foods  [email or web address]     Pureed Recipes  [email or web address]    Your Stage 4 Diet: Soft Foods  [email or web address]    Your Stage 5 Diet: Regular Foods  [email or web address]    Supplements after Sleeve Gastrectomy, Gastric Bypass or Single Anastomosis Duodenal Switch  [email or web address]    Keeping Track of Fluids  [email or web address]    [employer]it[employer] [place] for tracking food and liquids.     Chewable Multivitamin Options for After Surgery:  Bariatric Advantage Advanced Multi EA chewable: https://www.bariatricFashionspace.CitizenNet/item/chewable-advanced-multi-ea  Take 2 chews daily. Additional calcium citrate supplement needed.  - Validation code for Discount on Bariatric Advantage vitamin/mineral supplements:  FSWLSBA    Celebrate Multi Complete 45: https://celebrateSvaya Nanotechnologiess.Jumper Networks/products/ggqyg-vdnpapyk-51?[email or web address]  Take 2 chews daily. Additional calcium citrate supplement needed.    [place]'s Complete, or generic (with 10 mg iron per chew)   (https://www.Bionanoplus.Jumper Networks/p/kids-39-complete-multivitamin-chewable-tablets-orange-grape-38-cherry-150ct-up-38-up-8482/-/[email or web address]#lnk=sametab)   Take 2 chews per day. Additional B12 and calcium citrate supplements needed. Potential need for additional iron supplement (if needing more than 20 mg iron per day)    Vivek Melts   Bariatric Pal     COMPREHENSIVE WEIGHT MANAGEMENT PROGRAM  VIRTUAL SUPPORT GROUPS    At [facility], our Comprehensive Weight Management program offers on-line support groups for patients who are working on weight loss and considering, preparing for, or have had weight loss surgery.     There is no cost for this opportunity.  You are invited to attend the?Virtual Support Groups?provided by any of the following locations:    [facility] via Microsoft Teams with Sandra Hopper RN  2.   [place] via [employer] with Lalit Keenan, PhD, LP  3.   [place] via [employer] with Shea Winston RN  4.   [facility] via a Zoom Meeting with LLUVIA Ng    The following Support Group information can also be found on our website:  [email or web address]      [facility] Weight Loss Surgery Support Group  The support group is a patient-lead forum that meets monthly to share experiences, encouragement and education. It is open to those who have had weight loss surgery, are scheduled for surgery, or are considering surgery.   WHEN: This group meets on the 3rd Wednesday of each month from 5:00PM - 6:00PM virtually using Microsoft Teams.   FACILITATOR: Led by Sandra Covarrubias, ANDREW, LD, RN, the program's Clinical Coordinator.   TO REGISTER: Please  "contact the clinic via [employer] or call the nurse line directly at [phone] to inform our staff that you would like an invite sent to you and to let us know the email you would like the invite sent to. Prior to the meeting, a link with directions on how to join the meeting will be sent to you.    2024 Meetings   January 17  February 21  March 20  April 17  May 15  Alexandra 19      [facility] Bariatric Care Support Group?  This is open to all pre- and post- operative bariatric surgery patients as well as their support system.   WHEN: This group meets the 3rd Tuesday of each month from 6:30 PM - 8:00 PM virtually using Microsoft Teams.   FACILITATOR: Led by Lalit Keenan, Ph.D who is a Licensed Psychologist with the [facility] Comprehensive Weight Management Program.   TO REGISTER: Please send an email to Lalit Keenan, Ph.D., LP at?[email or web address]?if you would like an invitation to the group. Prior to the meeting, a link with directions on how to join the meeting will be sent to you.    2024 Meetings January 16: \"Medication Management and Bariatric Surgery\", Jackie Gambino, PharmD, Pharmacy Resident at [facility]  February 20: \"A Bariatric Surgery Patient's Perspective\", ELENA Torres, [facility], Behavioral Health Clinician at [facility]  March 19  April 16  May 21  Alexandra 18: \"Nutritional Labeling\", Dietitian speaker to be announced.  November 19: \"Holiday Eating\", Dietitian speaker to be announced.    [facility] Post-Operative Bariatric Surgery Support Group  This is a support group for [facility] bariatric patients (and those external to [facility]) who have had bariatric surgery and are at least 3 months post-surgery.  WHEN: This support group meets the 4th Wednesday of the month from 11:00 AM - 12:00 PM virtually using " "Microsoft Teams.   FACILITATOR: Led by Certified Bariatric Nurse, Shea Winston RN.   TO REGISTER: Please send an email to Shea at [email or web address] if you would like an invitation to the group.  Prior to the meeting, a link with directions on how to join the meeting will be sent to you.    2024 Meetings  January 24  February 28  March 27  April 24  May 22  Alexandra 26    [facility] Healthy Lifestyle Group?  This is a 60 minute virtual coaching group for those who want to lead a healthier lifestyle. Come together to set goals and overcome barriers in a supportive group environment. We will address the four pillars of health: nutrition, exercise, sleep and emotional well-being.  This group is highly recommended for those who are participating in the 24 week Healthy Lifestyle Plan and our Health Coaching sessions.  WHEN: This group meets the 1st Friday of the month, 12:30 PM - 1:30 PM online, via a zoom meeting.    FACILITATOR: Led by National Board Certified Health and , Shea Toure, [facility].   TO REGISTER: Please call the Call Center at [phone] to register. You will get an appointment to attend in [facility]. Fifteen minutes prior to the meeting, complete the e-check in and you will get the link to join the meeting.    There is no charge to attend this group and space is limited.     2024 Meetings  January 5: \"New Years Vision: Manifest your Best 2024!\" (guided imagery,  journaling and discussion)  February 2: \"Let's Talk\"  March 1: \"10 Percent Happier\" by Priyank Christensen (Book Bites - a guided discussion on the nuggets of wisdom from favorite wellness books, no need to read the book but highly encouraged)  April 5: \"Let's Talk\"  May 3: \"Essentialism: The Disciplined Pursuit of Less\" by Jorge Rosario (Book Bites discussion)  June 7: \"Let's Talk\"  July 5: NO MEETING, off for the 4th of July Holiday  August 2: \"The Blue Zones, Secrets for Living a Longer Life\" " by Priyank Sofia (Book Bites discussion)

## 2024-09-03 ENCOUNTER — TELEPHONE (OUTPATIENT)
Dept: ENDOCRINOLOGY | Facility: CLINIC | Age: 64
End: 2024-09-03
Payer: COMMERCIAL

## 2024-09-03 NOTE — TELEPHONE ENCOUNTER
Received the prior authorization from [facility] for a sleeve gastrectomy scheduled 9/10/24 with Dr Cabrera, auth ref # [id number] effective 09/3/2024 - 03/02/2025 per letter date 09/03/24.

## 2024-09-04 ENCOUNTER — TRANSFERRED RECORDS (OUTPATIENT)
Dept: HEALTH INFORMATION MANAGEMENT | Facility: CLINIC | Age: 64
End: 2024-09-04

## 2024-09-04 ENCOUNTER — LAB (OUTPATIENT)
Dept: LAB | Facility: HOSPITAL | Age: 64
End: 2024-09-04
Payer: COMMERCIAL

## 2024-09-04 DIAGNOSIS — E66.01 MORBID OBESITY (H): ICD-10-CM

## 2024-09-04 LAB
CHOLEST SERPL-MCNC: 246 MG/DL
EST. AVERAGE GLUCOSE BLD GHB EST-MCNC: 111 MG/DL
FASTING STATUS PATIENT QL REPORTED: YES
HBA1C MFR BLD: 5.5 %
HDLC SERPL-MCNC: 73 MG/DL
LDLC SERPL CALC-MCNC: 159 MG/DL
NONHDLC SERPL-MCNC: 173 MG/DL
TRIGL SERPL-MCNC: 72 MG/DL

## 2024-09-04 PROCEDURE — 99000 SPECIMEN HANDLING OFFICE-LAB: CPT | Mod: 95 | Performed by: PATHOLOGY

## 2024-09-04 PROCEDURE — 82306 VITAMIN D 25 HYDROXY: CPT | Mod: GT

## 2024-09-04 PROCEDURE — 83718 ASSAY OF LIPOPROTEIN: CPT

## 2024-09-04 PROCEDURE — 36415 COLL VENOUS BLD VENIPUNCTURE: CPT

## 2024-09-04 PROCEDURE — 83036 HEMOGLOBIN GLYCOSYLATED A1C: CPT

## 2024-09-05 DIAGNOSIS — J84.9 ILD (INTERSTITIAL LUNG DISEASE) (H): ICD-10-CM

## 2024-09-05 DIAGNOSIS — R09.02 HYPOXIA: ICD-10-CM

## 2024-09-05 LAB — VIT D+METAB SERPL-MCNC: 34 NG/ML (ref 20–50)

## 2024-09-05 RX ORDER — FLUTICASONE PROPIONATE AND SALMETEROL 250; 50 UG/1; UG/1
1 POWDER RESPIRATORY (INHALATION) EVERY 12 HOURS
Qty: 60 EACH | Refills: 3 | Status: SHIPPED | OUTPATIENT
Start: 2024-09-05

## 2024-09-09 DIAGNOSIS — M62.838 MUSCLE SPASM: ICD-10-CM

## 2024-09-09 DIAGNOSIS — G89.29 CHRONIC UPPER BACK PAIN: ICD-10-CM

## 2024-09-09 DIAGNOSIS — M54.9 CHRONIC UPPER BACK PAIN: ICD-10-CM

## 2024-09-09 DIAGNOSIS — I10 ESSENTIAL HYPERTENSION: Chronic | ICD-10-CM

## 2024-09-09 ASSESSMENT — LIFESTYLE VARIABLES: TOBACCO_USE: 0

## 2024-09-09 ASSESSMENT — ENCOUNTER SYMPTOMS: SEIZURES: 0

## 2024-09-09 NOTE — TELEPHONE ENCOUNTER
Cyclobenzaprine (Flexeril) 5 MG tablet    Last Written Prescription Date:  03/12/2024  Last Fill Quantity: 30,   # refills: 0  Last Office Visit: 08/08/2024    Metoprolol Succinate ER (Toprol XL) 25 MG 24 hr tablet  Last Written Prescription Date:  03/12/2024  Last Fill Quantity: 90,   # refills: 1

## 2024-09-10 ENCOUNTER — HOSPITAL ENCOUNTER (INPATIENT)
Facility: CLINIC | Age: 64
LOS: 1 days | Discharge: HOME | End: 2024-09-11
Attending: SURGERY | Admitting: SURGERY
Payer: COMMERCIAL

## 2024-09-10 ENCOUNTER — ANESTHESIA (OUTPATIENT)
Dept: SURGERY | Facility: CLINIC | Age: 64
End: 2024-09-10
Payer: COMMERCIAL

## 2024-09-10 DIAGNOSIS — E66.812 CLASS 2 SEVERE OBESITY WITH SERIOUS COMORBIDITY AND BODY MASS INDEX (BMI) OF 38.0 TO 38.9 IN ADULT, UNSPECIFIED OBESITY TYPE (H): ICD-10-CM

## 2024-09-10 DIAGNOSIS — E66.01 CLASS 2 SEVERE OBESITY WITH SERIOUS COMORBIDITY AND BODY MASS INDEX (BMI) OF 38.0 TO 38.9 IN ADULT, UNSPECIFIED OBESITY TYPE (H): ICD-10-CM

## 2024-09-10 DIAGNOSIS — E66.01 MORBID OBESITY (H): ICD-10-CM

## 2024-09-10 DIAGNOSIS — K44.9 HIATAL HERNIA WITH GASTROESOPHAGEAL REFLUX DISEASE AND ESOPHAGITIS: ICD-10-CM

## 2024-09-10 DIAGNOSIS — F33.0 MILD EPISODE OF RECURRENT MAJOR DEPRESSIVE DISORDER (H): ICD-10-CM

## 2024-09-10 DIAGNOSIS — K29.30 CHRONIC SUPERFICIAL GASTRITIS WITHOUT BLEEDING: ICD-10-CM

## 2024-09-10 DIAGNOSIS — Z98.84 S/P LAPAROSCOPIC SLEEVE GASTRECTOMY: Primary | ICD-10-CM

## 2024-09-10 DIAGNOSIS — R10.13 DYSPEPSIA: ICD-10-CM

## 2024-09-10 DIAGNOSIS — K21.00 HIATAL HERNIA WITH GASTROESOPHAGEAL REFLUX DISEASE AND ESOPHAGITIS: ICD-10-CM

## 2024-09-10 LAB
CREAT SERPL-MCNC: 0.87 MG/DL (ref 0.51–0.95)
EGFRCR SERPLBLD CKD-EPI 2021: 74 ML/MIN/1.73M2
GLUCOSE BLDC GLUCOMTR-MCNC: 113 MG/DL (ref 70–99)
GLUCOSE BLDC GLUCOMTR-MCNC: 76 MG/DL (ref 70–99)

## 2024-09-10 PROCEDURE — 250N000013 HC RX MED GY IP 250 OP 250 PS 637: Performed by: SURGERY

## 2024-09-10 PROCEDURE — 250N000011 HC RX IP 250 OP 636

## 2024-09-10 PROCEDURE — 250N000009 HC RX 250

## 2024-09-10 PROCEDURE — 258N000003 HC RX IP 258 OP 636

## 2024-09-10 PROCEDURE — 250N000025 HC SEVOFLURANE, PER MIN: Performed by: SURGERY

## 2024-09-10 PROCEDURE — 258N000003 HC RX IP 258 OP 636: Performed by: ANESTHESIOLOGY

## 2024-09-10 PROCEDURE — 82565 ASSAY OF CREATININE: CPT | Performed by: NURSE PRACTITIONER

## 2024-09-10 PROCEDURE — 120N000002 HC R&B MED SURG/OB UMMC

## 2024-09-10 PROCEDURE — 43775 LAP SLEEVE GASTRECTOMY: CPT | Mod: GC | Performed by: SURGERY

## 2024-09-10 PROCEDURE — 272N000001 HC OR GENERAL SUPPLY STERILE: Performed by: SURGERY

## 2024-09-10 PROCEDURE — 999N000141 HC STATISTIC PRE-PROCEDURE NURSING ASSESSMENT: Performed by: SURGERY

## 2024-09-10 PROCEDURE — 0BQT4ZZ REPAIR DIAPHRAGM, PERCUTANEOUS ENDOSCOPIC APPROACH: ICD-10-PCS | Performed by: SURGERY

## 2024-09-10 PROCEDURE — 250N000011 HC RX IP 250 OP 636: Performed by: SURGERY

## 2024-09-10 PROCEDURE — 250N000011 HC RX IP 250 OP 636: Performed by: ANESTHESIOLOGY

## 2024-09-10 PROCEDURE — 258N000001 HC RX 258: Performed by: SURGERY

## 2024-09-10 PROCEDURE — 710N000010 HC RECOVERY PHASE 1, LEVEL 2, PER MIN: Performed by: SURGERY

## 2024-09-10 PROCEDURE — 88305 TISSUE EXAM BY PATHOLOGIST: CPT | Mod: 26 | Performed by: PATHOLOGY

## 2024-09-10 PROCEDURE — 0DB64Z3 EXCISION OF STOMACH, PERCUTANEOUS ENDOSCOPIC APPROACH, VERTICAL: ICD-10-PCS | Performed by: SURGERY

## 2024-09-10 PROCEDURE — 360N000077 HC SURGERY LEVEL 4, PER MIN: Performed by: SURGERY

## 2024-09-10 PROCEDURE — 370N000017 HC ANESTHESIA TECHNICAL FEE, PER MIN: Performed by: SURGERY

## 2024-09-10 PROCEDURE — 250N000011 HC RX IP 250 OP 636: Performed by: NURSE PRACTITIONER

## 2024-09-10 PROCEDURE — 36415 COLL VENOUS BLD VENIPUNCTURE: CPT | Performed by: NURSE PRACTITIONER

## 2024-09-10 PROCEDURE — 250N000012 HC RX MED GY IP 250 OP 636 PS 637: Performed by: NURSE PRACTITIONER

## 2024-09-10 PROCEDURE — 88305 TISSUE EXAM BY PATHOLOGIST: CPT | Mod: TC | Performed by: SURGERY

## 2024-09-10 RX ORDER — PROCHLORPERAZINE MALEATE 5 MG
10 TABLET ORAL EVERY 6 HOURS PRN
Status: DISCONTINUED | OUTPATIENT
Start: 2024-09-10 | End: 2024-09-11 | Stop reason: HOSPADM

## 2024-09-10 RX ORDER — SODIUM CHLORIDE, SODIUM LACTATE, POTASSIUM CHLORIDE, CALCIUM CHLORIDE 600; 310; 30; 20 MG/100ML; MG/100ML; MG/100ML; MG/100ML
INJECTION, SOLUTION INTRAVENOUS CONTINUOUS
Status: DISCONTINUED | OUTPATIENT
Start: 2024-09-10 | End: 2024-09-10 | Stop reason: HOSPADM

## 2024-09-10 RX ORDER — LIDOCAINE 40 MG/G
CREAM TOPICAL
Status: DISCONTINUED | OUTPATIENT
Start: 2024-09-10 | End: 2024-09-11 | Stop reason: HOSPADM

## 2024-09-10 RX ORDER — ONDANSETRON 2 MG/ML
4 INJECTION INTRAMUSCULAR; INTRAVENOUS EVERY 30 MIN PRN
Status: DISCONTINUED | OUTPATIENT
Start: 2024-09-10 | End: 2024-09-10 | Stop reason: HOSPADM

## 2024-09-10 RX ORDER — NALOXONE HYDROCHLORIDE 0.4 MG/ML
0.4 INJECTION, SOLUTION INTRAMUSCULAR; INTRAVENOUS; SUBCUTANEOUS
Status: DISCONTINUED | OUTPATIENT
Start: 2024-09-10 | End: 2024-09-11 | Stop reason: HOSPADM

## 2024-09-10 RX ORDER — DEXAMETHASONE SODIUM PHOSPHATE 4 MG/ML
4 INJECTION, SOLUTION INTRA-ARTICULAR; INTRALESIONAL; INTRAMUSCULAR; INTRAVENOUS; SOFT TISSUE
Status: DISCONTINUED | OUTPATIENT
Start: 2024-09-10 | End: 2024-09-10 | Stop reason: HOSPADM

## 2024-09-10 RX ORDER — SCOLOPAMINE TRANSDERMAL SYSTEM 1 MG/1
1 PATCH, EXTENDED RELEASE TRANSDERMAL
Status: DISCONTINUED | OUTPATIENT
Start: 2024-09-10 | End: 2024-09-11 | Stop reason: HOSPADM

## 2024-09-10 RX ORDER — SODIUM CHLORIDE, SODIUM LACTATE, POTASSIUM CHLORIDE, CALCIUM CHLORIDE 600; 310; 30; 20 MG/100ML; MG/100ML; MG/100ML; MG/100ML
INJECTION, SOLUTION INTRAVENOUS CONTINUOUS PRN
Status: DISCONTINUED | OUTPATIENT
Start: 2024-09-10 | End: 2024-09-10

## 2024-09-10 RX ORDER — DIPHENHYDRAMINE HYDROCHLORIDE 50 MG/ML
25 INJECTION INTRAMUSCULAR; INTRAVENOUS EVERY 6 HOURS PRN
Status: DISCONTINUED | OUTPATIENT
Start: 2024-09-10 | End: 2024-09-11 | Stop reason: HOSPADM

## 2024-09-10 RX ORDER — CYCLOBENZAPRINE HCL 5 MG
5 TABLET ORAL 2 TIMES DAILY PRN
Qty: 30 TABLET | Refills: 0 | Status: SHIPPED | OUTPATIENT
Start: 2024-09-10

## 2024-09-10 RX ORDER — AMOXICILLIN 250 MG
2 CAPSULE ORAL 2 TIMES DAILY
Status: DISCONTINUED | OUTPATIENT
Start: 2024-09-10 | End: 2024-09-11 | Stop reason: HOSPADM

## 2024-09-10 RX ORDER — HYDROMORPHONE HCL IN WATER/PF 6 MG/30 ML
0.2 PATIENT CONTROLLED ANALGESIA SYRINGE INTRAVENOUS
Status: DISCONTINUED | OUTPATIENT
Start: 2024-09-10 | End: 2024-09-11 | Stop reason: HOSPADM

## 2024-09-10 RX ORDER — METOPROLOL SUCCINATE 25 MG/1
25 TABLET, EXTENDED RELEASE ORAL DAILY
Qty: 90 TABLET | Refills: 2 | Status: SHIPPED | OUTPATIENT
Start: 2024-09-10

## 2024-09-10 RX ORDER — OXYCODONE HYDROCHLORIDE 5 MG/1
5 TABLET ORAL
Status: DISCONTINUED | OUTPATIENT
Start: 2024-09-10 | End: 2024-09-10 | Stop reason: HOSPADM

## 2024-09-10 RX ORDER — ENOXAPARIN SODIUM 100 MG/ML
40 INJECTION SUBCUTANEOUS
Status: COMPLETED | OUTPATIENT
Start: 2024-09-10 | End: 2024-09-10

## 2024-09-10 RX ORDER — LIDOCAINE HYDROCHLORIDE 20 MG/ML
INJECTION, SOLUTION INFILTRATION; PERINEURAL PRN
Status: DISCONTINUED | OUTPATIENT
Start: 2024-09-10 | End: 2024-09-10

## 2024-09-10 RX ORDER — FENTANYL CITRATE 50 UG/ML
INJECTION, SOLUTION INTRAMUSCULAR; INTRAVENOUS PRN
Status: DISCONTINUED | OUTPATIENT
Start: 2024-09-10 | End: 2024-09-10

## 2024-09-10 RX ORDER — ENOXAPARIN SODIUM 100 MG/ML
40 INJECTION SUBCUTANEOUS EVERY 24 HOURS
Status: DISCONTINUED | OUTPATIENT
Start: 2024-09-11 | End: 2024-09-10

## 2024-09-10 RX ORDER — SODIUM CHLORIDE, SODIUM LACTATE, POTASSIUM CHLORIDE, CALCIUM CHLORIDE 600; 310; 30; 20 MG/100ML; MG/100ML; MG/100ML; MG/100ML
INJECTION, SOLUTION INTRAVENOUS CONTINUOUS
Status: DISCONTINUED | OUTPATIENT
Start: 2024-09-10 | End: 2024-09-11 | Stop reason: HOSPADM

## 2024-09-10 RX ORDER — NALOXONE HYDROCHLORIDE 0.4 MG/ML
0.2 INJECTION, SOLUTION INTRAMUSCULAR; INTRAVENOUS; SUBCUTANEOUS
Status: DISCONTINUED | OUTPATIENT
Start: 2024-09-10 | End: 2024-09-11 | Stop reason: HOSPADM

## 2024-09-10 RX ORDER — CYCLOBENZAPRINE HCL 5 MG
5 TABLET ORAL 2 TIMES DAILY PRN
Status: DISCONTINUED | OUTPATIENT
Start: 2024-09-10 | End: 2024-09-11 | Stop reason: HOSPADM

## 2024-09-10 RX ORDER — PROPOFOL 10 MG/ML
INJECTION, EMULSION INTRAVENOUS PRN
Status: DISCONTINUED | OUTPATIENT
Start: 2024-09-10 | End: 2024-09-10

## 2024-09-10 RX ORDER — FLUTICASONE PROPIONATE AND SALMETEROL 250; 50 UG/1; UG/1
1 POWDER RESPIRATORY (INHALATION) EVERY 12 HOURS
Status: DISCONTINUED | OUTPATIENT
Start: 2024-09-10 | End: 2024-09-10

## 2024-09-10 RX ORDER — OXYCODONE HYDROCHLORIDE 10 MG/1
10 TABLET ORAL
Status: DISCONTINUED | OUTPATIENT
Start: 2024-09-10 | End: 2024-09-10 | Stop reason: HOSPADM

## 2024-09-10 RX ORDER — ALBUTEROL SULFATE 90 UG/1
2 AEROSOL, METERED RESPIRATORY (INHALATION) EVERY 4 HOURS PRN
Status: DISCONTINUED | OUTPATIENT
Start: 2024-09-10 | End: 2024-09-11 | Stop reason: HOSPADM

## 2024-09-10 RX ORDER — HYDROMORPHONE HCL IN WATER/PF 6 MG/30 ML
0.4 PATIENT CONTROLLED ANALGESIA SYRINGE INTRAVENOUS EVERY 5 MIN PRN
Status: DISCONTINUED | OUTPATIENT
Start: 2024-09-10 | End: 2024-09-10 | Stop reason: HOSPADM

## 2024-09-10 RX ORDER — ENALAPRILAT 1.25 MG/ML
1.25 INJECTION INTRAVENOUS EVERY 6 HOURS PRN
Status: DISCONTINUED | OUTPATIENT
Start: 2024-09-10 | End: 2024-09-11 | Stop reason: HOSPADM

## 2024-09-10 RX ORDER — CEFAZOLIN SODIUM/WATER 2 G/20 ML
2 SYRINGE (ML) INTRAVENOUS
Status: COMPLETED | OUTPATIENT
Start: 2024-09-10 | End: 2024-09-10

## 2024-09-10 RX ORDER — ENOXAPARIN SODIUM 100 MG/ML
40 INJECTION SUBCUTANEOUS EVERY 24 HOURS
Status: DISCONTINUED | OUTPATIENT
Start: 2024-09-11 | End: 2024-09-11 | Stop reason: HOSPADM

## 2024-09-10 RX ORDER — FLUTICASONE FUROATE AND VILANTEROL 100; 25 UG/1; UG/1
1 POWDER RESPIRATORY (INHALATION) DAILY
Status: DISCONTINUED | OUTPATIENT
Start: 2024-09-11 | End: 2024-09-11 | Stop reason: HOSPADM

## 2024-09-10 RX ORDER — OXYCODONE HYDROCHLORIDE 5 MG/1
5 TABLET ORAL
Status: DISCONTINUED | OUTPATIENT
Start: 2024-09-10 | End: 2024-09-11 | Stop reason: HOSPADM

## 2024-09-10 RX ORDER — KETOROLAC TROMETHAMINE 15 MG/ML
15 INJECTION, SOLUTION INTRAMUSCULAR; INTRAVENOUS EVERY 6 HOURS PRN
Status: DISCONTINUED | OUTPATIENT
Start: 2024-09-10 | End: 2024-09-11 | Stop reason: HOSPADM

## 2024-09-10 RX ORDER — MYCOPHENOLATE MOFETIL 200 MG/ML
1000 POWDER, FOR SUSPENSION ORAL
Status: DISCONTINUED | OUTPATIENT
Start: 2024-09-10 | End: 2024-09-11 | Stop reason: HOSPADM

## 2024-09-10 RX ORDER — NALOXONE HYDROCHLORIDE 0.4 MG/ML
0.1 INJECTION, SOLUTION INTRAMUSCULAR; INTRAVENOUS; SUBCUTANEOUS
Status: DISCONTINUED | OUTPATIENT
Start: 2024-09-10 | End: 2024-09-10 | Stop reason: HOSPADM

## 2024-09-10 RX ORDER — CEFAZOLIN SODIUM/WATER 2 G/20 ML
2 SYRINGE (ML) INTRAVENOUS SEE ADMIN INSTRUCTIONS
Status: DISCONTINUED | OUTPATIENT
Start: 2024-09-10 | End: 2024-09-10 | Stop reason: HOSPADM

## 2024-09-10 RX ORDER — METOPROLOL SUCCINATE 25 MG/1
25 TABLET, EXTENDED RELEASE ORAL EVERY MORNING
Status: DISCONTINUED | OUTPATIENT
Start: 2024-09-11 | End: 2024-09-11 | Stop reason: HOSPADM

## 2024-09-10 RX ORDER — ACETAMINOPHEN 325 MG/1
650 TABLET ORAL EVERY 4 HOURS PRN
Status: DISCONTINUED | OUTPATIENT
Start: 2024-09-10 | End: 2024-09-11 | Stop reason: HOSPADM

## 2024-09-10 RX ORDER — BUPROPION HYDROCHLORIDE 150 MG/1
150 TABLET ORAL EVERY MORNING
Status: DISCONTINUED | OUTPATIENT
Start: 2024-09-11 | End: 2024-09-11 | Stop reason: HOSPADM

## 2024-09-10 RX ORDER — ONDANSETRON 4 MG/1
4 TABLET, ORALLY DISINTEGRATING ORAL EVERY 6 HOURS PRN
Status: DISCONTINUED | OUTPATIENT
Start: 2024-09-10 | End: 2024-09-11 | Stop reason: HOSPADM

## 2024-09-10 RX ORDER — ONDANSETRON 4 MG/1
4 TABLET, ORALLY DISINTEGRATING ORAL EVERY 30 MIN PRN
Status: DISCONTINUED | OUTPATIENT
Start: 2024-09-10 | End: 2024-09-10 | Stop reason: HOSPADM

## 2024-09-10 RX ORDER — DEXAMETHASONE SODIUM PHOSPHATE 4 MG/ML
INJECTION, SOLUTION INTRA-ARTICULAR; INTRALESIONAL; INTRAMUSCULAR; INTRAVENOUS; SOFT TISSUE PRN
Status: DISCONTINUED | OUTPATIENT
Start: 2024-09-10 | End: 2024-09-10

## 2024-09-10 RX ORDER — ONDANSETRON 2 MG/ML
4 INJECTION INTRAMUSCULAR; INTRAVENOUS
Status: DISCONTINUED | OUTPATIENT
Start: 2024-09-10 | End: 2024-09-10 | Stop reason: HOSPADM

## 2024-09-10 RX ORDER — OXYCODONE HYDROCHLORIDE 10 MG/1
10 TABLET ORAL
Status: DISCONTINUED | OUTPATIENT
Start: 2024-09-10 | End: 2024-09-11 | Stop reason: HOSPADM

## 2024-09-10 RX ORDER — ONDANSETRON 2 MG/ML
4 INJECTION INTRAMUSCULAR; INTRAVENOUS EVERY 6 HOURS PRN
Status: DISCONTINUED | OUTPATIENT
Start: 2024-09-10 | End: 2024-09-11 | Stop reason: HOSPADM

## 2024-09-10 RX ORDER — ONDANSETRON 2 MG/ML
INJECTION INTRAMUSCULAR; INTRAVENOUS PRN
Status: DISCONTINUED | OUTPATIENT
Start: 2024-09-10 | End: 2024-09-10

## 2024-09-10 RX ORDER — ACETAMINOPHEN 325 MG/1
975 TABLET ORAL ONCE
Status: COMPLETED | OUTPATIENT
Start: 2024-09-10 | End: 2024-09-10

## 2024-09-10 RX ORDER — DIPHENHYDRAMINE HCL 25 MG
25 CAPSULE ORAL EVERY 6 HOURS PRN
Status: DISCONTINUED | OUTPATIENT
Start: 2024-09-10 | End: 2024-09-11 | Stop reason: HOSPADM

## 2024-09-10 RX ORDER — LABETALOL HYDROCHLORIDE 5 MG/ML
5 INJECTION, SOLUTION INTRAVENOUS ONCE
Status: COMPLETED | OUTPATIENT
Start: 2024-09-10 | End: 2024-09-10

## 2024-09-10 RX ORDER — HYDROMORPHONE HCL IN WATER/PF 6 MG/30 ML
0.2 PATIENT CONTROLLED ANALGESIA SYRINGE INTRAVENOUS EVERY 5 MIN PRN
Status: DISCONTINUED | OUTPATIENT
Start: 2024-09-10 | End: 2024-09-10 | Stop reason: HOSPADM

## 2024-09-10 RX ORDER — FENTANYL CITRATE 50 UG/ML
25 INJECTION, SOLUTION INTRAMUSCULAR; INTRAVENOUS EVERY 5 MIN PRN
Status: DISCONTINUED | OUTPATIENT
Start: 2024-09-10 | End: 2024-09-10 | Stop reason: HOSPADM

## 2024-09-10 RX ORDER — LIDOCAINE 40 MG/G
CREAM TOPICAL
Status: DISCONTINUED | OUTPATIENT
Start: 2024-09-10 | End: 2024-09-10 | Stop reason: HOSPADM

## 2024-09-10 RX ORDER — CEFAZOLIN SODIUM/WATER 2 G/20 ML
2 SYRINGE (ML) INTRAVENOUS
Status: DISCONTINUED | OUTPATIENT
Start: 2024-09-10 | End: 2024-09-10 | Stop reason: HOSPADM

## 2024-09-10 RX ORDER — FENTANYL CITRATE 50 UG/ML
50 INJECTION, SOLUTION INTRAMUSCULAR; INTRAVENOUS EVERY 5 MIN PRN
Status: DISCONTINUED | OUTPATIENT
Start: 2024-09-10 | End: 2024-09-10 | Stop reason: HOSPADM

## 2024-09-10 RX ADMIN — KETOROLAC TROMETHAMINE 15 MG: 15 INJECTION, SOLUTION INTRAMUSCULAR; INTRAVENOUS at 23:55

## 2024-09-10 RX ADMIN — HYDROMORPHONE HYDROCHLORIDE 0.5 MG: 1 INJECTION, SOLUTION INTRAMUSCULAR; INTRAVENOUS; SUBCUTANEOUS at 17:01

## 2024-09-10 RX ADMIN — Medication 20 MG: at 15:37

## 2024-09-10 RX ADMIN — Medication 50 MG: at 15:07

## 2024-09-10 RX ADMIN — FENTANYL CITRATE 50 MCG: 50 INJECTION, SOLUTION INTRAMUSCULAR; INTRAVENOUS at 19:15

## 2024-09-10 RX ADMIN — FENTANYL CITRATE 50 MCG: 50 INJECTION INTRAMUSCULAR; INTRAVENOUS at 15:36

## 2024-09-10 RX ADMIN — FENTANYL CITRATE 50 MCG: 50 INJECTION INTRAMUSCULAR; INTRAVENOUS at 16:08

## 2024-09-10 RX ADMIN — MYCOPHENOLATE MOFETIL 1000 MG: 200 POWDER, FOR SUSPENSION ORAL at 23:28

## 2024-09-10 RX ADMIN — FENTANYL CITRATE 100 MCG: 50 INJECTION INTRAMUSCULAR; INTRAVENOUS at 15:07

## 2024-09-10 RX ADMIN — HYDROMORPHONE HYDROCHLORIDE 0.2 MG: 0.2 INJECTION, SOLUTION INTRAMUSCULAR; INTRAVENOUS; SUBCUTANEOUS at 23:33

## 2024-09-10 RX ADMIN — FENTANYL CITRATE 50 MCG: 50 INJECTION, SOLUTION INTRAMUSCULAR; INTRAVENOUS at 18:45

## 2024-09-10 RX ADMIN — ONDANSETRON 4 MG: 2 INJECTION INTRAMUSCULAR; INTRAVENOUS at 18:00

## 2024-09-10 RX ADMIN — FENTANYL CITRATE 50 MCG: 50 INJECTION, SOLUTION INTRAMUSCULAR; INTRAVENOUS at 18:30

## 2024-09-10 RX ADMIN — FENTANYL CITRATE 50 MCG: 50 INJECTION, SOLUTION INTRAMUSCULAR; INTRAVENOUS at 18:15

## 2024-09-10 RX ADMIN — Medication 2 G: at 15:12

## 2024-09-10 RX ADMIN — ENOXAPARIN SODIUM 40 MG: 40 INJECTION SUBCUTANEOUS at 13:33

## 2024-09-10 RX ADMIN — ACETAMINOPHEN 975 MG: 325 TABLET ORAL at 13:33

## 2024-09-10 RX ADMIN — SUGAMMADEX 200 MG: 100 INJECTION, SOLUTION INTRAVENOUS at 17:11

## 2024-09-10 RX ADMIN — PROCHLORPERAZINE EDISYLATE 5 MG: 5 INJECTION INTRAMUSCULAR; INTRAVENOUS at 17:28

## 2024-09-10 RX ADMIN — LIDOCAINE HYDROCHLORIDE 100 MG: 20 INJECTION, SOLUTION INFILTRATION; PERINEURAL at 15:07

## 2024-09-10 RX ADMIN — PROPOFOL 150 MG: 10 INJECTION, EMULSION INTRAVENOUS at 15:07

## 2024-09-10 RX ADMIN — ONDANSETRON 4 MG: 2 INJECTION INTRAMUSCULAR; INTRAVENOUS at 16:24

## 2024-09-10 RX ADMIN — SODIUM CHLORIDE, POTASSIUM CHLORIDE, SODIUM LACTATE AND CALCIUM CHLORIDE: 600; 310; 30; 20 INJECTION, SOLUTION INTRAVENOUS at 17:45

## 2024-09-10 RX ADMIN — Medication 20 MG: at 16:17

## 2024-09-10 RX ADMIN — LABETALOL HYDROCHLORIDE 5 MG: 5 INJECTION, SOLUTION INTRAVENOUS at 21:15

## 2024-09-10 RX ADMIN — SODIUM CHLORIDE, POTASSIUM CHLORIDE, SODIUM LACTATE AND CALCIUM CHLORIDE: 600; 310; 30; 20 INJECTION, SOLUTION INTRAVENOUS at 15:04

## 2024-09-10 RX ADMIN — HYDROMORPHONE HYDROCHLORIDE 0.5 MG: 1 INJECTION, SOLUTION INTRAMUSCULAR; INTRAVENOUS; SUBCUTANEOUS at 16:34

## 2024-09-10 RX ADMIN — DEXAMETHASONE SODIUM PHOSPHATE 8 MG: 4 INJECTION, SOLUTION INTRA-ARTICULAR; INTRALESIONAL; INTRAMUSCULAR; INTRAVENOUS; SOFT TISSUE at 15:22

## 2024-09-10 ASSESSMENT — ACTIVITIES OF DAILY LIVING (ADL)
ADLS_ACUITY_SCORE: 20
ADLS_ACUITY_SCORE: 18
ADLS_ACUITY_SCORE: 18
ADLS_ACUITY_SCORE: 20
ADLS_ACUITY_SCORE: 20

## 2024-09-10 NOTE — ANESTHESIA CARE TRANSFER NOTE
Patient: Elsie Rubi    Procedure: Procedure(s):  GASTRECTOMY, SLEEVE, LAPAROSCOPIC  HERNIORRHAPHY, HIATAL, LAPAROSCOPIC; cruropexy and esophagocruropexy       Diagnosis: Morbid obesity (H) [E66.01]  Diagnosis Additional Information: No value filed.    Anesthesia Type:   General     Note:  Anesthesia Care Transfer Notewriter  Vitals:  Vitals Value Taken Time   /92 09/10/24 1830   Temp 36.4  C (97.5  F) 09/10/24 1830   Pulse 75 09/10/24 1843   Resp 12 09/10/24 1843   SpO2 100 % 09/10/24 1843   Vitals shown include unfiled device data.    Electronically Signed By: Juni Butt MD  September 10, 2024  6:43 PM

## 2024-09-10 NOTE — ANESTHESIA PROCEDURE NOTES
Airway       Patient location during procedure: OR       Procedure Start/Stop Times: 9/10/2024 3:11 PM  Staff -        CRNA: Margo Caro APRN CRNA       Performed By: CRNA  Consent for Airway        Urgency: elective  Indications and Patient Condition       Indications for airway management: diamante-procedural       Induction type:intravenous       Mask difficulty assessment: 1 - vent by mask    Final Airway Details       Final airway type: endotracheal airway       Successful airway: ETT - single and Oral  Endotracheal Airway Details        ETT size (mm): 7.0       Cuffed: yes       Cuff volume (mL): 8       Successful intubation technique: direct laryngoscopy       DL Blade Type: Salcedo 2       Grade View of Cords: 1       Adjucts: stylet       Position: Right       Measured from: lips       Secured at (cm): 22       Bite block used: None    Post intubation assessment        Placement verified by: capnometry, equal breath sounds and chest rise        Number of attempts at approach: 1       Secured with: tape       Ease of procedure: easy       Dentition: Intact and Unchanged    Medication(s) Administered   Medication Administration Time: 9/10/2024 3:11 PM

## 2024-09-10 NOTE — ANESTHESIA PREPROCEDURE EVALUATION
Anesthesia Pre-Procedure Evaluation    Patient: Elsie Rubi   MRN: [id number] : 1960        Procedure : Procedure(s):  GASTRECTOMY, SLEEVE, LAPAROSCOPIC  HERNIORRHAPHY, HIATAL, LAPAROSCOPIC          Past Medical History:   Diagnosis Date    Benign essential hypertension     Borderline personality disorder (H) 2020    Depression     Depressive disorder     ILD (interstitial lung disease) (H)     Primary osteoarthritis of left foot 2020    Uncomplicated asthma       Past Surgical History:   Procedure Laterality Date    BUNIONECTOMY Left     COLONOSCOPY      ESOPHAGOSCOPY, GASTROSCOPY, DUODENOSCOPY (EGD), COMBINED N/A 2024    Procedure: ESOPHAGOGASTRODUODENOSCOPY, WITH BIOPSY;  Surgeon: Jerel Cabrera MD;  Location: [facility]    EXAM UNDER ANESTHESIA RECTUM      GYN SURGERY      tubal ligation    ORTHOPEDIC SURGERY        No Known Allergies   Social History     Tobacco Use    Smoking status: Never     Passive exposure: Never    Smokeless tobacco: Never   Substance Use Topics    Alcohol use: Yes     Comment: 2 glasses wine night      Wt Readings from Last 1 Encounters:   24 89.4 kg (197 lb)        Anesthesia Evaluation   Pt has had prior anesthetic.         ROS/MED HX  ENT/Pulmonary: Comment: ILD    (+) sleep apnea, doesn't use CPAP,                    asthma  Treatment: Inhaler prn and Inhaled steroids,              (-) tobacco use   Neurologic:  - neg neurologic ROS  (-) no seizures and no CVA   Cardiovascular:     (+)  hypertension- -   -  - -                                 Previous cardiac testing   Echo: Date: 2023 Results:  Interpretation Summary   A two-dimensional transthoracic echocardiogram with color flow and Doppler was performed.     The left ventricle is normal size. The left ventricular systolic function is normal. Wall motion is normal.   Qualitative ejection fraction is 65-70% (normal).   Left ventricular diastolic function is normal.   TR  signal inadequate to allow accurate estimate RV systolic pressure.   Inferior vena cava size normal and collapsibility > 50% normal indicating normal right atrial pressure (3 mm Hg).   There is no pericardial effusion.   No color doppler evidence for atrial septal defect or patent foramen ovale. A bubble study was not performed.     No prior echocardiogram available for comparison.   Stress Test:  Date: 3/2024 Results:     The nuclear stress test is negative for inducible myocardial ischemia   or infarction.      Left ventricular function is normal.      The left ventricular ejection fraction at rest is 75%.  The left   ventricular ejection fraction at stress is 76%.      There is no prior study for comparison.   ECG Reviewed:  Date: Results:    Cath:  Date: Results:   (-) taking anticoagulants/antiplatelets   METS/Exercise Tolerance: 3 - Able to walk 1-2 blocks without stopping Comment: Limited activity. Can walk about 10 minutes without CP. Some BECKER   Hematologic:  - neg hematologic  ROS  (-) history of blood clots and history of blood transfusion   Musculoskeletal:  - neg musculoskeletal ROS     GI/Hepatic:     (+)      hiatal hernia,           (-) GERD   Renal/Genitourinary:  - neg Renal ROS     Endo:     (+)               Obesity,    (-) chronic steroid usage   Psychiatric/Substance Use:     (+) psychiatric history        Infectious Disease:  - neg infectious disease ROS     Malignancy:  - neg malignancy ROS     Other:            Physical Exam    Airway        Mallampati: II   TM distance: > 3 FB   Neck ROM: full   Mouth opening: > 3 cm    Respiratory Devices and Support         Dental       (+) Completely normal teeth      Cardiovascular          Rhythm and rate: regular and normal     Pulmonary           breath sounds clear to auscultation         OUTSIDE LABS:  CBC:   Lab Results   Component Value Date    WBC 6.3 07/24/2024    WBC 6.3 04/03/2024    HGB 13.6 07/24/2024    HGB 13.2 04/03/2024    HCT 42.8  "07/24/2024    HCT 40.2 04/03/2024     07/24/2024     04/03/2024     BMP:   Lab Results   Component Value Date     07/24/2024     04/03/2024    POTASSIUM 4.2 07/24/2024    POTASSIUM 3.4 04/03/2024    CHLORIDE 102 07/24/2024    CHLORIDE 105 04/03/2024    CO2 25 07/24/2024    CO2 23 04/03/2024    BUN 17.4 07/24/2024    BUN 7.3 (L) 04/03/2024    CR 0.89 07/24/2024    CR 0.82 04/03/2024     (H) 07/24/2024     (H) 04/03/2024     COAGS: No results found for: \"PTT\", \"INR\", \"FIBR\"  POC: No results found for: \"BGM\", \"HCG\", \"HCGS\"  HEPATIC:   Lab Results   Component Value Date    ALBUMIN 4.2 07/24/2024    PROTTOTAL 7.0 07/24/2024    ALT 12 07/24/2024    AST 21 07/24/2024    ALKPHOS 80 07/24/2024    BILITOTAL 0.4 07/24/2024     OTHER:   Lab Results   Component Value Date    A1C 5.5 09/04/2024    JANETTE 9.6 07/24/2024    TSH 2.39 03/12/2024    SED 16 08/01/2023       Anesthesia Plan    ASA Status:  3    NPO Status:  NPO Appropriate    Anesthesia Type: General.     - Airway: ETT   Induction: Intravenous, RSI.   Maintenance: Balanced.   Techniques and Equipment:     - Airway: Video-Laryngoscope       Consents    Anesthesia Plan(s) and associated risks, benefits, and realistic alternatives discussed. Questions answered and patient/representative(s) expressed understanding.     - Discussed: Risks, Benefits and Alternatives for the PROCEDURE were discussed     - Discussed with:  Patient      - Extended Intubation/Ventilatory Support Discussed: No.      - Patient is DNR/DNI Status: No     Use of blood products discussed: No .     Postoperative Care    Pain management: IV analgesics, Oral pain medications, Multi-modal analgesia.   PONV prophylaxis: Ondansetron (or other 5HT-3), Dexamethasone or Solumedrol, Background Propofol Infusion     Comments:    Other Comments: RSI as patient has hiatal hernia.           Ryan Kaur,     I have reviewed the pertinent notes and labs in the chart from the " "past 30 days and (re)examined the patient.  Any updates or changes from those notes are reflected in this note.              # Obesity: Estimated body mass index is 33.81 kg/m  as calculated from the following:    Height as of 8/30/24: 1.626 m (5' 4\").    Weight as of 8/30/24: 89.4 kg (197 lb).      "

## 2024-09-10 NOTE — OR NURSING
Pt having nausea and throat discomfort. Compazine was administered to her by the CRNA at 1727. BP up in the 170's,possible pain and nausea related. At 1800, pt was still having nausea discomfort. 4 mg IVP Zofran was given. Fentanyl given as needed for throat pain. Writer will continue to monitor.

## 2024-09-10 NOTE — ANESTHESIA POSTPROCEDURE EVALUATION
Patient: Elsie Rubi    Procedure: Procedure(s):  GASTRECTOMY, SLEEVE, LAPAROSCOPIC  HERNIORRHAPHY, HIATAL, LAPAROSCOPIC; cruropexy and esophagocruropexy       Anesthesia Type:  General    Note:     Postop Pain Control: Uneventful            Sign Out: Well controlled pain   PONV:    Neuro/Psych: Uneventful            Sign Out: Acceptable/Baseline neuro status   Airway/Respiratory: Uneventful            Sign Out: Acceptable/Baseline resp. status   CV/Hemodynamics: Uneventful            Sign Out: Acceptable CV status; No obvious hypovolemia; No obvious fluid overload   Other NRE: NONE   DID A NON-ROUTINE EVENT OCCUR?            Last vitals:  Vitals Value Taken Time   /92 09/10/24 1830   Temp 36.4  C (97.5  F) 09/10/24 1830   Pulse 75 09/10/24 1843   Resp 12 09/10/24 1843   SpO2 100 % 09/10/24 1843   Vitals shown include unfiled device data.    Electronically Signed By: Juni Butt MD  September 10, 2024  6:43 PM

## 2024-09-10 NOTE — OP NOTE
[facility]    Operative Note    Pre-operative diagnosis: Morbid obesity (H) [E66.01]   Post-operative diagnosis * No post-op diagnosis entered *   Procedure: Procedure(s):  GASTRECTOMY, SLEEVE, LAPAROSCOPIC  HERNIORRHAPHY, HIATAL, LAPAROSCOPIC; cruropexy and esophagocruropexy   Surgeon: Surgeons and Role:     * Jerel Cabrera MD - Primary     * Arianna Tan MD - Resident - Assisting     Anesthesia:   General    Estimated blood loss: 30 cc   Drains: None   Specimens: ID Type Source Tests Collected by Time Destination   1 : Partial Gastrectomy Tissue Stomach SURGICAL PATHOLOGY EXAM Jerel Cabrera MD 9/10/2024  4:39 PM       Findings: Large hiatal hernia .   Complications: None   Implants: None       BOUGIE SIZE: 40 FR  DISTANCE FROM PYLORUS: 10 CM  STAPLE LINE REINFORCEMENT: clips  STAPLE LINE OVERSEW: NO  COMORBIDITIES:   Past Medical History:   Diagnosis Date    Benign essential hypertension     Borderline personality disorder (H) 08/13/2020    Depression     Depressive disorder 1978    ILD (interstitial lung disease) (H)     Primary osteoarthritis of left foot 08/13/2020    Uncomplicated asthma 2019       INDICATIONS FOR PROCEDURE  Elsie Rubi is a 64 year old female who is morbidly obese.  Numerous weight loss attempts without surgery have been without success.     After understanding the risks and benefits of proceeding with a laparoscopic vertical sleeve gastrectomy, she agreed to an operation as outlined by me.    I reviewed the risks of surgery with Elsie Rubi.    These include, but are not limited to, death, myocardial infarction, pneumonia, urinary tract infection, deep venous thrombosis with or without pulmonary embolus, abdominal infection from bowel injury or abscess, bowel obstruction, wound infection, and bleeding; furthermore, there is risk that the intended approach of the surgery (for example, laparoscopic) would need to  "be changed to open (laparotomy) if laparoscopy does not continue to be safe  Finally, there is also potential that the intended procedure will NOT provide benefit.    More specific risks related to vertical sleeve gastrectomy were detailed at the bariatric informational seminar and include the followin.) leak at the vertical sleeve staple line, 2.) stricture in the sleeve, 3.) nausea, vomiting, and dehydration for several months, 4.) adhesions causing bowel obstruction, 5.) rapid weight loss causing a higher rate of gallstone formation during the first 6 months after surgery, 6.) decreased absorption of vitamins because of the reduced stomach size, 7.) weight regain if inappropriate food intake occurs.    The BMI that we are treating this patient for was measured at the initial consultation visit in our bariatric program and it was: 38.1 kg/m2 (as calculated just below).      The initial consult height, weight, and BMI are as follows:    Height: 5' 4\"      2024    10:16 AM    BARIATRIC WEIGHT TRACKING   Initial BMI 38.1       Our weight loss surgery program requires weight loss prior to bariatric surgery and currently the height, weight, and BMI are as follows:    Height: 162.6 cm (5' 4\"), Weight: 88.3 kg (194 lb 10.7 oz), and currently the Body mass index is 33.41 kg/m .    Due to the patient's comorbidity conditions of HTN, MIC, and hiatal hernia, in association with elevated body mass index, bariatric surgery has been recommended and is being performed today.    Moreover, as the surgeon performing this procedure, I certify that the following are true in regards to this patient at or prior to the day of surgery:    1. The patient's body mass index (BMI) is or has been greater than or equal to 35 kg/m2.  2. The patient has at least one co-morbidity related to obesity (as outlined above).  3. The patient has been previously unsuccessful with medical treatment for obesity.  Please note that some of this " information has been documented as part of a comprehensive pre-bariatric surgery process in the outpatient clinic and is NOT immediately available in the inpatient encounter for this bariatric operation.      OPERATIVE PROCEDURE:     Elsie Rubi was brought to the operating room and prepared in routine fashion. Under the benefits of general anesthesia, a left upper quadrant Veress needle was inserted and pneumoperitoneum was established using carbon dioxide gas to a maximum pressure of 15 mmHg. A total of six ports were placed into the abdomen.     A liver retractor was placed through the rightmost port and this provided a view of the upper stomach. The operation was started by dividing the short gastric vessels off the greater curvature of the stomach. This dissection was carried up to the angle of His, and ligasure dissector was used for hemostasis.     A hiatal hernia repair was performed by crural approximation and figure of eight of 2-0 surgidac was used to close the hiatus posteriorly and antertiorly. Additionally the the esophagus was sutured to the right and left crura with simple interrupted sutures of 2-0 surgidac.    A bougie (size noted above) was passed into the stomach and I used 4 blue loads and 3 white loads of the [employer] [place] flex linear cutter stapler device to create a vertical sleeve gastrectomy with the bougie as a template. The bougie was removed.    A transabdominal properitoneal anesthetic block was performed using 30 ml 0.5% bupivicaine diluted with 90 ml saline (120 mL total); this was injected using 22gauge spinal needle into the properitoneal planes around the ports and laterally along the anterior axillary line under direct optical guidance. Some of the mixture was used to inject local anesthetic at the skin of each incision as well.    The sleeve gastrectomy specimen (partial gastrectomy) was now removed from the abdomen through the 15 mm port.    Hemostasis was secured, and  the liver retractor was removed.    All ports were then removed from the abdomen.    Skin incisions were closed using skin staples, and sterile dressings were placed.     I was present for all critical components of the operation and all needle and sponge counts were correct x2 at the end of the procedure.    Jerel Cabrera MD  Surgery  [phone] (hospital )      Operative report initially prepared by:    Arianna Tan MD

## 2024-09-11 VITALS
OXYGEN SATURATION: 100 % | TEMPERATURE: 97.4 F | SYSTOLIC BLOOD PRESSURE: 154 MMHG | BODY MASS INDEX: 33.23 KG/M2 | DIASTOLIC BLOOD PRESSURE: 84 MMHG | HEIGHT: 64 IN | HEART RATE: 76 BPM | RESPIRATION RATE: 18 BRPM | WEIGHT: 194.67 LBS

## 2024-09-11 LAB
GLUCOSE BLDC GLUCOMTR-MCNC: 128 MG/DL (ref 70–99)
HOLD SPECIMEN: NORMAL
PLATELET # BLD AUTO: 169 10E3/UL (ref 150–450)

## 2024-09-11 PROCEDURE — 250N000013 HC RX MED GY IP 250 OP 250 PS 637: Performed by: NURSE PRACTITIONER

## 2024-09-11 PROCEDURE — 250N000012 HC RX MED GY IP 250 OP 636 PS 637: Performed by: NURSE PRACTITIONER

## 2024-09-11 PROCEDURE — 258N000003 HC RX IP 258 OP 636: Performed by: NURSE PRACTITIONER

## 2024-09-11 PROCEDURE — 85049 AUTOMATED PLATELET COUNT: CPT

## 2024-09-11 PROCEDURE — 250N000011 HC RX IP 250 OP 636: Performed by: NURSE PRACTITIONER

## 2024-09-11 PROCEDURE — 36415 COLL VENOUS BLD VENIPUNCTURE: CPT

## 2024-09-11 PROCEDURE — 250N000009 HC RX 250: Performed by: NURSE PRACTITIONER

## 2024-09-11 RX ORDER — CYCLOBENZAPRINE HCL 5 MG
5 TABLET ORAL 2 TIMES DAILY PRN
COMMUNITY
Start: 2024-09-11

## 2024-09-11 RX ORDER — SCOLOPAMINE TRANSDERMAL SYSTEM 1 MG/1
1 PATCH, EXTENDED RELEASE TRANSDERMAL
Status: SHIPPED
Start: 2024-09-13 | End: 2024-09-18

## 2024-09-11 RX ORDER — ACETAMINOPHEN 325 MG/1
650 TABLET ORAL EVERY 4 HOURS PRN
Status: SHIPPED
Start: 2024-09-11

## 2024-09-11 RX ORDER — FLUOXETINE 40 MG/1
40 CAPSULE ORAL DAILY
Status: SHIPPED
Start: 2024-09-11 | End: 2024-09-16

## 2024-09-11 RX ORDER — CYCLOBENZAPRINE HCL 5 MG
5 TABLET ORAL 2 TIMES DAILY PRN
Status: DISCONTINUED | OUTPATIENT
Start: 2024-09-11 | End: 2024-09-11

## 2024-09-11 RX ORDER — CALCIUM CARBONATE 500(1250)
1 TABLET ORAL EVERY MORNING
Status: SHIPPED
Start: 2024-09-11 | End: 2024-09-18

## 2024-09-11 RX ORDER — OXYCODONE HYDROCHLORIDE 5 MG/1
5 TABLET ORAL EVERY 6 HOURS PRN
Qty: 12 TABLET | Refills: 0 | Status: SHIPPED | OUTPATIENT
Start: 2024-09-11 | End: 2024-09-18

## 2024-09-11 RX ORDER — OMEPRAZOLE 40 MG/1
40 CAPSULE, DELAYED RELEASE ORAL DAILY
Status: SHIPPED
Start: 2024-09-11

## 2024-09-11 RX ORDER — METOPROLOL SUCCINATE 25 MG/1
25 TABLET, EXTENDED RELEASE ORAL DAILY
Status: DISCONTINUED | OUTPATIENT
Start: 2024-09-11 | End: 2024-09-11

## 2024-09-11 RX ORDER — ERGOCALCIFEROL (VITAMIN D2) 10 MCG
400 TABLET ORAL EVERY MORNING
Status: SHIPPED
Start: 2024-09-11 | End: 2024-09-18

## 2024-09-11 RX ADMIN — MYCOPHENOLATE MOFETIL 1000 MG: 200 POWDER, FOR SUSPENSION ORAL at 09:45

## 2024-09-11 RX ADMIN — FLUTICASONE FUROATE AND VILANTEROL TRIFENATATE 1 PUFF: 100; 25 POWDER RESPIRATORY (INHALATION) at 09:48

## 2024-09-11 RX ADMIN — OXYCODONE HYDROCHLORIDE 10 MG: 10 TABLET ORAL at 13:59

## 2024-09-11 RX ADMIN — SENNOSIDES AND DOCUSATE SODIUM 2 TABLET: 8.6; 5 TABLET ORAL at 09:46

## 2024-09-11 RX ADMIN — SCOPOLAMINE 1 PATCH: 1.5 PATCH, EXTENDED RELEASE TRANSDERMAL at 02:18

## 2024-09-11 RX ADMIN — CYCLOBENZAPRINE HYDROCHLORIDE 5 MG: 5 TABLET, FILM COATED ORAL at 06:50

## 2024-09-11 RX ADMIN — HYDROMORPHONE HYDROCHLORIDE 0.2 MG: 0.2 INJECTION, SOLUTION INTRAMUSCULAR; INTRAVENOUS; SUBCUTANEOUS at 05:53

## 2024-09-11 RX ADMIN — OXYCODONE HYDROCHLORIDE 5 MG: 5 TABLET ORAL at 06:50

## 2024-09-11 RX ADMIN — BUPROPION HYDROCHLORIDE 150 MG: 150 TABLET, FILM COATED, EXTENDED RELEASE ORAL at 09:46

## 2024-09-11 RX ADMIN — OMEPRAZOLE 40 MG: 20 CAPSULE, DELAYED RELEASE ORAL at 09:46

## 2024-09-11 RX ADMIN — FLUOXETINE HYDROCHLORIDE 40 MG: 20 CAPSULE ORAL at 09:46

## 2024-09-11 RX ADMIN — ENOXAPARIN SODIUM 40 MG: 40 INJECTION SUBCUTANEOUS at 09:46

## 2024-09-11 RX ADMIN — OXYCODONE HYDROCHLORIDE 10 MG: 10 TABLET ORAL at 09:47

## 2024-09-11 RX ADMIN — OXYCODONE HYDROCHLORIDE 5 MG: 5 TABLET ORAL at 02:18

## 2024-09-11 RX ADMIN — SODIUM CHLORIDE, POTASSIUM CHLORIDE, SODIUM LACTATE AND CALCIUM CHLORIDE: 600; 310; 30; 20 INJECTION, SOLUTION INTRAVENOUS at 05:34

## 2024-09-11 RX ADMIN — METOPROLOL SUCCINATE 25 MG: 25 TABLET, EXTENDED RELEASE ORAL at 09:48

## 2024-09-11 ASSESSMENT — ACTIVITIES OF DAILY LIVING (ADL)
DIFFICULTY_EATING/SWALLOWING: NO
ADLS_ACUITY_SCORE: 24
ADLS_ACUITY_SCORE: 26
ADLS_ACUITY_SCORE: 24
ADLS_ACUITY_SCORE: 24
DRESSING/BATHING_DIFFICULTY: NO
WALKING_OR_CLIMBING_STAIRS_DIFFICULTY: YES
ADLS_ACUITY_SCORE: 24
EQUIPMENT_CURRENTLY_USED_AT_HOME: OTHER (SEE COMMENTS)
CHANGE_IN_FUNCTIONAL_STATUS_SINCE_ONSET_OF_CURRENT_ILLNESS/INJURY: NO
ADLS_ACUITY_SCORE: 27
CONCENTRATING,_REMEMBERING_OR_MAKING_DECISIONS_DIFFICULTY: NO
ADLS_ACUITY_SCORE: 20
ADLS_ACUITY_SCORE: 27
WEAR_GLASSES_OR_BLIND: YES
FALL_HISTORY_WITHIN_LAST_SIX_MONTHS: NO
DIFFICULTY_COMMUNICATING: NO
WALKING_OR_CLIMBING_STAIRS: OTHER (SEE COMMENTS)
ADLS_ACUITY_SCORE: 27
TOILETING_ISSUES: NO
ADLS_ACUITY_SCORE: 26
ADLS_ACUITY_SCORE: 27
ADLS_ACUITY_SCORE: 24
DOING_ERRANDS_INDEPENDENTLY_DIFFICULTY: NO
ADLS_ACUITY_SCORE: 27
ADLS_ACUITY_SCORE: 26

## 2024-09-11 NOTE — DISCHARGE INSTRUCTIONS
After Bariatric Surgery Discharge Instructions  [facility] Comprehensive Weight Management     Note: Ask your nurse to order your medications from the pharmacy. Be sure you have your medications with you when you leave.  Diet on discharge: bariatric full liquids.    How much fluid should I drink?  Strive for 48-64 ounces daily.  Carry a water bottle with you without a straw or sports top. Drink from it often.  Keep track of how much fluid you drink in a day.  Remember:  -Do not use straws, chew gum or suck on hard candies. They may cause painful gas.    -Sip, don't slurp when you drink.    -Practice small sips using a medicine cup for the first week postop.   -No ice or cold drinks. This could cause gas or spasms.   -No coffee, soda pop or drinks with caffeine. These may cause stomach pain.   -No alcohol. It is bad for your liver and will cause stomach pain.    How often should I do my deep breathing and coughing?  Use your incentive spirometer (small plastic breathing device) every hour while awake after you get home. Using the incentive spirometer helps you deep breath. Continuing to cough and deep breath will help prevent fevers and pneumonia.   If you do not have a fever after one week, you can stop using the incentive spirometer and discard it.     You can continue to take deep breaths without the incentive spirometer every one to two hours while awake for the month after surgery.    What kinds of activity can I do?  Get plenty of rest the first few days after surgery and try to balance rest and activity. You will need some time to recover - you may be more tired than you realize at first. You'll feel better and heal faster if you take good care of yourself.  For 4 weeks after surgery (Please review restrictions at your one week visit, they could change based on how well you are doing):  -Don't lift more than 20 pounds.   -Take 4-5 short walks every day.  -Don't jog, run, or do belly exercises.  Don't  swim, bathe or use a hot tub until your cuts are healed (scabs are gone).  You may shower  Don't plan to fly or take a road trip within the first 1 to 3 months after surgery.  You could get a blood clot in your legs. If you must travel, get up and move around every hour for at least 5 minutes before continuing on your journey.  Your care team can help you decide when it's safe for you to travel.     What can I do for pain control?  You had major belly surgery that involves all layers of your belly muscles. Pain is expected, even for some as far out as 6-8 weeks after surgery. Moving, sneezing, coughing, and breathing will cause discomfort because these activities use your belly muscles.   Please see your after visit summary medication review for what pain medication will be continued, discontinued and newly started for you.    You can take opioid pain medicine if prescribed and if needed. Try to wean off from it as soon as you feel comfortable.   Do not drive while you are taking opioid pain medicine. This is dangerous.  You can take acetaminophen (Tylenol) between your prescribed pain medicine on a scheduled basis OR take it scheduled every 6 hours (check with your care team for specifics).  -Acetaminophen formulation options:    -Liquid   -Caplet (Cut caplet in half before taking)   -Do not take more than 3000 mg Tylenol in a 24 hour period.  -You may also take Tylenol for pain in place of the opioid pain medicine (check with your care team for specifics).   You can apply ice or heat to the affected area(s). Just remember to wrap the ice in something and limit icing sessions to 20 minutes. Excessive icing can irritate the skin or cause skin damage.  You can apply heat with a hot, wet towel or heating pad. Just like cold therapy, limit heat application to 20 minutes. Never sleep with a heating pad on. It could cause severe burns to your skin.  Wear your binder to support your belly muscles if you have one.  Take  this off a little more each day and try to be off completely by 2 weeks after surgery. If you don't need your binder for comfort or support, you don't need to wear it.   You may not be able to sleep in a comfortable position for a few weeks after surgery. This is normal. You may be more comfortable sleeping in a recliner or propped up with 3 pillows for the first couple of weeks after surgery.  Do not take NSAID's (Non-Steroidal Anti-Inflammatory Drugs) (examples: ibuprofen, Motrin, Advil, Aleve, and Naproxen), aspirin, or use pain patches with NSAID's. They will increase your risk of bleeding or getting an ulcer.    Please call the clinic for any of the following pain concerns, we would like to talk to you:  -pain that does not improve with rest  -pain that gets worse and worse  -pain that is not controlled by your pain medicine  -a sudden severe increase in pain    What medications will I need to take after surgery?  You may be discharged with the following types of medications: omeprazole 20 mg once daily for heartburn symptom prevention, zofran as needed for nausea and a medication called hyoscyamine (levsin) as needed for cramping.Please see your after visit summary medication review for what will be continued, discontinued and newly started.  It is important to reduce the amount of acid in your new stomach for a couple of months after surgery while it is still healing. We will prescribe an acid reducer or antacid. Take it as directed. This will help prevent ulcers, heartburn and acid reflux.  If you took an acid reducer before you had surgery, your care team will let you know what acid reducer you will take after surgery.   It is okay to swallow any medications smaller than   inch in size.   -If it is larger than   inch, it may need to be cut, crushed, or in a liquid form. Check with your care team about which way is most appropriate for you and your medications.    What should I know about my incisions  (cuts)?  Your incisions are covered with white steri strips or butterfly tape and have band aids or gauze over the top. If you have gauze or band aids, they can come off in the hospital.  Leave your steri strips on until they fall off on their own. If the steri strips don't fall off after 1 to 2 weeks, you can take them off. If they fall off earlier, replace them with clean band aids trying to avoid touching the incision itself.   If you have gauze covered by a clear dressing, remove 2-3 days after surgery or as directed by your care team.  You may shower in the hospital after surgery and can get your incision coverings wet.  Do not submerge in water (e.g. No baths, swimming pools, hot tubs) until your care team tells you it is okay and your incisions are completely healed.    Call the clinic if you have any of these signs or symptoms of infection:  -Redness around the site.  -Drainage that smells bad.  -Drainage that is thick yellow or green.  -An increase in pain around the incision site  -An increase in swelling around the incision site  -Heat or warmth around incision site   -Fever of 101.5  F (38.3  C) or higher when taken under the tongue.    -Chills    Will my urine or bowel movements change?   Your first bowel movements (stools) will likely be liquid. You may also notice old blood or a darker color (black or maroon color) in your bowel movements.  This is not unusual and usually goes away after the first week, if not sooner. You may not have a bowel movement for a week.   If you have not had a bowel movement for at least three days after your surgery date and are passing gas, you can use over the counter stool softeners.  Please stop taking the stool softeners and laxatives if your stools are loose.  Increasing fluids and activity as well as getting off narcotics will help prevent constipation.    Call the clinic if:   -You have stomach pain.   -You continue to have constipation.  -You have excessive  "bloating after walking and passing gas.    How can I prevent dehydration if I feel nauseated (sick to my stomach) and vomit (throw up)?   Vomiting is not normal after surgery. If you continue to have nausea and vomiting, call the clinic.   Nausea can be a sign of dehydration. That is why it is very important to track your fluids.  Do not nap more than one hour during the day. Set a timer to wake yourself up, if needed. Too much sleep will keep you from drinking enough fluid during the day and lead to dehydration.  No outside activity in hot, humid weather until you can drink 48 to 64 ounces of fluid in 24 hours. If you sweat a lot, your body may lose too much water.  Try to take a 1 ounce sip of water (one medicine cup) every 15 minutes.  Set a timer to remind yourself.    Call the clinic if you have any of these signs or symptoms of dehydration:  -Dark colored urine  -Urinating (pass water) less than 2-3 times per day  -Lack of energy  -Nausea  -Dizziness  -Headache    Call the clinic ANY TIME at [phone] if:  -Your pain medicine is not working.  -You have a fever ? 101.5 F.  -You have belly or left shoulder pain that gets worse and worse.  -You have a swollen leg with redness, warmth, or pain behind the knee or calf.  -You have chest pain   -You feel very short of breath.  -You have a sudden severe increase in heart rate.  -You have vomiting that gets worse and worse.  -You have constant nausea (feeling sick to your stomach) that does not go away with medication.  -You have trouble swallowing.  -You have an increasing feeling that \"something is not right\".  -You have hiccups that do not stop.  -You have any questions or concerns.    AFTER HOURS QUESTIONS OR CONCERNS: Call [phone] and ask to speak with surgery resident if you are having troubles in the evenings, at night, or on weekends. Please call if you experience increasing abdominal pain, nausea, vomiting, increasing drainage from your wounds, chills, " or fever >101.5    If you have to go to the Emergency Room, we prefer you go to the hospital that did your surgery. Please let them know that you had bariatric surgery and to notify your surgeon.    When should I go back to the clinic?  Follow up with your care team in 1-2 weeks.   If this appointment was not already made, please call: [phone]    Appointments located at [facility] clinic:  [facility] ([facility]    [address]  [place] [address]

## 2024-09-11 NOTE — PROGRESS NOTES
"Admitted/transferred from: PACU @ 4299  2 RN full   skin assessment completed by Jonathan Sims RN and WOOD Velazquez.  Skin assessment finding: skin intact, no problems   Interventions/actions: other none      Bedside Emergency Equipment Present:  Suction Regulator: Yes  Suction Canister: Yes  Tubing between Regulator and Canister: Yes  O2 Regulator with Tree: Yes  Ambu Bag: Yes    Documentation of Language Proficiency Assessment:  Does the patient speak a language other than English at home? No   Have they had difficulty understanding or being understood in previous admissions or doctor visits? No   Do they have difficulty clearly explaining what brought them into the hospital? No   Do they show difficulty providing clear teach back about call light use? No     If the answer was \"yes,\" to more than one question, was an  utilized for the remainder of the admission assessment? N/A    If no, please explain:      Jonathan Sims RN    "

## 2024-09-11 NOTE — PLAN OF CARE
Goal Outcome Evaluation:      Plan of Care Reviewed With: patient    Overall Patient Progress: improvingOverall Patient Progress: improving    Outcome Evaluation: steady progress since arriving to 7B    Temp: 97.8  F (36.6  C) Temp src: Oral BP: (!) 143/92 Pulse: 74   Resp: 18 SpO2: 99 % O2 Device: Nasal cannula Oxygen Delivery: 3 LPM     Time: 1900 - 0700  Reason for admission:  9/10 Partial (sleeve) gastrectomy & hiatal hernia repair  Vitals:  Stable on 1-2 L oxygen nasal cannula  Diet:  Bariatric clear sips  Activity/Mobility:  SBA. Ambulated to bathroom x2  Neuro:  A&Ox4  Pain/Nausea:  Pain controlled with meds. Nausea controlled with meds.   Respiratory: WDL on 1-2 L oxygen nasal cannula, postop capnography monitor  Cardiac: WDL - runs hypertensive - see PRN meds if needed  GI:  No BM this shift.  :  Voiding spontaneously & adequately  Lines & Drains:  R PIV SL,  L PIV LR@75, 6 lap sites  Labs:  Monitored.   Incisions/Skin:  6 lap sites - surgical dressings CDI    NEW CHANGES:  Pt arrived to unit from PACU apprx 2200. Postop vitals sequence initiated, skin check performed. Pt out of bed to bathroom and voiding spontaneously. Sips of water started.    Continue to implement Care Plan.    Jonathan Sims RN

## 2024-09-11 NOTE — PLAN OF CARE
Goal Outcome Evaluation:      Plan of Care Reviewed With: patient    Overall Patient Progress: improvingOverall Patient Progress: improving         Reason for admission:  9/10 Partial (sleeve) gastrectomy & hiatal hernia repair  Diet:  Bariatric FLD now  Activity/Mobility: Was SBA now UAL. Ambulated to bathroom  and around room. Spouse at bedside.  Neuro:  Aox4, able to make needs known  Pain/Nausea:  Pain 4-7/10 controlled with meds. Nausea  denied.   Respiratory: WDL on RA or 1 L O2 NC  Cardiac: HTN, given am metoprolol - see PRN meds if needed  GI:  No BM, no flatus. Educated on OTC and provider recs for stool softeners. To contact team with qns or sx of abnormalities  :  Voiding spontaneously & adequately in bathrom  Lines & Drains:  R PIV SL,  L PIV LR@75, 6 lap sites  Labs:  Monitored.   Incisions/Skin:  6 lap sites - surgical dressings CDI    Plan. Discharge         Belongings sent with patient and . IV site discontinued. Staples removed assist from circulating staff. Given pill cutter and supplies. Discharge meds to discharge pharmacy. AVS and education gone over with patient. Pt to follow up as outpatient and with PCP. Pt verbalized understanding of all education and information.

## 2024-09-11 NOTE — DISCHARGE SUMMARY
"[facility]   MIS Discharge Summary    Date of Admission: 9/10/2024  Date of Discharge: 9/11/2024    Admission Diagnosis:  1. Class 3 obesity    Discharge Diagnosis:  1. Same as above  2. S/p sleeve     Consultations:  IP pharmacy    Procedures:  1. 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 Has been able to achieve weight loss over the years with numerous weight loss efforts but many were restrictive and not sustainable long term. Has chosen to pursue bariatric surgery for sustainable tool long term. Comrobidities include MIC, prediabetes, HTN, GERD, asthma, and ILD.     Hospital Course:  The patient was admitted and underwent the above procedure. The patient tolerated the procedure well. There were no complications. The patient's diet was slowly advanced as bowel function returned. Pain was controlled with oral pain medication and the patient was able to ambulate and void without difficulty. The patient received appropriate education post operatively. On POD #1 the patient was discharged to home.    Discharge Physical Exam:  BP (!) 154/84   Pulse 76   Temp 97.4  F (36.3  C) (Oral)   Resp 18   Ht 1.626 m (5' 4\")   Wt 88.3 kg (194 lb 10.7 oz)   SpO2 100%   BMI 33.41 kg/m      Gen: NAD  Lungs: non-labored breathing  CV: regular rhythm, normal rate   Abd: obese, soft, nondistended, appropriately tender, incisions are c/d/i  Ext: no peripheral edema  Neuro: AOx3    Meds:       Review of your medicines        START taking        Dose / Directions   acetaminophen 325 MG tablet  Commonly known as: TYLENOL  Used for: S/P laparoscopic sleeve gastrectomy      Dose: 650 mg  Take 2 tablets (650 mg) by mouth every 4 hours as needed for other (For optimal non-opioid multimodal pain management to improve pain control and physical function.).  Refills: 0   "   oxyCODONE 5 MG tablet  Commonly known as: ROXICODONE  Used for: S/P laparoscopic sleeve gastrectomy      Dose: 5 mg  Take 1 tablet (5 mg) by mouth every 6 hours as needed for moderate to severe pain.  Quantity: 12 tablet  Refills: 0     scopolamine 1 MG/3DAYS 72 hr patch  Commonly known as: TRANSDERM  Used for: S/P laparoscopic sleeve gastrectomy      Dose: 1 patch  Start taking on: September 13, 2024  Place 1 patch over 72 hours onto the skin every 72 hours. Ok to leave current patch in place at discharge  Refills: 0            CONTINUE these medicines which may have CHANGED, or have new prescriptions. If we are uncertain of the size of tablets/capsules you have at home, strength may be listed as something that might have changed.        Dose / Directions   calcium carbonate 500 MG tablet  Commonly known as: OS-JANETTE  This may have changed: additional instructions  Used for: S/P laparoscopic sleeve gastrectomy      Dose: 1 tablet  Take 1 tablet (500 mg) by mouth every morning. Hold until 1 month postop  Refills: 0     FLUoxetine 40 MG capsule  Commonly known as: PROzac  This may have changed: additional instructions  Used for: Mild episode of recurrent major depressive disorder (H24)      Dose: 40 mg  Take 1 capsule (40 mg) by mouth daily. Open capsule and mix with applesauce  Refills: 0     hyoscyamine 0.125 MG tablet  Commonly known as: LEVSIN  This may have changed: additional instructions      Dose: 125 mcg  Take 1 tablet (125 mcg) by mouth every 4 hours as needed for cramping.  Quantity: 30 tablet  Refills: 1     metoprolol succinate ER 25 MG 24 hr tablet  Commonly known as: TOPROL XL  This may have changed: when to take this  Used for: Essential hypertension      Dose: 25 mg  Take 1 tablet (25 mg) by mouth daily  Quantity: 90 tablet  Refills: 2     omeprazole 40 MG DR capsule  Commonly known as: PriLOSEC  This may have changed: additional instructions  Used for: Hiatal hernia with gastroesophageal reflux  disease and esophagitis, Chronic superficial gastritis without bleeding      Dose: 40 mg  Take 1 capsule (40 mg) by mouth daily. Open capsule and mix with applesauce  Refills: 0     ondansetron 4 MG ODT tab  Commonly known as: ZOFRAN ODT  This may have changed: additional instructions      Dose: 4 mg  Take 1 tablet (4 mg) by mouth every 6 hours as needed for nausea.  Quantity: 15 tablet  Refills: 0     senna-docusate 8.6-50 MG tablet  Commonly known as: SENOKOT-S/PERICOLACE  This may have changed: additional instructions      Dose: 2 tablet  Take 2 tablets by mouth daily as needed for constipation (While taking narcotic pain medications.  Stop taking if having loose stools.).  Quantity: 30 tablet  Refills: 1     Vitamin D (Cholecalciferol) 10 MCG (400 UNIT) Tabs  This may have changed:   how much to take  additional instructions  Used for: S/P laparoscopic sleeve gastrectomy      Dose: 400 Units  Take 400 Units by mouth every morning. Hold until 1 month postop  Refills: 0            CONTINUE these medicines which have NOT CHANGED        Dose / Directions   albuterol 108 (90 Base) MCG/ACT inhaler  Commonly known as: PROAIR HFA/PROVENTIL HFA/VENTOLIN HFA  Used for: ILD (interstitial lung disease) (H), Mild intermittent asthma without complication      Dose: 2 puff  Inhale 2 puffs into the lungs every 4 hours as needed for shortness of breath, wheezing or cough  Quantity: 18 g  Refills: 3     buPROPion 150 MG 24 hr tablet  Commonly known as: WELLBUTRIN XL  Used for: Moderate episode of recurrent major depressive disorder (H)      Dose: 150 mg  Take 1 tablet (150 mg) by mouth every morning  Quantity: 30 tablet  Refills: 1     cyclobenzaprine 5 MG tablet  Commonly known as: FLEXERIL  Used for: Muscle spasm, Chronic upper back pain      Dose: 5 mg  Take 1 tablet (5 mg) by mouth 2 times daily as needed for muscle spasms  Quantity: 30 tablet  Refills: 0     fluticasone-salmeterol 250-50 MCG/ACT inhaler  Commonly known as:  ADVAIR  Used for: ILD (interstitial lung disease) (H), Hypoxia      Dose: 1 puff  Inhale 1 puff into the lungs every 12 hours.  Quantity: 60 each  Refills: 3     mycophenolate 200 MG/ML suspension  Commonly known as: GENERIC EQUIVALENT  Used for: ILD (interstitial lung disease) (H)      Dose: 1,000 mg  Take 5 mLs (1,000 mg) by mouth 2 times daily.  Quantity: 300 mL  Refills: 2     traZODone 50 MG tablet  Commonly known as: DESYREL  Used for: Primary insomnia      Dose: 50 mg  Take 1 tablet (50 mg) by mouth nightly as needed for sleep  Quantity: 90 tablet  Refills: 1            STOP taking      Wegovy 1 MG/0.5ML pen  Generic drug: Semaglutide-Weight Management                  Where to get your medicines        These medications were sent to [facility] [address] [address]      Phone: [phone]   cyclobenzaprine 5 MG tablet  metoprolol succinate ER 25 MG 24 hr tablet       These medications were sent to [facility] [address] [address]      Phone: [phone]   oxyCODONE 5 MG tablet         Additional instructions:  After Care       Future Labs/Procedures    Activity     Comments:    Your activity upon discharge: activity as tolerated and no lifting of more than 20lb for 4 weeks    Contact Surgeon     Comments:    Contact your Surgeon IF:  ~  Your pain is not controlled by pain medication or pain that suddenly increases;  ~  You develop a fever greater than 101 and/or chills 24 hours after surgery;  ~  You notice redness or bad smelling drainage at the surgery site;  ~  You notice a large amount of bleeding from the surgery site that does not stop when moderate pressure is applied;  ~  You are unable to pass urine within 8-10 hours after surgery;  ~  You have an upset stomach or vomiting lasting more than a day;  ~  You have a skin reaction to tape or dressing such as redness,  itching or rash at the surgery site.    Diet     Comments:    Follow this diet upon discharge: bariatric full liquid    Discharge diet     Comments:    Phase II: Full liquid diet, room temperature with one protein shake per day. Goal water intake: 40oz per day by post-operative day #4. Meet with Bariatric Dietitian in 1 week to discuss dietary changes.    Do not drive     Comments:    Do NOT drive until after you have been completely off narcotics for a minimum of 24 hours.    Follow-up Care - Dietician     Comments:    Follow-up with your bariatric dietitian in 1 week.    Return to Work     Comments:    May return to work in 2 weeks if cleared by Bariatric surgeon.    Shower/Bathing     Comments:    Okay to shower. Do NOT soak in tub for 2-4 weeks.    Surgical Site Care     Comments:    If you have skin tapes on your surgical site(s), leave them on the surgical site(s) until they fall off on their own or 1 week after surgery date. May remove Band-Aid one day after surgery.    Wash your hands     Comments:    Wash your hands with soap and warm water before you touch the site of surgery.    Weight Restrictions     Comments:    Do not lift over 20 pounds for 2 weeks.                Follow Up:  -Follow up with Kaya Whiteside PA-C 9/18/2024      BARIATRIC PATIENTS:  Call [phone] to schedule or to reach your care team    GENERAL SURGERY PATIENTS: Call [phone] for scheduling needs or to reach your care team      Holly Wren [facility] [place]     Completed by: Arianna Tan MD

## 2024-09-11 NOTE — PROGRESS NOTES
"  MIS/Bariatric Surgery Progress Note  Surgery Cross-Cover  Post Op Check    09/10/2024    Elsie Rubi is a 64 year old female POD#0 s/p Procedure(s):  GASTRECTOMY, SLEEVE, LAPAROSCOPIC  HERNIORRHAPHY, HIATAL, LAPAROSCOPIC; cruropexy and esophagocruropexy for Pre-Op Diagnosis Codes:     * Morbid obesity (H) [E66.01]    Pt reports their pain is controlled with current regimen. Denies nausea. Patient Is voiding spontaneously.     BP (!) 176/107 (BP Location: Left arm)   Pulse 69   Temp 97.8  F (36.6  C) (Oral)   Resp 13   Ht 1.626 m (5' 4\")   Wt 88.3 kg (194 lb 10.7 oz)   SpO2 100%   BMI 33.41 kg/m      Gen: A&O x4, NAD   Chest: breathing non-labored on RA   Abdomen: soft, non-tender, non-distended    A/P: No acute post-op issues. Continue plan of care per primary team. Please call with any questions.    Santo Barrios MD   "

## 2024-09-11 NOTE — OR NURSING
Pt is resting ,sleeping between care. She states her pain is at 4 now and nausea is much better and tolerable.   Writer attempted 3 x s to update  but no answer. Pt left voice message for him.

## 2024-09-12 ENCOUNTER — PATIENT OUTREACH (OUTPATIENT)
Dept: ENDOCRINOLOGY | Facility: CLINIC | Age: 64
End: 2024-09-12

## 2024-09-12 NOTE — PROGRESS NOTES
RN Post-Op/Post-Discharge Care Coordination Note    Ms. Elsie Rubi is a 64 year old female who underwent laparoscopic gastric sleeve on 9/10 with Jerle Cabrera MD.  Spoke with Patient.    Support  Patient able to care for self independently     Health Status  Fevers/chills: Patient denies any fever or chills.    Pain: Rates pain a 4-6 on a 10 Scale.  Alternating Narcotic Analgesic with Tylenol.  Encouraged non-medication management modalities: Heating pad, with barrier, to abdomen, Frequent position changes, Walking, Ice packs, with barrier, to incisions, and Abdominal Binder    Nausea/Vomiting: Patient reports feeling nauseated.    Eating/drinking: Tolerating full liquid diet. Reminded to drink small sips slowly.  Encouraged room temperature/warm fluids.    Fluid Ounces per day: 48 ounces.     Cramping: yes.    Bowel/Bladder habits: Patient reports no bowel movement since surgery. Passing gas Last bowel movement DOS.  Urine normal.    New Medications:  Omeprazole (opening capsules), Levsin, Zofran, Oxycodone, Tylenol, Senna, and patient was reminded not to take NSAIDS    Activity: walking    Breathing: Did the patient receive an incentive spirometer? yes.  Reminded patient to use incentive spirometer- 5 to 10 deep breaths each hour while awake.    Other symptoms: Tachycardia: no, Dizziness/lightheadedness: no, Shortness of breath, dyspnea with exertion, leg pain/swelling: no.      Drains (NAT): N/A    Incisions: Patient denies any signs and symptoms of infection..  Wound closure:  Skin Sealant  Steri-strips    Pathology reviewed with patient:  No, not yet available      Activity/Restrictions  No lifting in excess of 20 pounds for 4 weeks    Forms/Letters  No. All forms should be faxed to [phone].      Postop Appointment Reminder  September 18 at 10 AM confirmed with the patient:  Yes.    Additional Questions: All of her questions were answered including reviewing diet, restrictions, and wound care.   She will call this office if she has any further questions and/or concerns.        Whom and When to Call  Nurses: [phone]  Fax: [phone]     Patient advised if any concerns outside of clinic hours, to call hospital at [phone] and ask to speak to on-call bariatric resident. They should present to ED at [facility] to be assessed if urgency arises.    Risk for:  urinary tract infection, pneumonia, leg clots (deep vein thrombosis), lung clots (pulmonary emboli), injury to the bowels or other organs, bowel obstruction, hernia, and gastrointestinal bleeding.    Weight loss surgery side effects:  abdominal pain, cramping, bloating, difficulty swallowing, constipation, nausea, vomiting, diarrhea, frequent loose stools, dehydration, hair loss, excess skin, protein, iron and vitamin deficiencies, malnutrition, fatigue, and heartburn.  Bariatric surgery risks may include:  an internal leak at the staple line, narrowing of the esophagus, stomach or intestines (strictures), gallstones, bowel obstruction, inflammation of the esophagus or stomach, ulcers with or without bleeding, injuries to other organs, hernias, and iron deficiency.    Sleeve gastrectomy side effects:  leaks are more common after this procedure.  Risks include:  internal leak at the vertical sleeve staple line; nausea, vomiting, and dehydration for several months; bowel obstruction; gallstones during the first 6 months related to rapid weight loss; decreased absorption of vitamins and protein because of the reduced stomach size; weight regain if you increase food intake; narrowing of stomach, esophagus or intestines (stricture); injury to other organs; hernia; and ulcers.

## 2024-09-13 LAB
PATH REPORT.COMMENTS IMP SPEC: NORMAL
PATH REPORT.COMMENTS IMP SPEC: NORMAL
PATH REPORT.FINAL DX SPEC: NORMAL
PATH REPORT.GROSS SPEC: NORMAL
PATH REPORT.MICROSCOPIC SPEC OTHER STN: NORMAL
PATH REPORT.RELEVANT HX SPEC: NORMAL
PHOTO IMAGE: NORMAL

## 2024-09-15 ENCOUNTER — APPOINTMENT (OUTPATIENT)
Dept: GENERAL RADIOLOGY | Facility: HOSPITAL | Age: 64
End: 2024-09-15
Attending: EMERGENCY MEDICINE
Payer: COMMERCIAL

## 2024-09-15 ENCOUNTER — HOSPITAL ENCOUNTER (EMERGENCY)
Facility: HOSPITAL | Age: 64
Discharge: HOME | End: 2024-09-15
Attending: EMERGENCY MEDICINE | Admitting: EMERGENCY MEDICINE
Payer: COMMERCIAL

## 2024-09-15 ENCOUNTER — APPOINTMENT (OUTPATIENT)
Dept: CT IMAGING | Facility: HOSPITAL | Age: 64
End: 2024-09-15
Attending: NURSE PRACTITIONER
Payer: COMMERCIAL

## 2024-09-15 VITALS
DIASTOLIC BLOOD PRESSURE: 98 MMHG | OXYGEN SATURATION: 98 % | RESPIRATION RATE: 20 BRPM | HEART RATE: 75 BPM | SYSTOLIC BLOOD PRESSURE: 148 MMHG | TEMPERATURE: 98.1 F

## 2024-09-15 DIAGNOSIS — K91.870 POSTOPERATIVE HEMATOMA INVOLVING DIGESTIVE SYSTEM FOLLOWING DIGESTIVE SYSTEM PROCEDURE: ICD-10-CM

## 2024-09-15 LAB
ALBUMIN SERPL BCG-MCNC: 3.9 G/DL (ref 3.5–5.2)
ALP SERPL-CCNC: 73 U/L (ref 40–150)
ALT SERPL W P-5'-P-CCNC: 18 U/L (ref 0–50)
ANION GAP SERPL CALCULATED.3IONS-SCNC: 12 MMOL/L (ref 7–15)
AST SERPL W P-5'-P-CCNC: 23 U/L (ref 0–45)
BASOPHILS # BLD AUTO: 0 10E3/UL (ref 0–0.2)
BASOPHILS NFR BLD AUTO: 0 %
BILIRUB DIRECT SERPL-MCNC: <0.2 MG/DL (ref 0–0.3)
BILIRUB SERPL-MCNC: 0.9 MG/DL
BUN SERPL-MCNC: 13.1 MG/DL (ref 8–23)
CALCIUM SERPL-MCNC: 9.1 MG/DL (ref 8.8–10.4)
CHLORIDE SERPL-SCNC: 101 MMOL/L (ref 98–107)
CREAT SERPL-MCNC: 0.88 MG/DL (ref 0.51–0.95)
EGFRCR SERPLBLD CKD-EPI 2021: 73 ML/MIN/1.73M2
EOSINOPHIL # BLD AUTO: 0.1 10E3/UL (ref 0–0.7)
EOSINOPHIL NFR BLD AUTO: 2 %
ERYTHROCYTE [DISTWIDTH] IN BLOOD BY AUTOMATED COUNT: 13.7 % (ref 10–15)
FLUAV RNA SPEC QL NAA+PROBE: NEGATIVE
FLUBV RNA RESP QL NAA+PROBE: NEGATIVE
GLUCOSE SERPL-MCNC: 105 MG/DL (ref 70–99)
HCO3 SERPL-SCNC: 25 MMOL/L (ref 22–29)
HCT VFR BLD AUTO: 37.6 % (ref 35–47)
HGB BLD-MCNC: 12.1 G/DL (ref 11.7–15.7)
HOLD SPECIMEN: NORMAL
IMM GRANULOCYTES # BLD: 0 10E3/UL
IMM GRANULOCYTES NFR BLD: 0 %
LIPASE SERPL-CCNC: 27 U/L (ref 13–60)
LYMPHOCYTES # BLD AUTO: 1.6 10E3/UL (ref 0.8–5.3)
LYMPHOCYTES NFR BLD AUTO: 25 %
MCH RBC QN AUTO: 30.3 PG (ref 26.5–33)
MCHC RBC AUTO-ENTMCNC: 32.2 G/DL (ref 31.5–36.5)
MCV RBC AUTO: 94 FL (ref 78–100)
MONOCYTES # BLD AUTO: 0.8 10E3/UL (ref 0–1.3)
MONOCYTES NFR BLD AUTO: 13 %
NEUTROPHILS # BLD AUTO: 3.7 10E3/UL (ref 1.6–8.3)
NEUTROPHILS NFR BLD AUTO: 60 %
NRBC # BLD AUTO: 0 10E3/UL
NRBC BLD AUTO-RTO: 0 /100
PLATELET # BLD AUTO: 217 10E3/UL (ref 150–450)
POTASSIUM SERPL-SCNC: 4 MMOL/L (ref 3.4–5.3)
PROT SERPL-MCNC: 6.4 G/DL (ref 6.4–8.3)
RBC # BLD AUTO: 3.99 10E6/UL (ref 3.8–5.2)
RSV RNA SPEC NAA+PROBE: NEGATIVE
SARS-COV-2 RNA RESP QL NAA+PROBE: NEGATIVE
SODIUM SERPL-SCNC: 138 MMOL/L (ref 135–145)
TROPONIN T SERPL HS-MCNC: 7 NG/L
WBC # BLD AUTO: 6.2 10E3/UL (ref 4–11)

## 2024-09-15 PROCEDURE — 84484 ASSAY OF TROPONIN QUANT: CPT | Performed by: EMERGENCY MEDICINE

## 2024-09-15 PROCEDURE — 87637 SARSCOV2&INF A&B&RSV AMP PRB: CPT | Performed by: EMERGENCY MEDICINE

## 2024-09-15 PROCEDURE — 36415 COLL VENOUS BLD VENIPUNCTURE: CPT | Performed by: EMERGENCY MEDICINE

## 2024-09-15 PROCEDURE — 85025 COMPLETE CBC W/AUTO DIFF WBC: CPT | Performed by: EMERGENCY MEDICINE

## 2024-09-15 PROCEDURE — 82248 BILIRUBIN DIRECT: CPT | Performed by: NURSE PRACTITIONER

## 2024-09-15 PROCEDURE — 99284 EMERGENCY DEPT VISIT MOD MDM: CPT | Performed by: NURSE PRACTITIONER

## 2024-09-15 PROCEDURE — 93010 ELECTROCARDIOGRAM REPORT: CPT | Performed by: INTERNAL MEDICINE

## 2024-09-15 PROCEDURE — 99285 EMERGENCY DEPT VISIT HI MDM: CPT | Mod: 25

## 2024-09-15 PROCEDURE — 74177 CT ABD & PELVIS W/CONTRAST: CPT

## 2024-09-15 PROCEDURE — 250N000011 HC RX IP 250 OP 636: Performed by: NURSE PRACTITIONER

## 2024-09-15 PROCEDURE — 71046 X-RAY EXAM CHEST 2 VIEWS: CPT

## 2024-09-15 PROCEDURE — 82374 ASSAY BLOOD CARBON DIOXIDE: CPT | Performed by: EMERGENCY MEDICINE

## 2024-09-15 PROCEDURE — 93005 ELECTROCARDIOGRAM TRACING: CPT

## 2024-09-15 PROCEDURE — 83690 ASSAY OF LIPASE: CPT | Performed by: NURSE PRACTITIONER

## 2024-09-15 RX ORDER — IOPAMIDOL 755 MG/ML
66 INJECTION, SOLUTION INTRAVASCULAR ONCE
Status: COMPLETED | OUTPATIENT
Start: 2024-09-15 | End: 2024-09-15

## 2024-09-15 RX ADMIN — IOPAMIDOL 66 ML: 755 INJECTION, SOLUTION INTRAVENOUS at 12:08

## 2024-09-15 ASSESSMENT — ENCOUNTER SYMPTOMS
ENDOCRINE NEGATIVE: 1
EYES NEGATIVE: 1
NEUROLOGICAL NEGATIVE: 1
HEMATOLOGIC/LYMPHATIC NEGATIVE: 1
FEVER: 1
SHORTNESS OF BREATH: 1
VOMITING: 0
PSYCHIATRIC NEGATIVE: 1
COUGH: 1
NAUSEA: 1
ABDOMINAL PAIN: 1
MUSCULOSKELETAL NEGATIVE: 1
CONSTIPATION: 1
ALLERGIC/IMMUNOLOGIC NEGATIVE: 1

## 2024-09-15 ASSESSMENT — ACTIVITIES OF DAILY LIVING (ADL)
ADLS_ACUITY_SCORE: 36

## 2024-09-15 NOTE — ED TRIAGE NOTES
Ambulated into triage with c/o cough, SOB, and chest wall pain with inspiration that started last night. States she had a gastric resection last week. Reports her blood pressure is elevated for her at 150's systolic. Is on blood pressure medication. Rates chest wall pain 7/10 with inspiration. Concerned about a blood clot in her lung and wants to be checked for that.

## 2024-09-15 NOTE — DISCHARGE INSTRUCTIONS
Follow-up with your primary care provider for reevaluation.  Contact your primary care provider if you have any questions or concerns.  Do not hesitate to return to the ER if any new or worsening symptoms.     Please read the attached instructions (if any).  They highlight more specific treatments and interventions for you at home.              Thank you for letting me participate in your care and wish you a fast and uneventful recovery,    Lalit SWENSON CNP    Do not hesitate to contact me with questions or concerns.  [email or web address]

## 2024-09-15 NOTE — ED PROVIDER NOTES
History     Chief Complaint   Patient presents with    Cough    Shortness of Breath    Post-op Problem     HPI  Elsie Rubi is a 64 year old individual with history of hypertension, major depression, asthma, interstitial lung disease, chronic fatigue syndrome, fibromyalgia, comes in for cough, shortness of breath, epigastric pain.  Patient states had a gastric sleeve on 9/10/2024.  States that first everything was going good.  Now has developed a slight fever at home with cough and feelings of shortness of breath.  States that she also has not had a bowel movement since 9/10/2024.  Has had nausea but no vomiting.  No dysuria or hematuria.  Denies any drainage from surgical sites.    Allergies:  No Known Allergies    Problem List:    Patient Active Problem List    Diagnosis Date Noted    Class 2 severe obesity with serious comorbidity in adult (H) 09/10/2024     Priority: Medium    ILD (interstitial lung disease) (H) 03/12/2024     Priority: Medium    Prediabetes 08/25/2023     Priority: Medium    Mixed hyperlipidemia 08/25/2023     Priority: Medium     The 10-year ASCVD risk score (Radha DENT, et al., 2019) is: 5.8%    Values used to calculate the score:      Age: 63 years      Sex: Female      Is Non- : No      Diabetic: No      Tobacco smoker: No      Systolic Blood Pressure: 123 mmHg      Is BP treated: Yes      HDL Cholesterol: 57 mg/dL      Total Cholesterol: 218 mg/dL        Primary insomnia 08/10/2023     Priority: Medium    Rectocele 08/10/2023     Priority: Medium    Primary stress urinary incontinence 08/10/2023     Priority: Medium     OB G[facility] 2021 - declined surgery      MIC (obstructive sleep apnea) 10/14/2021     Priority: Medium     Mild MIC (AHI 12) with mild sleep-associated hypoxemia (SpO2 <= 88% for 8.7 minutes)     I do suspect that her mild obstructive sleep apnea and mild hypoxemia will improve following weight loss with planned upcoming bariatric surgery.   She is also certain that she be unable to tolerate PAP therapy.  But since she currently does have supplemental oxygen at home available, we will start treatment with nocturnal supplemental oxygen at 2 L/min.      Chronic fatigue syndrome 08/13/2020     Priority: Medium    Dyspepsia 08/13/2020     Priority: Medium    Essential hypertension 08/13/2020     Priority: Medium    Fibromyalgia 08/13/2020     Priority: Medium    Mild episode of recurrent major depressive disorder (H24) 08/13/2020     Priority: Medium    Mild intermittent asthma without complication 08/13/2020     Priority: Medium    Severe obesity (BMI 35.0-39.9) with comorbidity (H) 08/13/2020     Priority: Medium        Past Medical History:    Past Medical History:   Diagnosis Date    Benign essential hypertension     Borderline personality disorder (H) 08/13/2020    Depression     Depressive disorder 1978    ILD (interstitial lung disease) (H)     Primary osteoarthritis of left foot 08/13/2020    Uncomplicated asthma 2019       Past Surgical History:    Past Surgical History:   Procedure Laterality Date    BUNIONECTOMY Left     COLONOSCOPY  2015    ESOPHAGOSCOPY, GASTROSCOPY, DUODENOSCOPY (EGD), COMBINED N/A 03/13/2024    Procedure: ESOPHAGOGASTRODUODENOSCOPY, WITH BIOPSY;  Surgeon: Jerel Cabrrea MD;  Location: [facility]    EXAM UNDER ANESTHESIA RECTUM      GYN SURGERY  1996    tubal ligation    LAPAROSCOPIC GASTRIC SLEEVE N/A 9/10/2024    Procedure: GASTRECTOMY, SLEEVE, LAPAROSCOPIC;  Surgeon: Jerel Cabrera MD;  Location: [facility]    LAPAROSCOPIC HERNIORRHAPHY HIATAL N/A 9/10/2024    Procedure: HERNIORRHAPHY, HIATAL, LAPAROSCOPIC; cruropexy and esophagocruropexy;  Surgeon: Jerel Cabrera MD;  Location: [facility]    ORTHOPEDIC SURGERY  2021       Family History:    Family History   Problem Relation Age of Onset    Hypertension Mother     Osteoporosis Mother     Coronary Artery Disease Father     Hyperlipidemia Father     Cerebrovascular  Disease Father     Obesity Sister     Deep Vein Thrombosis (DVT) Sister     Breast Cancer Paternal Grandmother     Coronary Artery Disease Paternal Grandfather     Anxiety Disorder Daughter     Mental Illness Daughter     Obesity Daughter     Obesity Daughter     Anxiety Disorder Son     Anesthesia Reaction No family hx of        Social History:  Marital Status:   [2]  Social History     Tobacco Use    Smoking status: Never     Passive exposure: Never    Smokeless tobacco: Never   Vaping Use    Vaping status: Never Used   Substance Use Topics    Alcohol use: Yes     Comment: 2 glasses wine night    Drug use: Not Currently        Medications:    acetaminophen (TYLENOL) 325 MG tablet  albuterol (PROAIR HFA/PROVENTIL HFA/VENTOLIN HFA) 108 (90 Base) MCG/ACT inhaler  buPROPion (WELLBUTRIN XL) 150 MG 24 hr tablet  calcium carbonate (OS-JANETTE) 500 MG tablet  cyclobenzaprine (FLEXERIL) 5 MG tablet  cyclobenzaprine (FLEXERIL) 5 MG tablet  FLUoxetine (PROZAC) 40 MG capsule  fluticasone-salmeterol (ADVAIR) 250-50 MCG/ACT inhaler  hyoscyamine (LEVSIN) 0.125 MG tablet  metoprolol succinate ER (TOPROL XL) 25 MG 24 hr tablet  mycophenolate (GENERIC EQUIVALENT) 200 MG/ML suspension  omeprazole (PRILOSEC) 40 MG DR capsule  ondansetron (ZOFRAN ODT) 4 MG ODT tab  oxyCODONE (ROXICODONE) 5 MG tablet  scopolamine (TRANSDERM) 1 MG/3DAYS 72 hr patch  senna-docusate (SENOKOT-S/PERICOLACE) 8.6-50 MG tablet  traZODone (DESYREL) 50 MG tablet  Vitamin D, Cholecalciferol, 10 MCG (400 UNIT) TABS          Review of Systems   Constitutional:  Positive for fever.   HENT: Negative.     Eyes: Negative.    Respiratory:  Positive for cough and shortness of breath.    Gastrointestinal:  Positive for abdominal pain (Epigastric), constipation and nausea. Negative for vomiting.   Endocrine: Negative.    Genitourinary: Negative.    Musculoskeletal: Negative.    Skin: Negative.    Allergic/Immunologic: Negative.    Neurological: Negative.     Hematological: Negative.    Psychiatric/Behavioral: Negative.         Physical Exam   BP: 123/85  Pulse: 113  Temp: 98.1  F (36.7  C)  Resp: 20  SpO2: 94 %      GENERAL APPEARANCE:  The patient is a 64 year old well-developed, well-nourished individual in no acute distress that appears as stated age.  NECK:  Supple.  Trachea is midline.   CHEST:  Symmetric.  Non-tender to palpation.  No crepitus or deformity.  LUNGS:  Breathing is easy.  Breath sounds are equal and clear bilaterally.  No wheezes, rhonchi, or rales.  HEART:  Regular rate and rhythm with normal S1 and S2.  No murmurs, gallops, or rubs.  ABDOMEN:  Soft.  No mass, guarding, or rebound.  Generalized abdominal tenderness to palpation but mostly in the epigastric area.  No organomegaly or hernia.  Bowel sounds are present.  No CVA tenderness or flank mass.  No abdominal bruits or thrills present upon auscultation/palpation.  GENITOURINARY: No obvious anterior pelvic tenderness, hernia, mass noted to palpation.    NEUROLOGIC:  No focal sensory or motor deficits are noted.    PSYCHIATRIC:  The patient is awake, alert, and oriented x4.  Recent and remote memory is intact.  Appropriate mood and affect.  Calm and cooperative with history and physical exam.  SKIN:  Warm, dry, and well perfused.  Good turgor.  Surgical and trocar sites x 4 to abdomen.  No wound dehiscence noted.  No erythema or toxic striations.  Slight bruising around trocar sites.    Comment: Discrepancies between my note and notes on behalf of the nursing team or other care providers are secondary to my findings reflecting my physical examination and questioning of the patient.  Any conflicting information provided is not in line with my examination of the patient.       ED Course     ED Course as of 09/15/24 1327   Sun Sep 15, 2024   1046 Labs are previously ordered while patient in lobby.   1100 EKG 12-lead, tracing only  No acute findings on ECG.   1116 In to see patient and  history/physical completed.    1125 CT angio of chest/abdomen/pelvis ordered.   1250 CT angio of the chest/abdomen/pelvis returned showing no PE but does have new infrahilar mass.    1303 Contacted [facility] bariatric surgery for consult due to either hilar mass or postop hematoma.  They will call back.   1313 Discussed case with bariatric surgeon Dr. Juan C Burks at [facility].  States that no interventions need to be done and this is most definitely of postop hematoma.  States that if patient able to eat and drink and is doing well can discharge home.  Bariatric office will give patient a call tomorrow.   1317 Will be discharged home.  Close follow-up with bariatric surgery and pulmonary.  Return precautions given.          ECG:    ECG competed at 1043 and personally reviewed at 1100 showing sinus rhythm with ventricular rate of 85 and QTc of 452.  Normal axis.  Normal ECG.  When compared to ECG from 3/14/2024, no significant changes noted.           Results for orders placed or performed during the hospital encounter of 09/15/24 (from the past 24 hour(s))   EKG 12-lead, tracing only   Result Value Ref Range    Systolic Blood Pressure  mmHg    Diastolic Blood Pressure  mmHg    Ventricular Rate 85 BPM    Atrial Rate 85 BPM    OH Interval 146 ms    QRS Duration 76 ms     ms    QTc 452 ms    P Axis 34 degrees    R AXIS 6 degrees    T Axis 23 degrees    Interpretation ECG       Sinus rhythm  Normal ECG  When compared with ECG of 14-Mar-2024 14:05,  No significant change was found     [place] Draw    Narrative    The following orders were created for panel order [place] Draw.  Procedure                               Abnormality         Status                     ---------                               -----------         ------                     Extra Blue Top Tube[[id number]]                              Final result               Extra Red Top Tube[[id number]]                                Final result               Extra Green Top (Lithium...[[id number]]                      Final result               Extra Purple Top Tube[[id number]]                                                         Please view results for these tests on the individual orders.   CBC with Platelets & Differential    Narrative    The following orders were created for panel order CBC with Platelets & Differential.  Procedure                               Abnormality         Status                     ---------                               -----------         ------                     CBC with platelets and d...[[id number]]                      Final result                 Please view results for these tests on the individual orders.   Basic metabolic panel   Result Value Ref Range    Sodium 138 135 - 145 mmol/L    Potassium 4.0 3.4 - 5.3 mmol/L    Chloride 101 98 - 107 mmol/L    Carbon Dioxide (CO2) 25 22 - 29 mmol/L    Anion Gap 12 7 - 15 mmol/L    Urea Nitrogen 13.1 8.0 - 23.0 mg/dL    Creatinine 0.88 0.51 - 0.95 mg/dL    GFR Estimate 73 >60 mL/min/1.73m2    Calcium 9.1 8.8 - 10.4 mg/dL    Glucose 105 (H) 70 - 99 mg/dL   Troponin T, High Sensitivity   Result Value Ref Range    Troponin T, High Sensitivity 7 <=14 ng/L   Extra Blue Top Tube   Result Value Ref Range    Hold Specimen JIC    Extra Red Top Tube   Result Value Ref Range    Hold Specimen JIC    Extra Green Top (Lithium Heparin) Tube   Result Value Ref Range    Hold Specimen JIC    CBC with platelets and differential   Result Value Ref Range    WBC Count 6.2 4.0 - 11.0 10e3/uL    RBC Count 3.99 3.80 - 5.20 10e6/uL    Hemoglobin 12.1 11.7 - 15.7 g/dL    Hematocrit 37.6 35.0 - 47.0 %    MCV 94 78 - 100 fL    MCH 30.3 26.5 - 33.0 pg    MCHC 32.2 31.5 - 36.5 g/dL    RDW 13.7 10.0 - 15.0 %    Platelet Count 217 150 - 450 10e3/uL    % Neutrophils 60 %    % Lymphocytes 25 %    % Monocytes 13 %    % Eosinophils 2 %    % Basophils 0 %    % Immature Granulocytes 0 %    NRBCs per  100 WBC 0 <1 /100    Absolute Neutrophils 3.7 1.6 - 8.3 10e3/uL    Absolute Lymphocytes 1.6 0.8 - 5.3 10e3/uL    Absolute Monocytes 0.8 0.0 - 1.3 10e3/uL    Absolute Eosinophils 0.1 0.0 - 0.7 10e3/uL    Absolute Basophils 0.0 0.0 - 0.2 10e3/uL    Absolute Immature Granulocytes 0.0 <=0.4 10e3/uL    Absolute NRBCs 0.0 10e3/uL   Lipase   Result Value Ref Range    Lipase 27 13 - 60 U/L   Hepatic function panel   Result Value Ref Range    Protein Total 6.4 6.4 - 8.3 g/dL    Albumin 3.9 3.5 - 5.2 g/dL    Bilirubin Total 0.9 <=1.2 mg/dL    Alkaline Phosphatase 73 40 - 150 U/L    AST 23 0 - 45 U/L    ALT 18 0 - 50 U/L    Bilirubin Direct <0.20 0.00 - 0.30 mg/dL   XR Chest 2 Views    Narrative    PROCEDURE:  XR CHEST 2 VIEWS    HISTORY: SOB, .    COMPARISON:  3/28/2024    FINDINGS:  The cardiomediastinal contours are unchanged. The trachea is midline.  Partial atelectasis versus consolidation of the left lower lobe is  worse when compared to priors through 3/28/2024.    No suspicious osseous lesion or subdiaphragmatic free air.      Impression    IMPRESSION:      Partial atelectasis or consolidation of the left lower lobe is worse  when compared back to March. Recommend CT chest with contrast to  assess for a hilar or endobronchial lesion.      AL ALLEN MD         SYSTEM ID:  [id number]   Symptomatic Influenza A/B, RSV, & SARS-CoV2 PCR (COVID-19) Nasopharyngeal    Specimen: Nasopharyngeal; Swab   Result Value Ref Range    Influenza A PCR Negative Negative    Influenza B PCR Negative Negative    RSV PCR Negative Negative    SARS CoV2 PCR Negative Negative    Narrative    Testing was performed using the Xpert Xpress CoV2/Flu/RSV Assay on the Cepheid GeneXpert Instrument. This test should be ordered for the detection of SARS-CoV2, influenza, and RSV viruses in individuals with signs and symptoms of respiratory tract infection. This test is for in vitro diagnostic use under the [place] FDA for laboratories certified under  CLIA to perform high or moderate complexity testing. This test has been [place] FDA cleared. A negative result does not rule out the presence of PCR inhibitors in the specimen or target RNA in concentration below the limit of detection for the assay. If only one viral target is positive but coinfection with multiple targets is suspected, the sample should be re-tested with another FDA cleared, approved, or authorized test, if coninfection would change clinical management. This test was validated by the [facility] [place]. These laboratories are certified under the Clinical Laboratory Improvement Amendments of 1988 (CLIA-88) as qualified to perfom high complexity laboratory testing.   CT Chest (PE) Abdomen Pelvis w Contrast    Narrative    PROCEDURE:  CT CHEST PE ABDOMEN PELVIS W CONTRAST.    HISTORY:  Evaluate for pulmonary embolism.  Shortness of breath and  cough R/O PE, abdominal pain post surgery R/O gastric bypass  abnormality/bowel obstruction    TECHNIQUE:  Initial pre-contrast  and localizer images were  obtained.  Contrast enhanced helical thoracic CT angiography was then  performed.  Routine transaxial and post-processed (including 3D MIP)  reformations were obtained. This CT exam was performed using one or  more the following dose reduction techniques: automated exposure  control, adjustment of the mA and/or kV according to patient size,  and/or iterative reconstruction technique.    COMPARISON:  7/21/2023    MEDS/CONTRAST: isovue 370 66mL    CTA FINDINGS:  This is a diagnostic quality helical CT pulmonary  angiogram.  There is no acute pulmonary embolism to the first  subsegmental pulmonary artery level.    OTHER FINDINGS:      Postoperative changes are seen in the proximal stomach. An air-fluid  level is seen in the thoracic esophagus.    There is a new enhancing left infrahilar mass measuring 5.0 x 4.2 cm,  new when compared to 2023. There is partial atelectasis of the left  lower lobe.  There is a small left pleural effusion.    Scattered subcutaneous air is seen in the ventral chest, likely  postoperative. The heart size is unchanged. No suspicious thoracic  adenopathy is seen separate from the above-described mass.     Calcified gallstones are noted.    No suspicious osseous lesion is identified.      Impression    IMPRESSION:    No acute pulmonary emboli to the subsegmental level.    5 cm hyperdense left infrahilar mass, new when compared to 2023.  Neoplasm and postoperative hematoma are possible. Consider pulmonology  consultation versus EGD.    AL ALLEN MD         SYSTEM ID:  [id number]       Medications   iopamidol (ISOVUE-370) solution 66 mL (66 mLs Intravenous $Given 9/15/24 1208)   sodium chloride (PF) 0.9% PF flush 100 mL (100 mLs Intravenous $Given 9/15/24 1208)       Assessments & Plan (with Medical Decision Making)     I have reviewed the nursing notes.    I have reviewed the findings, diagnosis, plan and need for follow up with the patient.    Summary:  Patient presents to the ER today for cough with shortness of breath.  Potential diagnosis which have been considered and evaluated include MI/NSTEMI, PE, pneumonia, pneumothorax, viral illness, postop bowel obstruction, postop pain, postsurgical complication, as well as others. Many of these have been excluded using the various modalities and assessment as noted on the chart. At the present time, the diagnosis given seems to be the most likely postoperative hematoma.  Upon arrival, vitals signs show blood pressure of 123/85 with a pulse of 113.  Temperature 98.1  F.  Respirations 20 with oxygenation of 94% on room air.  The patient is alert and oriented.  Cardiac and respiratory examination benign.  Epigastric tenderness to palpation along with generalized abdominal tenderness.  Abdomen is soft.  Trocar sites to the abdomen x 4 with no signs of infection.  No wound dehiscence present.  Patient has not had bowel movement in 5  days which is abnormal.  Patient also complains of shortness of breath and is tachycardic.  ECG was obtained showing no acute abnormalities.  IV established and lab work obtained showing WBC of 6.2 with hemoglobin of 12.1.  Electrolytes, renal, hepatic functions normal.  Lipase normal at 27.  Influenza, COVID, RSV negative.  High-sensitivity troponin 7 making ACS unlikely as this has been going on for 3 days.  UA not obtained.  Chest x-ray personally reviewed showing consolidation in the left lower lobe of the lung.  Radiology did recommend CT of the chest.  With patient having surgery recently and tachycardia I do have concerns of PE so CT angio of the chest ordered also included CT of abdomen pelvis due to possible bowel obstruction post surgery and epigastric pain postsurgery.  This showed no PE but new finding of intra hilar mass on the left which also could be postop hematoma.  With patient having surgery 5 days prior and symptom onset 3 days ago, highly likely postoperative hematoma.  Discussed this case with bariatric surgeon Dr. Juan C Burks at [facility].  Feels that it is highly likely postoperative hematoma but no interventions need to be done.  As vital signs normal along with normal lab work patient okay to be discharged.  Bariatric office will call patient tomorrow.  Advised patient to return to ER if new or worsening symptoms, otherwise follow-up with pulmonology which is scheduled, very*surgeon, and PCP.  Patient verbalized understand agrees plan of care.  Patient discharged home.        Critical Care Time: None    Impression and plan discussed with patient. Questions answered, concerns addressed, indications for urgent re-evaluation reviewed, and  given. Patient/Parent/Caregiver agree with treatment plan and have no further questions at this time.  AVS provided at discharge.    This note was created by the Dragon Voice Dictation System. Inadvertent typographical errors, due to software  recognition problems, may still exist.             New Prescriptions    No medications on file       Final diagnoses:   Postoperative hematoma involving digestive system following digestive system procedure       9/15/2024   [facility]       Lalit Fitzpatrick APRN CNP  09/15/24 0167

## 2024-09-15 NOTE — ED NOTES
Discharge instructions reviewed and patient verbalizes understanding. Instructed to return with worsening symptoms/concerns. A/ox4, ambulatory upon discharge.

## 2024-09-16 ENCOUNTER — OFFICE VISIT (OUTPATIENT)
Dept: FAMILY MEDICINE | Facility: OTHER | Age: 64
End: 2024-09-16
Attending: STUDENT IN AN ORGANIZED HEALTH CARE EDUCATION/TRAINING PROGRAM
Payer: COMMERCIAL

## 2024-09-16 VITALS
WEIGHT: 193.3 LBS | RESPIRATION RATE: 18 BRPM | BODY MASS INDEX: 33 KG/M2 | HEART RATE: 94 BPM | HEIGHT: 64 IN | TEMPERATURE: 97.2 F | OXYGEN SATURATION: 93 % | SYSTOLIC BLOOD PRESSURE: 134 MMHG | DIASTOLIC BLOOD PRESSURE: 90 MMHG

## 2024-09-16 DIAGNOSIS — I10 ESSENTIAL HYPERTENSION: Chronic | ICD-10-CM

## 2024-09-16 DIAGNOSIS — K59.03 DRUG-INDUCED CONSTIPATION: ICD-10-CM

## 2024-09-16 DIAGNOSIS — Z90.3 H/O GASTRIC SLEEVE: ICD-10-CM

## 2024-09-16 DIAGNOSIS — E66.812 CLASS 2 SEVERE OBESITY WITH SERIOUS COMORBIDITY AND BODY MASS INDEX (BMI) OF 38.0 TO 38.9 IN ADULT, UNSPECIFIED OBESITY TYPE (H): ICD-10-CM

## 2024-09-16 DIAGNOSIS — E66.01 CLASS 2 SEVERE OBESITY WITH SERIOUS COMORBIDITY AND BODY MASS INDEX (BMI) OF 38.0 TO 38.9 IN ADULT, UNSPECIFIED OBESITY TYPE (H): ICD-10-CM

## 2024-09-16 DIAGNOSIS — F33.1 MODERATE EPISODE OF RECURRENT MAJOR DEPRESSIVE DISORDER (H): Primary | ICD-10-CM

## 2024-09-16 LAB
ATRIAL RATE - MUSE: 85 BPM
DIASTOLIC BLOOD PRESSURE - MUSE: NORMAL MMHG
INTERPRETATION ECG - MUSE: NORMAL
P AXIS - MUSE: 34 DEGREES
PR INTERVAL - MUSE: 146 MS
QRS DURATION - MUSE: 76 MS
QT - MUSE: 380 MS
QTC - MUSE: 452 MS
R AXIS - MUSE: 6 DEGREES
SYSTOLIC BLOOD PRESSURE - MUSE: NORMAL MMHG
T AXIS - MUSE: 23 DEGREES
VENTRICULAR RATE- MUSE: 85 BPM

## 2024-09-16 PROCEDURE — G2211 COMPLEX E/M VISIT ADD ON: HCPCS | Performed by: STUDENT IN AN ORGANIZED HEALTH CARE EDUCATION/TRAINING PROGRAM

## 2024-09-16 PROCEDURE — G0463 HOSPITAL OUTPT CLINIC VISIT: HCPCS

## 2024-09-16 PROCEDURE — 99214 OFFICE O/P EST MOD 30 MIN: CPT | Performed by: STUDENT IN AN ORGANIZED HEALTH CARE EDUCATION/TRAINING PROGRAM

## 2024-09-16 RX ORDER — AMOXICILLIN 250 MG
2 CAPSULE ORAL DAILY PRN
Qty: 30 TABLET | Refills: 1 | Status: SHIPPED | OUTPATIENT
Start: 2024-09-16 | End: 2024-09-18

## 2024-09-16 RX ORDER — FLUOXETINE 20 MG/5ML
40 SOLUTION ORAL DAILY
Qty: 300 ML | Refills: 2 | Status: SHIPPED | OUTPATIENT
Start: 2024-09-16

## 2024-09-16 ASSESSMENT — ANXIETY QUESTIONNAIRES
GAD7 TOTAL SCORE: 5
8. IF YOU CHECKED OFF ANY PROBLEMS, HOW DIFFICULT HAVE THESE MADE IT FOR YOU TO DO YOUR WORK, TAKE CARE OF THINGS AT HOME, OR GET ALONG WITH OTHER PEOPLE?: SOMEWHAT DIFFICULT
7. FEELING AFRAID AS IF SOMETHING AWFUL MIGHT HAPPEN: SEVERAL DAYS

## 2024-09-16 ASSESSMENT — ASTHMA QUESTIONNAIRES
ACT_TOTALSCORE: 19
QUESTION_4 LAST FOUR WEEKS HOW OFTEN HAVE YOU USED YOUR RESCUE INHALER OR NEBULIZER MEDICATION (SUCH AS ALBUTEROL): NOT AT ALL
QUESTION_2 LAST FOUR WEEKS HOW OFTEN HAVE YOU HAD SHORTNESS OF BREATH: MORE THAN ONCE A DAY
QUESTION_3 LAST FOUR WEEKS HOW OFTEN DID YOUR ASTHMA SYMPTOMS (WHEEZING, COUGHING, SHORTNESS OF BREATH, CHEST TIGHTNESS OR PAIN) WAKE YOU UP AT NIGHT OR EARLIER THAN USUAL IN THE MORNING: NOT AT ALL
ACT_TOTALSCORE: 19
QUESTION_1 LAST FOUR WEEKS HOW MUCH OF THE TIME DID YOUR ASTHMA KEEP YOU FROM GETTING AS MUCH DONE AT WORK, SCHOOL OR AT HOME: A LITTLE OF THE TIME
QUESTION_5 LAST FOUR WEEKS HOW WOULD YOU RATE YOUR ASTHMA CONTROL: WELL CONTROLLED

## 2024-09-16 NOTE — PROGRESS NOTES
Reviewed case. Likely postop hematoma. Labs, vitals WNL. No s/sx leak/sepsis. Case also d/w Dr Cabrera. Will have patient called in AM. Will need a followup CT in 1-2 mo to ensure resolution    Juan C Burks MD

## 2024-09-16 NOTE — PROGRESS NOTES
Video-Visit Details    Type of service:  Video Visit    Video Start Time: 10:33 AM   Video End Time: 10:58 AM     Originating Location (pt. Location): Home    Distant Location (provider location):  Offsite (providers home) [facility] [place]     Platform used for Video Visit: 1Lay    Nutrition Assessment  Reason For Visit:  Elise Rubi is a 64 year old female presenting today for nutrition follow-up, 1 week s/p laparoscopic sleeve gastrectomy on 9/10/24 with Dr Cabrera.      Patient referred by EARL Varela on January 22, 2024.    Anthropometrics  Initial Consult Weight: 222 lbs  Day of Surgery Weight(9/10/24): 194 lbs  Current Weight: 190 lbs  Weight loss: -32 lbs from initial consult; -4 lbs from day of surgery    Current Vitamins/Minerals: Planning to start Hailey's MVI with iron - 2 per day.     Nutrition History  Pt presented to the ED on 9/15/24 due to cough with shortness of breath. Found to have a post operative hematoma. Reports she has a better pain management plan in place now.     Pt reports consuming and tolerating for the most part the bariatric clear and low-fat full liquid diets. Fluid intake appears adequate, consuming 48-64 oz/day. Consuming peppermint tea, water, Gatorade Zero, broth. Having a hard time eating foods but continues to try different things. Explained this will likely get easier as the pain gets better managed. Patient has been mixing a scoop of protein powder into bone broth. Cream of wheat with protein powder. Tomato soup with protein powder.     Hasn't been too active over the last few days due to being tired but is hopefully this will get better with time.     Nutrition Prescription:  Grams Protein: 60 (minimum)  Amount of Fluid: 48-64 oz    Nutrition Diagnosis  Food and nutrition-related knowledge deficit r/t lack of prior exposure to diet advancements beyond bariatric low-fat full liquid diet aeb recent bariatric surgery and  pt interest in diet education/review    Intervention  Intervention At Appointment:  Materials/education provided on bariatric pureed and soft diets, protein intake, fluid intake, eating pace, portion control, avoiding excess sugar and fat, recommended vitamin/mineral supplements. Patient demonstrates understanding.     Expected Engagement: Good    Goals:  1) Follow diet advancement schedule below.  2) Work towards 60 gm protein/day.  3) Consume 48-64+ oz fluids daily- between meals only once on puree diet  4) Eat slowly (>20 min/meal), chewing well to smooth consistency once on the bariatric soft diet.  5) Limit portions to ~1/4 to 1/2 cup/meal.  6) Start chewable/liquid multivitamin/minerals daily    Post-op Diet Advancement Schedule:  Low-Fat Full Liquid Diet (stage 2): start 9/11   Pureed Diet (stage 3): start 9/24  Soft Diet (stage 4): start 10/8   Regular Diet (stage 5): start 11/5     Post-op Diet Handouts:  Diet Guidelines after Weight-loss Surgery  [email or web address]     Your Stage 2 Diet: Low-fat Full Liquids  [email or web address]     Your Stage 3 Diet: Pureed Foods  [email or web address]     Pureed Recipes  [email or web address]    Your Stage 4 Diet: Soft Foods  [email or web address]    Your Stage 5 Diet: Regular Foods  [email or web address]    Supplements after Sleeve Gastrectomy, Gastric Bypass or Single Anastomosis Duodenal Switch  [email or web address]    Keeping Track of Fluids  [email or web address]    Exercise Guidelines after Weight Loss Surgery (1st 4-6 weeks)  [email or web address]    Follow-Up: October 11    Time spent with patient: 25 minutes.  Caroline Mcduffie RD, LD

## 2024-09-16 NOTE — PROGRESS NOTES
Assessment & Plan     Moderate episode of recurrent major depressive disorder (H)  Improving. Doing well on Wellbutrin. Brighter affect, less anhedonia.   With gastric sleeve, unable to do capsule medication. Will change prozac to liquid - she did not tolerate crushing capsules due to taste.   Follow up in 3 months to reassess, sooner if concerns.   - FLUoxetine (PROZAC) 20 MG/5ML solution; Take 10 mLs (40 mg) by mouth daily.    Essential hypertension  Elevated today but suspect recent surgery contributing. Was well controlled prior. Would continue metoprolol and monitor BP at home. Follow up if persistently elevated.     H/O gastric sleeve  Class 2 severe obesity with serious comorbidity and body mass index (BMI) of 38.0 to 38.9 in adult, unspecified obesity type (H)  Surgery six days ago with gastric sleeve. Recovery complicated by hematoma formation, causing some shortness of breath and abdominal pain/discomfort.   Reviewed Emergency Department workup yesterday and normal labs.   Has follow up tomorrow with nutrition, further close follow up planned with endo and pharmacy. Has struggled with the liquid diet requirements.     Drug-induced constipation  BM this morning, first one since surgery and did not feel complete.   Last oxycodone use was last week (3+ days ago).  She was supposed to take Senna, lost the RX.   Will refill senna. Discussed hydration.  If vomiting, severe pain, no stools/not passing gas, would need to be re-evaluated.   - senna-docusate (SENOKOT-S/PERICOLACE) 8.6-50 MG tablet; Take 2 tablets by mouth daily as needed for constipation (While taking narcotic pain medications.  Stop taking if having loose stools.).          30 minutes spent by me on the date of the encounter doing chart review, history and exam, documentation and further activities per the note    The longitudinal plan of care for the diagnosis(es)/condition(s) as documented were addressed during this visit. Due to the added  complexity in care, I will continue to support Ju in the subsequent management and with ongoing continuity of care.      Subjective   Ju is a 64 year old, presenting for the following health issues:  Anxiety and Hypertension        9/16/2024     3:18 PM   Additional Questions   Roomed by alena rojo   Accompanied by spouse         9/16/2024     3:18 PM   Patient Reported Additional Medications   Patient reports taking the following new medications none     History of Present Illness       Mental Health Follow-up:  Patient presents to follow-up on Anxiety.    Patient's anxiety since last visit has been:  Better  The patient is not having other symptoms associated with anxiety.  Any significant life events: other  Patient is not feeling anxious or having panic attacks.  Patient has no concerns about alcohol or drug use.    Hypertension: She presents for follow up of hypertension.  She does not check blood pressure  regularly outside of the clinic. Outside blood pressures have been over 140/90. She does not follow a low salt diet.     She eats 0-1 servings of fruits and vegetables daily.She consumes 0 sweetened beverage(s) daily.She exercises with enough effort to increase her heart rate 9 or less minutes per day.  She exercises with enough effort to increase her heart rate 7 days per week.   She is taking medications regularly.       Hypertension Follow-up    Do you check your blood pressure regularly outside of the clinic? No   Are you following a low salt diet? No  Are your blood pressures ever more than 140 on the top number (systolic) OR more   than 90 on the bottom number (diastolic), for example 140/90? Yes    Has been high since surgery. Slowly coming down.   Taking metoprolol.     Anxiety   How are you doing with your anxiety since your last visit? Improved   Are you having other symptoms that might be associated with anxiety? No  Have you had a significant life event? OTHER: surgery    Are you feeling  "depressed? No  Do you have any concerns with your use of alcohol or other drugs? No    Not taking prozac right now - hard to take after recent bariatric surgery. Can't do capsules. If she breaks them open, tastes horrible and sticks to mouth.   Wellbutrin helped with interest in doing things.     Per note 8/8/24 -   Very difficult to do things she once enjoyed.   Last remember \"loving life\" when she was on adderall.   Routine activities are \"dull\" to her.   Very inactive.   Less stress than in the past.   A  lot of trouble with follow through on tasks    Six days out from surgery right now.   Seen in Emergency Department for shortness of breath and dry cough.   No PE but has postoperative hematoma.   Still having discomfort with eating - trying to do slow meals   Lost the Senna - BM this morning but still feels backed up.   Last took oxycodone 3 days ago. Took for a few days.   Lost the senna they sent in for her.     Social History     Tobacco Use    Smoking status: Never     Passive exposure: Never    Smokeless tobacco: Never   Vaping Use    Vaping status: Never Used   Substance Use Topics    Alcohol use: Yes     Comment: 2 glasses wine night    Drug use: Not Currently         6/13/2024     1:19 PM 8/8/2024     9:56 AM 9/16/2024     3:08 PM   EDMAR-7 SCORE   Total Score  4 (minimal anxiety) 5 (mild anxiety)   Total Score 8 4 5         6/20/2024     8:27 AM 8/8/2024     9:55 AM 8/16/2024     1:19 PM   PHQ   PHQ-9 Total Score 3 12 5   Q9: Thoughts of better off dead/self-harm past 2 weeks Not at all Not at all Not at all           Objective    BP (!) 135/92 (BP Location: Left arm, Patient Position: Sitting, Cuff Size: Adult Large)   Pulse 94   Temp 97.2  F (36.2  C) (Tympanic)   Resp 18   Ht 1.626 m (5' 4\")   Wt 87.7 kg (193 lb 4.8 oz)   SpO2 93%   BMI 33.18 kg/m    Body mass index is 33.18 kg/m .    Physical Exam  Constitutional:       General: She is not in acute distress.     Appearance: Normal appearance. " She is not ill-appearing.   Pulmonary:      Effort: Pulmonary effort is normal.   Skin:     General: Skin is warm and dry.   Neurological:      General: No focal deficit present.      Mental Status: She is alert.   Psychiatric:         Mood and Affect: Mood normal.         Behavior: Behavior normal.         Thought Content: Thought content normal.         Judgment: Judgment normal.                  Signed Electronically by: Jessie Rose MD

## 2024-09-17 ENCOUNTER — VIRTUAL VISIT (OUTPATIENT)
Dept: ENDOCRINOLOGY | Facility: CLINIC | Age: 64
End: 2024-09-17
Payer: COMMERCIAL

## 2024-09-17 ENCOUNTER — CARE COORDINATION (OUTPATIENT)
Dept: ENDOCRINOLOGY | Facility: CLINIC | Age: 64
End: 2024-09-17

## 2024-09-17 VITALS — WEIGHT: 190 LBS | BODY MASS INDEX: 32.61 KG/M2

## 2024-09-17 DIAGNOSIS — T14.8XXA HEMATOMA: ICD-10-CM

## 2024-09-17 DIAGNOSIS — Z90.3 H/O GASTRIC SLEEVE: Primary | ICD-10-CM

## 2024-09-17 DIAGNOSIS — Z98.84 S/P LAPAROSCOPIC SLEEVE GASTRECTOMY: ICD-10-CM

## 2024-09-17 DIAGNOSIS — Z71.3 NUTRITIONAL COUNSELING: Primary | ICD-10-CM

## 2024-09-17 PROCEDURE — 99207 PR NO CHARGE LOS: CPT | Mod: 95

## 2024-09-17 PROCEDURE — 97803 MED NUTRITION INDIV SUBSEQ: CPT | Mod: 95

## 2024-09-17 ASSESSMENT — PAIN SCALES - GENERAL: PAINLEVEL: MODERATE PAIN (4)

## 2024-09-17 NOTE — PATIENT INSTRUCTIONS
Henrry Dodd,     Follow-up with RD on October 11.     Thank you,    Caroline Mcduffie, RD, LD  If you would like to schedule or reschedule an appointment with the RD, please call [phone]    Nutrition Goals    1) Follow diet advancement schedule below.  2) Work towards 60 gm protein/day.  3) Consume 48-64+ oz fluids daily- between meals only once on puree diet  4) Eat slowly (>20 min/meal), chewing well to smooth consistency once on the bariatric soft diet.  5) Limit portions to ~1/4 to 1/2 cup/meal.  6) Start chewable/liquid multivitamin/minerals daily    Post-op Diet Advancement Schedule:  Low-Fat Full Liquid Diet (stage 2): start 9/11   Pureed Diet (stage 3): start 9/24  Soft Diet (stage 4): start 10/8   Regular Diet (stage 5): start 11/5     Post-op Diet Handouts:  Diet Guidelines after Weight-loss Surgery  [email or web address]     Your Stage 2 Diet: Low-fat Full Liquids  [email or web address]     Your Stage 3 Diet: Pureed Foods  [email or web address]     Pureed Recipes  [email or web address]    Your Stage 4 Diet: Soft Foods  [email or web address]    Your Stage 5 Diet: Regular Foods  [email or web address]    Supplements after Sleeve Gastrectomy, Gastric Bypass or Single Anastomosis Duodenal Switch  [email or web address]    Keeping Track of Fluids  [email or web address]    Exercise Guidelines after Weight Loss Surgery (1st 4-6 weeks)  [email or web address]    COMPREHENSIVE WEIGHT MANAGEMENT PROGRAM  VIRTUAL SUPPORT GROUPS    At [facility], our Comprehensive Weight Management program offers on-line support groups for patients who are working on weight loss and considering, preparing for, or have had weight loss surgery.     There is no cost for this opportunity.  You are invited to attend the?Virtual Support Groups?provided by any of the following locations:    [facility] via Unlimited Concepts Teams with Sandra Hopper RN  2.    [place] via Microsoft Teams with Lalit Keenan, PhD, LP  3.   [place] via [place] with Shea Winston RN  4.   [facility] via a Zoom Meeting with LLUVIA Ng    The following Support Group information can also be found on our website:  [email or web address]      [facility] Weight Loss Surgery Support Group  The support group is a patient-lead forum that meets monthly to share experiences, encouragement and education. It is open to those who have had weight loss surgery, are scheduled for surgery, or are considering surgery.   WHEN: This group meets on the 3rd Wednesday of each month from 5:00PM - 6:00PM virtually using Microsoft Teams.   FACILITATOR: Led by Sandra Covarrubias RD, MADHURI, RN, the program's Clinical Coordinator.   TO REGISTER: Please contact the clinic via Looxcie or call the nurse line directly at [phone] to inform our staff that you would like an invite sent to you and to let us know the email you would like the invite sent to. Prior to the meeting, a link with directions on how to join the meeting will be sent to you.    2024 Meetings   January 17  February 21  March 20  April 17  May 15  Alexandra 19      [facility] Bariatric Care Support Group?  This is open to all pre- and post- operative bariatric surgery patients as well as their support system.   WHEN: This group meets the 3rd Tuesday of each month from 6:30 PM - 8:00 PM virtually using Microsoft Teams.   FACILITATOR: Led by Lalit Keenan, Ph.D who is a Licensed Psychologist with the [facility] Comprehensive Weight Management Program.   TO REGISTER: Please send an email to Lalit Keenan, Ph.D., LP at?[email or web address]?if you would like an invitation to the group. Prior to the meeting, a link with directions on how to join the meeting will be sent to you.    2024  "Meetings  January 16: \"Medication Management and Bariatric Surgery\", Jackie Gambino, PharmD, Pharmacy Resident at [facility], [facility]  February 20: \"A Bariatric Surgery Patient's Perspective\", ELENA Torres, [facility], Behavioral Health Clinician at [facility]  March 19  April 16  May 21  Alexandra 18: \"Nutritional Labeling\", Dietitian speaker to be announced.  November 19: \"Holiday Eating\", Dietitian speaker to be announced.    [facility] Post-Operative Bariatric Surgery Support Group  This is a support group for [facility] bariatric patients (and those external to [facility]) who have had bariatric surgery and are at least 3 months post-surgery.  WHEN: This support group meets the 4th Wednesday of the month from 11:00 AM - 12:00 PM virtually using Microsoft Teams.   FACILITATOR: Led by Certified Bariatric Nurse, Shea Winston RN.   TO REGISTER: Please send an email to Shea at [email or web address] if you would like an invitation to the group.  Prior to the meeting, a link with directions on how to join the meeting will be sent to you.    2024 Meetings  January 24  February 28  March 27  April 24  May 22  Alexandra 26    [facility] Healthy Lifestyle Group?  This is a 60 minute virtual coaching group for those who want to lead a healthier lifestyle. Come together to set goals and overcome barriers in a supportive group environment. We will address the four pillars of health: nutrition, exercise, sleep and emotional well-being.  This group is highly recommended for those who are participating in the 24 week Healthy Lifestyle Plan and our Health Coaching sessions.  WHEN: This group meets the 1st Friday of the month, 12:30 PM - 1:30 PM online, via a zoom meeting.    FACILITATOR: Led by National Board Certified Health and , [facility].   TO REGISTER: " "Please call the Call Center at [phone] to register. You will get an appointment to attend in [employer]. Fifteen minutes prior to the meeting, complete the e-check in and you will get the link to join the meeting.    There is no charge to attend this group and space is limited.     2024 Meetings  January 5: \"New Years Vision: Manifest your Best 2024!\" (guided imagery,  journaling and discussion)  February 2: \"Let's Talk\"  March 1: \"10 Percent Happier\" by Priyank Christensen (Book Bites - a guided discussion on the nuggets of wisdom from favorite wellness books, no need to read the book but highly encouraged)  April 5: \"Let's Talk\"  May 3: \"Essentialism: The Disciplined Pursuit of Less\" by Jorge Rosario (Book Bites discussion)  June 7: \"Let's Talk\"  July 5: NO MEETING, off for the 4th of July Holiday  August 2: \"The Blue Zones, Secrets for Living a Longer Life\" by Priyank Sofia (Book Bites discussion)        "

## 2024-09-17 NOTE — LETTER
9/17/2024       RE: Elsie Rubi  [address] [facility] [address]     Dear Colleague,    Thank you for referring your patient, Elsie Rubi, to the [facility] WEIGHT MANAGEMENT CLINIC [place] at [facility]. Please see a copy of my visit note below.    Video-Visit Details    Type of service:  Video Visit    Video Start Time: 10:33 AM   Video End Time: 10:58 AM     Originating Location (pt. Location): Home    Distant Location (provider location):  Offsite (providers home) [facility] WEIGHT MANAGEMENT CLINIC [place]     Platform used for Video Visit: Gogobeans    Nutrition Assessment  Reason For Visit:  Elsie Rubi is a 64 year old female presenting today for nutrition follow-up, 1 week s/p laparoscopic sleeve gastrectomy on 9/10/24 with Dr Cabrera.      Patient referred by EARL Varela on January 22, 2024.    Anthropometrics  Initial Consult Weight: 222 lbs  Day of Surgery Weight(9/10/24): 194 lbs  Current Weight: 190 lbs  Weight loss: -32 lbs from initial consult; -4 lbs from day of surgery    Current Vitamins/Minerals: Planning to start [place]'s MVI with iron - 2 per day.     Nutrition History  Pt presented to the ED on 9/15/24 due to cough with shortness of breath. Found to have a post operative hematoma. Reports she has a better pain management plan in place now.     Pt reports consuming and tolerating for the most part the bariatric clear and low-fat full liquid diets. Fluid intake appears adequate, consuming 48-64 oz/day. Consuming peppermint tea, water, Gatorade Zero, broth. Having a hard time eating foods but continues to try different things. Explained this will likely get easier as the pain gets better managed. Patient has been mixing a scoop of protein powder into bone broth. Cream of wheat with protein powder. Tomato soup with protein powder.     Hasn't been too active over the last few days due to being tired but  is hopefully this will get better with time.     Nutrition Prescription:  Grams Protein: 60 (minimum)  Amount of Fluid: 48-64 oz    Nutrition Diagnosis  Food and nutrition-related knowledge deficit r/t lack of prior exposure to diet advancements beyond bariatric low-fat full liquid diet aeb recent bariatric surgery and pt interest in diet education/review    Intervention  Intervention At Appointment:  Materials/education provided on bariatric pureed and soft diets, protein intake, fluid intake, eating pace, portion control, avoiding excess sugar and fat, recommended vitamin/mineral supplements. Patient demonstrates understanding.     Expected Engagement: Good    Goals:  1) Follow diet advancement schedule below.  2) Work towards 60 gm protein/day.  3) Consume 48-64+ oz fluids daily- between meals only once on puree diet  4) Eat slowly (>20 min/meal), chewing well to smooth consistency once on the bariatric soft diet.  5) Limit portions to ~1/4 to 1/2 cup/meal.  6) Start chewable/liquid multivitamin/minerals daily    Post-op Diet Advancement Schedule:  Low-Fat Full Liquid Diet (stage 2): start 9/11   Pureed Diet (stage 3): start 9/24  Soft Diet (stage 4): start 10/8   Regular Diet (stage 5): start 11/5     Post-op Diet Handouts:  Diet Guidelines after Weight-loss Surgery  [email or web address]     Your Stage 2 Diet: Low-fat Full Liquids  [email or web address]     Your Stage 3 Diet: Pureed Foods  [email or web address]     Pureed Recipes  [email or web address]    Your Stage 4 Diet: Soft Foods  [email or web address]    Your Stage 5 Diet: Regular Foods  [email or web address]    Supplements after Sleeve Gastrectomy, Gastric Bypass or Single Anastomosis Duodenal Switch  [email or web address]    Keeping Track of Fluids  [email or web address]    Exercise Guidelines after Weight Loss Surgery (1st 4-6 weeks)  [email or web address]    Follow-Up:  October 11    Time spent with patient: 25 minutes.  Caroline Mcduffie RD, LD      Again, thank you for allowing me to participate in the care of your patient.      Sincerely,    Caroline Mcduffie RD     Have You Had A Chemical Peel Before?: has had a previous peel

## 2024-09-17 NOTE — NURSING NOTE
Is the patient currently in the state of [place]? YES    Location: home    Visit mode:VIDEO    If the visit is dropped, the patient can be reconnected by: VIDEO VISIT: Text to cell phone:   Telephone Information:   Mobile [phone]    and VIDEO VISIT: Send to e-mail at: [email or web address]    Will anyone else be joining the visit? NO  (If patient encounters technical issues they should call [phone]    How would you like to obtain your AVS? MyChart    Are changes needed to the allergy or medication list? No    Are refills needed on medications prescribed by this physician? NO    Reason for visit: BRENDA ASHLEY    QNR Status: n/a

## 2024-09-17 NOTE — PROGRESS NOTES
Patient states she is having a lot of pain left upper abdomen under her diaphragm. Feels she is breathing shallow at times due to the pain. Tolerating full liquid diet, meeting fluid/protein goals. She took flexeril for pain last night and is not having pain today.  Advised patient to take levsin every 4 hours, flexeril every 12 hours and Tylenol every 6 hours for pain control. To ice area every hour for 15 minutes. To go to the ED if she develops, fast HR, dizziness, SOB, or uncontrolled pain.   Patient verbalized understanding. Plan to call and check patient status tomorrow.  CT abdomen/pelvis ordered per Dr Burks in 1 month to check status of hematoma, patient aware.    Walking

## 2024-09-18 ENCOUNTER — VIRTUAL VISIT (OUTPATIENT)
Dept: ENDOCRINOLOGY | Facility: CLINIC | Age: 64
End: 2024-09-18
Payer: COMMERCIAL

## 2024-09-18 VITALS — HEIGHT: 64 IN | WEIGHT: 189 LBS | BODY MASS INDEX: 32.27 KG/M2

## 2024-09-18 DIAGNOSIS — Z90.3 H/O GASTRIC SLEEVE: Primary | ICD-10-CM

## 2024-09-18 PROCEDURE — 99024 POSTOP FOLLOW-UP VISIT: CPT | Mod: 95 | Performed by: PHYSICIAN ASSISTANT

## 2024-09-18 RX ORDER — URSODIOL 300 MG/1
300 CAPSULE ORAL 2 TIMES DAILY
Qty: 60 CAPSULE | Refills: 5 | Status: SHIPPED | OUTPATIENT
Start: 2024-09-18

## 2024-09-18 ASSESSMENT — PAIN SCALES - GENERAL: PAINLEVEL: MILD PAIN (2)

## 2024-09-18 NOTE — LETTER
"9/18/2024       RE: Elsie Rubi  [address] [facility] [address]     Dear Colleague,    Thank you for referring your patient, Elsie Rubi, to the [facility] WEIGHT MANAGEMENT CLINIC [place] at [facility]. Please see a copy of my visit note below.    Postoperative bariatric surgery visit.    Patient underwent sleeve gastrectomy 1 weeks ago Dr Cabrera    Did well Days 0-3 and starting feeling sick day 4  ED visit 9/15/24 SOB, cough, weak.  Neg for PE and found hematoma. BP normal.   but now 80s at home. Troponin neg and EKG normal.  Started feeling better yesterday when taking levsin q4 hours, takes zofran in the am preventatively but not feeling nausea.   Planning to repeat CT in 1 month to look at hematoma      Tolerating liquids: 48-64 oz daily and tolerating full liquids. Saw RD yesterday  Lightheadedness: occasionally with standing  Abdominal pain: epigastric discomfort  Bowel movements: didn't have BM for 1 week, took laxative OTC but couldn't remember which one, had BM yesterday.   Fevers/shakes/chills: None  GERD: epigastric discomfort/pressure but no burning.  Taking omeprazole 20mg daily  Leg/calf pain: None    How many opioid pain medications used after surgery? 9 What did you do with extra pills? At home      Ht 1.626 m (5' 4\")   Wt 85.7 kg (189 lb)   BMI 32.44 kg/m    NAD  Overall looks good  Incisions c/d/i; healing well per pt.    Plan:  1. RD visit today.  2. Start vitamin supplements per RD directions.  3. Advance diet per RD directions.  4. Follow-up: 3 weeks RD and ASHELY  5. Actigall prescription? Send today  6. B12 SL or injection: Plan on SL or purchase sánchez MVI.   7. Pathology reviewed     Final Diagnosis  A. STOMACH, GREATER CURVATURE, PARTIAL GASTRECTOMY:  - Unremarkable stomach  - No H. pylori-like organisms identified on routine staining  - No intestinal metaplasia identified  8. Weight loss medications: Wegovy or " Zepbound pre-op  9. Need to restart statin? N/A      Kaya Whiteside PA-C MPAS  Lead PA Surgical and Medical Weight Management     [phone]  [email or web address]        Virtual Visit Details    Type of service:  Video Visit   Video Start Time:  1010  Video End Time: 1045    Originating Location (pt. Location): Home    Distant Location (provider location):  Off-site  Platform used for Video Visit: Kelsy        Again, thank you for allowing me to participate in the care of your patient.      Sincerely,    Kaya Whiteside PA-C

## 2024-09-18 NOTE — PROGRESS NOTES
"Postoperative bariatric surgery visit.    Patient underwent sleeve gastrectomy 1 weeks ago Dr Cabrera    Did well Days 0-3 and starting feeling sick day 4  ED visit 9/15/24 SOB, cough, weak.  Neg for PE and found hematoma. BP normal.   but now 80s at home. Troponin neg and EKG normal.  Started feeling better yesterday when taking levsin q4 hours, takes zofran in the am preventatively but not feeling nausea.   Planning to repeat CT in 1 month to look at hematoma      Tolerating liquids: 48-64 oz daily and tolerating full liquids. Saw RD yesterday  Lightheadedness: occasionally with standing  Abdominal pain: epigastric discomfort  Bowel movements: didn't have BM for 1 week, took laxative OTC but couldn't remember which one, had BM yesterday.   Fevers/shakes/chills: None  GERD: epigastric discomfort/pressure but no burning.  Taking omeprazole 20mg daily  Leg/calf pain: None    How many opioid pain medications used after surgery? 9 What did you do with extra pills? At home      Ht 1.626 m (5' 4\")   Wt 85.7 kg (189 lb)   BMI 32.44 kg/m    NAD  Overall looks good  Incisions c/d/i; healing well per pt.    Plan:  1. RD visit today.  2. Start vitamin supplements per RD directions.  3. Advance diet per RD directions.  4. Follow-up: 3 weeks RD and ASHELY  5. Actigall prescription? Send today  6. B12 SL or injection: Plan on SL or purchase sánchez MVI.   7. Pathology reviewed     Final Diagnosis  A. STOMACH, GREATER CURVATURE, PARTIAL GASTRECTOMY:  - Unremarkable stomach  - No H. pylori-like organisms identified on routine staining  - No intestinal metaplasia identified  8. Weight loss medications: Wegovy or Zepbound pre-op  9. Need to restart statin? N/A      Kaya Whiteside PA-C MPAS  Lead PA Surgical and Medical Weight Management     [phone]  [email or web address]        Virtual Visit Details    Type of service:  Video Visit   Video Start Time:  1010  Video End Time: 1045    Originating Location (pt. Location): " Home    Distant Location (provider location):  Off-site  Platform used for Video Visit: Kelsy

## 2024-09-18 NOTE — NURSING NOTE
Current patient location: [address] [place] [id number]    Is the patient currently in the state of [place]? YES    Visit mode:VIDEO    If the visit is dropped, the patient can be reconnected by: VIDEO VISIT: Text to cell phone:   Telephone Information:   Mobile [phone]       Will anyone else be joining the visit? NO  (If patient encounters technical issues they should call [phone]    How would you like to obtain your AVS? MyChart    Are changes needed to the allergy or medication list? No    Are refills needed on medications prescribed by this physician? NO    Rooming Documentation:  Questionnaire(s) not pre-assigned    Pt states 2/10 upper abdominal pain today.    Reason for visit: RECHECK    [place] ASHLEY

## 2024-09-29 ENCOUNTER — APPOINTMENT (OUTPATIENT)
Dept: GENERAL RADIOLOGY | Facility: HOSPITAL | Age: 64
End: 2024-09-29
Attending: STUDENT IN AN ORGANIZED HEALTH CARE EDUCATION/TRAINING PROGRAM
Payer: COMMERCIAL

## 2024-09-29 ENCOUNTER — HOSPITAL ENCOUNTER (EMERGENCY)
Facility: HOSPITAL | Age: 64
Discharge: HOME | End: 2024-09-29
Attending: STUDENT IN AN ORGANIZED HEALTH CARE EDUCATION/TRAINING PROGRAM
Payer: COMMERCIAL

## 2024-09-29 VITALS
TEMPERATURE: 98.3 F | SYSTOLIC BLOOD PRESSURE: 138 MMHG | OXYGEN SATURATION: 100 % | DIASTOLIC BLOOD PRESSURE: 83 MMHG | HEART RATE: 86 BPM | RESPIRATION RATE: 12 BRPM

## 2024-09-29 DIAGNOSIS — J84.9 ILD (INTERSTITIAL LUNG DISEASE) (H): ICD-10-CM

## 2024-09-29 DIAGNOSIS — E86.0 DEHYDRATION: ICD-10-CM

## 2024-09-29 DIAGNOSIS — R06.02 SHORTNESS OF BREATH: ICD-10-CM

## 2024-09-29 LAB
ALBUMIN SERPL BCG-MCNC: 4.2 G/DL (ref 3.5–5.2)
ALP SERPL-CCNC: 91 U/L (ref 40–150)
ALT SERPL W P-5'-P-CCNC: 23 U/L (ref 0–50)
ANION GAP SERPL CALCULATED.3IONS-SCNC: 19 MMOL/L (ref 7–15)
AST SERPL W P-5'-P-CCNC: 32 U/L (ref 0–45)
BASOPHILS # BLD AUTO: 0 10E3/UL (ref 0–0.2)
BASOPHILS NFR BLD AUTO: 1 %
BILIRUB SERPL-MCNC: 0.7 MG/DL
BUN SERPL-MCNC: 16.1 MG/DL (ref 8–23)
CALCIUM SERPL-MCNC: 9.4 MG/DL (ref 8.8–10.4)
CHLORIDE SERPL-SCNC: 102 MMOL/L (ref 98–107)
CREAT SERPL-MCNC: 0.98 MG/DL (ref 0.51–0.95)
EGFRCR SERPLBLD CKD-EPI 2021: 64 ML/MIN/1.73M2
EOSINOPHIL # BLD AUTO: 0.1 10E3/UL (ref 0–0.7)
EOSINOPHIL NFR BLD AUTO: 2 %
ERYTHROCYTE [DISTWIDTH] IN BLOOD BY AUTOMATED COUNT: 14.4 % (ref 10–15)
GLUCOSE SERPL-MCNC: 116 MG/DL (ref 70–99)
HCO3 SERPL-SCNC: 16 MMOL/L (ref 22–29)
HCT VFR BLD AUTO: 41.2 % (ref 35–47)
HGB BLD-MCNC: 13.5 G/DL (ref 11.7–15.7)
HOLD SPECIMEN: NORMAL
IMM GRANULOCYTES # BLD: 0 10E3/UL
IMM GRANULOCYTES NFR BLD: 0 %
LYMPHOCYTES # BLD AUTO: 1.7 10E3/UL (ref 0.8–5.3)
LYMPHOCYTES NFR BLD AUTO: 27 %
MCH RBC QN AUTO: 30.5 PG (ref 26.5–33)
MCHC RBC AUTO-ENTMCNC: 32.8 G/DL (ref 31.5–36.5)
MCV RBC AUTO: 93 FL (ref 78–100)
MONOCYTES # BLD AUTO: 0.7 10E3/UL (ref 0–1.3)
MONOCYTES NFR BLD AUTO: 10 %
NEUTROPHILS # BLD AUTO: 4 10E3/UL (ref 1.6–8.3)
NEUTROPHILS NFR BLD AUTO: 61 %
NRBC # BLD AUTO: 0 10E3/UL
NRBC BLD AUTO-RTO: 0 /100
PLATELET # BLD AUTO: 265 10E3/UL (ref 150–450)
POTASSIUM SERPL-SCNC: 3.7 MMOL/L (ref 3.4–5.3)
PROT SERPL-MCNC: 7 G/DL (ref 6.4–8.3)
RBC # BLD AUTO: 4.43 10E6/UL (ref 3.8–5.2)
SODIUM SERPL-SCNC: 137 MMOL/L (ref 135–145)
WBC # BLD AUTO: 6.5 10E3/UL (ref 4–11)

## 2024-09-29 PROCEDURE — 71045 X-RAY EXAM CHEST 1 VIEW: CPT

## 2024-09-29 PROCEDURE — 93010 ELECTROCARDIOGRAM REPORT: CPT | Performed by: INTERNAL MEDICINE

## 2024-09-29 PROCEDURE — 80053 COMPREHEN METABOLIC PANEL: CPT | Performed by: INTERNAL MEDICINE

## 2024-09-29 PROCEDURE — 93005 ELECTROCARDIOGRAM TRACING: CPT | Performed by: STUDENT IN AN ORGANIZED HEALTH CARE EDUCATION/TRAINING PROGRAM

## 2024-09-29 PROCEDURE — 99284 EMERGENCY DEPT VISIT MOD MDM: CPT | Performed by: STUDENT IN AN ORGANIZED HEALTH CARE EDUCATION/TRAINING PROGRAM

## 2024-09-29 PROCEDURE — 99285 EMERGENCY DEPT VISIT HI MDM: CPT | Mod: 25 | Performed by: STUDENT IN AN ORGANIZED HEALTH CARE EDUCATION/TRAINING PROGRAM

## 2024-09-29 PROCEDURE — 36415 COLL VENOUS BLD VENIPUNCTURE: CPT | Performed by: NURSE PRACTITIONER

## 2024-09-29 PROCEDURE — 85025 COMPLETE CBC W/AUTO DIFF WBC: CPT | Performed by: INTERNAL MEDICINE

## 2024-09-29 PROCEDURE — 258N000003 HC RX IP 258 OP 636: Performed by: STUDENT IN AN ORGANIZED HEALTH CARE EDUCATION/TRAINING PROGRAM

## 2024-09-29 RX ADMIN — SODIUM CHLORIDE 1000 ML: 9 INJECTION, SOLUTION INTRAVENOUS at 12:41

## 2024-09-29 RX ADMIN — SODIUM CHLORIDE 1000 ML: 9 INJECTION, SOLUTION INTRAVENOUS at 12:18

## 2024-09-29 ASSESSMENT — ACTIVITIES OF DAILY LIVING (ADL)
ADLS_ACUITY_SCORE: 36
ADLS_ACUITY_SCORE: 36

## 2024-09-29 NOTE — ED PROVIDER NOTES
ED PROVIDER NOTE  Patient Name: Elsie Rubi  MRN: [id number]    CC:    Chief Complaint   Patient presents with    Shortness of Breath    Palpitations         MDM  64 year old female presenting with shortness of breath.    In the ED, /91   Pulse 94   Resp 20   SpO2 95%     Physical exam revealed clear auscultation bilaterally.  Benign abdominal exam.  Grossly neurologically intact.  In no acute distress, no accessory muscle use.  Overall does not look critically ill or toxic .      I have independently reviewed and interpreted the patients test results which I have ordered this visit which are the following:  Labs were remarkable for white blood cell count of 6.5.  Hemoglobin 13.5.  Sodium 137 with a potassium 3.7.  Creatinine 0.98.    POC cardiac US  Adequate LV ejection fraction, IVC appears to be completely flat, no B-lines bilaterally     TTE 4/18/2023 OSH  The left ventricle is normal size. The left ventricular systolic function is normal. Wall motion is normal.   Qualitative ejection fraction is 65-70% (normal).   Left ventricular diastolic function is normal.   TR signal inadequate to allow accurate estimate RV systolic pressure.   Inferior vena cava size normal and collapsibility > 50% normal indicating normal right atrial pressure (3 mm Hg).   There is no pericardial effusion.   No color doppler evidence for atrial septal defect or patent foramen ovale. A bubble study was not performed.     Patient was given 2 L of fluids    I do believe the patient's symptoms are likely from dehydration, they seem to be provoked with movement, and she appears clinically dry with dry oral mucous membranes as well as an IVC that appears small.  She had significant improvements after getting around and given 2 L here.  I recommended her to increase her fluid intake which she feels like she can do.  Her pain is bearable.  I would be worried at this point to start any opiate medications.  She was told to follow-up  with her surgeon as well as return if she has any worsening symptoms.    Plan  -2 L normal saline  - The patient was advised to follow up with PCP in 2-3 days and to return to the ED if symptoms worsened, or if there were any other urgent or life-threatening concerns.  - Following counseling on the work-up, results, diagnosis, and treatment plan, the patient was amenable to discharge after all questions were answered. The patient expressed agreement and understanding to the plan.      Clinical impression  Shortness of breath  Dehydration    Disposition  Home      HPI    Elsie Rubi is a 64 year old female with PMH of chronic fatigue syndrome, fibromyalgia, hypertension, dyspepsia, depression, asthma, MIC, elevated BMI, stress urinary incontinence, interstitial lung disease, gastric sleeve procedure who presents with shortness of breath.     History of obtained by:Patient and spouse    Patient presents for tachycardia, lightheadedness, palpitations.  Symptoms are worse with standing or walking.  She notes that she has had very little p.o. intake recently, she is able to tolerate p.o. but she has some uncomfortable sensation in her epigastric region every time she drinks which has been there since her surgery.  She would not call the severe pain.  She has been passing regular stools, slightly formed/loose, last bowel was yesterday.  Denies any current abdominal pain.  Currently she is not short of breath.  Her heart rate during the conversation was 86.  She denies any chest pain pleuritic pain, runny nose sore throat.  She has had a cough since her surgery which is mild, and has not changed.    PMH and SH reviewed.    10 point ROS reviewed and negative except as stated in HPI.    Past medical history:  Past Medical History:   Diagnosis Date    Benign essential hypertension     Borderline personality disorder (H) 08/13/2020    Depression     Depressive disorder 1978    ILD (interstitial lung disease) (H)      Primary osteoarthritis of left foot 08/13/2020    Uncomplicated asthma 2019       Social history:  Social Connections: Not on file     Social History     Socioeconomic History    Marital status:      Spouse name: Delon   Tobacco Use    Smoking status: Never     Passive exposure: Never    Smokeless tobacco: Never   Vaping Use    Vaping status: Never Used   Substance and Sexual Activity    Alcohol use: Yes     Comment: 2 glasses wine night    Drug use: Not Currently    Sexual activity: Yes     Partners: Male     Birth control/protection: Post-menopausal   Other Topics Concern    Parent/sibling w/ CABG, MI or angioplasty before 65F 55M? Yes     Comment: Father heart attack and bypass around 55     Social Determinants of Health     Financial Resource Strain: Low Risk  (9/11/2024)    Financial Resource Strain     Within the past 12 months, have you or your family members you live with been unable to get utilities (heat, electricity) when it was really needed?: No   Food Insecurity: Low Risk  (9/11/2024)    Food Insecurity     Within the past 12 months, did you worry that your food would run out before you got money to buy more?: No     Within the past 12 months, did the food you bought just not last and you didn t have money to get more?: No   Transportation Needs: Low Risk  (9/11/2024)    Transportation Needs     Within the past 12 months, has lack of transportation kept you from medical appointments, getting your medicines, non-medical meetings or appointments, work, or from getting things that you need?: No   Interpersonal Safety: Low Risk  (9/10/2024)    Interpersonal Safety     Do you feel physically and emotionally safe where you currently live?: Yes     Within the past 12 months, have you been hit, slapped, kicked or otherwise physically hurt by someone?: No     Within the past 12 months, have you been humiliated or emotionally abused in other ways by your partner or ex-partner?: No   Housing Stability:  Low Risk  (9/11/2024)    Housing Stability     Do you have housing? : Yes     Are you worried about losing your housing?: No         I have reviewed the patients prescribed medications:  No current facility-administered medications for this encounter.    Current Outpatient Medications:     acetaminophen (TYLENOL) 325 MG tablet, Take 2 tablets (650 mg) by mouth every 4 hours as needed for other (For optimal non-opioid multimodal pain management to improve pain control and physical function.)., Disp: , Rfl:     albuterol (PROAIR HFA/PROVENTIL HFA/VENTOLIN HFA) 108 (90 Base) MCG/ACT inhaler, Inhale 2 puffs into the lungs every 4 hours as needed for shortness of breath, wheezing or cough, Disp: 18 g, Rfl: 3    buPROPion (WELLBUTRIN XL) 150 MG 24 hr tablet, Take 1 tablet (150 mg) by mouth every morning, Disp: 30 tablet, Rfl: 1    cyclobenzaprine (FLEXERIL) 5 MG tablet, Take 1 tablet (5 mg) by mouth 2 times daily as needed for muscle spasms., Disp: , Rfl:     cyclobenzaprine (FLEXERIL) 5 MG tablet, Take 1 tablet (5 mg) by mouth 2 times daily as needed for muscle spasms, Disp: 30 tablet, Rfl: 0    FLUoxetine (PROZAC) 20 MG/5ML solution, Take 10 mLs (40 mg) by mouth daily., Disp: 300 mL, Rfl: 2    fluticasone-salmeterol (ADVAIR) 250-50 MCG/ACT inhaler, Inhale 1 puff into the lungs every 12 hours., Disp: 60 each, Rfl: 3    hyoscyamine (LEVSIN) 0.125 MG tablet, Take 1 tablet (125 mcg) by mouth every 4 hours as needed for cramping., Disp: 30 tablet, Rfl: 1    metoprolol succinate ER (TOPROL XL) 25 MG 24 hr tablet, Take 1 tablet (25 mg) by mouth daily, Disp: 90 tablet, Rfl: 2    mycophenolate (GENERIC EQUIVALENT) 200 MG/ML suspension, Take 5 mLs (1,000 mg) by mouth 2 times daily., Disp: 300 mL, Rfl: 2    omeprazole (PRILOSEC) 40 MG DR capsule, Take 1 capsule (40 mg) by mouth daily. Open capsule and mix with applesauce (Patient not taking: Reported on 9/16/2024), Disp: , Rfl:     ondansetron (ZOFRAN ODT) 4 MG ODT tab, Take 1  tablet (4 mg) by mouth every 6 hours as needed for nausea., Disp: 15 tablet, Rfl: 0    traZODone (DESYREL) 50 MG tablet, Take 1 tablet (50 mg) by mouth nightly as needed for sleep, Disp: 90 tablet, Rfl: 1    ursodiol (ACTIGALL) 300 MG capsule, Take 1 capsule (300 mg) by mouth 2 times daily., Disp: 60 capsule, Rfl: 5    Allergies:  No Known Allergies      Vitals:    09/29/24 1113 09/29/24 1116   BP: 114/91    Pulse: 94    Resp:  20   SpO2: 95%        Physical Exam  Vitals: /91   Pulse 94   Resp 20   SpO2 95%   Constitutional: awake, NAD  Eyes: No conjunctival injection and normal lids, PERRL, EOMI  ENT: external nose and ears atraumatic  Neck: Symmetric, trachea midline, Supple  CV: RRR, no murmurs appreciated.  Pulm: Unlabored respiratory effort, good air movement, CTAB, no w/c/r appreciated.  GI: Soft, nontender, nondistended.  No rebound or guarding  MSK: No deformities.  No cyanosis.  Neuro: AOx4, normal speech, following commands, grossly symmetric strength in all extremities.  Cranial nerves III-XII grossly intact.    Skin: warm & dry, no rashes or lesions  Psych: Appropriate mood & affect    Past medical history, past surgical history, problem list, family history, social history, medication list, and allergies were reviewed as documented in epic snapshot.  Relevant review of systems are documented within the HPI above.    Complexity of the Problems Addressed    5 (High) - 1 or more chronic illnesses with severe exacerbation, progression, or side effects of treatment or 1 acute chronic illness or injury that poses threat to live or bodily function    Complexity of Data  I have reviewed the following pertinent historical labs, diagnoses, tests, and/or imaging which contribute to complexity of this patient's care  4 - (Moderate) - (1 of three) I have reviewed at least 3 of the 4; external notes, review results of unique test, ordering of unique test, assessment requiring independent history. OR  independent interpretation of tests done by other physician. OR discussion with specialist (including SW, PT, etc)      Complication Risk  Based on my interpretation of the current labs, historical tests and/or external tests. Patient does have a risk level as stated below of morbidity, threat to life, and bodily function, and severe exacerbation if not treated.   4 - Moderate ( Rx drug management, significant limitation of treatment options 2/2 social determinants of health, decision regarding elective major surgery without risk, decision regarding minor surgery with identified patient risk).  -Rx drug management such as giving new Rx in the ED, new Rx prescibed for home use, modification of Rx Drugs, review of home meds and considering dose adjustment but not dose adjustment made.  - major/ minor surgery discussions (regarding fracture reduction, joint relocation, chest tube, cardioversion, intubation)    ED Events, Labs and Imaging:  ED Course as of 09/29/24 1305   Sun Sep 29, 2024   1158 Sodium: 137   1158 Potassium: 3.7       Evelio Villarreal MD  Emergency Medicine    Dictation Disclaimer: Some notes are completed with voice-recognition dictation software. Errors are generally corrected in real time. Please contact me via Epic staff message if you note any errors requiring clarification.     Nathan Villarreal MD  09/29/24 1301

## 2024-09-29 NOTE — ED NOTES
Pt is here today c/o SOB, dizziness nausea while standing at [employer] and singing. Pt reports she went home and still felt her heart racing. Pt reports heart rate up into 130's while singing. Pt did note bariatric surgery 09/10 of this year and is having some minor pain from it, reports the sx was a sleeve placed. Pt reports she feels dehydrated d/t lips being dry. Pt reports able to eat and drink water today. Pt notes she gets cramps after she eats.

## 2024-09-29 NOTE — ED NOTES
Per provider request stood patient and had walk around room , pt reported she feels much better and not as dizzy anymore and feels her HR is normal.provider notified

## 2024-09-29 NOTE — DISCHARGE INSTRUCTIONS
Follow-up with your primary care doctor the next 2 to 3 days.    For mild to moderate pain or fever you can take Tylenol if you do not have allergies to these.    Drink plenty of fluids    You have any worsening or concerning symptoms please call 911 or return to the emergency department immediately.

## 2024-09-30 LAB
ATRIAL RATE - MUSE: 93 BPM
DIASTOLIC BLOOD PRESSURE - MUSE: NORMAL MMHG
INTERPRETATION ECG - MUSE: NORMAL
P AXIS - MUSE: 2 DEGREES
PR INTERVAL - MUSE: 140 MS
QRS DURATION - MUSE: 88 MS
QT - MUSE: 350 MS
QTC - MUSE: 435 MS
R AXIS - MUSE: 1 DEGREES
SYSTOLIC BLOOD PRESSURE - MUSE: NORMAL MMHG
T AXIS - MUSE: 25 DEGREES
VENTRICULAR RATE- MUSE: 93 BPM

## 2024-10-07 ENCOUNTER — HOSPITAL ENCOUNTER (OUTPATIENT)
Dept: CT IMAGING | Facility: HOSPITAL | Age: 64
Discharge: HOME | End: 2024-10-07
Attending: SURGERY | Admitting: SURGERY
Payer: COMMERCIAL

## 2024-10-07 DIAGNOSIS — T14.8XXA HEMATOMA: ICD-10-CM

## 2024-10-07 DIAGNOSIS — Z90.3 H/O GASTRIC SLEEVE: ICD-10-CM

## 2024-10-07 PROCEDURE — 71250 CT THORAX DX C-: CPT

## 2024-10-08 ENCOUNTER — VIRTUAL VISIT (OUTPATIENT)
Dept: PHARMACY | Facility: CLINIC | Age: 64
End: 2024-10-08
Payer: COMMERCIAL

## 2024-10-08 VITALS — BODY MASS INDEX: 31.76 KG/M2 | WEIGHT: 185 LBS

## 2024-10-08 DIAGNOSIS — Z98.84 S/P LAPAROSCOPIC SLEEVE GASTRECTOMY: Primary | ICD-10-CM

## 2024-10-08 ASSESSMENT — PAIN SCALES - GENERAL: PAINLEVEL: NO PAIN (0)

## 2024-10-08 NOTE — PROGRESS NOTES
Medication Therapy Management (MTM) Encounter    ASSESSMENT:                            Medication Adherence/Access: No issues identified    Weight management/ s/p sleeve gastrectomy    Change back to capsules of omeprazole and fluoxetine likely reasonable given adverse taste of other formulations. Not experiencing any difficulty with swallowing capsules or feelings of being stuck.   Increased cramping after some meals likely due to types or amount of food intake. Further conversation with registered dietitian on progress in diet recommended.     PLAN:                            Be mindful of body's response to certain foods, use hyoscyamine when needed.   Continue fluoxetine and omeprazole capsules.     Follow-up: 10/11/2024 with registered dietitian and Leanne Stanford PA-C, PharmD as needed     SUBJECTIVE/OBJECTIVE:                          Ju Rubi is a 64 year old female seen for a follow-up visit.       Reason for visit: post sleeve gastrectomy.    Allergies/ADRs: Reviewed in chart  Past Medical History: Reviewed in chart  Tobacco: She reports that she has never smoked. She has never been exposed to tobacco smoke. She has never used smokeless tobacco.  Alcohol: not currently using      Medication Adherence/Access: no issues reported      Weight Management   / s/p sleeve gastrectomy     Tachycardia, breathlessness has improved - still having some episodes.     Omeprazole capsules - took OTC tablets for awhile, hard to open capsules   Fluoxetine capsules - taste of opening capsule and liquid  was unbearable  - has transitioned back to taking both capsules by mouth, started after 2+ weeks. Has not had any issues with swallowing, feeling of capsule getting stuck in throat or stomach.     Continues to have pain, cramping in stomach - feels nutritional guidelines are extreme, hard to balance water + protein - was focusing on protein over water, led to dehydration. Difficulty with tastes of some foods at  "pureed. Has started some soft foods, chewing   Combination of diarrhea, constipation - taking senna once daily with constipation > diarrhea   Bariatric vitamins with probiotics ordered and arriving soon.     Stopped hycosamine - feels she should be better by now.    4oz of fluid at a time without cramping   Food intake - 2 meals daily, eats slowly throughout the day  Whey protein doesn't blend well, waiting on alternate   Trying alternate protein shake (previous protein shake   Ensure protein plus     Trip to [place] upcoming soon to see parents     Follow-up appt with registered dietitian and Leanne Stanford PA-C on 10/11/2024    Initial Consult Weight: 214 lbs (4/3/2024)       Current weight today: 185 lbs 0 oz  Cumulative Weight Loss: -29 lb, -13.5% from baseline    Wt Readings from Last 4 Encounters:   10/08/24 185 lb (83.9 kg)   09/18/24 189 lb (85.7 kg)   09/17/24 190 lb (86.2 kg)   09/16/24 193 lb 4.8 oz (87.7 kg)     Estimated body mass index is 31.76 kg/m  as calculated from the following:    Height as of 9/18/24: 5' 4\" (1.626 m).    Weight as of this encounter: 185 lb (83.9 kg).      Today's Vitals: Wt 185 lb (83.9 kg)   BMI 31.76 kg/m    ----------------  Post Discharge Medication Reconciliation Status: discharge medications reconciled and changed, per note/orders.    I spent 42 minutes with this patient today. All changes were made via collaborative practice agreement with Leanne Stanford. A copy of the visit note was provided to the patient's provider(s).    A summary of these recommendations was sent via clinic portal.    Rin Cohen, PharmD, BCACP  Medication Therapy Management (MTM) Pharmacist   [facility]    Telemedicine Visit Details  The patient's medications can be safely assessed via a telemedicine encounter.  Type of service:  Telephone visit  Originating Location (pt. Location): Home    Distant Location (provider location):  On-site  Start " Time:  11:00 AM  End Time: 11:42 AM     Medication Therapy Recommendations  Severe obesity (BMI 35.0-39.9) with comorbidity (H)    Current Medication: mycophenolate (GENERIC EQUIVALENT) 500 MG tablet (Discontinued)   Rationale: Dosage form inappropriate - Ineffective medication - Effectiveness   Recommendation: Change Medication Formulation    Status: Accepted per Provider

## 2024-10-08 NOTE — NURSING NOTE
Current patient location: [address] [place] [id number]    Is the patient currently in the state of [place]? YES    Visit mode:TELEPHONE    If the visit is dropped, the patient can be reconnected by: TELEPHONE VISIT: Phone number:   Telephone Information:   Mobile [phone]       Will anyone else be joining the visit? NO  (If patient encounters technical issues they should call [phone]    Are changes needed to the allergy or medication list? No, Pt stated no changes to allergies, and Pt stated no med changes    Are refills needed on medications prescribed by this physician? NO    Rooming Documentation:  Not applicable    Reason for visit: Medication Therapy Management    Lauren ASHLEY

## 2024-10-09 NOTE — PATIENT INSTRUCTIONS
"Recommendations from today's MTM visit:                                                    Be mindful of body's response to certain foods, use hyoscyamine when needed.   Continue fluoxetine and omeprazole capsules.     Follow-up: 10/11/2024 with registered dietitian and Leanne Stanford PA-C, PharmD as needed     It was great speaking with you today.  I value your experience and would be very thankful for your time in providing feedback in our clinic survey. In the next few days, you may receive an email or text message from [facility] with a link to a survey related to your  clinical pharmacist.\"     To schedule another MTM appointment, please call the clinic directly or you may call the MTM scheduling line at [phone] or toll-free at [phone].     My Clinical Pharmacist's contact information:                                                      Please feel free to contact me with any questions or concerns you have.      Rin Cohen, PharmD, [facility]  Medication Therapy Management (MTM) Pharmacist   [facility]     "

## 2024-10-11 ENCOUNTER — VIRTUAL VISIT (OUTPATIENT)
Dept: ENDOCRINOLOGY | Facility: CLINIC | Age: 64
End: 2024-10-11
Payer: COMMERCIAL

## 2024-10-11 VITALS — HEIGHT: 64 IN | WEIGHT: 183 LBS | BODY MASS INDEX: 31.24 KG/M2

## 2024-10-11 DIAGNOSIS — F33.1 MODERATE EPISODE OF RECURRENT MAJOR DEPRESSIVE DISORDER (H): ICD-10-CM

## 2024-10-11 DIAGNOSIS — Z71.3 NUTRITIONAL COUNSELING: Primary | ICD-10-CM

## 2024-10-11 DIAGNOSIS — Z98.84 S/P LAPAROSCOPIC SLEEVE GASTRECTOMY: Primary | ICD-10-CM

## 2024-10-11 DIAGNOSIS — Z98.84 S/P LAPAROSCOPIC SLEEVE GASTRECTOMY: ICD-10-CM

## 2024-10-11 PROCEDURE — 97803 MED NUTRITION INDIV SUBSEQ: CPT | Mod: 95

## 2024-10-11 PROCEDURE — 99024 POSTOP FOLLOW-UP VISIT: CPT | Mod: 95

## 2024-10-11 PROCEDURE — 99207 PR NO CHARGE LOS: CPT | Mod: 95

## 2024-10-11 RX ORDER — BUPROPION HYDROCHLORIDE 150 MG/1
150 TABLET ORAL EVERY MORNING
Qty: 90 TABLET | Refills: 1 | Status: SHIPPED | OUTPATIENT
Start: 2024-10-11

## 2024-10-11 ASSESSMENT — PAIN SCALES - GENERAL: PAINLEVEL: NO PAIN (0)

## 2024-10-11 NOTE — LETTER
10/11/2024       RE: Elsie Rubi  [address] [facility] [address]     Dear Colleague,    Thank you for referring your patient, Elsie Rubi, to the [facility] [place] at [facility]. Please see a copy of my visit note below.    Video-Visit Details    Type of service:  Video Visit    Video Start Time: 11:01 AM  Video End Time: 11:34 AM     Originating Location (pt. Location): Home    Distant Location (provider location):  Offsite (providers home) [facility] [place]     Platform used for Video Visit: [employer]    Nutrition Assessment  Reason For Visit:  Elsie Rubi is a 64 year old female presenting today for nutrition follow-up, 1 month s/p laparoscopic sleeve gastrectomy on 9/10/24 with Dr Cabrera.      Patient referred by EARL Varela on January 22, 2024.    Anthropometrics  Initial Consult Weight: 222 lbs  Day of Surgery Weight(9/10/24): 194 lbs  Current Weight: 183 lbs   Weight loss: -39 lbs from initial consult; -11 lbs from day of surgery     Current Vitamins/Minerals: Tried the [employer] MVI but ended up night liking it. Then purchased a bariatric brand vitamin called J1 however these were large capsules so advised going to a chewable option. Patient reports she is going to look into purchasing the brand Vivek Melts MVI with iron to try.     Nutrition History  Pt presented to the ED on 9/15/24 due to cough with shortness of breath. Found to have a post operative hematoma. Reports she has a better pain management plan in place now.     Pt reports consuming and tolerating for the most part the bariatric clear and low-fat full liquid diets. Fluid intake appears adequate, consuming 48-64 oz/day. Consuming peppermint tea, water, Gatorade Zero, broth. Having a hard time eating foods but continues to try different things. Explained this will likely get easier as the pain gets  better managed. Patient has been mixing a scoop of protein powder into bone broth. Cream of wheat with protein powder. Tomato soup with protein powder.     Hasn't been too active over the last few days due to being tired but is hopefully this will get better with time.     October 2024: Patient is now 1 month post op. Had another visit to the ED on 9/29 and was found to be dehydrated. Patient reports she really struggled with puree foods. The texture would gross her out very easily. Reports she was having some digestive issues, feels this is starting to get a little better. Taking small bites at a time. Chewing foods really good before swallowing. Has ventured into some foods that weren't necessarily on the diet stage however reports she has tolerated them fine.  Lentil soups with carrots, tomatoes. Drinking a small SF gatorade drink packet mixed in water (20 oz), small cup of coffee with protein shake. Estimates averaging 30 oz of fluid daily. Has been carrying her Gatorade bottle with her everywhere. Estimates only getting ~0 gram of protein daily. Planning to incorporate Ensure Max Protein Shake in the morning for breakfast going forward.     Patient taking a larger tablet MVI at this time. Advised she change over to a chewable. Plans on trying out Vivek Melts chewable MVI with iron. This will then provide her with enough Vitamin B12 and Vitamin D.     Progress with Previous Goals:  1) Follow bariatric low-fat full liquid diet through day 13 post-op, then to progress to pureed diet x 2 weeks.  If tolerating, may advance on day 29 post-op to bariatric soft diet. met   2) Work towards 60 gm protein/day. Not met, continues   3) Consume 48-64+ oz fluids daily- between meals. Not met, continues   4) Eat slowly (>20 min/meal), chewing well to smooth consistency once on the bariatric soft diet. Met, continues    5) Limit portions to ~1/2 cup/meal. Met, continues    6) Start chewable/liquid multivitamin/minerals daily -  not consistently met, continues     Nutrition Prescription:  Grams Protein: 60 (minimum)  Amount of Fluid: 48-64 oz    Nutrition Diagnosis  Previous: Food and nutrition-related knowledge deficit r/t lack of prior exposure to diet advancements beyond bariatric low-fat full liquid diet aeb recent bariatric surgery and pt interest in diet education/review     Current: Food and nutrition-related knowledge deficit r/t lack of prior exposure to diet advancements beyond bariatric pureed diet aeb recent bariatric surgery and pt interest in diet education/review     Intervention  Materials/Education provided on bariatric soft and regular consistency diets, protein intake, fluid intake, eating pace, chewing foods well, portion control, sugar/fat intake, recommended vitamin/mineral supplements. Patient demonstrates understanding.     Expected Engagement: Good    Goals:  1) Follow soft diet for 4 weeks, then to progress to bariatric regular diet(11/5).   2) Consume 60+ grams of protein/day.  3) Sip on 48-64+ oz of fluids/day- between meals only.  4) Eat slowly (>20 min/meal), chewing foods well (to applesauce-like consistency).  5) Limit portions to ~1/2 cup/meal until 3 months post op  6) Schedule 3 month provider/RD appointments  7) Get 3 month labs done before next appointments   8) Take vitamins/minerals as recommended     Post-op Diet Advancement Schedule:  Soft Diet (stage 4): start 10/8   Regular Diet (stage 5): start 11/5     Post-op Diet Handouts:  Diet Guidelines after Weight-loss Surgery  [email or web address]     Your Stage 4 Diet: Soft Foods  [email or web address]    Your Stage 5 Diet: Regular Foods  [email or web address]    Supplements after Sleeve Gastrectomy, Gastric Bypass or Single Anastomosis Duodenal Switch  [email or web address]    Keeping Track of Fluids  [email or web address]    Exercises after Weight Loss Surgery (strengthening, when no weight lifting  restrictions)  [email or web address]      Follow-Up: December 10    Time spent with patient: 33 minutes.  Caroline Mcduffie RD, LD      Again, thank you for allowing me to participate in the care of your patient.      Sincerely,    Caroline Mcduffie RD

## 2024-10-11 NOTE — TELEPHONE ENCOUNTER
Wellbutrin      Last Written Prescription Date:  8/8/24  Last Fill Quantity: 30,   # refills: 1  Last Office Visit: 9/16/24  Future Office visit:       Routing refill request to provider for review/approval because:

## 2024-10-11 NOTE — TELEPHONE ENCOUNTER
Rx Protocol Bupropion Imhwxh32/11/2024 09:53 AM   Protocol Details PHQ-9 score of less than 5 in past 6 months

## 2024-10-11 NOTE — NURSING NOTE
Is the patient currently in the state of [place]? YES    Location: [place]    Visit mode:VIDEO    If the visit is dropped, the patient can be reconnected by: VIDEO VISIT: Text to cell phone:   Telephone Information:   Mobile [phone]    and VIDEO VISIT: Send to e-mail at: [email or web address]    Will anyone else be joining the visit? NO  (If patient encounters technical issues they should call [phone])    How would you like to obtain your AVS? MyChart    Are changes needed to the allergy or medication list? No    Are refills needed on medications prescribed by this physician? NO    Reason for visit: RECHMIKAEL Wilcox VVF    QNR Status: complete

## 2024-10-11 NOTE — PATIENT INSTRUCTIONS
Henrry Dodd,     Follow-up with RD on December 10.     Thank you,    Caroline Mcduffie, ANDREW, LD  If you would like to schedule or reschedule an appointment with the RD, please call [phone]    Nutrition Goals  1) Follow soft diet for 4 weeks, then to progress to bariatric regular diet(11/5).   2) Consume 60+ grams of protein/day.  3) Sip on 48-64+ oz of fluids/day- between meals only.  4) Eat slowly (>20 min/meal), chewing foods well (to applesauce-like consistency).  5) Limit portions to ~1/2 cup/meal until 3 months post op  6) Schedule 3 month provider/RD appointments  7) Get 3 month labs done before next appointments   8) Take vitamins/minerals as recommended     Post-op Diet Advancement Schedule:  Soft Diet (stage 4): start 10/8   Regular Diet (stage 5): start 11/5     Post-op Diet Handouts:  Diet Guidelines after Weight-loss Surgery  [email or web address]     Your Stage 4 Diet: Soft Foods  [email or web address]    Your Stage 5 Diet: Regular Foods  [email or web address]    Supplements after Sleeve Gastrectomy, Gastric Bypass or Single Anastomosis Duodenal Switch  [email or web address]    Keeping Track of Fluids  [email or web address]    Exercises after Weight Loss Surgery (strengthening, when no weight lifting restrictions)  [email or web address]        COMPREHENSIVE WEIGHT MANAGEMENT PROGRAM  VIRTUAL SUPPORT GROUPS    At [facility], our Comprehensive Weight Management program offers on-line support groups for patients who are working on weight loss and considering, preparing for, or have had weight loss surgery.     There is no cost for this opportunity.  You are invited to attend the?Virtual Support Groups?provided by any of the following locations:    [facility] via [employer] Teams with Sandra Hopper RD, RN  2.   [place] via Unid[employer] Teams with Lalit Keenan, PhD, LP  3.   [place] via [employer] Teams with Shea Winston RN  4.   [facility]  "[place] via a Zoom Meeting with LLUVIA NgHW    The following Support Group information can also be found on our website:  [email or web address]      [facility]   WEIGHT LOSS SURGERY SUPPORT GROUP  The support group is a patient-lead forum that meets monthly to share experiences, encouragement and education. It is open to those who have had weight loss surgery, are scheduled for surgery, or are considering surgery.   WHEN: 3rd Wednesday of each month from 5:00PM - 6:00PM using Microsoft Teams.   FACILITATOR: Led by Sandra Covarrubias RD, LD, RN, the program's Clinical Coordinator.   TO REGISTER: Please contact the clinic via [employer] or call the nurse line directly at [phone] to inform our staff that you would like an invite sent to you and to let us know the email you would like the invite sent to. Prior to the meeting, a link with directions on how to join the meeting will be sent to you.    2024 Meetings    September 18: Danika Figueroa, PhD, Outpatient Clinical Therapist, \"Pro Tips for Habit Change\".  October 16:  Let's Talk  This will be a time of group support.??   November 20:  Let's Talk  about How to Navigate the Upcoming Holiday Season.  December 18:  Let's Talk  and Celebrate the past year.       [facility] BARIATRIC CARE SUPPORT GROUP  This is open to all pre- and post- operative bariatric surgery patients as well as their support system.   WHEN: 3rd Tuesday of each month from 6:30 PM - 8:00 PM using Microsoft Teams.   FACILITATOR: Led by Lalit Keenan, Ph.D who is a Licensed Psychologist with the [facility] Comprehensive Weight Management Program.   TO REGISTER: Please send an email to Lalit Keenan, Ph.D., LP at?[email or web address]?if you would like an invitation to the group. Prior to the meeting, a link with directions on how to join the " "meeting will be sent to you.    2024 Meetings September 17: IN PERSON MEETING. [facility], [address] [place], [place], [address], 1st floor Conference Room.  October 15: Date changed to OCTOBER 8th (2nd Tuesday).   November 19: \"Holiday Eating\", Love Angel RD, LD.  December 17: \"Hospital Stay and Compliance\", Shea Winston RN, CBN.      [facility] POST-OPERATIVE BARIATRIC SURGERY SUPPORT GROUP  This is a support group for [facility] bariatric patients (and those external to [facility]) who have had bariatric surgery and are at least 3 months post-surgery.  WHEN: 4th Thursday of the month from 11:00 AM - 12:00 PM using Microsoft Teams.   FACILITATOR: Led by Certified Bariatric Nurse, Shea Winston RN.   TO REGISTER: Please send an email to Shea at [email or web address] if you would like an invitation to the group.  Prior to the meeting, a link with directions on how to join the meeting will be sent to you.    2024 Meetings September 26 October 24 November 28: No meeting  December 26    [facility]   HEALTHY LIFESTYLE COACHING GROUP  This is a 60 minute virtual coaching group for those who want to lead a healthier lifestyle. Come together to set goals and overcome barriers in a supportive group environment. We will address the four pillars of health: nutrition, exercise, sleep, and emotional well-being. This group is highly recommended for those who are participating in the 24 week Healthy Lifestyle Plan and our Health Coaching sessions.   WHEN: 1st Friday of the month, 12:30 PM - 1:30 PM   using a Zoom meeting.     FACILITATOR: Led by National Board-Certified Health and , Shea Toure [facility].  TO REGISTER: Please call the PF Management Services at [phone] to register. You will get an appointment to attend in [place]. Fifteen minutes prior to the " meeting, complete the e-check in and you will get the link to join the meeting.  There is no charge to attend this group and space is limited.  Please register for each month you wish to attend    2024 Meetings  September 5 No meeting.  October 4 November 1 December 6

## 2024-10-11 NOTE — PATIENT INSTRUCTIONS
Plan:  Ju is planning to fly to [place] to visit her parents for 1 month starting next week, I think this is appropriate given she is able to get adequate fluids and nutrition in, though would still advise caution as she was in the ed only 2 weeks ago due to dehydration symptoms.   Please update us with recent blood pressures, we can discuss a plan to adjust your blood pressure medication   3 month post op labs ordered, to be completed in 2 months, early December RD visit today.   Start vitamin supplements per RD directions.  Advance diet per RD directions.  Follow-up: Leanne Stanford December 10, 2024.      Actigall prescription - not taking per patient preference- has had asymptomatic gall stones for several years, does not want to take additional medications   B12 SL or injection - in MVI   Pathology reviewed at 1 week, no concerns   Weight loss medications: wegovy, later zepbound prior to surgery   Need to restart statin: NA   Cleared from lifting restrictions (4 weeks post op, no concerns or instability with movement or lifting)   Once no more scabs on incision sites: Cleared for water submersion once (this is appropriate as patient reports incisions have healed and scabs are no longer present)   AFTER HOURS QUESTIONS OR CONCERNS: Call [phone] and ask to speak with surgery resident if you are having troubles in the evenings, at night, or on weekends

## 2024-10-11 NOTE — NURSING NOTE
Is the patient currently in the state of [place]? YES    Location: home    Visit mode:VIDEO    If the visit is dropped, the patient can be reconnected by: VIDEO VISIT: Text to cell phone:   Telephone Information:   Mobile [phone]    and VIDEO VISIT: Send to e-mail at: [email or web address]    Will anyone else be joining the visit? NO  (If patient encounters technical issues they should call [phone] :[phone])    How would you like to obtain your AVS? MyChart    Are changes needed to the allergy or medication list? No    Are refills needed on medications prescribed by this physician? NO    Reason for visit: BRENDA ASHLEY    QNR Status: n/a

## 2024-10-11 NOTE — PROGRESS NOTES
Virtual Visit Details    Type of service:  Video Visit   Video Start Time:  12:08pm  Video End Time:12:38 PM    Originating Location (pt. Location): Home    Distant Location (provider location):  On-site  Platform used for Video Visit: [facility]    Postoperative bariatric surgery visit.    Patient underwent sleeve gastrectomy 1 month ago, on 9/10/24 with Dr. Cabrera.    Last seen by Kaya Whiteside at 1 week post op.  Since last visit:   ED visit 9/29/24 for dyspnea- symptoms improved with IV hydration   Follow up x ray shows likely improving hematoma seen at ED visit 9/15/24     Today in visit (10/11/24)   Gradually starting to feel better this week- has been cooking more, has been out in her garden.       Tolerating liquids: Not tracking lately, estimates average of 30oz daily, worries she's still not getting enough to drink. Ensure, broth, zero sugar gatorade. Struggles to get more due to nausea, stomach discomfort. Drinking more zero sugar gatorade lately to help with hydration. Estimates getting 30g protein daily (goal is 64 ml, 60g). Plans to start doing a protein shake first thing in the morning moving forward.   Current diet: soft foods     Nausea: intermittent, gradually improving    Vomiting: denies  Dysphagia: some discomfort with drinking, makes it harder to do consistently.   Lightheadedness: intermittent, improves in the past with improved hydration.   Abdominal pain: mild cramping after meals, gradually improving.     Bowel movements: improving. 3 bms so far this week.  Stools are soft, well formed.   Blood in stool? denies  Fevers/shakes/chills: some shakes, improves with hydration. Denies fever.   GERD: denies Taking omeprazole 40 mg  Leg/calf pain: none   Chest pain/SOB: denies chest pain. 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   Walking: out in her garden, cooking more this  week   Work: Retired       How many opioid pain medications used after surgery? 9 What did you do with extra pills? home  Were any opioid pain medication refills provided after surgery? no  Were any opioid pain medications needed after 30 days postop? No     There were no vitals taken for this visit.  NAD  Overall looks well  Virtual visit. Reports incisions c/d/I, small amount of scabbing on one ; Abdomen mildly tender to palpation in LUQ area. No other pain.     Pathology - reviewed at 1 week, no concerns     Plan:  Ju is planning to fly to [place] to visit her parents for 1 month starting next week, I think this is appropriate given she is able to get adequate fluids and nutrition in, though would still advise caution as she was in the ed only 2 weeks ago due to dehydration symptoms.   Please update us with recent blood pressures, we can discuss a plan to adjust your blood pressure medication   3 month post op labs ordered, to be completed in 2 months, early December RD visit today.   Start vitamin supplements per RD directions.  Advance diet per RD directions.  Follow-up: Leanne Stanford December 10, 2024.    Actigall prescription - not taking per patient preference- has had asymptomatic gall stones for several years, does not want to take additional medications   B12 SL or injection - in MVI   Pathology reviewed at 1 week, no concerns   Weight loss medications: wegovy, later zepbound prior to surgery   Need to restart statin: NA   Cleared from lifting restrictions (4 weeks post op, no concerns or instability with movement or lifting)   Once no more scabs on incision sites: Cleared for water submersion once (this is appropriate as patient reports incisions have healed and scabs are no longer present)   AFTER HOURS QUESTIONS OR CONCERNS: Call [phone] and ask to speak with surgery resident if you are having troubles in the evenings, at night, or on weekends       Leanne Stanford PA-C       Wt  Readings from Last 5 Encounters:   10/11/24 83 kg (183 lb)   10/08/24 83.9 kg (185 lb)   09/18/24 85.7 kg (189 lb)   09/17/24 86.2 kg (190 lb)   09/16/24 87.7 kg (193 lb 4.8 oz)

## 2024-10-11 NOTE — PROGRESS NOTES
Video-Visit Details    Type of service:  Video Visit    Video Start Time: 11:01 AM  Video End Time: 11:34 AM     Originating Location (pt. Location): Home    Distant Location (provider location):  Offsite (providers home) [facility] [place]     Platform used for Video Visit: [employer]    Nutrition Assessment  Reason For Visit:  Elsie Rubi is a 64 year old female presenting today for nutrition follow-up, 1 month s/p laparoscopic sleeve gastrectomy on 9/10/24 with Dr Cabrera.      Patient referred by EARL Varela on January 22, 2024.    Anthropometrics  Initial Consult Weight: 222 lbs  Day of Surgery Weight(9/10/24): 194 lbs  Current Weight: 183 lbs   Weight loss: -39 lbs from initial consult; -11 lbs from day of surgery     Current Vitamins/Minerals: Tried the [place] MVI but ended up night liking it. Then purchased a bariatric brand vitamin called J1 however these were large capsules so advised going to a chewable option. Patient reports she is going to look into purchasing the brand Vivek Melts MVI with iron to try.     Nutrition History  Pt presented to the ED on 9/15/24 due to cough with shortness of breath. Found to have a post operative hematoma. Reports she has a better pain management plan in place now.     Pt reports consuming and tolerating for the most part the bariatric clear and low-fat full liquid diets. Fluid intake appears adequate, consuming 48-64 oz/day. Consuming peppermint tea, water, Gatorade Zero, broth. Having a hard time eating foods but continues to try different things. Explained this will likely get easier as the pain gets better managed. Patient has been mixing a scoop of protein powder into bone broth. Cream of wheat with protein powder. Tomato soup with protein powder.     Hasn't been too active over the last few days due to being tired but is hopefully this will get better with time.     October 2024: Patient is now 1 month post op. Had  another visit to the ED on 9/29 and was found to be dehydrated. Patient reports she really struggled with puree foods. The texture would gross her out very easily. Reports she was having some digestive issues, feels this is starting to get a little better. Taking small bites at a time. Chewing foods really good before swallowing. Has ventured into some foods that weren't necessarily on the diet stage however reports she has tolerated them fine.  Lentil soups with carrots, tomatoes. Drinking a small SF gatorade drink packet mixed in water (20 oz), small cup of coffee with protein shake. Estimates averaging 30 oz of fluid daily. Has been carrying her Gatorade bottle with her everywhere. Estimates only getting ~0 gram of protein daily. Planning to incorporate Ensure Max Protein Shake in the morning for breakfast going forward.     Patient taking a larger tablet MVI at this time. Advised she change over to a chewable. Plans on trying out Vivek Melts chewable MVI with iron. This will then provide her with enough Vitamin B12 and Vitamin D.     Progress with Previous Goals:  1) Follow bariatric low-fat full liquid diet through day 13 post-op, then to progress to pureed diet x 2 weeks.  If tolerating, may advance on day 29 post-op to bariatric soft diet. met   2) Work towards 60 gm protein/day. Not met, continues   3) Consume 48-64+ oz fluids daily- between meals. Not met, continues   4) Eat slowly (>20 min/meal), chewing well to smooth consistency once on the bariatric soft diet. Met, continues    5) Limit portions to ~1/2 cup/meal. Met, continues    6) Start chewable/liquid multivitamin/minerals daily - not consistently met, continues     Nutrition Prescription:  Grams Protein: 60 (minimum)  Amount of Fluid: 48-64 oz    Nutrition Diagnosis  Previous: Food and nutrition-related knowledge deficit r/t lack of prior exposure to diet advancements beyond bariatric low-fat full liquid diet aeb recent bariatric surgery and pt  interest in diet education/review     Current: Food and nutrition-related knowledge deficit r/t lack of prior exposure to diet advancements beyond bariatric pureed diet aeb recent bariatric surgery and pt interest in diet education/review     Intervention  Materials/Education provided on bariatric soft and regular consistency diets, protein intake, fluid intake, eating pace, chewing foods well, portion control, sugar/fat intake, recommended vitamin/mineral supplements. Patient demonstrates understanding.     Expected Engagement: Good    Goals:  1) Follow soft diet for 4 weeks, then to progress to bariatric regular diet(11/5).   2) Consume 60+ grams of protein/day.  3) Sip on 48-64+ oz of fluids/day- between meals only.  4) Eat slowly (>20 min/meal), chewing foods well (to applesauce-like consistency).  5) Limit portions to ~1/2 cup/meal until 3 months post op  6) Schedule 3 month provider/RD appointments  7) Get 3 month labs done before next appointments   8) Take vitamins/minerals as recommended     Post-op Diet Advancement Schedule:  Soft Diet (stage 4): start 10/8   Regular Diet (stage 5): start 11/5     Post-op Diet Handouts:  Diet Guidelines after Weight-loss Surgery  [email or web address]     Your Stage 4 Diet: Soft Foods  [email or web address]    Your Stage 5 Diet: Regular Foods  [email or web address]    Supplements after Sleeve Gastrectomy, Gastric Bypass or Single Anastomosis Duodenal Switch  [email or web address]    Keeping Track of Fluids  [email or web address]    Exercises after Weight Loss Surgery (strengthening, when no weight lifting restrictions)  [email or web address]      Follow-Up: December 10    Time spent with patient: 33 minutes.  Caroline Mcduffie, ANDREW, LD

## 2024-10-11 NOTE — LETTER
10/11/2024       RE: Elsie Rubi  [address] [facility] [address]     Dear Colleague,    Thank you for referring your patient, Elsie Rubi, to the [facility] [place] at [facility]. Please see a copy of my visit note below.    Virtual Visit Details    Type of service:  Video Visit   Video Start Time:  12:08pm  Video End Time:12:38 PM    Originating Location (pt. Location): Home    Distant Location (provider location):  On-site  Platform used for Video Visit: [facility]    Postoperative bariatric surgery visit.    Patient underwent sleeve gastrectomy 1 month ago, on 9/10/24 with Dr. Cabrera.    Last seen by Kaya Whiteside at 1 week post op.  Since last visit:   ED visit 9/29/24 for dyspnea- symptoms improved with IV hydration   Follow up x ray shows likely improving hematoma seen at ED visit 9/15/24     Today in visit (10/11/24)   Gradually starting to feel better this week- has been cooking more, has been out in her garden.       Tolerating liquids: Not tracking lately, estimates average of 30oz daily, worries she's still not getting enough to drink. Ensure, broth, zero sugar gatorade. Struggles to get more due to nausea, stomach discomfort. Drinking more zero sugar gatorade lately to help with hydration. Estimates getting 30g protein daily (goal is 64 ml, 60g). Plans to start doing a protein shake first thing in the morning moving forward.   Current diet: soft foods     Nausea: intermittent, gradually improving    Vomiting: denies  Dysphagia: some discomfort with drinking, makes it harder to do consistently.   Lightheadedness: intermittent, improves in the past with improved hydration.   Abdominal pain: mild cramping after meals, gradually improving.     Bowel movements: improving. 3 bms so far this week.  Stools are soft, well formed.   Blood in stool? denies  Fevers/shakes/chills: some shakes, improves with hydration. Denies  fever.   GERD: denies Taking omeprazole 40 mg  Leg/calf pain: none   Chest pain/SOB: denies chest pain. 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   Walking: out in her garden, cooking more this week   Work: Retired       How many opioid pain medications used after surgery? 9 What did you do with extra pills? home  Were any opioid pain medication refills provided after surgery? no  Were any opioid pain medications needed after 30 days postop? No     There were no vitals taken for this visit.  NAD  Overall looks well  Virtual visit. Reports incisions c/d/I, small amount of scabbing on one ; Abdomen mildly tender to palpation in LUQ area. No other pain.     Pathology - reviewed at 1 week, no concerns     Plan:  Ju is planning to fly to [place] to visit her parents for 1 month starting next week, I think this is appropriate given she is able to get adequate fluids and nutrition in, though would still advise caution as she was in the ed only 2 weeks ago due to dehydration symptoms.   Please update us with recent blood pressures, we can discuss a plan to adjust your blood pressure medication   3 month post op labs ordered, to be completed in 2 months, early December    RD visit today.   Start vitamin supplements per RD directions.  Advance diet per RD directions.  Follow-up: Leanne Stanford December 10, 2024.    Actigall prescription - not taking per patient preference- has had asymptomatic gall stones for several years, does not want to take additional medications   B12 SL or injection - in MVI   Pathology reviewed at 1 week, no concerns   Weight loss medications: wegovy, later zepbound prior to surgery   Need to restart statin: NA   Cleared from lifting restrictions (4 weeks post op, no concerns or instability with movement or lifting)   Once no more scabs on incision sites: Cleared for water  submersion once (this is appropriate as patient reports incisions have healed and scabs are no longer present)   AFTER HOURS QUESTIONS OR CONCERNS: Call [phone] and ask to speak with surgery resident if you are having troubles in the evenings, at night, or on weekends       Leanne Stanford PA-C       Wt Readings from Last 5 Encounters:   10/11/24 83 kg (183 lb)   10/08/24 83.9 kg (185 lb)   09/18/24 85.7 kg (189 lb)   09/17/24 86.2 kg (190 lb)   09/16/24 87.7 kg (193 lb 4.8 oz)     Again, thank you for allowing me to participate in the care of your patient.      Sincerely,    Leanne Stanford PA-C

## 2024-10-17 DIAGNOSIS — F33.0 MILD EPISODE OF RECURRENT MAJOR DEPRESSIVE DISORDER (H): ICD-10-CM

## 2024-10-17 RX ORDER — FLUOXETINE 40 MG/1
40 CAPSULE ORAL DAILY
Qty: 90 CAPSULE | Refills: 0 | OUTPATIENT
Start: 2024-10-17

## 2024-11-05 DIAGNOSIS — G89.29 CHRONIC UPPER BACK PAIN: ICD-10-CM

## 2024-11-05 DIAGNOSIS — M62.838 MUSCLE SPASM: ICD-10-CM

## 2024-11-05 DIAGNOSIS — F33.0 MILD EPISODE OF RECURRENT MAJOR DEPRESSIVE DISORDER (H): ICD-10-CM

## 2024-11-05 DIAGNOSIS — M54.9 CHRONIC UPPER BACK PAIN: ICD-10-CM

## 2024-11-05 NOTE — TELEPHONE ENCOUNTER
cyclobenzaprine (FLEXERIL) 5 MG tablet 30 tablet 0 9/10/2024     Prozac not on EMR    Last Office Visit: 09/16/2024  Future Office visit:       Routing refill request to provider for review/approval because:

## 2024-11-07 RX ORDER — FLUOXETINE 40 MG/1
40 CAPSULE ORAL DAILY
Qty: 90 CAPSULE | Refills: 0 | Status: SHIPPED | OUTPATIENT
Start: 2024-11-07

## 2024-11-07 RX ORDER — CYCLOBENZAPRINE HCL 5 MG
5 TABLET ORAL 2 TIMES DAILY PRN
Qty: 30 TABLET | Refills: 0 | Status: SHIPPED | OUTPATIENT
Start: 2024-11-07

## 2024-11-07 NOTE — TELEPHONE ENCOUNTER
SSRIs Protocol Failed    Rerun Protocol (11/5/2024 10:18 AM)    PHQ-9 score less than 5 in past 6 months    Please review last PHQ-9 score.     Medication is active on med list

## 2024-12-09 NOTE — PROGRESS NOTES
Video-Visit Details    Type of service:  Video Visit    Video Start Time: 11:32 AM  Video End Time: 12:04 PM     Originating Location (pt. Location): Home    Distant Location (provider location):  Offsite (providers home) [facility] [place]     Platform used for Video Visit: [employer]    Nutrition Assessment  Reason For Visit:  Elsie Rubi is a 64 year old female presenting today for nutrition follow-up, 3 months s/p laparoscopic sleeve gastrectomy on 9/10/24 with Dr Cabrera.      Patient referred by EARL Varela on January 22, 2024.    Anthropometrics  Initial Consult Weight: 222 lbs  Day of Surgery Weight(9/10/24): 194 lbs  Current Weight: 180 lbs   Weight loss: -42 lbs from initial consult; -14 lbs from day of surgery     Current Vitamins/Minerals: Vivek Melts MVI with iron - 2 per day. Not currently supplementing calcium given she gets 600 mg from her protein shake. She is going to start taking at least 1 calcium citrate daily.     Nutrition History  Pt presented to the ED on 9/15/24 due to cough with shortness of breath. Found to have a post operative hematoma. Reports she has a better pain management plan in place now.     Pt reports consuming and tolerating for the most part the bariatric clear and low-fat full liquid diets. Fluid intake appears adequate, consuming 48-64 oz/day. Consuming peppermint tea, water, Gatorade Zero, broth. Having a hard time eating foods but continues to try different things. Explained this will likely get easier as the pain gets better managed. Patient has been mixing a scoop of protein powder into bone broth. Cream of wheat with protein powder. Tomato soup with protein powder.     Hasn't been too active over the last few days due to being tired but is hopefully this will get better with time.     October 2024: Patient is now 1 month post op. Had another visit to the ED on 9/29 and was found to be dehydrated. Patient reports she really  struggled with puree foods. The texture would gross her out very easily. Reports she was having some digestive issues, feels this is starting to get a little better. Taking small bites at a time. Chewing foods really good before swallowing. Has ventured into some foods that weren't necessarily on the diet stage however reports she has tolerated them fine.  Lentil soups with carrots, tomatoes. Drinking a small SF gatorade drink packet mixed in water (20 oz), small cup of coffee with protein shake. Estimates averaging 30 oz of fluid daily. Has been carrying her Gatorade bottle with her everywhere. Estimates only getting ~0 gram of protein daily. Planning to incorporate Ensure Max Protein Shake in the morning for breakfast going forward.     Patient taking a larger tablet MVI at this time. Advised she change over to a chewable. Plans on trying out Vivek Melts chewable MVI with iron. This will then provide her with enough Vitamin B12 and Vitamin D.     December 2024: Patient is now 3 months post op. Uneventful last few months. Still getting in good amounts of protein with meals. Has no appetite at all. Does drink a protein shake everyday, sometimes two. Mood is improved, being more active. The only food so far that hasn't been the best tolerated is hamburger patties.     Going to try to increase her protein intake closer to 90 grams daily. Wants to use her protein powder in different ways.     Eating around 1/2 cup of food per meal.     Needs to get her 3 month post op labs obtained.     Progress with Previous Goals:  1) Follow soft diet for 4 weeks, then to progress to bariatric regular diet(11/5) - met   2) Consume 60+ grams of protein/day. - met, continues   3) Sip on 48-64+ oz of fluids/day- between meals only. Met, continues   4) Eat slowly (>20 min/meal), chewing foods well (to applesauce-like consistency). - met, continues   5) Limit portions to ~1/2 cup/meal until 3 months post op - met  6) Schedule 3 month  provider/RD appointments - met   7) Get 3 month labs done before next appointments - not met, continues   8) Take vitamins/minerals as recommended - met, continues    Nutrition Prescription:  Grams Protein: 60 (minimum)  Amount of Fluid: 48-64 oz    Nutrition Diagnosis  Previous: Food and nutrition-related knowledge deficit r/t lack of prior exposure to diet advancements beyond bariatric pureed diet aeb recent bariatric surgery and pt interest in diet education/review    Current: Food and nutrition-related knowledge deficit r/t lack of prior exposure to diet instruction beyond 3 months s/p SG as evidenced by recent bariatric surgery and pt interest in diet education/review    Intervention  Materials/Education provided, reviewed bariatric regular consistency diet, protein intake, fluid intake, eating pace, chewing foods well, portion control, sugar/fat intake, recommended vitamin/mineral supplements. Patient demonstrates understanding.     Expected Engagement: Good    Goals:  1) Follow bariatric regular diet.   2) Consume 60+ grams of protein/day.  3) Sip on 48-64+ oz of fluids/day- between meals only.  4) Eat slowly (>20 min/meal), chewing foods well (to applesauce-like consistency).  5) Limit portions to ~1/2 - 3/4 cup/meal until 6 months post op.  6) Take the following after a Sleeve Gastrectomy:  - Continue with your current Vivek Melts MVI with iron - 2 per day   - Start taking at Calcium Citrate containing vitamin D: 500 mg or 600 mg 1 time daily     - Separate the calcium from your multivitamin or iron by at least 2 hours.     - Must be a chewable calcium citrate until post-op 3 months     - Options for calcium citrate: Meño calcium citrate chewable, bariatric advantage calcium citrate chewable, Celebrate vitamins calcium citrate chewable, Bariatric Fusion calcium citrate chewable    Your Stage 5 Diet: Regular Foods  [email or web address]     Keeping Up Your Diet after Weight Loss  Surgery  [email or web address]    Why Take Supplements for Life after Weight Loss Surgery  [email or web address]      Preventing Low Blood Sugar after Weight Loss Surgery  [email or web address]      Preventing Dumping Syndrome after Weight Loss Surgery  [email or web address]    Supplements after Sleeve Gastrectomy, Gastric Bypass or Single Anastomosis Duodenal Switch  [email or web address]    [email or web address]       Follow-Up: 6 months post op/prn    Time spent with patient: 32 minutes.  Caroline Mcduffie RD, LD

## 2024-12-09 NOTE — PROGRESS NOTES
Return Bariatric Surgery Note    RE: Elsie Rubi  MR#: [id number]  : 1960  VISIT DATE: Dec 10, 2024      Dear Jessie Rose,    I had the pleasure of seeing your patient, Elsie Rubi, in my post-bariatric surgery assessment clinic.    Assessment & Plan   Problem List Items Addressed This Visit       Dyspepsia    Relevant Medications    omeprazole (PRILOSEC) 20 MG DR capsule     Other Visit Diagnoses       S/P laparoscopic sleeve gastrectomy    -  Primary    Relevant Medications    omeprazole (PRILOSEC) 20 MG DR capsule             24 minutes spent by me on the date of the encounter doing chart review, history and exam, documentation and further activities per the note    CHIEF COMPLAINT: Post-bariatric surgery follow-up. Sleeve gastrectomy 3 months ago on 9/10/24 with Dr. Cabrera.      HISTORY OF PRESENT ILLNESS:       No data to display                  Plan  Switch to 20mg omeprazole as GERD symptoms are well controlled   Try 1/2 cap miralax daily to help with regularity of bowel movements as diarrhea symptoms can often come from untreated constipation, contact clinic if seeing worsened constipation or diarrhea symptoms. Stop miralax if seeing persistently worsened diarrhea.    Goals we discussed today:   90g protein daily   Explore weight training- consider pilates or yoga   Labs ordered at previous visit, discussed getting this done   Follow up with Juxinli or other celestino in 3 months for 6 month post op visit   Dietician appointment today   Keep up the excellent work!       Interval History:   Sleeve gastrectomy 9/10/24 with Dr. Cabrera.    Last seen by me at 1 month post op, 10/11/24- doing well, not taking actigall per her preference,     Today in visit 12/10/24  Continued gradual weight loss, currently is at 180lbs, down 14lbs since day of surgery, overall 19% total body weight loss since starting with program.     Seeing significant improvement in mood, reduced sedentary behavior.     Hunger  "is continuing to be well managed.   Seeing significant improvement in dyspnea issues, much better improvement in shortness of breath. \"I feel like my lungs have better capacity to take in oxygen.\"     Some irregularity in bowel movements alternating between constipation and diarrhea, increasing veggies and taking a probiotic which has helped some of this. Diarrhea 2x a week, maybe 3-4 bouts of diarrhea in one day. Otherwise feels constipated. Discussed trying miralax to help with more regular bowel movements. Discussed 1/2 cap miralax daily.     Hoping to increase exercise, build muscles. Discussed goal of 90g protein daily, exploring weight training.       Wt Readings from Last 5 Encounters:   12/10/24 81.6 kg (180 lb)   10/11/24 83 kg (183 lb)   10/08/24 83.9 kg (185 lb)   09/18/24 85.7 kg (189 lb)   09/17/24 86.2 kg (190 lb)       Anti-obesity medication history:     Current:     Past/failed/contraindicated:   Wegovy, zepbound prior to surgery.     Recent diet changes: tolerating all foods she's tried, has been cautious with re-introducing food. Tried peanuts and raw veggies at separate times recently, both went well. Cooking much more, cooking things mostly from scratch- prioritizing mediterranean foods, reducing processed foods. Is open to trying adding protein shakes back into diet to help with increasing protein.     Recent exercise/activity changes: improve exercise tolerance and breath capacity, able to stand up and cook which is a big improvement for her. Turns 65 in January which should qualify her for a gym membership, is hopeful to adding the gym routine into her year next year.     Vitamins/Labs: 3 month labs ordered at previous visit, discussed getting these done. Taking dissolvable bariatric vitamins, also started a probiotic.     GERD symptoms: denies, taking omeprazole 40mg daily- will switch to 20mg today     Nausea : if she overeats or eats too fast, if she isn't paying good attention to what " she's eating or eats the last couple bites on her plate even if she's feeling full.     Vomiting : if she overeats, might cough up some phlegm-like substance, not acidic. Otherwise no regurgitation or vomiting     Dysphagia:  no issues, switched to regular fluoxetine which has gone well for her.     Dumping syndrome: denies           Weight History:       No data to display              Initial Weight (lbs): 222.2 lbs  Weight: 81.6 kg (180 lb)     Cumulative weight loss (lbs): 42.2  Weight Loss Percentage: 18.99%         No data to display                    1/16/2024    11:27 AM   Eating Habits   How many meals do you eat per day? 2   Do you snack between meals? Yes           1/16/2024    11:27 AM   Exercise Questions Reviewed With Patient   How often do you exercise? Less than 1 time per week   What is the duration of your exercise (in minutes)? 10 Minutes   What keeps you from being more active? Pain    I should be more active but I just have not gotten around to it    Shortness of breath       Social History:       No data to display                Medications:  Current Outpatient Medications   Medication Sig Dispense Refill    acetaminophen (TYLENOL) 325 MG tablet Take 2 tablets (650 mg) by mouth every 4 hours as needed for other (For optimal non-opioid multimodal pain management to improve pain control and physical function.).      albuterol (PROAIR HFA/PROVENTIL HFA/VENTOLIN HFA) 108 (90 Base) MCG/ACT inhaler Inhale 2 puffs into the lungs every 4 hours as needed for shortness of breath, wheezing or cough 18 g 3    buPROPion (WELLBUTRIN XL) 150 MG 24 hr tablet Take 1 tablet (150 mg) by mouth every morning 90 tablet 1    cyclobenzaprine (FLEXERIL) 5 MG tablet Take 1 tablet (5 mg) by mouth 2 times daily as needed for muscle spasms 30 tablet 0    cyclobenzaprine (FLEXERIL) 5 MG tablet Take 1 tablet (5 mg) by mouth 2 times daily as needed for muscle spasms.      FLUoxetine (PROZAC) 40 MG capsule TAKE ONE CAPSULE  "BY MOUTH DAILY 90 capsule 0    fluticasone-salmeterol (ADVAIR) 250-50 MCG/ACT inhaler Inhale 1 puff into the lungs every 12 hours. 60 each 3    metoprolol succinate ER (TOPROL XL) 25 MG 24 hr tablet Take 1 tablet (25 mg) by mouth daily 90 tablet 2    omeprazole (PRILOSEC) 20 MG DR capsule Take 1 capsule (20 mg) by mouth daily. 30 capsule 3    traZODone (DESYREL) 50 MG tablet Take 1 tablet (50 mg) by mouth nightly as needed for sleep 90 tablet 1    mycophenolate (GENERIC EQUIVALENT) 200 MG/ML suspension Take 5 mLs (1,000 mg) by mouth 2 times daily. (Patient not taking: Reported on 12/10/2024) 300 mL 2    ondansetron (ZOFRAN ODT) 4 MG ODT tab Take 1 tablet (4 mg) by mouth every 6 hours as needed for nausea. (Patient not taking: Reported on 12/10/2024) 15 tablet 0     No current facility-administered medications for this visit.          No data to display                ROS:  GI:        No data to display              Skin:        No data to display              Psych:        No data to display              Female Only:       1/16/2024    11:27 AM   BAR RBS ROS -    Female only Post-menopausal                No data to display                  PHYSICAL EXAM:  Objective    Ht 1.626 m (5' 4\")   Wt 81.6 kg (180 lb)   BMI 30.90 kg/m    Vitals - Patient Reported  Pain Score: No Pain (0)      Vitals:  No vitals were obtained today due to virtual visit.    Physical Exam   GENERAL: alert and no distress  EYES: Eyes grossly normal to inspection.  No discharge or erythema, or obvious scleral/conjunctival abnormalities.  RESP: No audible wheeze, cough, or visible cyanosis.    SKIN: Visible skin clear. No significant rash, abnormal pigmentation or lesions.  NEURO: Cranial nerves grossly intact.  Mentation and speech appropriate for age.  PSYCH: Appropriate affect, tone, and pace of words        Sincerely,    Leanne Stanford PA-C    The longitudinal plan of care for the diagnosis(es)/condition(s) as documented were addressed " during this visit. Due to the added complexity in care, I will continue to support Ju in the subsequent management and with ongoing continuity of care.

## 2024-12-10 ENCOUNTER — VIRTUAL VISIT (OUTPATIENT)
Dept: ENDOCRINOLOGY | Facility: CLINIC | Age: 64
End: 2024-12-10
Payer: COMMERCIAL

## 2024-12-10 VITALS — HEIGHT: 64 IN | BODY MASS INDEX: 30.73 KG/M2 | WEIGHT: 180 LBS

## 2024-12-10 VITALS — WEIGHT: 180 LBS | BODY MASS INDEX: 30.73 KG/M2 | HEIGHT: 64 IN

## 2024-12-10 DIAGNOSIS — Z98.84 S/P LAPAROSCOPIC SLEEVE GASTRECTOMY: Primary | ICD-10-CM

## 2024-12-10 DIAGNOSIS — Z98.84 S/P LAPAROSCOPIC SLEEVE GASTRECTOMY: ICD-10-CM

## 2024-12-10 DIAGNOSIS — Z71.3 NUTRITIONAL COUNSELING: Primary | ICD-10-CM

## 2024-12-10 DIAGNOSIS — R10.13 DYSPEPSIA: ICD-10-CM

## 2024-12-10 PROCEDURE — 99207 PR NO CHARGE LOS: CPT | Mod: 95

## 2024-12-10 PROCEDURE — G2211 COMPLEX E/M VISIT ADD ON: HCPCS | Mod: 95

## 2024-12-10 PROCEDURE — 97803 MED NUTRITION INDIV SUBSEQ: CPT | Mod: 95

## 2024-12-10 PROCEDURE — 99213 OFFICE O/P EST LOW 20 MIN: CPT | Mod: 95

## 2024-12-10 ASSESSMENT — PAIN SCALES - GENERAL
PAINLEVEL_OUTOF10: NO PAIN (0)
PAINLEVEL_OUTOF10: NO PAIN (0)

## 2024-12-10 NOTE — PROGRESS NOTES
Virtual Visit Details    Type of service:  Video Visit   Video Start Time:  10:07am  Video End Time: 10:37am    Originating Location (pt. Location): Home    Distant Location (provider location):  Off-site  Platform used for Video Visit: Kelsy

## 2024-12-10 NOTE — NURSING NOTE
Current patient location: home     Is the patient currently in the state of [place]? YES    Visit mode:VIDEO    If the visit is dropped, the patient can be reconnected by: VIDEO VISIT: Text to cell phone:   Telephone Information:   Mobile [phone]       Will anyone else be joining the visit? NO  (If patient encounters technical issues they should call [phone]    Are changes needed to the allergy or medication list? N/A    Are refills needed on medications prescribed by this physician? NO    Rooming Documentation:  Not applicable      Reason for visit: RECHECK    Wt other than 24 hrs:    Pain more than one location:  no  Jessi ASHLEY

## 2024-12-10 NOTE — PATIENT INSTRUCTIONS
"Thank you for allowing us the privilege of caring for you. We hope we provided you with the excellent service you deserve.   Please let us know if there is anything else we can do for you so that we can be sure you are completely satisfied with your care experience.    To ensure the quality of our services you may be receiving a patient satisfaction survey from an independent patient satisfaction monitoring company.    The greatest compliment you can give is a \"Likely to Recommend\"    Your visit was with Leanne Stanford PA-C today.    Instructions per today's visit:     Henrry Rubi, it was great to visit with you today.  Here is a review of our visit.  If our clinic scheduler is not able to reach you please call [phone] to schedule your next appointments.    Plan  Switch to 20mg omeprazole as GERD symptoms are well controlled   Try 1/2 cap miralax daily to help with regularity of bowel movements as diarrhea symptoms can often come from untreated constipation, contact clinic if seeing worsened constipation or diarrhea symptoms. Stop miralax if seeing persistently worsened diarrhea.    Goals we discussed today:   90g protein daily   Explore weight training- consider pilates or yoga   Labs ordered at previous visit, discussed getting this done   Follow up with Leanne or other celestino in 3 months for 6 month post op visit   Dietician appointment today   Keep up the excellent work!       Information about Video Visits with [facility]: video visit information  _________________________________________________________________________________________________________________________________________________________  If you are asked by your clinic team to have your blood pressure checked:  [facility] do offer several locations for blood pressure checks. Please follow the below link to schedule an appointment. Scheduling an appointment at the pharmacy for a blood pressure check is now " preferred.    Appointment Plus ([email or web address])  _________________________________________________________________________________________________________________________________________________________  Important contact and scheduling information:  Please call our contact center at [phone] to schedule your next appointments.  To find a lab location near you, please call [phone].  For any nursing questions or concerns call Deyanira Schuler LPN at [phone] or Katarina Leonard RN at [phone]  Please call during clinic hours Monday through Friday 8:00a - 4:00p if you have questions or you can contact us via [employer] at anytime and we will reply during clinic hours.    Lab results will be communicated through My Chart or letter (if My Chart not used). Please call the clinic if you have not received communication after 1 week or if you have any questions.?  Clinic Fax: [phone]    _________________________________________________________________________________________________________________________________________________________  Meal Replacement Products:    Here is the link to our new e-store where you can purchase our meal replacement products    [facility] E-Store  [email or web address]    The one week starter kit is a great way to sample a variety of products and see what works for you.    If you want more information about the product go to: [employer]    If you are an employee or [facility] or [facility] please contact your care team for a 10% estore discount    Free Shipping for orders over $75     Benefits of meal replacements products:    Portion and calorie control  Improved nutrition  Structured eating  Simplified food choices  Avoid contact with trigger  foods  _________________________________________________________________________________________________________________________________________________________  Interested in working with a health ?  Health coaches work with you to improve your overall health and wellbeing.  They look at the whole person, and may involve discussion of different areas of life, including, but not limited to the four pillars of health (sleep, exercise, nutrition, and stress management). Discuss with your care team if you would like to start working a health .  Health Coaching-3 Pack: Schedule by calling [phone]    $99 for three health coaching visits    Visits may be done in person or via phone    Coaching is a partnership between the  and the client; Coaches do not prescribe or diagnose    Coaching helps inspire the client to reach his/her personal goals   _________________________________________________________________________________________________________________________________________________________  24 Week Healthy Lifestyle Plan:    Our mission in the 24-week Healthy Lifestyle Plan is to provide you with individualized care by giving you the tools, education and support you need to lose weight and maintain a healthy lifestyle. In your 24-week journey, you ll be supported by a dedicated weight loss team that includes registered dietitians, medical weight management providers, health coaches, and nurses -- all with special expertise in weight loss -- to help you every step of the way.     Monthly meetings with your registered dietician or medical weight management provider help to review your progress, update your care plan, and make any adjustments needed to ensure success. Between these visits, weekly and bi-weekly health  visits will help you focus on the four pillars of weight loss -- stress, sleep, nutrition, and exercise -- and how you can best adapt each to achieve sustainable weight loss  results.    In addition, you will be given exclusive access to online wellbeing classes through [employer].  Your initial visit will be with a medical weight management provider who will help to understand your weight loss goals and ensure this program is the right fit for you. Please let our team know if you are interested in the 24 week plan by sending a message to your care team or calling [phone] to schedule.  _________________________________________________________________________________________________________________________________________________________  __________  [facility] Get Moving Program  Our team of physical therapists is trained to help you understand and take control of your condition. They will perform a thorough evaluation to determine your ability for activity and develop a customized plan to fit your goals and physical ability.  Scheduling: Unsure if the Get Moving program is right for you? Discuss the program with your medical provider or diabetes educator. You can also call us at [phone] to ask questions or schedule an appointment.   [facility] Get Moving Program  ____________________________________________________________________________________________________________________________________________________________________________   [facility] Diabetes Prevention Program (DPP)  If you have prediabetes and Medicare please contact us via [employer] to learn more about the Diabetes Prevention Program (DPP)  Program Details:   [facility] offers the year-long Diabetes Prevention Program (DPP). The program helps you to make lifestyle changes that prevent or delay type 2 diabetes by supporting healthy eating, increased physical activity, stress reduction and use of coping skills.   On average, previous [facility] DPP cohorts have lost and maintained at least 5% of their starting weight throughout the program and averaged more than 150 minutes of physical  activity per week.  Participants meet weekly for one-hour group sessions over sixteen weeks, every other week for the next 8 weeks, and monthly for the last six months.   A year-long maintenance program is also available for participants who complete the first year.   Location & Cost:   During the COVID-19 Public Health Emergency, the program is offered virtually. When in-person classes can resume, they will be held at [facility].  For people with Medicare, the program is covered in full. A self-pay option will also be available for those with non-Medicare insurance plans.   ______________________________________________________________________________________________________________________________________________________________________________________________________________________________    To work with a Behavioral Health Psychologist:    Call to schedule:    Tu Navarrete - [phone]  Sonia Ku - [phone]  Luiza Zuniga - [phone]  Lou Franklin - [phone]   Love Ye PhD (cannot accept Medicare) [phone]        Thank you,   [facility] Comprehensive Weight Management Team

## 2024-12-10 NOTE — LETTER
12/10/2024       RE: Elsie Rubi  [address] [facility] [address]     Dear Colleague,    Thank you for referring your patient, Elsie Rubi, to the [facility] [place] at [facility]. Please see a copy of my visit note below.    Video-Visit Details    Type of service:  Video Visit    Video Start Time: 11:32 AM  Video End Time: 12:04 PM     Originating Location (pt. Location): Home    Distant Location (provider location):  Offsite (providers home) [facility] [place]     Platform used for Video Visit: restOpolis    Nutrition Assessment  Reason For Visit:  Elsie Rubi is a 64 year old female presenting today for nutrition follow-up, 3 months s/p laparoscopic sleeve gastrectomy on 9/10/24 with Dr Cabrera.      Patient referred by EARL Varela on January 22, 2024.    Anthropometrics  Initial Consult Weight: 222 lbs  Day of Surgery Weight(9/10/24): 194 lbs  Current Weight: 180 lbs   Weight loss: -42 lbs from initial consult; -14 lbs from day of surgery     Current Vitamins/Minerals: Vivek Melts MVI with iron - 2 per day. Not currently supplementing calcium given she gets 600 mg from her protein shake. She is going to start taking at least 1 calcium citrate daily.     Nutrition History  Pt presented to the ED on 9/15/24 due to cough with shortness of breath. Found to have a post operative hematoma. Reports she has a better pain management plan in place now.     Pt reports consuming and tolerating for the most part the bariatric clear and low-fat full liquid diets. Fluid intake appears adequate, consuming 48-64 oz/day. Consuming peppermint tea, water, Gatorade Zero, broth. Having a hard time eating foods but continues to try different things. Explained this will likely get easier as the pain gets better managed. Patient has been mixing a scoop of protein powder into bone broth. Cream of  wheat with protein powder. Tomato soup with protein powder.     Hasn't been too active over the last few days due to being tired but is hopefully this will get better with time.     October 2024: Patient is now 1 month post op. Had another visit to the ED on 9/29 and was found to be dehydrated. Patient reports she really struggled with puree foods. The texture would gross her out very easily. Reports she was having some digestive issues, feels this is starting to get a little better. Taking small bites at a time. Chewing foods really good before swallowing. Has ventured into some foods that weren't necessarily on the diet stage however reports she has tolerated them fine.  Lentil soups with carrots, tomatoes. Drinking a small SF gatorade drink packet mixed in water (20 oz), small cup of coffee with protein shake. Estimates averaging 30 oz of fluid daily. Has been carrying her Gatorade bottle with her everywhere. Estimates only getting ~0 gram of protein daily. Planning to incorporate Ensure Max Protein Shake in the morning for breakfast going forward.     Patient taking a larger tablet MVI at this time. Advised she change over to a chewable. Plans on trying out Vivek Melts chewable MVI with iron. This will then provide her with enough Vitamin B12 and Vitamin D.     December 2024: Patient is now 3 months post op. Uneventful last few months. Still getting in good amounts of protein with meals. Has no appetite at all. Does drink a protein shake everyday, sometimes two. Mood is improved, being more active. The only food so far that hasn't been the best tolerated is hamburger patties.     Going to try to increase her protein intake closer to 90 grams daily. Wants to use her protein powder in different ways.     Eating around 1/2 cup of food per meal.     Needs to get her 3 month post op labs obtained.     Progress with Previous Goals:  1) Follow soft diet for 4 weeks, then to progress to bariatric regular diet(11/5) - met    2) Consume 60+ grams of protein/day. - met, continues   3) Sip on 48-64+ oz of fluids/day- between meals only. Met, continues   4) Eat slowly (>20 min/meal), chewing foods well (to applesauce-like consistency). - met, continues   5) Limit portions to ~1/2 cup/meal until 3 months post op - met  6) Schedule 3 month provider/RD appointments - met   7) Get 3 month labs done before next appointments - not met, continues   8) Take vitamins/minerals as recommended - met, continues    Nutrition Prescription:  Grams Protein: 60 (minimum)  Amount of Fluid: 48-64 oz    Nutrition Diagnosis  Previous: Food and nutrition-related knowledge deficit r/t lack of prior exposure to diet advancements beyond bariatric pureed diet aeb recent bariatric surgery and pt interest in diet education/review    Current: Food and nutrition-related knowledge deficit r/t lack of prior exposure to diet instruction beyond 3 months s/p SG as evidenced by recent bariatric surgery and pt interest in diet education/review    Intervention  Materials/Education provided, reviewed bariatric regular consistency diet, protein intake, fluid intake, eating pace, chewing foods well, portion control, sugar/fat intake, recommended vitamin/mineral supplements. Patient demonstrates understanding.     Expected Engagement: Good    Goals:  1) Follow bariatric regular diet.   2) Consume 60+ grams of protein/day.  3) Sip on 48-64+ oz of fluids/day- between meals only.  4) Eat slowly (>20 min/meal), chewing foods well (to applesauce-like consistency).  5) Limit portions to ~1/2 - 3/4 cup/meal until 6 months post op.  6) Take the following after a Sleeve Gastrectomy:  - Continue with your current Vivek Melts MVI with iron - 2 per day   - Start taking at Calcium Citrate containing vitamin D: 500 mg or 600 mg 1 time daily     - Separate the calcium from your multivitamin or iron by at least 2 hours.     - Must be a chewable calcium citrate until post-op 3 months     - Options  for calcium citrate: Meño calcium citrate chewable, bariatric advantage calcium citrate chewable, Celebrate vitamins calcium citrate chewable, Bariatric Fusion calcium citrate chewable    Your Stage 5 Diet: Regular Foods  [email or web address]     Keeping Up Your Diet after Weight Loss Surgery  [email or web address]    Why Take Supplements for Life after Weight Loss Surgery  [email or web address]      Preventing Low Blood Sugar after Weight Loss Surgery  [email or web address]      Preventing Dumping Syndrome after Weight Loss Surgery  [email or web address]    Supplements after Sleeve Gastrectomy, Gastric Bypass or Single Anastomosis Duodenal Switch  [email or web address]    [email or web address]://[email or web address]/[email or web address]/recipes       Follow-Up: 6 months post op/prn    Time spent with patient: 32 minutes.  Caroline Mcduffie RD, LD      Again, thank you for allowing me to participate in the care of your patient.      Sincerely,    Caroline Mcduffie RD

## 2024-12-10 NOTE — PATIENT INSTRUCTIONS
Henrry Dodd,     Follow-up with RD 6 months post op.     Thank you,    Caroline Mcduffie, RD, LD  If you would like to schedule or reschedule an appointment with the RD, please call [phone]    Nutrition Goals  1) Follow bariatric regular diet.   2) Consume 60+ grams of protein/day.  3) Sip on 48-64+ oz of fluids/day- between meals only.  4) Eat slowly (>20 min/meal), chewing foods well (to applesauce-like consistency).  5) Limit portions to ~1/2 - 3/4 cup/meal until 6 months post op.  6) Take the following after a Sleeve Gastrectomy:  - Continue with your current Vivek Melts MVI with iron - 2 per day   - Start taking at Calcium Citrate containing vitamin D: 500 mg or 600 mg 1 time daily     - Separate the calcium from your multivitamin or iron by at least 2 hours.     - Must be a chewable calcium citrate until post-op 3 months     - Options for calcium citrate: Meño calcium citrate chewable, bariatric advantage calcium citrate chewable, Celebrate vitamins calcium citrate chewable, Bariatric Fusion calcium citrate chewable    Your Stage 5 Diet: Regular Foods  [email or web address]     Keeping Up Your Diet after Weight Loss Surgery  [email or web address]    Why Take Supplements for Life after Weight Loss Surgery  [email or web address]      Preventing Low Blood Sugar after Weight Loss Surgery  [email or web address]      Preventing Dumping Syndrome after Weight Loss Surgery  [email or web address]    Supplements after Sleeve Gastrectomy, Gastric Bypass or Single Anastomosis Duodenal Switch  [email or web address]    [email or web address].CertiVox/en-[email or web address]/recipes    COMPREHENSIVE WEIGHT MANAGEMENT PROGRAM  VIRTUAL SUPPORT GROUPS    At [facility], our Comprehensive Weight Management program offers on-line support groups for patients who are working on weight loss and considering, preparing for, or have had weight loss surgery.     There is no cost for this  "opportunity.  You are invited to attend the?Virtual Support Groups?provided by any of the following locations:    [facility] via Microsoft Teams with Sandra Hopper RD, RN  2.   [place] via Help Remedies with Lalit Keenan, PhD, LP  3.   [place] via Help Remedies with Shea Winston RN  4.   [facility] via a Zoom Meeting with OXANA Ng-    The following Support Group information can also be found on our website:  [email or web address]      [facility]   WEIGHT LOSS SURGERY SUPPORT GROUP  The support group is a patient-lead forum that meets monthly to share experiences, encouragement and education. It is open to those who have had weight loss surgery, are scheduled for surgery, or are considering surgery.   WHEN: 3rd Wednesday of each month from 5:00PM - 6:00PM using Microsoft Teams.   FACILITATOR: Led by Sandra Covarrubias RD, MADHURI, RN, the program's Clinical Coordinator.   TO REGISTER: Please contact the clinic via Ultius or call the nurse line directly at [phone] to inform our staff that you would like an invite sent to you and to let us know the email you would like the invite sent to. Prior to the meeting, a link with directions on how to join the meeting will be sent to you.    2024 Meetings    September 18: Danika Figueroa, PhD, Outpatient Clinical Therapist, \"Pro Tips for Habit Change\".  October 16:  Let's Talk  This will be a time of group support.??   November 20:  Let's Talk  about How to Navigate the Upcoming Holiday Season.  December 18:  Let's Talk  and Celebrate the past year.       [facility] BARIATRIC CARE SUPPORT GROUP  This is open to all pre- and post- operative bariatric surgery patients as well as their support system.   WHEN: 3rd Tuesday of each month from 6:30 PM - 8:00 PM using Microsoft Teams.   FACILITATOR: Led by Lalit" "Shlomo, Ph.D who is a Licensed Psychologist with the [facility] Comprehensive Weight Management Program.   TO REGISTER: Please send an email to Lalit Keenan, Ph.D., LP at?[email or web address]?if you would like an invitation to the group. Prior to the meeting, a link with directions on how to join the meeting will be sent to you.    2024 Meetings September 17: IN PERSON MEETING. [facility], [facility] [place], [address], 1st floor Conference Room.  October 15: Date changed to OCTOBER 8th (2nd Tuesday).   November 19: \"Holiday Eating\", Love Angel RD, LD.  December 17: \"Hospital Stay and Compliance\", Shea Winston RN, CBN.      [facility] POST-OPERATIVE BARIATRIC SURGERY SUPPORT GROUP  This is a support group for [facility] bariatric patients (and those external to [facility]) who have had bariatric surgery and are at least 3 months post-surgery.  WHEN: 4th Thursday of the month from 11:00 AM - 12:00 PM using Microsoft Teams.   FACILITATOR: Led by Certified Bariatric Nurse, Shea Winston RN.   TO REGISTER: Please send an email to Shea at [email or web address] if you would like an invitation to the group.  Prior to the meeting, a link with directions on how to join the meeting will be sent to you.    2024 Meetings September 26 October 24 November 28: No meeting  December 26    [facility]   HEALTHY LIFESTYLE COACHING GROUP  This is a 60 minute virtual coaching group for those who want to lead a healthier lifestyle. Come together to set goals and overcome barriers in a supportive group environment. We will address the four pillars of health: nutrition, exercise, sleep, and emotional well-being. This group is highly recommended for those who are participating in the 24 week Healthy Lifestyle Plan and our Health Coaching sessions.   WHEN: 1st Friday of the " month, 12:30 PM - 1:30 PM   using a Zoom meeting.     FACILITATOR: Led by National Board-Certified Health and , Shea Toure [facility].  TO REGISTER: Please call the [employer] at [phone] to register. You will get an appointment to attend in [place]. Fifteen minutes prior to the meeting, complete the e-check in and you will get the link to join the meeting.  There is no charge to attend this group and space is limited.  Please register for each month you wish to attend    2024 Meetings  September 5 No meeting.  October 4 November 1 December 6

## 2024-12-10 NOTE — LETTER
12/10/2024       RE: Elsie Rubi  [address] [address]     Dear Colleague,    Thank you for referring your patient, Elsie Rubi, to the [facility] [place] at [facility]. Please see a copy of my visit note below.    Return Bariatric Surgery Note    RE: Elsie Rubi  MR#: [id number]  : 1960  VISIT DATE: Dec 10, 2024      Dear Jessie Rose,    I had the pleasure of seeing your patient, Elsie Rubi, in my post-bariatric surgery assessment clinic.    Assessment & Plan  Problem List Items Addressed This Visit       Dyspepsia    Relevant Medications    omeprazole (PRILOSEC) 20 MG DR capsule     Other Visit Diagnoses       S/P laparoscopic sleeve gastrectomy    -  Primary    Relevant Medications    omeprazole (PRILOSEC) 20 MG DR capsule             24 minutes spent by me on the date of the encounter doing chart review, history and exam, documentation and further activities per the note    CHIEF COMPLAINT: Post-bariatric surgery follow-up. Sleeve gastrectomy 3 months ago on 9/10/24 with Dr. Cabrera.      HISTORY OF PRESENT ILLNESS:       No data to display                  Plan  Switch to 20mg omeprazole as GERD symptoms are well controlled   Try 1/2 cap miralax daily to help with regularity of bowel movements as diarrhea symptoms can often come from untreated constipation, contact clinic if seeing worsened constipation or diarrhea symptoms. Stop miralax if seeing persistently worsened diarrhea.    Goals we discussed today:   90g protein daily   Explore weight training- consider pilates or yoga   Labs ordered at previous visit, discussed getting this done   Follow up with Leanne or other [facility] in 3 months for 6 month post op visit   Dietician appointment today   Keep up the excellent work!       Interval History:   Sleeve gastrectomy 9/10/24 with Dr. Cabrera.    Last seen by me at 1 month post op,  "10/11/24- doing well, not taking actigall per her preference,     Today in visit 12/10/24  Continued gradual weight loss, currently is at 180lbs, down 14lbs since day of surgery, overall 19% total body weight loss since starting with program.     Seeing significant improvement in mood, reduced sedentary behavior.     Hunger is continuing to be well managed.   Seeing significant improvement in dyspnea issues, much better improvement in shortness of breath. \"I feel like my lungs have better capacity to take in oxygen.\"     Some irregularity in bowel movements alternating between constipation and diarrhea, increasing veggies and taking a probiotic which has helped some of this. Diarrhea 2x a week, maybe 3-4 bouts of diarrhea in one day. Otherwise feels constipated. Discussed trying miralax to help with more regular bowel movements. Discussed 1/2 cap miralax daily.     Hoping to increase exercise, build muscles. Discussed goal of 90g protein daily, exploring weight training.       Wt Readings from Last 5 Encounters:   12/10/24 81.6 kg (180 lb)   10/11/24 83 kg (183 lb)   10/08/24 83.9 kg (185 lb)   09/18/24 85.7 kg (189 lb)   09/17/24 86.2 kg (190 lb)       Anti-obesity medication history:     Current:     Past/failed/contraindicated:   Wegovy, zepbound prior to surgery.     Recent diet changes: tolerating all foods she's tried, has been cautious with re-introducing food. Tried peanuts and raw veggies at separate times recently, both went well. Cooking much more, cooking things mostly from scratch- prioritizing mediterranean foods, reducing processed foods. Is open to trying adding protein shakes back into diet to help with increasing protein.     Recent exercise/activity changes: improve exercise tolerance and breath capacity, able to stand up and cook which is a big improvement for her. Turns 65 in January which should qualify her for a gym membership, is hopeful to adding the gym routine into her year next year. "     Vitamins/Labs: 3 month labs ordered at previous visit, discussed getting these done. Taking dissolvable bariatric vitamins, also started a probiotic.     GERD symptoms: denies, taking omeprazole 40mg daily- will switch to 20mg today     Nausea : if she overeats or eats too fast, if she isn't paying good attention to what she's eating or eats the last couple bites on her plate even if she's feeling full.     Vomiting : if she overeats, might cough up some phlegm-like substance, not acidic. Otherwise no regurgitation or vomiting     Dysphagia:  no issues, switched to regular fluoxetine which has gone well for her.     Dumping syndrome: denies           Weight History:       No data to display              Initial Weight (lbs): 222.2 lbs  Weight: 81.6 kg (180 lb)     Cumulative weight loss (lbs): 42.2  Weight Loss Percentage: 18.99%         No data to display                    1/16/2024    11:27 AM   Eating Habits   How many meals do you eat per day? 2   Do you snack between meals? Yes           1/16/2024    11:27 AM   Exercise Questions Reviewed With Patient   How often do you exercise? Less than 1 time per week   What is the duration of your exercise (in minutes)? 10 Minutes   What keeps you from being more active? Pain    I should be more active but I just have not gotten around to it    Shortness of breath       Social History:       No data to display                Medications:  Current Outpatient Medications   Medication Sig Dispense Refill     acetaminophen (TYLENOL) 325 MG tablet Take 2 tablets (650 mg) by mouth every 4 hours as needed for other (For optimal non-opioid multimodal pain management to improve pain control and physical function.).       albuterol (PROAIR HFA/PROVENTIL HFA/VENTOLIN HFA) 108 (90 Base) MCG/ACT inhaler Inhale 2 puffs into the lungs every 4 hours as needed for shortness of breath, wheezing or cough 18 g 3     buPROPion (WELLBUTRIN XL) 150 MG 24 hr tablet Take 1 tablet (150 mg) by  "mouth every morning 90 tablet 1     cyclobenzaprine (FLEXERIL) 5 MG tablet Take 1 tablet (5 mg) by mouth 2 times daily as needed for muscle spasms 30 tablet 0     cyclobenzaprine (FLEXERIL) 5 MG tablet Take 1 tablet (5 mg) by mouth 2 times daily as needed for muscle spasms.       FLUoxetine (PROZAC) 40 MG capsule TAKE ONE CAPSULE BY MOUTH DAILY 90 capsule 0     fluticasone-salmeterol (ADVAIR) 250-50 MCG/ACT inhaler Inhale 1 puff into the lungs every 12 hours. 60 each 3     metoprolol succinate ER (TOPROL XL) 25 MG 24 hr tablet Take 1 tablet (25 mg) by mouth daily 90 tablet 2     omeprazole (PRILOSEC) 20 MG DR capsule Take 1 capsule (20 mg) by mouth daily. 30 capsule 3     traZODone (DESYREL) 50 MG tablet Take 1 tablet (50 mg) by mouth nightly as needed for sleep 90 tablet 1     mycophenolate (GENERIC EQUIVALENT) 200 MG/ML suspension Take 5 mLs (1,000 mg) by mouth 2 times daily. (Patient not taking: Reported on 12/10/2024) 300 mL 2     ondansetron (ZOFRAN ODT) 4 MG ODT tab Take 1 tablet (4 mg) by mouth every 6 hours as needed for nausea. (Patient not taking: Reported on 12/10/2024) 15 tablet 0     No current facility-administered medications for this visit.          No data to display                ROS:  GI:        No data to display              Skin:        No data to display              Psych:        No data to display              Female Only:       1/16/2024    11:27 AM   [facility] -    Female only Post-menopausal                No data to display                  PHYSICAL EXAM:  Objective   Ht 1.626 m (5' 4\")   Wt 81.6 kg (180 lb)   BMI 30.90 kg/m    Vitals - Patient Reported  Pain Score: No Pain (0)      Vitals:  No vitals were obtained today due to virtual visit.    Physical Exam   GENERAL: alert and no distress  EYES: Eyes grossly normal to inspection.  No discharge or erythema, or obvious scleral/conjunctival abnormalities.  RESP: No audible wheeze, cough, or visible cyanosis.    SKIN: Visible skin " clear. No significant rash, abnormal pigmentation or lesions.  NEURO: Cranial nerves grossly intact.  Mentation and speech appropriate for age.  PSYCH: Appropriate affect, tone, and pace of words        Sincerely,    Leanne Stanford PA-C    The longitudinal plan of care for the diagnosis(es)/condition(s) as documented were addressed during this visit. Due to the added complexity in care, I will continue to support Ju in the subsequent management and with ongoing continuity of care.     Virtual Visit Details    Type of service:  Video Visit   Video Start Time:  10:07am  Video End Time: 10:37am    Originating Location (pt. Location): Home    Distant Location (provider location):  Off-site  Platform used for Video Visit: Kelsy      Again, thank you for allowing me to participate in the care of your patient.      Sincerely,    Leanne Stanford PA-C

## 2024-12-10 NOTE — NURSING NOTE
Current patient location: home     Is the patient currently in the state of [place]? YES    Visit mode:VIDEO    If the visit is dropped, the patient can be reconnected by: VIDEO VISIT: Text to cell phone:   Telephone Information:   Mobile [phone]       Will anyone else be joining the visit? NO  (If patient encounters technical issues they should call [phone]    Are changes needed to the allergy or medication list? Yes please remove meds not taking.    Are refills needed on medications prescribed by this physician? NO    Rooming Documentation:  Not applicable      Reason for visit: RECHECK    Wt other than 24 hrs:    Pain more than one location:  no  Jessi ASHLEY

## 2024-12-14 ENCOUNTER — HEALTH MAINTENANCE LETTER (OUTPATIENT)
Age: 64
End: 2024-12-14

## 2024-12-18 ENCOUNTER — TELEPHONE (OUTPATIENT)
Dept: ENDOCRINOLOGY | Facility: CLINIC | Age: 64
End: 2024-12-18
Payer: COMMERCIAL

## 2024-12-18 NOTE — TELEPHONE ENCOUNTER
Patient confirmed scheduled appointment:     Date: 4/9/2025  Time: 3PM  Visit type: Return Bariatric  Visit mode: Virtual Visit  Provider:  Leanne Stanford PA-C  Location: [facility]    Additional Notes:

## 2024-12-26 ENCOUNTER — TELEPHONE (OUTPATIENT)
Dept: ENDOCRINOLOGY | Facility: CLINIC | Age: 64
End: 2024-12-26
Payer: COMMERCIAL

## 2024-12-26 ENCOUNTER — MYC MEDICAL ADVICE (OUTPATIENT)
Dept: FAMILY MEDICINE | Facility: OTHER | Age: 64
End: 2024-12-26

## 2024-12-26 ASSESSMENT — PATIENT HEALTH QUESTIONNAIRE - PHQ9
SUM OF ALL RESPONSES TO PHQ QUESTIONS 1-9: 2
10. IF YOU CHECKED OFF ANY PROBLEMS, HOW DIFFICULT HAVE THESE PROBLEMS MADE IT FOR YOU TO DO YOUR WORK, TAKE CARE OF THINGS AT HOME, OR GET ALONG WITH OTHER PEOPLE: SOMEWHAT DIFFICULT
SUM OF ALL RESPONSES TO PHQ QUESTIONS 1-9: 2

## 2024-12-26 ASSESSMENT — ANXIETY QUESTIONNAIRES
8. IF YOU CHECKED OFF ANY PROBLEMS, HOW DIFFICULT HAVE THESE MADE IT FOR YOU TO DO YOUR WORK, TAKE CARE OF THINGS AT HOME, OR GET ALONG WITH OTHER PEOPLE?: NOT DIFFICULT AT ALL
7. FEELING AFRAID AS IF SOMETHING AWFUL MIGHT HAPPEN: NOT AT ALL
GAD7 TOTAL SCORE: 2
7. FEELING AFRAID AS IF SOMETHING AWFUL MIGHT HAPPEN: NOT AT ALL
IF YOU CHECKED OFF ANY PROBLEMS ON THIS QUESTIONNAIRE, HOW DIFFICULT HAVE THESE PROBLEMS MADE IT FOR YOU TO DO YOUR WORK, TAKE CARE OF THINGS AT HOME, OR GET ALONG WITH OTHER PEOPLE: NOT DIFFICULT AT ALL
4. TROUBLE RELAXING: SEVERAL DAYS
GAD7 TOTAL SCORE: 2
1. FEELING NERVOUS, ANXIOUS, OR ON EDGE: NOT AT ALL
2. NOT BEING ABLE TO STOP OR CONTROL WORRYING: NOT AT ALL
6. BECOMING EASILY ANNOYED OR IRRITABLE: SEVERAL DAYS
GAD7 TOTAL SCORE: 2
5. BEING SO RESTLESS THAT IT IS HARD TO SIT STILL: NOT AT ALL
3. WORRYING TOO MUCH ABOUT DIFFERENT THINGS: NOT AT ALL

## 2024-12-26 NOTE — TELEPHONE ENCOUNTER
Completed [facility] PHQ-9/EDMAR-7 on 12/26/2024 for Depression Remission quality patient outreach per [facility] quality guidelines. This writer sees no major mental health and/or safety concerns at present. Currently meeting depression remission status with PHQ-9 total score <=4. Continue with current plan of care.          8/8/2024     9:55 AM 8/16/2024     1:19 PM 12/26/2024     1:11 PM   PHQ   PHQ-9 Total Score 12 5 2    Q9: Thoughts of better off dead/self-harm past 2 weeks Not at all Not at all Not at all       Patient-reported          8/8/2024     9:56 AM 9/16/2024     3:08 PM 12/26/2024     1:12 PM   EDMAR-7 SCORE   Total Score 4 (minimal anxiety) 5 (mild anxiety) 2 (minimal anxiety)   Total Score 4 5 2        Patient-reported        Appointments in Next Year      Vladimir 15, 2025 1:00 PM  (Arrive by 12:45 PM)  Full PFT with  PFL A  [facility] ) [phone]     Vladimir 15, 2025 1:30 PM  (Arrive by 1:15 PM)  Return Interstitial Lung with Isaac Duque MD  [facility] ) [phone]     Mar 10, 2025 1:30 PM  (Arrive by 1:15 PM)  Return Bariatric Nutrition Visit with Caroline Mcduffie RD  [facility] ) [phone]     Apr 09, 2025 3:00 PM  (Arrive by 2:45 PM)  Return Bariatric Visit with Leanne Stanford PA-C  [facility] ) [phone]          Brandy Garcia RN

## 2024-12-26 NOTE — TELEPHONE ENCOUNTER
Patient confirmed scheduled appointment:  Date: 3/10/25  Time: 1:30 pm  Visit type: return sánchez nutrition  Provider: Caroline Mcduffie RD  Location: virtual  Testing/imaging: NA  Additional notes: NA

## 2025-01-14 ENCOUNTER — LAB (OUTPATIENT)
Dept: LAB | Facility: OTHER | Age: 65
End: 2025-01-14
Payer: MEDICARE

## 2025-01-14 DIAGNOSIS — Z98.84 S/P LAPAROSCOPIC SLEEVE GASTRECTOMY: ICD-10-CM

## 2025-01-14 LAB
ALBUMIN SERPL BCG-MCNC: 4.1 G/DL (ref 3.5–5.2)
ALP SERPL-CCNC: 88 U/L (ref 40–150)
ALT SERPL W P-5'-P-CCNC: 23 U/L (ref 0–50)
ANION GAP SERPL CALCULATED.3IONS-SCNC: 11 MMOL/L (ref 7–15)
AST SERPL W P-5'-P-CCNC: 28 U/L (ref 0–45)
BILIRUB SERPL-MCNC: 0.4 MG/DL
BUN SERPL-MCNC: 26 MG/DL (ref 8–23)
CALCIUM SERPL-MCNC: 9.4 MG/DL (ref 8.8–10.4)
CHLORIDE SERPL-SCNC: 105 MMOL/L (ref 98–107)
CHOLEST SERPL-MCNC: 236 MG/DL
CREAT SERPL-MCNC: 0.96 MG/DL (ref 0.51–0.95)
EGFRCR SERPLBLD CKD-EPI 2021: 66 ML/MIN/1.73M2
ERYTHROCYTE [DISTWIDTH] IN BLOOD BY AUTOMATED COUNT: 14.6 % (ref 10–15)
EST. AVERAGE GLUCOSE BLD GHB EST-MCNC: 111 MG/DL
FASTING STATUS PATIENT QL REPORTED: YES
GLUCOSE SERPL-MCNC: 99 MG/DL (ref 70–99)
HBA1C MFR BLD: 5.5 %
HCO3 SERPL-SCNC: 22 MMOL/L (ref 22–29)
HCT VFR BLD AUTO: 38.3 % (ref 35–47)
HDLC SERPL-MCNC: 82 MG/DL
HGB BLD-MCNC: 12.7 G/DL (ref 11.7–15.7)
LDLC SERPL CALC-MCNC: 138 MG/DL
MCH RBC QN AUTO: 31.3 PG (ref 26.5–33)
MCHC RBC AUTO-ENTMCNC: 33.2 G/DL (ref 31.5–36.5)
MCV RBC AUTO: 94 FL (ref 78–100)
NONHDLC SERPL-MCNC: 154 MG/DL
PLATELET # BLD AUTO: 188 10E3/UL (ref 150–450)
POTASSIUM SERPL-SCNC: 4.2 MMOL/L (ref 3.4–5.3)
PROT SERPL-MCNC: 6.6 G/DL (ref 6.4–8.3)
RBC # BLD AUTO: 4.06 10E6/UL (ref 3.8–5.2)
SODIUM SERPL-SCNC: 138 MMOL/L (ref 135–145)
TRIGL SERPL-MCNC: 81 MG/DL
WBC # BLD AUTO: 7.3 10E3/UL (ref 4–11)

## 2025-01-14 PROCEDURE — 84590 ASSAY OF VITAMIN A: CPT | Mod: ZL

## 2025-01-14 PROCEDURE — 80048 BASIC METABOLIC PNL TOTAL CA: CPT | Mod: ZL

## 2025-01-14 PROCEDURE — 85014 HEMATOCRIT: CPT | Mod: ZL

## 2025-01-14 PROCEDURE — 36415 COLL VENOUS BLD VENIPUNCTURE: CPT | Mod: ZL

## 2025-01-14 PROCEDURE — 82607 VITAMIN B-12: CPT | Mod: ZL

## 2025-01-14 PROCEDURE — 84520 ASSAY OF UREA NITROGEN: CPT | Mod: ZL

## 2025-01-14 PROCEDURE — 83718 ASSAY OF LIPOPROTEIN: CPT | Mod: ZL

## 2025-01-14 PROCEDURE — 83970 ASSAY OF PARATHORMONE: CPT | Mod: ZL

## 2025-01-14 PROCEDURE — 85041 AUTOMATED RBC COUNT: CPT | Mod: ZL

## 2025-01-14 PROCEDURE — 83036 HEMOGLOBIN GLYCOSYLATED A1C: CPT | Mod: ZL

## 2025-01-14 PROCEDURE — 82306 VITAMIN D 25 HYDROXY: CPT | Mod: ZL

## 2025-01-15 ENCOUNTER — OFFICE VISIT (OUTPATIENT)
Dept: PULMONOLOGY | Facility: CLINIC | Age: 65
End: 2025-01-15
Attending: INTERNAL MEDICINE

## 2025-01-15 VITALS
BODY MASS INDEX: 30.41 KG/M2 | DIASTOLIC BLOOD PRESSURE: 90 MMHG | SYSTOLIC BLOOD PRESSURE: 129 MMHG | WEIGHT: 182.5 LBS | HEIGHT: 65 IN | HEART RATE: 63 BPM | OXYGEN SATURATION: 96 %

## 2025-01-15 DIAGNOSIS — J84.9 ILD (INTERSTITIAL LUNG DISEASE) (H): ICD-10-CM

## 2025-01-15 DIAGNOSIS — G47.34 NOCTURNAL HYPOXIA: ICD-10-CM

## 2025-01-15 DIAGNOSIS — J45.30 MILD PERSISTENT ASTHMA WITHOUT COMPLICATION: ICD-10-CM

## 2025-01-15 DIAGNOSIS — G47.33 OSA (OBSTRUCTIVE SLEEP APNEA): ICD-10-CM

## 2025-01-15 DIAGNOSIS — J84.9 ILD (INTERSTITIAL LUNG DISEASE) (H): Primary | ICD-10-CM

## 2025-01-15 LAB
DLCOCOR-%PRED-PRE: 76 %
DLCOCOR-PRE: 15.16 ML/MIN/MMHG
DLCOUNC-%PRED-PRE: 75 %
DLCOUNC-PRE: 14.83 ML/MIN/MMHG
DLCOUNC-PRED: 19.73 ML/MIN/MMHG
ERV-%PRED-PRE: 29 %
ERV-PRE: 0.32 L
ERV-PRED: 1.07 L
EXPTIME-PRE: 3.78 SEC
FEF2575-%PRED-PRE: 161 %
FEF2575-PRE: 3.21 L/SEC
FEF2575-PRED: 1.99 L/SEC
FEFMAX-%PRED-PRE: 110 %
FEFMAX-PRE: 6.74 L/SEC
FEFMAX-PRED: 6.11 L/SEC
FEV1-%PRED-PRE: 79 %
FEV1-PRE: 1.78 L
FEV1FEV6-PRE: 89 %
FEV1FEV6-PRED: 80 %
FEV1FVC-PRE: 89 %
FEV1FVC-PRED: 79 %
FEV1SVC-PRE: 82 %
FEV1SVC-PRED: 69 %
FIFMAX-PRE: 2.78 L/SEC
FRCPLETH-%PRED-PRE: 61 %
FRCPLETH-PRE: 1.82 L
FRCPLETH-PRED: 2.94 L
FVC-%PRED-PRE: 70 %
FVC-PRE: 2.01 L
FVC-PRED: 2.84 L
IC-%PRED-PRE: 86 %
IC-PRE: 1.86 L
IC-PRED: 2.15 L
PTH-INTACT SERPL-MCNC: 55 PG/ML (ref 15–65)
RVPLETH-%PRED-PRE: 74 %
RVPLETH-PRE: 1.5 L
RVPLETH-PRED: 2.02 L
TLCPLETH-%PRED-PRE: 72 %
TLCPLETH-PRE: 3.68 L
TLCPLETH-PRED: 5.06 L
VA-%PRED-PRE: 65 %
VA-PRE: 3.11 L
VC-%PRED-PRE: 66 %
VC-PRE: 2.18 L
VC-PRED: 3.25 L
VIT B12 SERPL-MCNC: 884 PG/ML (ref 232–1245)
VIT D+METAB SERPL-MCNC: 42 NG/ML (ref 20–50)

## 2025-01-15 PROCEDURE — 94150 VITAL CAPACITY TEST: CPT | Performed by: INTERNAL MEDICINE

## 2025-01-15 PROCEDURE — 99212 OFFICE O/P EST SF 10 MIN: CPT | Performed by: INTERNAL MEDICINE

## 2025-01-15 PROCEDURE — 94726 PLETHYSMOGRAPHY LUNG VOLUMES: CPT | Performed by: INTERNAL MEDICINE

## 2025-01-15 PROCEDURE — 94375 RESPIRATORY FLOW VOLUME LOOP: CPT | Performed by: INTERNAL MEDICINE

## 2025-01-15 PROCEDURE — 94729 DIFFUSING CAPACITY: CPT | Performed by: INTERNAL MEDICINE

## 2025-01-15 ASSESSMENT — PAIN SCALES - GENERAL: PAINLEVEL_OUTOF10: NO PAIN (0)

## 2025-01-15 ASSESSMENT — ASTHMA QUESTIONNAIRES
QUESTION_1 LAST FOUR WEEKS HOW MUCH OF THE TIME DID YOUR ASTHMA KEEP YOU FROM GETTING AS MUCH DONE AT WORK, SCHOOL OR AT HOME: NONE OF THE TIME

## 2025-01-15 NOTE — LETTER
1/15/2025      Elsie Rubi  [address] [facility] [address]      Dear Colleague,    Thank you for referring your patient, Elsie Rubi, to the [facility] [place]. Please see a copy of my visit note below.    Pulmonary Patient Follow Up Clinic Note   January 15, 2025        PCP: Jessie Rose    Reason for visit: ILD     Pulmonary HPI:   Elsie Rubi is a 64 year old female w/ h/o elevated BMI now s/p gastric surgery and significant weight loss, mild MIC, hiatal hernia, GERD, HTN, pre-diabetes hx, reported hx of asthma  who presents for follow up of ILD, resolved exertional hypoxia, and nocturnal hypoxemia, and asthma.     Pulmonary visit 7/2023.  Team conference outcome-  NSIP vs organizing pna with small airways overlap or HP with small airways disease with enough acute inflammatory changes to suggest treatment with MMF.   Started on MMF, tolerating well. Was prescribed O2 2 LPM via NC on exertion.   12/20/2023: Developed URI symptoms. Viral work up negative. Also noted to have been of Advair; this was restarted due to asthma symptoms. Completed 13 sessions of pulm rehab before having to stop in 10/2023 due to a new job.   Symptomatic, not wearing O2 with exertion consistently.    O2 at rest (When she should be on RA) is good.   Was referred again to sleep medicine considering her previous MIC dx and concern for nocturnal hypoxia.     3/20/2024:   Retired now since Jan 2024.   Symptoms stable, though remains with exertional BECKER with going up flight of stairs (though not consistently wearing oxygen). Has not been keeping up with exercise routinely. Was referred to pulm rehab again by her PCP (though not clear regarding coverage)  Completed PSG on 3/12- noted for nocturnal hypoxia and AHI-12 (mild MIC). Has follow up visit appt soon to go over results and next steps. Tolerating MMF well- no GI symptoms; labs stable.  Seen at weight management  clinic- possible looking into gastric sleeve surgery potentially in 3-4 months and will need pulm risk assessment. Looks like Zepbound was also prescribed. Cards consulted also for pre-op clearance.   Had EGD 3/13 as part of this process- found grade A esophagitis with large hiatal hernia. Omeprazole added.       7/24/2024  Since retiring, feels much better in that she feels less stressed.   After last visit in late March, unfortunately, she developed a case of influenza A from which she was very symptomatic from. Was eval'd in the ED and discharged. Also reported mild delirium during it. MMF was held during the illness period.   Because of this experience, she is hesitant regarding continued MMF usage. She does not like wearing masks due to how it makes her breathing feel.   Followed up with sleep medicine for her mild MIC and nocturnal hypoxia- plan is to just use supplemental O2 of 2LPM.   Has felt more symptomatic on exertion.  Has lost weight with Wegovy. Is seen within the weight loss management group with additional counseling.   Since her last appointment, she also admits that she has not been very motivated to exercise she has been overall very sedentary and lacking energy.  She is seeing her primary care doctor to see if depression is being an issue.  She does remember that she was more enthusiastic about exercising when she was doing the pulmonary rehab and did really enjoy it.    1/15/2025:   Since last appointment, patient completed her laparoscopic sleeve gastrectomy in September 2024.  Her course was complicated by a likely postoperative hematoma near the distal esophagus in September that was initially symptomatic.  Symptoms improved with time and repeat imaging in October showed that it was starting to decrease in size.  Her course was also complicated by severe dehydration at the end of September which she went to the ED for and got large amounts of IV fluid and improved.  At her previous  appointment she was already losing weight following GLP-1 agonist but now she is continue losing weight in the setting of her gastrectomy and is down 35 pounds since March 2024.    With the weight loss she has felt like her breathing has just become much easier and exercise also has become more enjoyable.  She continues to use the Advair medium dose but is finding that she is not really using the albuterol very often.  It has been several months since she last really used it for shortness of breath symptoms.  No coughing or other respiratory symptoms really.    Of note, after starting mycophenolate in August 2023 in relation to their CT findings, she decided to stop her medication it appears 2 to 3 months ago though did not alert that I will the office.  Her concern of developing an overwhelming infection this became too great and she decided just to stop it especially since she was feeling better.    She also has felt well enough where she stopped using the 2 LPM O2 at night.  However she did not get retested since stopping usage.    She is going to fly again and not have to worry about oxygen.  Of note, she performed a 6-minute walk in July 2024 which showed that while she did have a desaturation she no longer developed hypoxia.    Review of systems: a complete 12-point ROS conducted, & found to be negative w/ exceptions as noted in the HPI.    Reviewed PMH, PSH, FH, SH    Medications:  Current Outpatient Medications   Medication Sig Dispense Refill     acetaminophen (TYLENOL) 325 MG tablet Take 2 tablets (650 mg) by mouth every 4 hours as needed for other (For optimal non-opioid multimodal pain management to improve pain control and physical function.).       albuterol (PROAIR HFA/PROVENTIL HFA/VENTOLIN HFA) 108 (90 Base) MCG/ACT inhaler Inhale 2 puffs into the lungs every 4 hours as needed for shortness of breath, wheezing or cough 18 g 3     buPROPion (WELLBUTRIN XL) 150 MG 24 hr tablet Take 1 tablet (150 mg)  "by mouth every morning 90 tablet 1     cyclobenzaprine (FLEXERIL) 5 MG tablet Take 1 tablet (5 mg) by mouth 2 times daily as needed for muscle spasms 30 tablet 0     cyclobenzaprine (FLEXERIL) 5 MG tablet Take 1 tablet (5 mg) by mouth 2 times daily as needed for muscle spasms.       FLUoxetine (PROZAC) 40 MG capsule TAKE ONE CAPSULE BY MOUTH DAILY 90 capsule 0     fluticasone-salmeterol (ADVAIR) 250-50 MCG/ACT inhaler Inhale 1 puff into the lungs every 12 hours. 60 each 3     metoprolol succinate ER (TOPROL XL) 25 MG 24 hr tablet Take 1 tablet (25 mg) by mouth daily 90 tablet 2     mycophenolate (GENERIC EQUIVALENT) 200 MG/ML suspension Take 5 mLs (1,000 mg) by mouth 2 times daily. (Patient not taking: Reported on 12/10/2024) 300 mL 2     omeprazole (PRILOSEC) 20 MG DR capsule Take 1 capsule (20 mg) by mouth daily. 30 capsule 3     ondansetron (ZOFRAN ODT) 4 MG ODT tab Take 1 tablet (4 mg) by mouth every 6 hours as needed for nausea. (Patient not taking: Reported on 12/10/2024) 15 tablet 0     traZODone (DESYREL) 50 MG tablet Take 1 tablet (50 mg) by mouth nightly as needed for sleep 90 tablet 1     No current facility-administered medications for this visit.       Allergies:  No Known Allergies    Physical examination:  BP (!) 129/90 (BP Location: Right arm, Patient Position: Sitting, Cuff Size: Adult Regular)   Pulse 63   Ht 1.645 m (5' 4.75\")   Wt 82.8 kg (182 lb 8 oz)   SpO2 96%   BMI 30.60 kg/m      General: NAD, coughing with deep inspiration  CV: RR, no m/c/r   Lungs: CTAB, no wrr  Abd: protruding  Ext: WWP, no BLE edema  Neuro: No focal deficits  Psych: Euthymic, normal affect, good eye contact    Labs: reviewed in [facility] & personally interpreted.     Previous ILD serologies negative      Imaging: reviewed in [facility] & personally interpreted. Below are the interpretations from the formal Radiology review.  No new imaging    PFT:  Most Recent [facility] Pulmonary Function Testing (FVC/FEV1 only)  FVC-Pre   Date " Value Ref Range Status   07/24/2024 1.91 L    03/20/2024 1.86 L    12/20/2023 1.89 L    07/21/2023 1.83 L      FVC-%Pred-Pre   Date Value Ref Range Status   07/24/2024 66 %    03/20/2024 64 %    12/20/2023 65 %    07/21/2023 62 %      FEV1-Pre   Date Value Ref Range Status   07/24/2024 1.64 L    03/20/2024 1.60 L    12/20/2023 1.62 L    07/21/2023 1.51 L      FEV1-%Pred-Pre   Date Value Ref Range Status   07/24/2024 72 %    03/20/2024 69 %    12/20/2023 70 %    07/21/2023 64 %      PFT 1/15/2025:  Spirometry showed no obstruction with normal FEV1 and mildly reduced FVC.   Lung volumes with mild restriction.  Uncorrected DLCO showed mild diffusion defect (hb-12.7 so likely minimal correction.  Compared to PFTs about 1.5 year ago, borderline significant improvement in FVC, significant significant improvement of her FEV1, significant improvement in her TLC, and significant proved in her DLCO.  Comparing her trend in her DLCO, it has been stable for 1 year.    PFT- Date: 7/24/2024  Similar spirometry findings as previous with FVC suggesting mild restriction and continued normal DLCO (significant improvement vs 7/2023)  6MW- completed on 2LPM at first- 366m and end spo2-96%  Repeat on RA- 360m and end spO2-92%    PFT- Date: 3/20/2024  FEV1- 1.6L (69%), FVC-1.86L (64%), FEV1/FVC- 0.86, DLCO- 16.19 ml/min/mmHg (81%)-  Interpretation-Similar spirometry with FVC suggesting mild restriction with normal DLCO (continued trend upward vs previous and significant improvement vs 7/2023)        PFT- Date: 12/20/2023   FEV1- 1.62L (70%), FVC-1.89L (65%), FEV1/FVC- 0.86,  DLCO- 15.3 ml/min/mmHg (76%)-  Interpretation-Similar spirometry with FVC suggesting mild restriction  Normalization of DLCO compared to previous      7/21/2023  FEV1- 1.51L (64%)  FVC- 1.83 (62%)  R- 0.82  TLC- 3.06L (58%)  uncDLCO- 12.85 (64%)  ERV and RV not as reduced as severely as TLC  RV/TLC-0.41  6MW- 2L NC O2 to complete (ended at 94%). 335m walked (329m  -LLN)     OSH PFTs:  3/20/23:   FVC: 1.89 L (61%)  FEV1: 1.62 L (67%)  FEV1/FVC: 86%  RV: 1.18 L (60%)  TLC: 3.38 L (68%)  RV/TLC ratio 35%  uncDLCO: 14.93 ml/min/mmHg (76%, not adjusted for Hb)    Impression & recommendations:    Diagnoses and all orders for this visit:    ILD (interstitial lung disease) (H)  -     Adult Pulmonology Clinic Follow-Up Order (6 Month); Future  -     General PFT Lab (Please always keep checked); Future  -     Pulmonary Function Test; Future  -     6 minute walk test; Future  -     Overnight oximetry study for DME - ONLY FOR DME; Future    MIC (obstructive sleep apnea)  -     Overnight oximetry study for DME - ONLY FOR DME; Future    Nocturnal hypoxia  -     Overnight oximetry study for DME - ONLY FOR DME; Future    Mild persistent asthma without complication  -     Overnight oximetry study for DME - ONLY FOR DME; Future    Other orders  -     Adult Pulmonology Clinic Follow-Up Order (6 Month)        Patient returns on follow-up ILD appointment for ILD, now previously exertional hypoxia, and nocturnal hypoxia/mild MIC.     Diagnosis non-IPF- NSIP with organizing portions vs HP with evidence of small airways disease also present, groundglass also seen. Also MIC  Was on mycophenolate since 8/2023 until she took herself off in September or October 2024 due to concern for potentially developing severe respiratory infection (she did develop influenza A last year and was very symptomatic from it).     Her PFTs have continued a positive trend forward with exception of a stable DLCO.  She had a CT chest performed last in October 2024 ordered by different provider which essentially showed fairly clearly lungs with exception of atelectasis versus underlying mild ILD in the bases of the lungs which is a dramatic improvement versus her CT chest in March and July 2023.    Overall, the mycophenolate was rather helpful in resolving the ongoing inflammatory process in her lungs.  This reflected by not  only her improvement in her imaging and PFTs, but also an improvement in her exertional oxygenation.    Her PFTs and exertional O2 requirements also have been aided by her weight loss which has been pretty significant in 2024 following initially GLP-1 agonist and then gastric surgery.    She symptomatically feels great and better than she has in years.  Even her asthma has dramatically improved where she basically uses her albuterol very sparingly while being on a maintenance medium dose of inhaled corticosteroid/LABA.    We did have a brief discussion regarding need for vaccination as a protective measurement.  She is very hesitant regarding mRNA vaccines (thus very hesitant to take a COVID-19 vaccine but we did discuss the need for protecting her self as she reaches 65 years old soon and older as this is where her mortality benefit has been showing for COVID-19 vaccine and boosters.  She did have the original vaccine but has not obtaining any boosters.  She was more receptive to the RSV vaccine and influenza vaccine considering that they are not mRNA based; these were particularly important due to the increasing viral infections that are ongoing.  She will stop by her pharmacy to obtain these.  In addition, we reviewed other protective measures this winter as well such as mask wearing in public places and hand hygiene, etc.    Considering her overall improvement and her weight loss, and the fact that she already stopped using supplemental O2 at night in addition to already having stopped her exertional O2 based on her 6-minute walk, we will repeat an overnight oximetry study on room air to ensure that she is not having significant desaturations.      -Will continue her asthma treatments- continue Advair and prn albuterol (for symptoms prn and before exercise).  If she remains stable at the next appointment we will probably cut the dosage of her Advair to the lower dosage and trial her at that level  -Will no  longer be on mycophenolate and thus no longer needs to be monitored with labs.  -Repeat overnight oximetry study on room air to see if improvement in her previous desaturations following her significant weight loss    RTC in 6 months with PFTs and 6MW  If remains stable, potentially can make visits yearly afterwards      45 minutes excluding the time spent on cigarette cessation was  spent on the date of the encounter doing chart review, history and exam, documentation and further activities as noted above.    These conclusions are made at the best of one's knowledge and belief based on the provided evidence such as patient's history and allergy test results and they can change over time or can be incomplete because of missing information's.    I explained the lab values, imagings and findings to the patient.  Patient expressed understanding I did not recognize any barriers to the understanding of the patient.    The longitudinal plan of care for the diagnosis(es)/condition(s) as documented were addressed during this visit. Due to the added complexity in care, I will continue to support Ju in the subsequent management and with ongoing continuity of care.    The above note was dictated using voice recognition software and may include typographical errors. Please contact the author for any clarifications.    Isaac Duque MD  , Division of Pulmonary/Critical Care            Again, thank you for allowing me to participate in the care of your patient.        Sincerely,        Isaac Duque MD    Electronically signed

## 2025-01-15 NOTE — PROGRESS NOTES
Elsie Rubi comes into clinic today at the request of Dr. Isaac Duque Ordering Provider for PFT      Willy Anthony, RRT

## 2025-01-15 NOTE — PROGRESS NOTES
Pulmonary Patient Follow Up Clinic Note   January 15, 2025        PCP: Jessie Rose    Reason for visit: ILD     Pulmonary HPI:   Elsie Rubi is a 64 year old female w/ h/o elevated BMI now s/p gastric surgery and significant weight loss, mild MIC, hiatal hernia, GERD, HTN, pre-diabetes hx, reported hx of asthma  who presents for follow up of ILD, resolved exertional hypoxia, and nocturnal hypoxemia, and asthma.     Pulmonary visit 7/2023.  Team conference outcome-  NSIP vs organizing pna with small airways overlap or HP with small airways disease with enough acute inflammatory changes to suggest treatment with MMF.   Started on MMF, tolerating well. Was prescribed O2 2 LPM via NC on exertion.   12/20/2023: Developed URI symptoms. Viral work up negative. Also noted to have been of Advair; this was restarted due to asthma symptoms. Completed 13 sessions of pulm rehab before having to stop in 10/2023 due to a new job.   Symptomatic, not wearing O2 with exertion consistently.    O2 at rest (When she should be on RA) is good.   Was referred again to sleep medicine considering her previous MIC dx and concern for nocturnal hypoxia.     3/20/2024:   Retired now since Jan 2024.   Symptoms stable, though remains with exertional BECKER with going up flight of stairs (though not consistently wearing oxygen). Has not been keeping up with exercise routinely. Was referred to pulm rehab again by her PCP (though not clear regarding coverage)  Completed PSG on 3/12- noted for nocturnal hypoxia and AHI-12 (mild MIC). Has follow up visit appt soon to go over results and next steps. Tolerating MMF well- no GI symptoms; labs stable.  Seen at weight management clinic- possible looking into gastric sleeve surgery potentially in 3-4 months and will need pulm risk assessment. Looks like Zepbound was also prescribed. Cards consulted also for pre-op clearance.   Had EGD 3/13 as part of this process- found grade A esophagitis with large  hiatal hernia. Omeprazole added.       7/24/2024  Since retiring, feels much better in that she feels less stressed.   After last visit in late March, unfortunately, she developed a case of influenza A from which she was very symptomatic from. Was eval'd in the ED and discharged. Also reported mild delirium during it. MMF was held during the illness period.   Because of this experience, she is hesitant regarding continued MMF usage. She does not like wearing masks due to how it makes her breathing feel.   Followed up with sleep medicine for her mild MIC and nocturnal hypoxia- plan is to just use supplemental O2 of 2LPM.   Has felt more symptomatic on exertion.  Has lost weight with Wegovy. Is seen within the weight loss management group with additional counseling.   Since her last appointment, she also admits that she has not been very motivated to exercise she has been overall very sedentary and lacking energy.  She is seeing her primary care doctor to see if depression is being an issue.  She does remember that she was more enthusiastic about exercising when she was doing the pulmonary rehab and did really enjoy it.    1/15/2025:   Since last appointment, patient completed her laparoscopic sleeve gastrectomy in September 2024.  Her course was complicated by a likely postoperative hematoma near the distal esophagus in September that was initially symptomatic.  Symptoms improved with time and repeat imaging in October showed that it was starting to decrease in size.  Her course was also complicated by severe dehydration at the end of September which she went to the ED for and got large amounts of IV fluid and improved.  At her previous appointment she was already losing weight following GLP-1 agonist but now she is continue losing weight in the setting of her gastrectomy and is down 35 pounds since March 2024.    With the weight loss she has felt like her breathing has just become much easier and exercise also has  become more enjoyable.  She continues to use the Advair medium dose but is finding that she is not really using the albuterol very often.  It has been several months since she last really used it for shortness of breath symptoms.  No coughing or other respiratory symptoms really.    Of note, after starting mycophenolate in August 2023 in relation to their CT findings, she decided to stop her medication it appears 2 to 3 months ago though did not alert that I will the office.  Her concern of developing an overwhelming infection this became too great and she decided just to stop it especially since she was feeling better.    She also has felt well enough where she stopped using the 2 LPM O2 at night.  However she did not get retested since stopping usage.    She is going to fly again and not have to worry about oxygen.  Of note, she performed a 6-minute walk in July 2024 which showed that while she did have a desaturation she no longer developed hypoxia.    Review of systems: a complete 12-point ROS conducted, & found to be negative w/ exceptions as noted in the HPI.    Reviewed PMH, PSH, FH, SH    Medications:  Current Outpatient Medications   Medication Sig Dispense Refill    acetaminophen (TYLENOL) 325 MG tablet Take 2 tablets (650 mg) by mouth every 4 hours as needed for other (For optimal non-opioid multimodal pain management to improve pain control and physical function.).      albuterol (PROAIR HFA/PROVENTIL HFA/VENTOLIN HFA) 108 (90 Base) MCG/ACT inhaler Inhale 2 puffs into the lungs every 4 hours as needed for shortness of breath, wheezing or cough 18 g 3    buPROPion (WELLBUTRIN XL) 150 MG 24 hr tablet Take 1 tablet (150 mg) by mouth every morning 90 tablet 1    cyclobenzaprine (FLEXERIL) 5 MG tablet Take 1 tablet (5 mg) by mouth 2 times daily as needed for muscle spasms 30 tablet 0    cyclobenzaprine (FLEXERIL) 5 MG tablet Take 1 tablet (5 mg) by mouth 2 times daily as needed for muscle spasms.       "FLUoxetine (PROZAC) 40 MG capsule TAKE ONE CAPSULE BY MOUTH DAILY 90 capsule 0    fluticasone-salmeterol (ADVAIR) 250-50 MCG/ACT inhaler Inhale 1 puff into the lungs every 12 hours. 60 each 3    metoprolol succinate ER (TOPROL XL) 25 MG 24 hr tablet Take 1 tablet (25 mg) by mouth daily 90 tablet 2    mycophenolate (GENERIC EQUIVALENT) 200 MG/ML suspension Take 5 mLs (1,000 mg) by mouth 2 times daily. (Patient not taking: Reported on 12/10/2024) 300 mL 2    omeprazole (PRILOSEC) 20 MG DR capsule Take 1 capsule (20 mg) by mouth daily. 30 capsule 3    ondansetron (ZOFRAN ODT) 4 MG ODT tab Take 1 tablet (4 mg) by mouth every 6 hours as needed for nausea. (Patient not taking: Reported on 12/10/2024) 15 tablet 0    traZODone (DESYREL) 50 MG tablet Take 1 tablet (50 mg) by mouth nightly as needed for sleep 90 tablet 1     No current facility-administered medications for this visit.       Allergies:  No Known Allergies    Physical examination:  BP (!) 129/90 (BP Location: Right arm, Patient Position: Sitting, Cuff Size: Adult Regular)   Pulse 63   Ht 1.645 m (5' 4.75\")   Wt 82.8 kg (182 lb 8 oz)   SpO2 96%   BMI 30.60 kg/m      General: NAD, coughing with deep inspiration  CV: RR, no m/c/r   Lungs: CTAB, no wrr  Abd: protruding  Ext: WWP, no BLE edema  Neuro: No focal deficits  Psych: Euthymic, normal affect, good eye contact    Labs: reviewed in [facility] & personally interpreted.     Previous ILD serologies negative      Imaging: reviewed in [facility] & personally interpreted. Below are the interpretations from the formal Radiology review.  No new imaging    PFT:  Most Recent [facility] Pulmonary Function Testing (FVC/FEV1 only)  FVC-Pre   Date Value Ref Range Status   07/24/2024 1.91 L    03/20/2024 1.86 L    12/20/2023 1.89 L    07/21/2023 1.83 L      FVC-%Pred-Pre   Date Value Ref Range Status   07/24/2024 66 %    03/20/2024 64 %    12/20/2023 65 %    07/21/2023 62 %      FEV1-Pre   Date Value Ref Range Status   07/24/2024 1.64 " L    03/20/2024 1.60 L    12/20/2023 1.62 L    07/21/2023 1.51 L      FEV1-%Pred-Pre   Date Value Ref Range Status   07/24/2024 72 %    03/20/2024 69 %    12/20/2023 70 %    07/21/2023 64 %      PFT 1/15/2025:  Spirometry showed no obstruction with normal FEV1 and mildly reduced FVC.   Lung volumes with mild restriction.  Uncorrected DLCO showed mild diffusion defect (hb-12.7 so likely minimal correction.  Compared to PFTs about 1.5 year ago, borderline significant improvement in FVC, significant significant improvement of her FEV1, significant improvement in her TLC, and significant proved in her DLCO.  Comparing her trend in her DLCO, it has been stable for 1 year.    PFT- Date: 7/24/2024  Similar spirometry findings as previous with FVC suggesting mild restriction and continued normal DLCO (significant improvement vs 7/2023)  6MW- completed on 2LPM at first- 366m and end spo2-96%  Repeat on RA- 360m and end spO2-92%    PFT- Date: 3/20/2024  FEV1- 1.6L (69%), FVC-1.86L (64%), FEV1/FVC- 0.86, DLCO- 16.19 ml/min/mmHg (81%)-  Interpretation-Similar spirometry with FVC suggesting mild restriction with normal DLCO (continued trend upward vs previous and significant improvement vs 7/2023)        PFT- Date: 12/20/2023   FEV1- 1.62L (70%), FVC-1.89L (65%), FEV1/FVC- 0.86,  DLCO- 15.3 ml/min/mmHg (76%)-  Interpretation-Similar spirometry with FVC suggesting mild restriction  Normalization of DLCO compared to previous      7/21/2023  FEV1- 1.51L (64%)  FVC- 1.83 (62%)  R- 0.82  TLC- 3.06L (58%)  uncDLCO- 12.85 (64%)  ERV and RV not as reduced as severely as TLC  RV/TLC-0.41  6MW- 2L NC O2 to complete (ended at 94%). 335m walked (329m -LLN)     OSH PFTs:  3/20/23:   FVC: 1.89 L (61%)  FEV1: 1.62 L (67%)  FEV1/FVC: 86%  RV: 1.18 L (60%)  TLC: 3.38 L (68%)  RV/TLC ratio 35%  uncDLCO: 14.93 ml/min/mmHg (76%, not adjusted for Hb)    Impression & recommendations:    Diagnoses and all orders for this visit:    ILD (interstitial  lung disease) (H)  -     Adult Pulmonology Clinic Follow-Up Order (6 Month); Future  -     General PFT Lab (Please always keep checked); Future  -     Pulmonary Function Test; Future  -     6 minute walk test; Future  -     Overnight oximetry study for DME - ONLY FOR DME; Future    MIC (obstructive sleep apnea)  -     Overnight oximetry study for DME - ONLY FOR DME; Future    Nocturnal hypoxia  -     Overnight oximetry study for DME - ONLY FOR DME; Future    Mild persistent asthma without complication  -     Overnight oximetry study for DME - ONLY FOR DME; Future    Other orders  -     Adult Pulmonology Clinic Follow-Up Order (6 Month)        Patient returns on follow-up ILD appointment for ILD, now previously exertional hypoxia, and nocturnal hypoxia/mild MIC.     Diagnosis non-IPF- NSIP with organizing portions vs HP with evidence of small airways disease also present, groundglass also seen. Also MIC  Was on mycophenolate since 8/2023 until she took herself off in September or October 2024 due to concern for potentially developing severe respiratory infection (she did develop influenza A last year and was very symptomatic from it).     Her PFTs have continued a positive trend forward with exception of a stable DLCO.  She had a CT chest performed last in October 2024 ordered by different provider which essentially showed fairly clearly lungs with exception of atelectasis versus underlying mild ILD in the bases of the lungs which is a dramatic improvement versus her CT chest in March and July 2023.    Overall, the mycophenolate was rather helpful in resolving the ongoing inflammatory process in her lungs.  This reflected by not only her improvement in her imaging and PFTs, but also an improvement in her exertional oxygenation.    Her PFTs and exertional O2 requirements also have been aided by her weight loss which has been pretty significant in 2024 following initially GLP-1 agonist and then gastric surgery.    She  symptomatically feels great and better than she has in years.  Even her asthma has dramatically improved where she basically uses her albuterol very sparingly while being on a maintenance medium dose of inhaled corticosteroid/LABA.    We did have a brief discussion regarding need for vaccination as a protective measurement.  She is very hesitant regarding mRNA vaccines (thus very hesitant to take a COVID-19 vaccine but we did discuss the need for protecting her self as she reaches 65 years old soon and older as this is where her mortality benefit has been showing for COVID-19 vaccine and boosters.  She did have the original vaccine but has not obtaining any boosters.  She was more receptive to the RSV vaccine and influenza vaccine considering that they are not mRNA based; these were particularly important due to the increasing viral infections that are ongoing.  She will stop by her pharmacy to obtain these.  In addition, we reviewed other protective measures this winter as well such as mask wearing in public places and hand hygiene, etc.    Considering her overall improvement and her weight loss, and the fact that she already stopped using supplemental O2 at night in addition to already having stopped her exertional O2 based on her 6-minute walk, we will repeat an overnight oximetry study on room air to ensure that she is not having significant desaturations.      -Will continue her asthma treatments- continue Advair and prn albuterol (for symptoms prn and before exercise).  If she remains stable at the next appointment we will probably cut the dosage of her Advair to the lower dosage and trial her at that level  -Will no longer be on mycophenolate and thus no longer needs to be monitored with labs.  -Repeat overnight oximetry study on room air to see if improvement in her previous desaturations following her significant weight loss    RTC in 6 months with PFTs and 6MW  If remains stable, potentially can make  visits yearly afterwards      45 minutes excluding the time spent on cigarette cessation was  spent on the date of the encounter doing chart review, history and exam, documentation and further activities as noted above.    These conclusions are made at the best of one's knowledge and belief based on the provided evidence such as patient's history and allergy test results and they can change over time or can be incomplete because of missing information's.    I explained the lab values, imagings and findings to the patient.  Patient expressed understanding I did not recognize any barriers to the understanding of the patient.    The longitudinal plan of care for the diagnosis(es)/condition(s) as documented were addressed during this visit. Due to the added complexity in care, I will continue to support Ju in the subsequent management and with ongoing continuity of care.    The above note was dictated using voice recognition software and may include typographical errors. Please contact the author for any clarifications.    Isaac Duque MD  , Division of Pulmonary/Critical Care

## 2025-01-16 ENCOUNTER — TELEPHONE (OUTPATIENT)
Dept: PULMONOLOGY | Facility: CLINIC | Age: 65
End: 2025-01-16

## 2025-01-16 NOTE — TELEPHONE ENCOUNTER
Patient Contacted for the patient to call back and schedule the following:    Appointment type: Return ILD  Provider: Dunia  Return date: 7/15/2025  Specialty phone number: [phone]  Additional appointment(s) needed: full pft, 6mwt  Additonal Notes: na

## 2025-01-18 LAB
ANNOTATION COMMENT IMP: NORMAL
RETINYL PALMITATE SERPL-MCNC: 0.03 MG/L
VIT A SERPL-MCNC: 0.39 MG/L

## 2025-02-05 DIAGNOSIS — F33.0 MILD EPISODE OF RECURRENT MAJOR DEPRESSIVE DISORDER: ICD-10-CM

## 2025-02-05 RX ORDER — FLUOXETINE HYDROCHLORIDE 40 MG/1
40 CAPSULE ORAL DAILY
Qty: 90 CAPSULE | Refills: 1 | Status: SHIPPED | OUTPATIENT
Start: 2025-02-05

## 2025-02-25 DIAGNOSIS — R09.02 HYPOXIA: ICD-10-CM

## 2025-02-25 DIAGNOSIS — J84.9 ILD (INTERSTITIAL LUNG DISEASE) (H): ICD-10-CM

## 2025-02-25 RX ORDER — FLUTICASONE PROPIONATE AND SALMETEROL 250; 50 UG/1; UG/1
1 POWDER RESPIRATORY (INHALATION) EVERY 12 HOURS
Qty: 60 EACH | Refills: 3 | Status: SHIPPED | OUTPATIENT
Start: 2025-02-25

## 2025-03-10 ENCOUNTER — TELEPHONE (OUTPATIENT)
Dept: ENDOCRINOLOGY | Facility: CLINIC | Age: 65
End: 2025-03-10

## 2025-03-10 NOTE — TELEPHONE ENCOUNTER
Patient was scheduled to visit with dietitian today at 1:30 PM for 6 months post op appointment. Patient now has Medicare coverage. Asked patient if she checked on her dietitian coverage and explained how Medicare has limited coverage for dietitian visits. Patient decided to cancel the visit to avoid any out of pocket expenses.     Caroline Mcduffie RD, LD  Clinic # [phone]

## 2025-03-16 ENCOUNTER — HEALTH MAINTENANCE LETTER (OUTPATIENT)
Age: 65
End: 2025-03-16

## 2025-03-24 ENCOUNTER — TELEPHONE (OUTPATIENT)
Dept: ENDOCRINOLOGY | Facility: CLINIC | Age: 65
End: 2025-03-24
Payer: MEDICARE

## 2025-03-24 NOTE — TELEPHONE ENCOUNTER
Called patient to rescheduled 4/9/25 visit with beba Stanford. Patient already had appointment scheduled for 6/26/25 and decided not to reschedule first appointment.     She will reach out via [employer] if she has any issues or questions.

## 2025-03-26 DIAGNOSIS — F33.1 MODERATE EPISODE OF RECURRENT MAJOR DEPRESSIVE DISORDER (H): ICD-10-CM

## 2025-03-26 RX ORDER — BUPROPION HYDROCHLORIDE 150 MG/1
150 TABLET ORAL EVERY MORNING
Qty: 90 TABLET | Refills: 1 | Status: SHIPPED | OUTPATIENT
Start: 2025-03-26

## 2025-04-30 DIAGNOSIS — Z98.84 S/P LAPAROSCOPIC SLEEVE GASTRECTOMY: ICD-10-CM

## 2025-04-30 DIAGNOSIS — R10.13 DYSPEPSIA: ICD-10-CM

## 2025-04-30 RX ORDER — OMEPRAZOLE 20 MG/1
20 CAPSULE, DELAYED RELEASE ORAL DAILY
Qty: 30 CAPSULE | Refills: 3 | Status: SHIPPED | OUTPATIENT
Start: 2025-04-30

## 2025-05-05 DIAGNOSIS — F33.0 MILD EPISODE OF RECURRENT MAJOR DEPRESSIVE DISORDER: ICD-10-CM

## 2025-05-05 RX ORDER — FLUOXETINE HYDROCHLORIDE 40 MG/1
40 CAPSULE ORAL DAILY
Qty: 90 CAPSULE | Refills: 1 | OUTPATIENT
Start: 2025-05-05

## 2025-05-30 NOTE — TELEPHONE ENCOUNTER
"Message sent to Dr. Cabrera regarding option for CS or time with MAC    Endoscopy Scheduling Screen    Have you had a positive Covid test in the last 14 days?  No    Are you active on MyChart?   Yes    What insurance is in the chart?  Other:  [facility]    Ordering/Referring Provider:   CHARANJIT RAWLS        (If ordering provider performs procedure, schedule with ordering provider unless otherwise instructed. )    BMI: Estimated body mass index is 37.74 kg/m  as calculated from the following:    Height as of an earlier encounter on 1/23/24: 1.626 m (5' 4.02\").    Weight as of an earlier encounter on 1/23/24: 99.8 kg (220 lb).     Sedation Ordered  MAC/deep sedation.   BMI<= 45 45 < BMI <= 48 48 < BMI < = 50  BMI > 50   No Restrictions No [facility]  No [facility]  [facility] with exceptions Hospital Only OR Only       Are you taking any prescription medications for pain 3 or more times per week?   NO - No RN review required.    Do you have a history of malignant hyperthermia or adverse reaction to anesthesia?  No    (Females) Are you currently pregnant?   No     Have you been diagnosed or told you have pulmonary hypertension?   No    Do you have an LVAD?  No    Have you been told you have moderate to severe sleep apnea?  No    Have you been told you have COPD, asthma, or any other lung disease?  Yes     What breathing problems do you have?  Asthma and interstitial lung disease      Do you use home oxygen?  Yes (Hospital Only)    Have your breathing problems required an ED visit or hospitalization in the last year?  Yes (RN Review required for scheduling unless scheduling in Hospital.)    Do you have any heart conditions?  No     Have you ever had an organ transplant?   No    Have you ever had or are you awaiting a heart or lung transplant?   No    Have you had a stroke or transient ischemic attack (TIA aka \"mini stroke\" in the last 6 months?   No    Have you been diagnosed with or been told you have cirrhosis of the " on the discharge service for the patient. I have reviewed and made amendments to the documentation where necessary. "liver?   No    Are you currently on dialysis?   No    Do you need assistance transferring?   No    BMI: Estimated body mass index is 37.74 kg/m  as calculated from the following:    Height as of an earlier encounter on 1/23/24: 1.626 m (5' 4.02\").    Weight as of an earlier encounter on 1/23/24: 99.8 kg (220 lb).     Is patients BMI > 40 and scheduling location [facility]?  No    Do you take an injectable medication for weight loss or diabetes (excluding insulin)?  No    Do you take the medication Naltrexone?  No    Do you take blood thinners?  No       Prep   Are you currently on dialysis or do you have chronic kidney disease?  No    Do you have a diagnosis of diabetes?  No    Do you have a diagnosis of cystic fibrosis (CF)?  No    On a regular basis do you go 3 -5 days between bowel movements?  No    BMI > 40?  No    Preferred Pharmacy:    [facility] - [address] [address]  Phone: [phone] Fax: [phone]    [facility] - [place], [place] - [address] [facility]  [address] [facility] [facility] [address]  Phone: [phone] Fax: [phone]      Final Scheduling Details   Colonoscopy prep sent?  N/A    Procedure scheduled  Upper endoscopy (EGD)    Surgeon:  Jeremias     Date of procedure:  TBD     Pre-OP / PAC:   No - Not required for this site.    Location  [facility] - Per exclusion criteria.    Sedation   MAC/Deep Sedation - Per order.      Patient Reminders:   You will receive a call from a Nurse to review instructions and health history.  This assessment must be completed prior to your procedure.  Failure to complete the Nurse assessment may result in the procedure being cancelled.      On the day of your procedure, please designate an adult(s) who can drive you home stay with you for the next 24 hours. The medicines used in the exam will make you sleepy. You will not be able to drive.      You cannot take public transportation, ride share " services, or non-medical taxi service without a responsible caregiver.  Medical transport services are allowed with the requirement that a responsible caregiver will receive you at your destination.  We require that drivers and caregivers are confirmed prior to your procedure.

## 2025-06-05 ENCOUNTER — HOSPITAL ENCOUNTER (EMERGENCY)
Facility: HOSPITAL | Age: 65
Discharge: HOME | End: 2025-06-05
Payer: COMMERCIAL

## 2025-06-05 VITALS
DIASTOLIC BLOOD PRESSURE: 83 MMHG | WEIGHT: 168 LBS | HEART RATE: 77 BPM | TEMPERATURE: 97.3 F | RESPIRATION RATE: 18 BRPM | BODY MASS INDEX: 28.68 KG/M2 | SYSTOLIC BLOOD PRESSURE: 120 MMHG | OXYGEN SATURATION: 99 % | HEIGHT: 64 IN
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
## 2025-06-05 DIAGNOSIS — S00.262A INSECT BITE OF LEFT EYELID, INITIAL ENCOUNTER: ICD-10-CM

## 2025-06-05 DIAGNOSIS — H02.846 SWELLING OF EYELID, LEFT: ICD-10-CM

## 2025-06-05 PROCEDURE — 99213 OFFICE O/P EST LOW 20 MIN: CPT

## 2025-06-05 PROCEDURE — G0463 HOSPITAL OUTPT CLINIC VISIT: HCPCS

## 2025-06-05 RX ORDER — SOLIFENACIN SUCCINATE 5 MG/1
5 TABLET, FILM COATED ORAL DAILY
COMMUNITY
Start: 2025-05-27

## 2025-06-05 RX ORDER — PREDNISONE 20 MG/1
TABLET ORAL
Qty: 10 TABLET | Refills: 0 | Status: SHIPPED | OUTPATIENT
Start: 2025-06-05

## 2025-06-05 RX ORDER — VIBEGRON 75 MG/1
75 TABLET, FILM COATED ORAL
COMMUNITY
Start: 2025-05-27 | End: 2025-06-06

## 2025-06-05 ASSESSMENT — ENCOUNTER SYMPTOMS
HEADACHES: 0
PHOTOPHOBIA: 0
EYE ITCHING: 0
DIZZINESS: 0
NUMBNESS: 0
FEVER: 0
EYE PAIN: 0
WEAKNESS: 0
SHORTNESS OF BREATH: 0
EYE REDNESS: 0
APPETITE CHANGE: 0
ACTIVITY CHANGE: 0

## 2025-06-05 ASSESSMENT — VISUAL ACUITY
OU: 1
OS: OTHER (SEE COMMENTS)
OD: 20/50

## 2025-06-05 NOTE — ED TRIAGE NOTES
CHRISTO Mandel CNP assessed patient in triage and determined patient Urgent Care appropriate. Will be seen in Urgent Care.     Left eye swelling since yesterday. Got bit by a mosquitos yesterday right above her eye. States she can see. It itches.

## 2025-06-05 NOTE — ED PROVIDER NOTES
History     Chief Complaint   Patient presents with    Facial Swelling     HPI  Elsie Rubi is a 65 year old female who presents to the urgent care with left upper and lower eyelid swelling after getting by a mosquito. Swelling started yesterday. She denies visual changes, eye pain, fevers, chills, and feeling unwell. She states she has had some stumbling since the swelling started but denies any headaches, dizziness, and weakness. She has taken oral benadryl without relief.     Allergies:  No Known Allergies    Problem List:    Patient Active Problem List    Diagnosis Date Noted    H/O gastric sleeve 09/16/2024     Priority: Medium    Class 2 severe obesity with serious comorbidity in adult (H) 09/10/2024     Priority: Medium    ILD (interstitial lung disease) (H) 03/12/2024     Priority: Medium    Prediabetes 08/25/2023     Priority: Medium    Mixed hyperlipidemia 08/25/2023     Priority: Medium     The 10-year ASCVD risk score (Radha DENT, et al., 2019) is: 5.8%    Values used to calculate the score:      Age: 63 years      Sex: Female      Is Non- : No      Diabetic: No      Tobacco smoker: No      Systolic Blood Pressure: 123 mmHg      Is BP treated: Yes      HDL Cholesterol: 57 mg/dL      Total Cholesterol: 218 mg/dL        Primary insomnia 08/10/2023     Priority: Medium    Rectocele 08/10/2023     Priority: Medium    Primary stress urinary incontinence 08/10/2023     Priority: Medium     OB [facility] 2021 - declined surgery      MIC (obstructive sleep apnea) 10/14/2021     Priority: Medium     Mild MIC (AHI 12) with mild sleep-associated hypoxemia (SpO2 <= 88% for 8.7 minutes)     I do suspect that her mild obstructive sleep apnea and mild hypoxemia will improve following weight loss with planned upcoming bariatric surgery.  She is also certain that she be unable to tolerate PAP therapy.  But since she currently does have supplemental oxygen at home available, we will start  treatment with nocturnal supplemental oxygen at 2 L/min.      Chronic fatigue syndrome 08/13/2020     Priority: Medium    Dyspepsia 08/13/2020     Priority: Medium    Essential hypertension 08/13/2020     Priority: Medium    Fibromyalgia 08/13/2020     Priority: Medium    Mild episode of recurrent major depressive disorder 08/13/2020     Priority: Medium    Mild intermittent asthma without complication 08/13/2020     Priority: Medium    Severe obesity (BMI 35.0-39.9) with comorbidity (H) 08/13/2020     Priority: Medium        Past Medical History:    Past Medical History:   Diagnosis Date    Benign essential hypertension     Borderline personality disorder (H) 08/13/2020    Depression     Depressive disorder 1978    ILD (interstitial lung disease) (H)     Primary osteoarthritis of left foot 08/13/2020    Uncomplicated asthma 2019       Past Surgical History:    Past Surgical History:   Procedure Laterality Date    BUNIONECTOMY Left     COLONOSCOPY  2015    ESOPHAGOSCOPY, GASTROSCOPY, DUODENOSCOPY (EGD), COMBINED N/A 03/13/2024    Procedure: ESOPHAGOGASTRODUODENOSCOPY, WITH BIOPSY;  Surgeon: Jerel Cabrera MD;  Location: [facility]    EXAM UNDER ANESTHESIA RECTUM      GYN SURGERY  1996    tubal ligation    LAPAROSCOPIC GASTRIC SLEEVE N/A 9/10/2024    Procedure: GASTRECTOMY, SLEEVE, LAPAROSCOPIC;  Surgeon: Jerel Cabrera MD;  Location: [facility]    LAPAROSCOPIC HERNIORRHAPHY HIATAL N/A 9/10/2024    Procedure: HERNIORRHAPHY, HIATAL, LAPAROSCOPIC; cruropexy and esophagocruropexy;  Surgeon: Jerel Cabrera MD;  Location: [facility]    ORTHOPEDIC SURGERY  2021       Family History:    Family History   Problem Relation Age of Onset    Hypertension Mother     Osteoporosis Mother     Coronary Artery Disease Father     Hyperlipidemia Father     Cerebrovascular Disease Father     Obesity Sister     Deep Vein Thrombosis (DVT) Sister     Breast Cancer Paternal Grandmother     Coronary Artery Disease Paternal Grandfather   "   Anxiety Disorder Daughter     Mental Illness Daughter     Obesity Daughter     Obesity Daughter     Anxiety Disorder Son     Anesthesia Reaction No family hx of        Social History:  Marital Status:   [2]  Social History     Tobacco Use    Smoking status: Never     Passive exposure: Never    Smokeless tobacco: Never   Vaping Use    Vaping status: Never Used   Substance Use Topics    Alcohol use: Yes     Comment: 2 glasses wine night    Drug use: Not Currently        Medications:    acetaminophen (TYLENOL) 325 MG tablet  albuterol (PROAIR HFA/PROVENTIL HFA/VENTOLIN HFA) 108 (90 Base) MCG/ACT inhaler  buPROPion (WELLBUTRIN XL) 150 MG 24 hr tablet  FLUoxetine (PROZAC) 40 MG capsule  fluticasone-salmeterol (ADVAIR) 250-50 MCG/ACT inhaler  omeprazole (PRILOSEC) 20 MG DR capsule  predniSONE (DELTASONE) 20 MG tablet  cyclobenzaprine (FLEXERIL) 5 MG tablet  cyclobenzaprine (FLEXERIL) 5 MG tablet  metoprolol succinate ER (TOPROL XL) 25 MG 24 hr tablet  solifenacin (VESICARE) 5 MG tablet  traZODone (DESYREL) 50 MG tablet  vibegron (GEMTESA) 75 MG TABS tablet          Review of Systems   Constitutional:  Negative for activity change, appetite change and fever.   Eyes:  Negative for photophobia, pain, redness, itching and visual disturbance.   Respiratory:  Negative for shortness of breath.    Cardiovascular:  Negative for chest pain.   Neurological:  Negative for dizziness, weakness, numbness and headaches.   All other systems reviewed and are negative.      Physical Exam   BP: 120/83  Pulse: 77  Temp: 97.3  F (36.3  C)  Resp: 18  Height: 162.6 cm (5' 4\")  Weight: 76.2 kg (168 lb)  SpO2: 99 %      Physical Exam  Vitals and nursing note reviewed.   Constitutional:       General: She is not in acute distress.     Appearance: Normal appearance. She is not ill-appearing or toxic-appearing.   HENT:      Mouth/Throat:      Lips: Pink. No lesions.      Mouth: Mucous membranes are moist.      Tongue: No lesions.      " Palate: No mass.      Pharynx: Oropharynx is clear. Uvula midline. No oropharyngeal exudate or posterior oropharyngeal erythema.   Eyes:      General: Vision grossly intact.      Extraocular Movements:      Right eye: Normal extraocular motion and no nystagmus.      Left eye: Normal extraocular motion and no nystagmus.      Conjunctiva/sclera:      Right eye: Right conjunctiva is not injected. No chemosis or exudate.     Left eye: Left conjunctiva is not injected. No chemosis or exudate.     Pupils: Pupils are equal.      Right eye: Pupil is round, reactive and not sluggish.      Left eye: Pupil is round, reactive and not sluggish.      Comments: Swelling without redness or tenderness to left upper and lower eye lid. No visual impairments or eye pain. No fevers. Less likely preseptal cellulitis.   Cardiovascular:      Rate and Rhythm: Normal rate and regular rhythm.      Heart sounds: Normal heart sounds. No murmur heard.  Pulmonary:      Effort: Pulmonary effort is normal.      Breath sounds: Normal breath sounds. No wheezing, rhonchi or rales.   Neurological:      Mental Status: She is alert.         ED Course        Procedures         No results found for this or any previous visit (from the past 24 hours).    Medications - No data to display    Assessments & Plan (with Medical Decision Making)     I have reviewed the nursing notes.    I have reviewed the findings, diagnosis, plan and need for follow up with the patient.  Elsie Rubi is a 65 year old female who presents to the urgent care with left upper and lower eyelid swelling after getting by a mosquito. Swelling started yesterday. She denies visual changes, eye pain, fevers, chills, and feeling unwell. She states she has had some stumbling since the swelling started but denies any headaches, dizziness, and weakness. She has taken oral benadryl without relief.     MDM: vital signs normal, afebrile. Non toxic in appearance with no noted distress. Lungs  clear, heart tones regular. Neuro exam unremarkable. Swelling without redness or tenderness to left upper and lower eye lid. No visual impairments or eye pain. No fevers. Less likely preseptal cellulitis. No swelling to tongue or lips. No swallowing difficulty. Less likely anaphylaxis or angioedema. Will treat with burst of prednisone. Supportive measures and return precautions discussed. She is in agreement with plan.     (S00.262A,  W57.XXXA) Insect bite of left eyelid, initial encounter, (H02.846) Swelling of eyelid, left  Plan:Prednisone once daily for 5 days. Do not take ibuprofen with this medication.   Ice for 15-20 minutes every 2-3 hours. Protect skin to prevent frost bite.   Follow up in the clinic as needed.   Return with any new or concerning symptoms as discussed. Understanding verbalized.     New Prescriptions    PREDNISONE (DELTASONE) 20 MG TABLET    Take two tablets (= 40mg) each day for 5 (five) days       Final diagnoses:   Insect bite of left eyelid, initial encounter   Swelling of eyelid, left       6/5/2025   [facility]       Caterina Mandel NP  06/05/25 [id number]

## 2025-06-05 NOTE — DISCHARGE INSTRUCTIONS
Prednisone once daily for 5 days. Do not take ibuprofen with this medication.   Ice for 15-20 minutes every 2-3 hours. Protect skin to prevent frost bite.   Follow up in the clinic as needed.   Return with any new or concerning symptoms as discussed.

## 2025-06-05 NOTE — ED TRIAGE NOTES
Pt presents with concern of left eye swelling, pt reports being bitten by a mosquito two days ago, pt reports having an increase reaction to mosquito bites recently. Unable to obtain visual acuity due to swelling.

## 2025-06-06 ENCOUNTER — APPOINTMENT (OUTPATIENT)
Dept: ULTRASOUND IMAGING | Facility: HOSPITAL | Age: 65
End: 2025-06-06
Attending: NURSE PRACTITIONER
Payer: COMMERCIAL

## 2025-06-06 ENCOUNTER — HOSPITAL ENCOUNTER (EMERGENCY)
Facility: HOSPITAL | Age: 65
Discharge: HOME | End: 2025-06-06
Attending: NURSE PRACTITIONER | Admitting: NURSE PRACTITIONER
Payer: COMMERCIAL

## 2025-06-06 ENCOUNTER — RESULTS FOLLOW-UP (OUTPATIENT)
Dept: PULMONOLOGY | Facility: CLINIC | Age: 65
End: 2025-06-06

## 2025-06-06 VITALS
RESPIRATION RATE: 22 BRPM | TEMPERATURE: 98.2 F | DIASTOLIC BLOOD PRESSURE: 85 MMHG | OXYGEN SATURATION: 98 % | SYSTOLIC BLOOD PRESSURE: 153 MMHG | HEART RATE: 70 BPM

## 2025-06-06 DIAGNOSIS — J84.9 ILD (INTERSTITIAL LUNG DISEASE) (H): ICD-10-CM

## 2025-06-06 DIAGNOSIS — K80.50 BILIARY COLIC: ICD-10-CM

## 2025-06-06 DIAGNOSIS — K80.20 SYMPTOMATIC CHOLELITHIASIS: ICD-10-CM

## 2025-06-06 LAB
ALBUMIN SERPL BCG-MCNC: 4.1 G/DL (ref 3.5–5.2)
ALBUMIN SERPL BCG-MCNC: 4.3 G/DL (ref 3.5–5.2)
ALP SERPL-CCNC: 82 U/L (ref 40–150)
ALP SERPL-CCNC: 83 U/L (ref 40–150)
ALT SERPL W P-5'-P-CCNC: 23 U/L (ref 0–50)
ALT SERPL W P-5'-P-CCNC: 25 U/L (ref 0–50)
ANION GAP SERPL CALCULATED.3IONS-SCNC: 11 MMOL/L (ref 7–15)
ANION GAP SERPL CALCULATED.3IONS-SCNC: 13 MMOL/L (ref 7–15)
AST SERPL W P-5'-P-CCNC: 30 U/L (ref 0–45)
AST SERPL W P-5'-P-CCNC: 32 U/L (ref 0–45)
BASOPHILS # BLD AUTO: 0 10E3/UL (ref 0–0.2)
BASOPHILS NFR BLD AUTO: 0 %
BILIRUB SERPL-MCNC: 0.5 MG/DL
BILIRUB SERPL-MCNC: 0.5 MG/DL
BUN SERPL-MCNC: 13.4 MG/DL (ref 8–23)
BUN SERPL-MCNC: 13.5 MG/DL (ref 8–23)
CALCIUM SERPL-MCNC: 9.7 MG/DL (ref 8.8–10.4)
CALCIUM SERPL-MCNC: 9.8 MG/DL (ref 8.8–10.4)
CHLORIDE SERPL-SCNC: 103 MMOL/L (ref 98–107)
CHLORIDE SERPL-SCNC: 103 MMOL/L (ref 98–107)
CREAT SERPL-MCNC: 0.81 MG/DL (ref 0.51–0.95)
CREAT SERPL-MCNC: 0.83 MG/DL (ref 0.51–0.95)
EGFRCR SERPLBLD CKD-EPI 2021: 78 ML/MIN/1.73M2
EGFRCR SERPLBLD CKD-EPI 2021: 80 ML/MIN/1.73M2
EOSINOPHIL # BLD AUTO: 0 10E3/UL (ref 0–0.7)
EOSINOPHIL NFR BLD AUTO: 0 %
ERYTHROCYTE [DISTWIDTH] IN BLOOD BY AUTOMATED COUNT: 13.6 % (ref 10–15)
ERYTHROCYTE [DISTWIDTH] IN BLOOD BY AUTOMATED COUNT: 13.6 % (ref 10–15)
GLUCOSE SERPL-MCNC: 122 MG/DL (ref 70–99)
GLUCOSE SERPL-MCNC: 125 MG/DL (ref 70–99)
HCO3 SERPL-SCNC: 23 MMOL/L (ref 22–29)
HCO3 SERPL-SCNC: 24 MMOL/L (ref 22–29)
HCT VFR BLD AUTO: 40.7 % (ref 35–47)
HCT VFR BLD AUTO: 41.7 % (ref 35–47)
HGB BLD-MCNC: 13.6 G/DL (ref 11.7–15.7)
HGB BLD-MCNC: 13.7 G/DL (ref 11.7–15.7)
HOLD SPECIMEN: NORMAL
HOLD SPECIMEN: NORMAL
IMM GRANULOCYTES # BLD: 0 10E3/UL
IMM GRANULOCYTES NFR BLD: 0 %
LIPASE SERPL-CCNC: 18 U/L (ref 13–60)
LYMPHOCYTES # BLD AUTO: 0.9 10E3/UL (ref 0.8–5.3)
LYMPHOCYTES NFR BLD AUTO: 13 %
MCH RBC QN AUTO: 32.8 PG (ref 26.5–33)
MCH RBC QN AUTO: 33.2 PG (ref 26.5–33)
MCHC RBC AUTO-ENTMCNC: 32.9 G/DL (ref 31.5–36.5)
MCHC RBC AUTO-ENTMCNC: 33.4 G/DL (ref 31.5–36.5)
MCV RBC AUTO: 100 FL (ref 78–100)
MCV RBC AUTO: 99 FL (ref 78–100)
MONOCYTES # BLD AUTO: 0.2 10E3/UL (ref 0–1.3)
MONOCYTES NFR BLD AUTO: 4 %
NEUTROPHILS # BLD AUTO: 5.6 10E3/UL (ref 1.6–8.3)
NEUTROPHILS NFR BLD AUTO: 83 %
NRBC # BLD AUTO: 0 10E3/UL
NRBC BLD AUTO-RTO: 0 /100
PLATELET # BLD AUTO: 179 10E3/UL (ref 150–450)
PLATELET # BLD AUTO: 182 10E3/UL (ref 150–450)
POTASSIUM SERPL-SCNC: 4.3 MMOL/L (ref 3.4–5.3)
POTASSIUM SERPL-SCNC: 4.4 MMOL/L (ref 3.4–5.3)
PROT SERPL-MCNC: 7.1 G/DL (ref 6.4–8.3)
PROT SERPL-MCNC: 7.6 G/DL (ref 6.4–8.3)
RBC # BLD AUTO: 4.1 10E6/UL (ref 3.8–5.2)
RBC # BLD AUTO: 4.18 10E6/UL (ref 3.8–5.2)
SODIUM SERPL-SCNC: 138 MMOL/L (ref 135–145)
SODIUM SERPL-SCNC: 139 MMOL/L (ref 135–145)
WBC # BLD AUTO: 6.8 10E3/UL (ref 4–11)
WBC # BLD AUTO: 6.8 10E3/UL (ref 4–11)

## 2025-06-06 PROCEDURE — 76705 ECHO EXAM OF ABDOMEN: CPT

## 2025-06-06 PROCEDURE — 85014 HEMATOCRIT: CPT | Performed by: NURSE PRACTITIONER

## 2025-06-06 PROCEDURE — 76705 ECHO EXAM OF ABDOMEN: CPT | Mod: 26 | Performed by: RADIOLOGY

## 2025-06-06 PROCEDURE — 93010 ELECTROCARDIOGRAM REPORT: CPT | Performed by: INTERNAL MEDICINE

## 2025-06-06 PROCEDURE — 83690 ASSAY OF LIPASE: CPT | Performed by: NURSE PRACTITIONER

## 2025-06-06 PROCEDURE — 76705 ECHO EXAM OF ABDOMEN: CPT | Mod: 26 | Performed by: NURSE PRACTITIONER

## 2025-06-06 PROCEDURE — 99285 EMERGENCY DEPT VISIT HI MDM: CPT | Mod: 25

## 2025-06-06 PROCEDURE — 93005 ELECTROCARDIOGRAM TRACING: CPT

## 2025-06-06 PROCEDURE — 76705 ECHO EXAM OF ABDOMEN: CPT | Mod: TC

## 2025-06-06 PROCEDURE — 36415 COLL VENOUS BLD VENIPUNCTURE: CPT | Performed by: INTERNAL MEDICINE

## 2025-06-06 PROCEDURE — 36415 COLL VENOUS BLD VENIPUNCTURE: CPT | Performed by: NURSE PRACTITIONER

## 2025-06-06 PROCEDURE — 84155 ASSAY OF PROTEIN SERUM: CPT | Performed by: INTERNAL MEDICINE

## 2025-06-06 PROCEDURE — 84155 ASSAY OF PROTEIN SERUM: CPT | Performed by: NURSE PRACTITIONER

## 2025-06-06 PROCEDURE — 85018 HEMOGLOBIN: CPT | Performed by: INTERNAL MEDICINE

## 2025-06-06 PROCEDURE — 99284 EMERGENCY DEPT VISIT MOD MDM: CPT | Mod: 25 | Performed by: NURSE PRACTITIONER

## 2025-06-06 RX ORDER — KETOROLAC TROMETHAMINE 15 MG/ML
15 INJECTION, SOLUTION INTRAMUSCULAR; INTRAVENOUS ONCE
Status: COMPLETED | OUTPATIENT
Start: 2025-06-06 | End: 2025-06-06

## 2025-06-06 ASSESSMENT — ENCOUNTER SYMPTOMS
MUSCULOSKELETAL NEGATIVE: 1
CONSTITUTIONAL NEGATIVE: 1
ABDOMINAL PAIN: 1
RESPIRATORY NEGATIVE: 1
VOMITING: 0
ENDOCRINE NEGATIVE: 1
NEUROLOGICAL NEGATIVE: 1
EYES NEGATIVE: 1
NAUSEA: 0
CONSTIPATION: 1
BLOOD IN STOOL: 0
DIARRHEA: 0
PSYCHIATRIC NEGATIVE: 1
ALLERGIC/IMMUNOLOGIC NEGATIVE: 1
CARDIOVASCULAR NEGATIVE: 1
HEMATOLOGIC/LYMPHATIC NEGATIVE: 1

## 2025-06-06 ASSESSMENT — COLUMBIA-SUICIDE SEVERITY RATING SCALE - C-SSRS
1. IN THE PAST MONTH, HAVE YOU WISHED YOU WERE DEAD OR WISHED YOU COULD GO TO SLEEP AND NOT WAKE UP?: NO
2. HAVE YOU ACTUALLY HAD ANY THOUGHTS OF KILLING YOURSELF IN THE PAST MONTH?: NO
6. HAVE YOU EVER DONE ANYTHING, STARTED TO DO ANYTHING, OR PREPARED TO DO ANYTHING TO END YOUR LIFE?: NO

## 2025-06-06 ASSESSMENT — ACTIVITIES OF DAILY LIVING (ADL)
ADLS_ACUITY_SCORE: 51

## 2025-06-06 NOTE — ED PROVIDER NOTES
History     Chief Complaint   Patient presents with    Chest Pain     HPI  Elsie Rubi is a 65 year old individual with history of hypertension, major depressive disorder, asthma, interstitial lung disease, fibromyalgia, comes in for right upper quadrant abdominal pain.  Patient states that she suddenly developed right upper quadrant pain at 1100 today.  States pain is severe and radiates to her right back.  Comes in for evaluation.  No nausea or vomiting.  No fever or chills.  States does have constipation issues with no melena or hematochezia.  No dysuria or hematuria.  No vaginal bleeding or discharge.  States had biliary colic 40 years ago and has known gallstones.  Patient states that this pain feels exactly the same as back then.    Allergies:  No Known Allergies    Problem List:    Patient Active Problem List    Diagnosis Date Noted    H/O gastric sleeve 09/16/2024     Priority: Medium    Class 2 severe obesity with serious comorbidity in adult (H) 09/10/2024     Priority: Medium    ILD (interstitial lung disease) (H) 03/12/2024     Priority: Medium    Prediabetes 08/25/2023     Priority: Medium    Mixed hyperlipidemia 08/25/2023     Priority: Medium     The 10-year ASCVD risk score (Radha DENT, et al., 2019) is: 5.8%    Values used to calculate the score:      Age: 63 years      Sex: Female      Is Non- : No      Diabetic: No      Tobacco smoker: No      Systolic Blood Pressure: 123 mmHg      Is BP treated: Yes      HDL Cholesterol: 57 mg/dL      Total Cholesterol: 218 mg/dL        Primary insomnia 08/10/2023     Priority: Medium    Rectocele 08/10/2023     Priority: Medium    Primary stress urinary incontinence 08/10/2023     Priority: Medium     OB [facility] 2021 - declined surgery      MIC (obstructive sleep apnea) 10/14/2021     Priority: Medium     Mild MIC (AHI 12) with mild sleep-associated hypoxemia (SpO2 <= 88% for 8.7 minutes)     I do suspect that her mild  obstructive sleep apnea and mild hypoxemia will improve following weight loss with planned upcoming bariatric surgery.  She is also certain that she be unable to tolerate PAP therapy.  But since she currently does have supplemental oxygen at home available, we will start treatment with nocturnal supplemental oxygen at 2 L/min.      Chronic fatigue syndrome 08/13/2020     Priority: Medium    Dyspepsia 08/13/2020     Priority: Medium    Essential hypertension 08/13/2020     Priority: Medium    Fibromyalgia 08/13/2020     Priority: Medium    Mild episode of recurrent major depressive disorder 08/13/2020     Priority: Medium    Mild intermittent asthma without complication 08/13/2020     Priority: Medium    Severe obesity (BMI 35.0-39.9) with comorbidity (H) 08/13/2020     Priority: Medium        Past Medical History:    Past Medical History:   Diagnosis Date    Benign essential hypertension     Borderline personality disorder (H) 08/13/2020    Depression     Depressive disorder 1978    ILD (interstitial lung disease) (H)     Primary osteoarthritis of left foot 08/13/2020    Uncomplicated asthma 2019       Past Surgical History:    Past Surgical History:   Procedure Laterality Date    BUNIONECTOMY Left     COLONOSCOPY  2015    ESOPHAGOSCOPY, GASTROSCOPY, DUODENOSCOPY (EGD), COMBINED N/A 03/13/2024    Procedure: ESOPHAGOGASTRODUODENOSCOPY, WITH BIOPSY;  Surgeon: Jerel Cabrera MD;  Location: [facility]    EXAM UNDER ANESTHESIA RECTUM      GYN SURGERY  1996    tubal ligation    LAPAROSCOPIC GASTRIC SLEEVE N/A 9/10/2024    Procedure: GASTRECTOMY, SLEEVE, LAPAROSCOPIC;  Surgeon: Jerel Cabrera MD;  Location: [facility]    LAPAROSCOPIC HERNIORRHAPHY HIATAL N/A 9/10/2024    Procedure: HERNIORRHAPHY, HIATAL, LAPAROSCOPIC; cruropexy and esophagocruropexy;  Surgeon: Jerel Cabrera MD;  Location: [facility]    ORTHOPEDIC SURGERY  2021       Family History:    Family History   Problem Relation Age of Onset    Hypertension  Mother     Osteoporosis Mother     Coronary Artery Disease Father     Hyperlipidemia Father     Cerebrovascular Disease Father     Obesity Sister     Deep Vein Thrombosis (DVT) Sister     Breast Cancer Paternal Grandmother     Coronary Artery Disease Paternal Grandfather     Anxiety Disorder Daughter     Mental Illness Daughter     Obesity Daughter     Obesity Daughter     Anxiety Disorder Son     Anesthesia Reaction No family hx of        Social History:  Marital Status:   [2]  Social History     Tobacco Use    Smoking status: Never     Passive exposure: Never    Smokeless tobacco: Never   Vaping Use    Vaping status: Never Used   Substance Use Topics    Alcohol use: Yes     Comment: 2 glasses wine night    Drug use: Not Currently        Medications:    acetaminophen (TYLENOL) 325 MG tablet  albuterol (PROAIR HFA/PROVENTIL HFA/VENTOLIN HFA) 108 (90 Base) MCG/ACT inhaler  buPROPion (WELLBUTRIN XL) 150 MG 24 hr tablet  cyclobenzaprine (FLEXERIL) 5 MG tablet  FLUoxetine (PROZAC) 40 MG capsule  fluticasone-salmeterol (ADVAIR) 250-50 MCG/ACT inhaler  omeprazole (PRILOSEC) 20 MG DR capsule  predniSONE (DELTASONE) 20 MG tablet  solifenacin (VESICARE) 5 MG tablet          Review of Systems   Constitutional: Negative.    HENT: Negative.     Eyes: Negative.    Respiratory: Negative.     Cardiovascular: Negative.    Gastrointestinal:  Positive for abdominal pain and constipation. Negative for blood in stool, diarrhea, nausea and vomiting.   Endocrine: Negative.    Genitourinary: Negative.    Musculoskeletal: Negative.    Skin: Negative.    Allergic/Immunologic: Negative.    Neurological: Negative.    Hematological: Negative.    Psychiatric/Behavioral: Negative.         Physical Exam   BP: (!) 183/100  Pulse: 69  Temp: 98.6  F (37  C)  Resp: 16  SpO2: 98 %      GENERAL APPEARANCE:  The patient is a 65 year old well-developed, well-nourished individual that appears as stated age.  LUNGS:  Breathing is easy.  Breath  sounds are equal and clear bilaterally.  No wheezes, rhonchi, or rales.  HEART:  Regular rate and rhythm with normal S1 and S2.  No murmurs, gallops, or rubs.  ABDOMEN:  Soft.  Right upper quadrant abdominal tenderness and guarding with positive Rovsing.  No mas or rebound.  No organomegaly or hernia.  Bowel sounds are present.  No CVA tenderness or flank mass.  No abdominal bruits or thrills present upon auscultation/palpation.  GENITOURINARY: No obvious anterior pelvic tenderness, hernia, mass noted to palpation.  PSYCHIATRIC:  The patient is awake, alert, and oriented x4.  Recent and remote memory is intact.  Appropriate mood and affect.  Calm and cooperative with history and physical exam.  SKIN:  Warm, dry, and well perfused.  Good turgor.  No lesions, nodules, or rashes are noted.  No bruising noted.      ED Course     ED Course as of 06/06/25 1339   Fri Jun 06, 2025   1216 In to see patient and history/physical completed.    1230 POCUS gallbladder ultrasound conducted.  Very large gallstone present.  Unable to measure bile duct.  Unable to measure gallbladder wall thickness.  Sonographic Aragon's present.   1234 Patient defers any for pain at this time.  Due to inability to measure gallbladder wall with noted gallstones, radiologic ultrasound ordered.   1334 Ultrasound shows cholelithiasis without any other acute abnormality.  Patient is currently pain-free.  Discussed CT scan but this was deferred.  As lab work is benign patient will be discharged home with biliary colic diet.  Outpatient referral for general surgery placed.  Follow-up recommendations and return precautions given.     POC US ABDOMEN LIMITED    Date/Time: 6/6/2025 12:32 PM    Performed by: Lalit Fitzpatrick APRN CNP  Authorized by: Lalit Fitzpatrick APRN CNP    Procedure details:     Indications: abdominal pain      Assessment for:  Gallstones    Hepatobiliary:  Visualized  Hepatobiliary findings:     Common bile duct:  Unable to visualize     Gallbladder wall:  Unable to visualize    Gallbladder stones: identified      Intra-abdominal fluid: not identified      Sonographic Aragon's sign: positive           Results for orders placed or performed during the hospital encounter of 06/06/25 (from the past 24 hours)   EKG 12-lead, tracing only   Result Value Ref Range    Systolic Blood Pressure  mmHg    Diastolic Blood Pressure  mmHg    Ventricular Rate 66 BPM    Atrial Rate 66 BPM    NH Interval 152 ms    QRS Duration 92 ms     ms    QTc 421 ms    P Axis 69 degrees    R AXIS 5 degrees    T Axis 39 degrees    Interpretation ECG       Sinus rhythm  Normal ECG  When compared with ECG of 29-Sep-2024 11:11,  No significant change was found     CBC with platelets   Result Value Ref Range    WBC Count 6.8 4.0 - 11.0 10e3/uL    RBC Count 4.18 3.80 - 5.20 10e6/uL    Hemoglobin 13.7 11.7 - 15.7 g/dL    Hematocrit 41.7 35.0 - 47.0 %     78 - 100 fL    MCH 32.8 26.5 - 33.0 pg    MCHC 32.9 31.5 - 36.5 g/dL    RDW 13.6 10.0 - 15.0 %    Platelet Count 182 150 - 450 10e3/uL   Comprehensive metabolic panel   Result Value Ref Range    Sodium 139 135 - 145 mmol/L    Potassium 4.3 3.4 - 5.3 mmol/L    Carbon Dioxide (CO2) 23 22 - 29 mmol/L    Anion Gap 13 7 - 15 mmol/L    Urea Nitrogen 13.4 8.0 - 23.0 mg/dL    Creatinine 0.83 0.51 - 0.95 mg/dL    GFR Estimate 78 >60 mL/min/1.73m2    Calcium 9.7 8.8 - 10.4 mg/dL    Chloride 103 98 - 107 mmol/L    Glucose 122 (H) 70 - 99 mg/dL    Alkaline Phosphatase 83 40 - 150 U/L    AST 32 0 - 45 U/L    ALT 25 0 - 50 U/L    Protein Total 7.6 6.4 - 8.3 g/dL    Albumin 4.3 3.5 - 5.2 g/dL    Bilirubin Total 0.5 <=1.2 mg/dL   Lipase   Result Value Ref Range    Lipase 18 13 - 60 U/L   Comprehensive metabolic panel   Result Value Ref Range    Sodium 138 135 - 145 mmol/L    Potassium 4.4 3.4 - 5.3 mmol/L    Carbon Dioxide (CO2) 24 22 - 29 mmol/L    Anion Gap 11 7 - 15 mmol/L    Urea Nitrogen 13.5 8.0 - 23.0 mg/dL    Creatinine 0.81 0.51 -  0.95 mg/dL    GFR Estimate 80 >60 mL/min/1.73m2    Calcium 9.8 8.8 - 10.4 mg/dL    Chloride 103 98 - 107 mmol/L    Glucose 125 (H) 70 - 99 mg/dL    Alkaline Phosphatase 82 40 - 150 U/L    AST 30 0 - 45 U/L    ALT 23 0 - 50 U/L    Protein Total 7.1 6.4 - 8.3 g/dL    Albumin 4.1 3.5 - 5.2 g/dL    Bilirubin Total 0.5 <=1.2 mg/dL   CBC with Platelets & Differential    Narrative    The following orders were created for panel order CBC with Platelets & Differential.  Procedure                               Abnormality         Status                     ---------                               -----------         ------                     CBC with platelets and ...[[id number]]  Abnormal            Final result                 Please view results for these tests on the individual orders.   CBC with platelets and differential   Result Value Ref Range    WBC Count 6.8 4.0 - 11.0 10e3/uL    RBC Count 4.10 3.80 - 5.20 10e6/uL    Hemoglobin 13.6 11.7 - 15.7 g/dL    Hematocrit 40.7 35.0 - 47.0 %    MCV 99 78 - 100 fL    MCH 33.2 (H) 26.5 - 33.0 pg    MCHC 33.4 31.5 - 36.5 g/dL    RDW 13.6 10.0 - 15.0 %    Platelet Count 179 150 - 450 10e3/uL    % Neutrophils 83 %    % Lymphocytes 13 %    % Monocytes 4 %    % Eosinophils 0 %    % Basophils 0 %    % Immature Granulocytes 0 %    NRBCs per 100 WBC 0 <1 /100    Absolute Neutrophils 5.6 1.6 - 8.3 10e3/uL    Absolute Lymphocytes 0.9 0.8 - 5.3 10e3/uL    Absolute Monocytes 0.2 0.0 - 1.3 10e3/uL    Absolute Eosinophils 0.0 0.0 - 0.7 10e3/uL    Absolute Basophils 0.0 0.0 - 0.2 10e3/uL    Absolute Immature Granulocytes 0.0 <=0.4 10e3/uL    Absolute NRBCs 0.0 10e3/uL   Extra Tube    Narrative    The following orders were created for panel order Extra Tube.  Procedure                               Abnormality         Status                     ---------                               -----------         ------                     Extra Blue Top Tube[[id number]]                             In  process                 Extra Red Top Tube[[id number]]                              In process                   Please view results for these tests on the individual orders.   US Abdomen Limited    Narrative    PROCEDURES: US ABDOMEN LIMITED    HISTORY: RUQ pain    TECHNIQUE: Grayscale ultrasound of the upper abdomen was performed.    COMPARISON: none     FINDINGS:    MEASUREMENTS:   Liver length:  14 cm.   Common duct diameter:  0.2 cm.    LIVER: The liver is free of masses or biliary ductal enlargement.    GALLBLADDER: Gallstones are seen within the gallbladder. Gallbladder  wall is not thickened    ABDOMINAL AORTA AND IVC: The abdominal aorta is. Inferior vena cava is  patent.    PANCREAS: The pancreas was obscured by bowel gas    Right KIDNEY: The right kidney is normal in size. No renal masses or  hydronephrosis is seen.        Impression    IMPRESSION:     Cholelithiasis without gallbladder wall thickening or biliary ductal  enlargement    JOSÉ MANUEL JACKMAN MD         SYSTEM ID:  [id number]       Medications   ketorolac (TORADOL) injection 15 mg (has no administration in time range)       Assessments & Plan (with Medical Decision Making)     I have reviewed the nursing notes.    I have reviewed the findings, diagnosis, plan and need for follow up with the patient.    Summary:  Patient presents to the ER today for right upper quadrant abdominal pain.  Potential diagnosis which have been considered and evaluated include appendicitis, cholecystitis, ureteral stone, pyelonephritis, UTI, aortic aneurysm, bowel obstruction, constipation, ischemic colitis, as well as others. Many of these have been excluded using the various modalities and assessment as noted on the chart. At the present time, the diagnosis is biliary colic and symptomatic cholelithiasis.  Upon arrival, vitals signs show blood pressure 183/100 with a pulse 69.  Temperature 98.6  F.  Respirations 16 with oxygenation 98% on room air.  The patient is  "alert and oriented but does have spasming in the right upper quadrant.  Cardiac and respiratory examination normal.  Right lower quadrant tenderness with guarding and positive Rovsing sign present.  No obvious hernia or mass.  No CVA or anterior pelvic tenderness to palpation.  POCUS gallbladder ultrasound did show a large stone in the gallbladder but made it difficult to measure gallbladder wall thickness and common bile duct.  For this reason radiologic ultrasound was conducted showing cholelithiasis without signs of cholecystitis or common bile duct dilatation.  Basic lab work obtained showing WBC of 6.8 with hemoglobin of 13.6.  Electrolytes, renal, hepatic functions normal with a lipase of 18.  UA not obtained.  Patient deferred anything for pain control until \"she gets the diagnosis\".  With no acute findings on ultrasound and patient had resolution of pain without intervention, discussed CT scan with patient but this was deferred.  Patient has symptomatic cholelithiasis/biliary colic.  Will discharge home with outpatient referral for general surgery.  Ibuprofen for pain control.  Biliary colic diet education given.  Follow-up with PCP as needed and return to ER if new or worsening symptoms.  Patient verbalized SI agrees to plan of care.  Patient discharged home.        Critical Care Time: None    Impression and plan discussed with patient. Questions answered, concerns addressed, indications for urgent re-evaluation reviewed, and  given. Patient/Parent/Caregiver agree with treatment plan and have no further questions at this time.  AVS provided at discharge.    This document was prepared using a combination of typing and voice generated software.  While every attempt was made for accuracy, spelling and grammatical errors may exist.          New Prescriptions    No medications on file       Final diagnoses:   Biliary colic   Symptomatic cholelithiasis       6/6/2025   [facility]     "   Lalit Fitzpatrick, APRN CNP  06/06/25 [id number]

## 2025-06-06 NOTE — DISCHARGE INSTRUCTIONS
Try to follow-up low-fat, low spice, low grease diet as this will help with preventing biliary colic pain.        Pain control:   If your past medical conditions, allergies, current medications, or current status does not prevent you from using acetaminophen and/or ibuprofen, use the following:   Ibuprofen 400-mg every 6 hours as needed for pain.           Follow-up with your primary care provider for reevaluation.  Contact your primary care provider if you have any questions or concerns.  Do not hesitate to return to the ER if any new or worsening symptoms.     Please read the attached instructions (if any).  They highlight more specific treatments and interventions for you at home.              Thank you for letting me participate in your care and wish you a fast and uneventful recovery,    Lalit SWENSON CNP    Do not hesitate to contact me with questions or concerns.  [email or web address]

## 2025-06-06 NOTE — ED TRIAGE NOTES
Pt presents with c/o a sudden onset of right sided chest pain that radiates to her right shoulder, pt states it feels like a gallbladder attack that she used to have in her 20's.

## 2025-06-09 DIAGNOSIS — J84.9 ILD (INTERSTITIAL LUNG DISEASE) (H): ICD-10-CM

## 2025-06-09 DIAGNOSIS — R09.02 HYPOXIA: ICD-10-CM

## 2025-06-09 LAB
ATRIAL RATE - MUSE: 66 BPM
DIASTOLIC BLOOD PRESSURE - MUSE: NORMAL MMHG
INTERPRETATION ECG - MUSE: NORMAL
P AXIS - MUSE: 69 DEGREES
PR INTERVAL - MUSE: 152 MS
QRS DURATION - MUSE: 92 MS
QT - MUSE: 402 MS
QTC - MUSE: 421 MS
R AXIS - MUSE: 5 DEGREES
SYSTOLIC BLOOD PRESSURE - MUSE: NORMAL MMHG
T AXIS - MUSE: 39 DEGREES
VENTRICULAR RATE- MUSE: 66 BPM

## 2025-06-09 RX ORDER — FLUTICASONE PROPIONATE AND SALMETEROL 250; 50 UG/1; UG/1
1 POWDER RESPIRATORY (INHALATION) EVERY 12 HOURS
Qty: 60 EACH | Refills: 3 | Status: SHIPPED | OUTPATIENT
Start: 2025-06-09

## 2025-06-24 NOTE — PROGRESS NOTES
Return Bariatric Surgery Note    RE: Elsie Rubi  MR#: [id number]  : 1960  VISIT DATE: 2025      Dear Jessie Rose,    I had the pleasure of seeing your patient, Elsie Rubi, in my post-bariatric surgery assessment clinic.    Assessment & Plan   Problem List Items Addressed This Visit       Mixed hyperlipidemia (Chronic)    MIC (obstructive sleep apnea) (Chronic)    H/O gastric sleeve - Primary    History of obesity          24 minutes spent by me on the date of the encounter doing chart review, history and exam, documentation and further activities per the note    CHIEF COMPLAINT: Post-bariatric surgery follow-up.    HISTORY OF PRESENT ILLNESS:      2025     1:49 PM   Questions Regarding Prior Weight Loss Surgery Reviewed With Patient   I had the following weight loss procedure Sleeve Gastrectomy   What year was your surgery?    How has your weight changed since your last visit? I have lost weight   Do you currently have any of the following Heartburn, acid reflux, or GERD (acid reflux disease)?   Do you have any concerns today? Dry mouth         Plan  Okay to switch to taking omeprazole every other day- if symptoms are still not coming back okay to stop completely after 2 weeks of taking every other day   Goals we discussed today:   Prioritizing protein at every meal   Follow up with Leanne in 3 months   Dietician appointment not covered by insurance   Will have nurses schedule consult with Dr. Cabrera given symptomatic cholelithiasis  Advise talking with PCP as soon as possible to discuss pros and cons of restarting your blood pressure medication    Keep up the excellent work!         Interval History:   Sleeve gastrectomy 9/10/24 with Dr. Cabrera.    Seen by me at 1 month post op, 10/11/24- doing well, not taking actigall per her preference,      Last seen by me 12/10/24, 3 months post op  Continued gradual weight loss, currently is at 180lbs, down 14lbs since day of surgery,  "overall 19% total body weight loss since starting with program.      Seeing significant improvement in mood, reduced sedentary behavior.      Hunger is continuing to be well managed.   Seeing significant improvement in dyspnea issues, much better improvement in shortness of breath. \"I feel like my lungs have better capacity to take in oxygen.\"      Some irregularity in bowel movements alternating between constipation and diarrhea, increasing veggies and taking a probiotic which has helped some of this. Diarrhea 2x a week, maybe 3-4 bouts of diarrhea in one day. Otherwise feels constipated. Discussed trying miralax to help with more regular bowel movements. Discussed 1/2 cap miralax daily.      Hoping to increase exercise, build muscles. Discussed goal of 90g protein daily, exploring weight training.     Today in visit 6/26/25  12lbs weight loss since last visit, down 26lbs from surgery. Down 24% total body weight since starting with program.   Reports she is doing well- seeing improved energy with her weight loss- feeling more youthful. Seeing improvement in her lung disease with her weight loss.     Was in ed earlier this month due to abodminal pain, concern for symptomatic cholelithiasis- reports she's had gallstones for 40 years and has had \"flare ups\" off and on, doesn't want to do anything for this.   Will talk with Dr. Cabrera to discuss pros and cons of cholecystectomy, will have this scheduled.     Has stopped her blood pressure medication, has checked her bp a few times since then, has been 149/90- discussed importance of reaching out to pcp to discuss adjusting blood pressure meds.       Wt Readings from Last 5 Encounters:   06/26/25 76.2 kg (168 lb)   06/05/25 76.2 kg (168 lb)   03/10/25 78 kg (172 lb)   01/15/25 82.8 kg (182 lb 8 oz)   12/10/24 81.6 kg (180 lb)       Anti-obesity medication history:     Current:       Past/failed/contraindicated:       Recent diet changes: baking bread at home- some " increase in carbs with this. Having 1 ensure every day. Is due for RD visits but has not been able to do this due to lack of coverage. Drinking sugar free gatorade.     Recent exercise/activity changes: gardening every day lately.     Vitamins/Labs: taking barimelts every day- is subscribed to this through [employer].     GERD symptoms: well managed with regular 20mg prilosec- if stopping prilosec will get break through heart burn symptoms after 2 days. Has tried drinking apple cider vinegar to see if helpful for her heart burn.     Nausea : denies     Vomiting : denies     Dysphagia:  denies         Weight History:      6/23/2025     1:49 PM   --   What is your highest lifetime weight? 233   What is your lowest weight since surgery? (In pounds) 168     Initial Weight (lbs): 222.2 lbs  Weight: 76.2 kg (168 lb)  Last Visits Weight: 76.2 kg (168 lb)  Cumulative weight loss (lbs): 54.2  Weight Loss Percentage: 24.39%        6/23/2025     1:49 PM   Questions Regarding Co-Morbidities and Health Concerns Reviewed With Patient   Pre-diabetes Improved   Diabetes II Never   High Blood Pressure Improved   High cholesterol Never   Heartburn/Reflux Improved   Sleep apnea Gone away   PCOS Never   Back pain Stayed the same   Joint pain Improved   Lower leg swelling Never           6/23/2025     1:49 PM   Eating Habits   How many meals do you eat per day? 1   Do you snack between meals? Yes   How much food are you eating at each meal? Greater than 1 cup   Are you able to separate your meals and liquids by at least 30 minutes? Yes   Are you able to avoid liquid calories? No           6/23/2025     1:49 PM   Exercise Questions Reviewed With Patient   How often do you exercise? 3 to 4 times per week   What is the duration of your exercise (in minutes)? 20 Minutes   What types of exercise do you do? walking    other   What keeps you from being more active? I am as active as I can possbily be    Pain    I should be more active but I just  have not gotten around to it       Social History:      6/23/2025     1:49 PM   --   Are you smoking? No   Are you drinking alcohol? Yes   How much alcohol? 2 glasses  wine daily       Medications:  Current Outpatient Medications   Medication Sig Dispense Refill    acetaminophen (TYLENOL) 325 MG tablet Take 2 tablets (650 mg) by mouth every 4 hours as needed for other (For optimal non-opioid multimodal pain management to improve pain control and physical function.).      albuterol (PROAIR HFA/PROVENTIL HFA/VENTOLIN HFA) 108 (90 Base) MCG/ACT inhaler Inhale 2 puffs into the lungs every 4 hours as needed for shortness of breath, wheezing or cough 18 g 3    buPROPion (WELLBUTRIN XL) 150 MG 24 hr tablet Take 1 tablet (150 mg) by mouth every morning 90 tablet 1    cyclobenzaprine (FLEXERIL) 5 MG tablet Take 1 tablet (5 mg) by mouth 2 times daily as needed for muscle spasms 30 tablet 0    FLUoxetine (PROZAC) 40 MG capsule TAKE ONE CAPSULE BY MOUTH EVERY DAY 90 capsule 1    fluticasone-salmeterol (ADVAIR) 250-50 MCG/ACT inhaler Inhale 1 puff into the lungs every 12 hours. 60 each 3    omeprazole (PRILOSEC) 20 MG DR capsule Take 1 capsule (20 mg) by mouth daily. 30 capsule 3    predniSONE (DELTASONE) 20 MG tablet Take two tablets (= 40mg) each day for 5 (five) days 10 tablet 0    solifenacin (VESICARE) 5 MG tablet Take 5 mg by mouth daily. (Patient not taking: Reported on 6/26/2025)       No current facility-administered medications for this visit.         6/23/2025     1:49 PM   --   Do you avoid NSAIDs such as (Ibuprofen, Aleve, Naproxen, Advil)? Yes       ROS:  GI:       6/23/2025     1:49 PM   --   Vomiting No   Diarrhea No   Constipation Yes   Swallowing trouble Yes   Abdominal pain Yes   Heartburn No     Skin:       6/23/2025     1:49 PM   BAR RBS ROS - SKIN   Rash in skin folds No     Psych:       6/23/2025     1:49 PM   --   Depression No   Anxiety No     Female Only:       6/23/2025     1:49 PM   BAR RBS ROS -   "  Female only Post-menopausal   Stress urinary incontinence Yes              No data to display                  PHYSICAL EXAM:  Objective    Ht 1.626 m (5' 4\")   Wt 76.2 kg (168 lb)   BMI 28.84 kg/m    Vitals - Patient Reported  Pain Score: No Pain (0)      Vitals:  No vitals were obtained today due to virtual visit.    Physical Exam   GENERAL: alert and no distress  EYES: Eyes grossly normal to inspection.  No discharge or erythema, or obvious scleral/conjunctival abnormalities.  RESP: No audible wheeze, cough, or visible cyanosis.    SKIN: Visible skin clear. No significant rash, abnormal pigmentation or lesions.  NEURO: Cranial nerves grossly intact.  Mentation and speech appropriate for age.  PSYCH: Appropriate affect, tone, and pace of words        Sincerely,    Leanne Stanford PA-C    The longitudinal plan of care for the diagnosis(es)/condition(s) as documented were addressed during this visit. Due to the added complexity in care, I will continue to support Ju in the subsequent management and with ongoing continuity of care.   "

## 2025-06-26 ENCOUNTER — VIRTUAL VISIT (OUTPATIENT)
Dept: ENDOCRINOLOGY | Facility: CLINIC | Age: 65
End: 2025-06-26
Payer: COMMERCIAL

## 2025-06-26 VITALS — HEIGHT: 64 IN | WEIGHT: 168 LBS | BODY MASS INDEX: 28.68 KG/M2

## 2025-06-26 DIAGNOSIS — G47.33 OSA (OBSTRUCTIVE SLEEP APNEA): ICD-10-CM

## 2025-06-26 DIAGNOSIS — Z86.39 HISTORY OF OBESITY: ICD-10-CM

## 2025-06-26 DIAGNOSIS — E78.2 MIXED HYPERLIPIDEMIA: Chronic | ICD-10-CM

## 2025-06-26 DIAGNOSIS — Z90.3 H/O GASTRIC SLEEVE: Primary | ICD-10-CM

## 2025-06-26 ASSESSMENT — PAIN SCALES - GENERAL: PAINLEVEL_OUTOF10: NO PAIN (0)

## 2025-06-26 NOTE — PATIENT INSTRUCTIONS
"Thank you for allowing us the privilege of caring for you. We hope we provided you with the excellent service you deserve.   Please let us know if there is anything else we can do for you so that we can be sure you are completely satisfied with your care experience.    To ensure the quality of our services you may be receiving a patient satisfaction survey from an independent patient satisfaction monitoring company.    The greatest compliment you can give is a \"Likely to Recommend\"    Your visit was with Leanne Stanford PA-C today.    Instructions per today's visit:     Henrry Rubi, it was great to visit with you today.  Here is a review of our visit.  If our clinic scheduler is not able to reach you please call [phone] to schedule your next appointments.    Plan  Okay to switch to taking omeprazole every other day- if symptoms are still not coming back okay to stop completely after 2 weeks of taking every other day   Goals we discussed today:   Prioritizing protein at every meal   Follow up with Leanne in 3 months   Dietician appointment not covered by insurance   Will have nurses schedule consult with Dr. Cabrera given symptomatic cholelithiasis  Advise talking with PCP as soon as possible to discuss pros and cons of restarting your blood pressure medication    Keep up the excellent work!       Information about Video Visits with [employer]: video visit information  _________________________________________________________________________________________________________________________________________________________  If you are asked by your clinic team to have your blood pressure checked:  [facility] do offer several locations for blood pressure checks. Please follow the below link to schedule an appointment. Scheduling an appointment at the pharmacy for a blood pressure check is now preferred.    Appointment Plus " ([employer])  _________________________________________________________________________________________________________________________________________________________  Important contact and scheduling information:  Please call our contact center at [phone] to schedule your next appointments.  To find a lab location near you, please call [phone].  For any nursing questions or concerns call Deyanira Schuler LPN at [phone] or Katarina Leonard RN at [phone]  Please call during clinic hours Monday through Friday 8:00a - 4:00p if you have questions or you can contact us via [employer] at anytime and we will reply during clinic hours.    Lab results will be communicated through My Chart or letter (if My Chart not used). Please call the clinic if you have not received communication after 1 week or if you have any questions.?  Clinic Fax: [phone]    [facility] with [facility] !  Virtual Sessions are Available through [facility]    To learn more, call to schedule an appointment: [phone]     What is Health Coaching?  Do you know what you are supposed to do, but you just aren't doing it?  Then, HEALTH COACHING may help you!   Get unstuck and move forward with the support of a professionally trained [employer] (National Board-Certified Health and ) who uses evidence-based approaches to help you move forward with healthy lifestyle changes in the areas of weight loss, stress management and overall well-being.    Health Coaches help you identify goals that will work best for you. Health Coaches provide support and encouragement with overcoming barriers and help you to find inspiration and motivation to lead a healthy lifestyle.    Health Coaching 3-Pack; Three, 30-minute Health Coaching Visits, for $135  Health Coaching 6-Pack; Six, 30-minute Health coaching visits, for $250  Visits are done virtually (phone or video)  This is a self pay service; we do not  accept insurance for farnaz coaching.    ____________________________________________________________________  [facility]  Healthy Lifestyle Group    Healthy Lifestyle Group  This is a 60 minute virtual coaching group for those who want to lead a healthier lifestyle. Come together to set goals and overcome barriers in a supportive group environment. We will address the four pillars of health--nutrition, exercise, sleep and emotional well-being.  This group is highly recommended for those who are participating in the 24 week Healthy Lifestyle Plan and our Health Coaching sessions.    WHEN: This group meets the first Friday of the month, 12:30 PM - 1:30 PM online, via a zoom meeting.      FACILITATOR: Led by National Board Certified Health and , Shea Toure [facility].    TO REGISTER: Please call the Call Center at [phone] to register. You will get an appointment to attend in [facility]. Fifteen minutes prior to the meeting, complete the e-check in and you will get the link to join the meeting.  There is no charge to attend this group and space is limited.      _________________________________________________________________________________________________________________________________________________________  __________  [facility] Get Moving Program  Our team of physical therapists is trained to help you understand and take control of your condition. They will perform a thorough evaluation to determine your ability for activity and develop a customized plan to fit your goals and physical ability.  Scheduling: Unsure if the Get Moving program is right for you? Discuss the program with your medical provider or diabetes educator. You can also call us at [phone] to ask questions or schedule an appointment.   [facility] Get Moving  Program  ____________________________________________________________________________________________________________________________________________________________________________        Thank you,   [facility] Team

## 2025-06-26 NOTE — LETTER
2025       RE: Elsie Rubi  [address] [facility] [address]     Dear Colleague,    Thank you for referring your patient, Elsie Rbui, to the [facility] [place] at [facility]. Please see a copy of my visit note below.    Return Bariatric Surgery Note    RE: Elsie Rubi  MR#: [id number]  : 1960  VISIT DATE: 2025      Dear Jessie Rose,    I had the pleasure of seeing your patient, Elsie Rubi, in my post-bariatric surgery assessment clinic.    Assessment & Plan  Problem List Items Addressed This Visit       Mixed hyperlipidemia (Chronic)    MIC (obstructive sleep apnea) (Chronic)    H/O gastric sleeve - Primary    History of obesity          24 minutes spent by me on the date of the encounter doing chart review, history and exam, documentation and further activities per the note    CHIEF COMPLAINT: Post-bariatric surgery follow-up.    HISTORY OF PRESENT ILLNESS:      2025     1:49 PM   Questions Regarding Prior Weight Loss Surgery Reviewed With Patient   I had the following weight loss procedure Sleeve Gastrectomy   What year was your surgery?    How has your weight changed since your last visit? I have lost weight   Do you currently have any of the following Heartburn, acid reflux, or GERD (acid reflux disease)?   Do you have any concerns today? Dry mouth         Plan  Okay to switch to taking omeprazole every other day- if symptoms are still not coming back okay to stop completely after 2 weeks of taking every other day   Goals we discussed today:   Prioritizing protein at every meal   Follow up with Leanne in 3 months   Dietician appointment not covered by insurance   Will have nurses schedule consult with Dr. Cabrera given symptomatic cholelithiasis  Advise talking with PCP as soon as possible to discuss pros and cons of restarting your blood pressure medication    Keep up the  "excellent work!         Interval History:   Sleeve gastrectomy 9/10/24 with Dr. Cabrera.    Seen by me at 1 month post op, 10/11/24- doing well, not taking actigall per her preference,      Last seen by me 12/10/24, 3 months post op  Continued gradual weight loss, currently is at 180lbs, down 14lbs since day of surgery, overall 19% total body weight loss since starting with program.      Seeing significant improvement in mood, reduced sedentary behavior.      Hunger is continuing to be well managed.   Seeing significant improvement in dyspnea issues, much better improvement in shortness of breath. \"I feel like my lungs have better capacity to take in oxygen.\"      Some irregularity in bowel movements alternating between constipation and diarrhea, increasing veggies and taking a probiotic which has helped some of this. Diarrhea 2x a week, maybe 3-4 bouts of diarrhea in one day. Otherwise feels constipated. Discussed trying miralax to help with more regular bowel movements. Discussed 1/2 cap miralax daily.      Hoping to increase exercise, build muscles. Discussed goal of 90g protein daily, exploring weight training.     Today in visit 6/26/25  12lbs weight loss since last visit, down 26lbs from surgery. Down 24% total body weight since starting with program.   Reports she is doing well- seeing improved energy with her weight loss- feeling more youthful. Seeing improvement in her lung disease with her weight loss.     Was in ed earlier this month due to abodminal pain, concern for symptomatic cholelithiasis- reports she's had gallstones for 40 years and has had \"flare ups\" off and on, doesn't want to do anything for this.   Will talk with Dr. Cabrera to discuss pros and cons of cholecystectomy, will have this scheduled.     Has stopped her blood pressure medication, has checked her bp a few times since then, has been 149/90- discussed importance of reaching out to pcp to discuss adjusting blood pressure meds.       Wt " Readings from Last 5 Encounters:   06/26/25 76.2 kg (168 lb)   06/05/25 76.2 kg (168 lb)   03/10/25 78 kg (172 lb)   01/15/25 82.8 kg (182 lb 8 oz)   12/10/24 81.6 kg (180 lb)       Anti-obesity medication history:     Current:       Past/failed/contraindicated:       Recent diet changes: baking bread at home- some increase in carbs with this. Having 1 ensure every day. Is due for RD visits but has not been able to do this due to lack of coverage. Drinking sugar free gatorade.     Recent exercise/activity changes: gardening every day lately.     Vitamins/Labs: taking barimelts every day- is subscribed to this through [employer].     GERD symptoms: well managed with regular 20mg prilosec- if stopping prilosec will get break through heart burn symptoms after 2 days. Has tried drinking apple cider vinegar to see if helpful for her heart burn.     Nausea : denies     Vomiting : denies     Dysphagia:  denies         Weight History:      6/23/2025     1:49 PM   --   What is your highest lifetime weight? 233   What is your lowest weight since surgery? (In pounds) 168     Initial Weight (lbs): 222.2 lbs  Weight: 76.2 kg (168 lb)  Last Visits Weight: 76.2 kg (168 lb)  Cumulative weight loss (lbs): 54.2  Weight Loss Percentage: 24.39%        6/23/2025     1:49 PM   Questions Regarding Co-Morbidities and Health Concerns Reviewed With Patient   Pre-diabetes Improved   Diabetes II Never   High Blood Pressure Improved   High cholesterol Never   Heartburn/Reflux Improved   Sleep apnea Gone away   PCOS Never   Back pain Stayed the same   Joint pain Improved   Lower leg swelling Never           6/23/2025     1:49 PM   Eating Habits   How many meals do you eat per day? 1   Do you snack between meals? Yes   How much food are you eating at each meal? Greater than 1 cup   Are you able to separate your meals and liquids by at least 30 minutes? Yes   Are you able to avoid liquid calories? No           6/23/2025     1:49 PM   Exercise  Questions Reviewed With Patient   How often do you exercise? 3 to 4 times per week   What is the duration of your exercise (in minutes)? 20 Minutes   What types of exercise do you do? walking    other   What keeps you from being more active? I am as active as I can possbily be    Pain    I should be more active but I just have not gotten around to it       Social History:      6/23/2025     1:49 PM   --   Are you smoking? No   Are you drinking alcohol? Yes   How much alcohol? 2 glasses  wine daily       Medications:  Current Outpatient Medications   Medication Sig Dispense Refill     acetaminophen (TYLENOL) 325 MG tablet Take 2 tablets (650 mg) by mouth every 4 hours as needed for other (For optimal non-opioid multimodal pain management to improve pain control and physical function.).       albuterol (PROAIR HFA/PROVENTIL HFA/VENTOLIN HFA) 108 (90 Base) MCG/ACT inhaler Inhale 2 puffs into the lungs every 4 hours as needed for shortness of breath, wheezing or cough 18 g 3     buPROPion (WELLBUTRIN XL) 150 MG 24 hr tablet Take 1 tablet (150 mg) by mouth every morning 90 tablet 1     cyclobenzaprine (FLEXERIL) 5 MG tablet Take 1 tablet (5 mg) by mouth 2 times daily as needed for muscle spasms 30 tablet 0     FLUoxetine (PROZAC) 40 MG capsule TAKE ONE CAPSULE BY MOUTH EVERY DAY 90 capsule 1     fluticasone-salmeterol (ADVAIR) 250-50 MCG/ACT inhaler Inhale 1 puff into the lungs every 12 hours. 60 each 3     omeprazole (PRILOSEC) 20 MG DR capsule Take 1 capsule (20 mg) by mouth daily. 30 capsule 3     predniSONE (DELTASONE) 20 MG tablet Take two tablets (= 40mg) each day for 5 (five) days 10 tablet 0     solifenacin (VESICARE) 5 MG tablet Take 5 mg by mouth daily. (Patient not taking: Reported on 6/26/2025)       No current facility-administered medications for this visit.         6/23/2025     1:49 PM   --   Do you avoid NSAIDs such as (Ibuprofen, Aleve, Naproxen, Advil)? Yes       ROS:  GI:       6/23/2025     1:49 PM  "  --   Vomiting No   Diarrhea No   Constipation Yes   Swallowing trouble Yes   Abdominal pain Yes   Heartburn No     Skin:       6/23/2025     1:49 PM   BAR RBS ROS - SKIN   Rash in skin folds No     Psych:       6/23/2025     1:49 PM   --   Depression No   Anxiety No     Female Only:       6/23/2025     1:49 PM   BAR RBS ROS -    Female only Post-menopausal   Stress urinary incontinence Yes              No data to display                  PHYSICAL EXAM:  Objective   Ht 1.626 m (5' 4\")   Wt 76.2 kg (168 lb)   BMI 28.84 kg/m    Vitals - Patient Reported  Pain Score: No Pain (0)      Vitals:  No vitals were obtained today due to virtual visit.    Physical Exam   GENERAL: alert and no distress  EYES: Eyes grossly normal to inspection.  No discharge or erythema, or obvious scleral/conjunctival abnormalities.  RESP: No audible wheeze, cough, or visible cyanosis.    SKIN: Visible skin clear. No significant rash, abnormal pigmentation or lesions.  NEURO: Cranial nerves grossly intact.  Mentation and speech appropriate for age.  PSYCH: Appropriate affect, tone, and pace of words        Sincerely,    Leanne Stanford PA-C    The longitudinal plan of care for the diagnosis(es)/condition(s) as documented were addressed during this visit. Due to the added complexity in care, I will continue to support Ju in the subsequent management and with ongoing continuity of care.     Again, thank you for allowing me to participate in the care of your patient.      Sincerely,    Lenane Stanford PA-C    "

## 2025-06-28 ENCOUNTER — MYC REFILL (OUTPATIENT)
Dept: FAMILY MEDICINE | Facility: OTHER | Age: 65
End: 2025-06-28

## 2025-06-28 DIAGNOSIS — G89.29 CHRONIC UPPER BACK PAIN: ICD-10-CM

## 2025-06-28 DIAGNOSIS — M54.9 CHRONIC UPPER BACK PAIN: ICD-10-CM

## 2025-06-28 DIAGNOSIS — M62.838 MUSCLE SPASM: ICD-10-CM

## 2025-06-30 RX ORDER — CYCLOBENZAPRINE HCL 5 MG
5 TABLET ORAL 2 TIMES DAILY PRN
Qty: 30 TABLET | Refills: 0 | Status: SHIPPED | OUTPATIENT
Start: 2025-06-30

## 2025-06-30 NOTE — TELEPHONE ENCOUNTER
Flexeril  Last Written Prescription Date: 11/7/24  Last Fill Quantity: 30 # of Refills: 0  Last Office Visit: 9/16/24

## 2025-07-15 ENCOUNTER — TELEPHONE (OUTPATIENT)
Dept: ENDOCRINOLOGY | Facility: CLINIC | Age: 65
End: 2025-07-15
Payer: COMMERCIAL

## 2025-07-15 NOTE — TELEPHONE ENCOUNTER
Patient confirmed scheduled appointment:  Date: 10/9/25  Time: 4 pm  Visit type: RET EDWIN  Provider: Leanne Stanford  Location: virtual  Testing/imaging: n/a  Additional notes: n/a

## 2025-07-16 ENCOUNTER — OFFICE VISIT (OUTPATIENT)
Dept: PULMONOLOGY | Facility: CLINIC | Age: 65
End: 2025-07-16
Attending: INTERNAL MEDICINE
Payer: COMMERCIAL

## 2025-07-16 VITALS
SYSTOLIC BLOOD PRESSURE: 144 MMHG | HEIGHT: 64 IN | DIASTOLIC BLOOD PRESSURE: 99 MMHG | HEART RATE: 74 BPM | BODY MASS INDEX: 28.79 KG/M2 | OXYGEN SATURATION: 100 % | WEIGHT: 168.6 LBS

## 2025-07-16 DIAGNOSIS — R09.02 HYPOXIA: ICD-10-CM

## 2025-07-16 DIAGNOSIS — J84.9 ILD (INTERSTITIAL LUNG DISEASE) (H): Primary | ICD-10-CM

## 2025-07-16 DIAGNOSIS — G47.34 NOCTURNAL HYPOXIA: ICD-10-CM

## 2025-07-16 DIAGNOSIS — J45.30 MILD PERSISTENT ASTHMA WITHOUT COMPLICATION: ICD-10-CM

## 2025-07-16 DIAGNOSIS — J45.30 MILD PERSISTENT ASTHMA WITHOUT COMPLICATION: Primary | ICD-10-CM

## 2025-07-16 DIAGNOSIS — J84.9 ILD (INTERSTITIAL LUNG DISEASE) (H): ICD-10-CM

## 2025-07-16 LAB
6 MIN WALK (FT): 1375 FT
6 MIN WALK (M): 419 M
DLCOCOR-%PRED-PRE: 63 %
DLCOCOR-PRE: 12.48 ML/MIN/MMHG
DLCOUNC-%PRED-PRE: 64 %
DLCOUNC-PRE: 12.7 ML/MIN/MMHG
DLCOUNC-PRED: 19.69 ML/MIN/MMHG
ERV-%PRED-PRE: 69 %
ERV-PRE: 0.55 L
ERV-PRED: 0.8 L
EXPTIME-PRE: 5.78 SEC
FEF2575-%PRED-PRE: 113 %
FEF2575-PRE: 2.24 L/SEC
FEF2575-PRED: 1.97 L/SEC
FEFMAX-%PRED-PRE: 116 %
FEFMAX-PRE: 7.09 L/SEC
FEFMAX-PRED: 6.08 L/SEC
FEV1-%PRED-PRE: 77 %
FEV1-PRE: 1.78 L
FEV1FEV6-PRE: 82 %
FEV1FEV6-PRED: 80 %
FEV1FVC-PRE: 82 %
FEV1FVC-PRED: 79 %
FEV1SVC-PRE: 81 L
FEV1SVC-PRED: 71 L
FIFMAX-PRE: 1.73 L/SEC
FRCPLETH-%PRED-PRE: 63 %
FRCPLETH-PRE: 1.79 L
FRCPLETH-PRED: 2.83 L
FVC-%PRED-PRE: 74 %
FVC-PRE: 2.18 L
FVC-PRED: 2.94 L
GAW-PRED: 1.03 L/S/CMH2O
IC-%PRED-PRE: 64 %
IC-PRE: 1.56 L
IC-PRED: 2.4 L
Lab: 103 %
RVPLETH-%PRED-PRE: 60 %
RVPLETH-PRE: 1.14 L
RVPLETH-PRED: 1.89 L
SGAW-PRED: 0.2 1/CMH2O*S
SRAW-PRED: < 4.76 CMH2O*S
TLCPLETH-%PRED-PRE: 64 %
TLCPLETH-PRE: 3.35 L
TLCPLETH-PRED: 5.17 L
VA-%PRED-PRE: 64 %
VA-PRE: 3.04 L
VC-%PRED-PRE: 67 %
VC-PRE: 2.21 L
VC-PRED: 3.27 L

## 2025-07-16 PROCEDURE — 3077F SYST BP >= 140 MM HG: CPT | Performed by: INTERNAL MEDICINE

## 2025-07-16 PROCEDURE — 3080F DIAST BP >= 90 MM HG: CPT | Performed by: INTERNAL MEDICINE

## 2025-07-16 PROCEDURE — 1126F AMNT PAIN NOTED NONE PRSNT: CPT | Performed by: INTERNAL MEDICINE

## 2025-07-16 PROCEDURE — 99215 OFFICE O/P EST HI 40 MIN: CPT | Mod: 25 | Performed by: INTERNAL MEDICINE

## 2025-07-16 PROCEDURE — G0463 HOSPITAL OUTPT CLINIC VISIT: HCPCS | Performed by: INTERNAL MEDICINE

## 2025-07-16 RX ORDER — FLUTICASONE PROPIONATE AND SALMETEROL 100; 50 UG/1; UG/1
1 POWDER RESPIRATORY (INHALATION) EVERY 12 HOURS
Qty: 3 EACH | Refills: 3 | Status: SHIPPED | OUTPATIENT
Start: 2025-07-16

## 2025-07-16 ASSESSMENT — ASTHMA QUESTIONNAIRES
QUESTION_2 LAST FOUR WEEKS HOW OFTEN HAVE YOU HAD SHORTNESS OF BREATH: NOT AT ALL
QUESTION_3 LAST FOUR WEEKS HOW OFTEN DID YOUR ASTHMA SYMPTOMS (WHEEZING, COUGHING, SHORTNESS OF BREATH, CHEST TIGHTNESS OR PAIN) WAKE YOU UP AT NIGHT OR EARLIER THAN USUAL IN THE MORNING: NOT AT ALL
ACT_TOTALSCORE: 25
QUESTION_1 LAST FOUR WEEKS HOW MUCH OF THE TIME DID YOUR ASTHMA KEEP YOU FROM GETTING AS MUCH DONE AT WORK, SCHOOL OR AT HOME: NONE OF THE TIME
QUESTION_4 LAST FOUR WEEKS HOW OFTEN HAVE YOU USED YOUR RESCUE INHALER OR NEBULIZER MEDICATION (SUCH AS ALBUTEROL): NOT AT ALL
QUESTION_5 LAST FOUR WEEKS HOW WOULD YOU RATE YOUR ASTHMA CONTROL: COMPLETELY CONTROLLED
ACT_TOTALSCORE: 25

## 2025-07-16 ASSESSMENT — PAIN SCALES - GENERAL: PAINLEVEL_OUTOF10: NO PAIN (0)

## 2025-07-16 NOTE — NURSING NOTE
Provider requested pt have [facility] without oxygen. RN entered orders and they will be faxed to [facility] where pt had oxygen with previously.   Karen Mahajan RN BSN

## 2025-07-16 NOTE — LETTER
7/16/2025      Elsie Rubi  [address] [facility] [address]      Dear Colleague,    Thank you for referring your patient, Elsie Rubi, to the [facility] FOR LUNG [facility]ENCE AND [facility] [place]. Please see a copy of my visit note below.    Pulmonary Patient Follow Up Clinic Note   July 16, 2025        PCP: Jessie Rose    Reason for visit: ILD     Pulmonary HPI:   Elsie Rubi is a 65 year old female w/ h/o elevated BMI now s/p gastric surgery and significant weight loss, mild MIC (PSG 3/2024, before weight loss), hiatal hernia, GERD, HTN, pre-diabetes hx, reported hx of asthma  who presents for follow up of ILD, resolved exertional hypoxia, and nocturnal hypoxemia, and asthma.     Pulmonary visit 7/2023.  Team conference outcome-  NSIP vs organizing pna with small airways overlap or HP with small airways disease with enough acute inflammatory changes to suggest treatment with MMF.   Started on MMF, tolerating well. Was prescribed O2 2 LPM via NC on exertion.   12/20/2023: Developed URI symptoms. Viral work up negative. Also noted to have been of Advair; this was restarted due to asthma symptoms. Completed 13 sessions of pulm rehab before having to stop in 10/2023 due to a new job.   Symptomatic, not wearing O2 with exertion consistently.    O2 at rest (When she should be on RA) is good.   Was referred again to sleep medicine considering her previous MIC dx and concern for nocturnal hypoxia.     3/20/2024:   Retired now since Jan 2024.   Symptoms stable, though remains with exertional BECKER with going up flight of stairs (though not consistently wearing oxygen). Has not been keeping up with exercise routinely. Was referred to pulm rehab again by her PCP (though not clear regarding coverage)  Completed PSG on 3/12- noted for nocturnal hypoxia and AHI-12 (mild MIC). Has follow up visit appt soon to go over results and next steps. Tolerating MMF well- no GI symptoms; labs  stable.  Seen at weight management clinic- possible looking into gastric sleeve surgery potentially in 3-4 months and will need pulm risk assessment. Looks like Zepbound was also prescribed. Cards consulted also for pre-op clearance.   Had EGD 3/13 as part of this process- found grade A esophagitis with large hiatal hernia. Omeprazole added.       7/24/2024  Since retiring, feels much better in that she feels less stressed.   After last visit in late March, unfortunately, she developed a case of influenza A from which she was very symptomatic from. Was eval'd in the ED and discharged. Also reported mild delirium during it. MMF was held during the illness period.   Because of this experience, she is hesitant regarding continued MMF usage. She does not like wearing masks due to how it makes her breathing feel.   Followed up with sleep medicine for her mild MIC and nocturnal hypoxia- plan is to just use supplemental O2 of 2LPM.   Has felt more symptomatic on exertion.  Has lost weight with Wegovy. Is seen within the weight loss management group with additional counseling.   Since her last appointment, she also admits that she has not been very motivated to exercise she has been overall very sedentary and lacking energy.  She is seeing her primary care doctor to see if depression is being an issue.  She does remember that she was more enthusiastic about exercising when she was doing the pulmonary rehab and did really enjoy it.    1/15/2025:   Since last appointment, patient completed her laparoscopic sleeve gastrectomy in September 2024.  Her course was complicated by a likely postoperative hematoma near the distal esophagus in September that was initially symptomatic.  Symptoms improved with time and repeat imaging in October showed that it was starting to decrease in size.  Her course was also complicated by severe dehydration at the end of September which she went to the ED for and got large amounts of IV fluid and  improved.  At her previous appointment she was already losing weight following GLP-1 agonist but now she is continue losing weight in the setting of her gastrectomy and is down 35 pounds since March 2024.    With the weight loss she has felt like her breathing has just become much easier and exercise also has become more enjoyable.  She continues to use the Advair medium dose but is finding that she is not really using the albuterol very often.  It has been several months since she last really used it for shortness of breath symptoms.  No coughing or other respiratory symptoms really.    Of note, after starting mycophenolate in August 2023 in relation to their CT findings, she decided to stop her medication it appears 2 to 3 months ago though did not alert that I will the office.  Her concern of developing an overwhelming infection this became too great and she decided just to stop it especially since she was feeling better.    She also has felt well enough where she stopped using the 2 LPM O2 at night.  However she did not get retested since stopping usage.    She is going to fly again and not have to worry about oxygen.  Of note, she performed a 6-minute walk in July 2024 which showed that while she did have a desaturation she no longer developed hypoxia.    7/16/2025:  Has lost another 15 pounds since her last visit.  Having gallbladder with stones leading to colicky pain [facility] did not show acute cholecystitis . May need removal in the future.     Reports that she is doing very well.  Has also stopped using oxygen at night as well though last time she was tested she still required 2 L/min.    Denies having issues with sleep related to her breathing.   states that she is not snoring.  Reports that her asthma has also been doing very well despite the poor air quality and high pollen.  This includes no coughing symptoms.  She has not used her albuterol for months.  She remains on the Advair.  Is not  "having issues in her day-to-day functions.  Doing more strength training exercises including this chair program but not doing much aerobic activity.  Overall she feels like she is doing well.    Review of systems: a complete 12-point ROS conducted, & found to be negative w/ exceptions as noted in the HPI.    Reviewed PMH, PSH, FH, SH    Medications:  Current Outpatient Medications   Medication Sig Dispense Refill     acetaminophen (TYLENOL) 325 MG tablet Take 2 tablets (650 mg) by mouth every 4 hours as needed for other (For optimal non-opioid multimodal pain management to improve pain control and physical function.).       albuterol (PROAIR HFA/PROVENTIL HFA/VENTOLIN HFA) 108 (90 Base) MCG/ACT inhaler Inhale 2 puffs into the lungs every 4 hours as needed for shortness of breath, wheezing or cough 18 g 3     buPROPion (WELLBUTRIN XL) 150 MG 24 hr tablet Take 1 tablet (150 mg) by mouth every morning 90 tablet 1     cyclobenzaprine (FLEXERIL) 5 MG tablet Take 1 tablet (5 mg) by mouth 2 times daily as needed for muscle spasms. 30 tablet 0     FLUoxetine (PROZAC) 40 MG capsule TAKE ONE CAPSULE BY MOUTH EVERY DAY 90 capsule 1     fluticasone-salmeterol (ADVAIR) 100-50 MCG/ACT inhaler Inhale 1 puff into the lungs every 12 hours. 3 each 3     omeprazole (PRILOSEC) 20 MG DR capsule Take 1 capsule (20 mg) by mouth daily. 30 capsule 3     No current facility-administered medications for this visit.       Allergies:  No Known Allergies    Physical examination:  BP (!) 144/99   Pulse 74   Ht 1.626 m (5' 4\")   Wt 76.5 kg (168 lb 9.6 oz)   SpO2 100%   BMI 28.94 kg/m      General: NAD, coughing with deep inspiration  CV: RR, no m/c/r   Lungs: CTAB, no wrr  Abd: Soft, NTTP, ND  Ext: WWP, no BLE edema  Neuro: No focal deficits  Psych: Euthymic, normal affect, good eye contact    Labs: reviewed in [facility] & personally interpreted.     Previous ILD serologies negative      Imaging: reviewed in [facility] & personally interpreted. Below " are the interpretations from the formal Radiology review.    Reviewed previously CT Chest 10/2024, 9/2024, 7/2023    PFT:  Most Recent [facility] Pulmonary Function Testing (FVC/FEV1 only)  FVC-Pre   Date Value Ref Range Status   07/16/2025 2.18 L Final   01/15/2025 2.01 L    07/24/2024 1.91 L    03/20/2024 1.86 L      FVC-%Pred-Pre   Date Value Ref Range Status   07/16/2025 74 % Final   01/15/2025 70 %    07/24/2024 66 %    03/20/2024 64 %      FEV1-Pre   Date Value Ref Range Status   07/16/2025 1.78 L Final   01/15/2025 1.78 L    07/24/2024 1.64 L    03/20/2024 1.60 L      FEV1-%Pred-Pre   Date Value Ref Range Status   07/16/2025 77 % Final   01/15/2025 79 %    07/24/2024 72 %    03/20/2024 69 %      7/16/2025:  PFT-   Spirometry- normal, no change vs previous  Lung volumes- TLC moderately reduced; borderline significant decline vs previous  uncDLCO- moderately, significant decline vs previous    6MW- good distance (better than 1 year ago).  Ending/jong SpO2-95% on RA (desat without hypoxia; better spO2 vs 1 year ago)    ADELE 1/28/2025: Short testing duration of 1:21 hrs, with ROSAMARIA-25, spO2<88%,   Never responded for re-testing.      PFT 1/15/2025:  Spirometry showed no obstruction with normal FEV1 and mildly reduced FVC.   Lung volumes with mild restriction.  Uncorrected DLCO showed mild diffusion defect (hb-12.7 so likely minimal correction.  Compared to PFTs about 1.5 year ago, borderline significant improvement in FVC, significant significant improvement of her FEV1, significant improvement in her TLC, and significant proved in her DLCO.  Comparing her trend in her DLCO, it has been stable for 1 year.    PFT- Date: 7/24/2024  Similar spirometry findings as previous with FVC suggesting mild restriction and continued normal DLCO (significant improvement vs 7/2023)  6MW- completed on 2LPM at first- 366m and end spo2-96%  Repeat on RA- 360m and end spO2-92%    PFT- Date: 3/20/2024  FEV1- 1.6L (69%), FVC-1.86L (64%),  FEV1/FVC- 0.86, DLCO- 16.19 ml/min/mmHg (81%)-  Interpretation-Similar spirometry with FVC suggesting mild restriction with normal DLCO (continued trend upward vs previous and significant improvement vs 7/2023)        PFT- Date: 12/20/2023   FEV1- 1.62L (70%), FVC-1.89L (65%), FEV1/FVC- 0.86,  DLCO- 15.3 ml/min/mmHg (76%)-  Interpretation-Similar spirometry with FVC suggesting mild restriction  Normalization of DLCO compared to previous      7/21/2023  FEV1- 1.51L (64%)  FVC- 1.83 (62%)  R- 0.82  TLC- 3.06L (58%)  uncDLCO- 12.85 (64%)  ERV and RV not as reduced as severely as TLC  RV/TLC-0.41  6MW- 2L NC O2 to complete (ended at 94%). 335m walked (329m -LLN)     OSH PFTs:  3/20/23:   FVC: 1.89 L (61%)  FEV1: 1.62 L (67%)  FEV1/FVC: 86%  RV: 1.18 L (60%)  TLC: 3.38 L (68%)  RV/TLC ratio 35%  uncDLCO: 14.93 ml/min/mmHg (76%, not adjusted for Hb)    Impression & recommendations:    Ju was seen today for recheck.    Diagnoses and all orders for this visit:    ILD (interstitial lung disease) (H)  -     Adult Pulmonology Clinic Follow-Up Order (6 Month)  -     CT Chest Hi-Resolution wo Contrast; Future  -     Pulmonary Function Test; Future  -     General PFT Lab (Please always keep checked); Future    Nocturnal hypoxia    Mild persistent asthma without complication  -     fluticasone-salmeterol (ADVAIR) 100-50 MCG/ACT inhaler; Inhale 1 puff into the lungs every 12 hours.    Other orders  -     Oximetry - HIM Scan  -     Adult Pulmonology Clinic Follow-Up Order (6 Month); Future      65 year old female w/ h/o elevated BMI now s/p gastric surgery and significant weight loss, mild IMC (PSG 3/2024, before weight loss), hiatal hernia, GERD, HTN, pre-diabetes hx, reported hx of asthma  who presents for follow up of ILD, resolved exertional hypoxia, and nocturnal hypoxemia/mild MIC, and asthma.     Diagnosis non-IPF- NSIP with organizing portions vs HP with evidence of small airways disease also present, groundglass also  seen. Also MIC  Was on mycophenolate since 8/2023 until she took herself off in September or October 2024 due to concern for potentially developing severe respiratory infection (she did develop influenza A last year and was very symptomatic from it).     Overall, the mycophenolate was rather helpful in resolving the ongoing inflammatory process in her lungs.  This reflected by not only her improvement in her imaging and PFTs, but also an improvement in her exertional oxygenation.  She had a CT chest performed last in October 2024 ordered by different provider which essentially showed fairly clearly lungs with exception of atelectasis versus underlying mild ILD in the bases of the lungs which is a dramatic improvement versus her CT chest in March and July 2023.      Her PFTs and exertional O2 requirements also have been aided by her weight loss which has been pretty significant in 2024 following initially GLP-1 agonist and then gastric surgery.    She symptomatically feels great and better than she has in years.      Her asthma also has improved with her weight loss and better control of her ILD.   Currently has not used albuterol in months; just has been using her controller Advair.    She previously had an overnight oximetry study in January that was very short in duration but was suggestive of desaturation events.  Was unable to repeat the study despite multiple messages.  Patient is going today to repeat it.    Her lung function test today has shown a slight decline in her total lung capacity though stable spirometry.  Her DLCO has a slight decline as well that is significantly changed.  However, her 6-minute walk showed an excellent distance that is dramatically improved compared to previously as well as continued good saturation that is better than her previous 6-minute walk 1 year ago as well.  Hence mixed results from her lung function test today.    -Continue as needed albuterol and continue Advair at the  lower the dosage today  - Repeat PFTs in 6 months with a repeat CT chest to ensure stability of her ILD rather than worsening while off mycophenolate  - Repeat overnight oximetry study on room air if possible; if abnormal refer her back to sleep medicine.  Her previous PSG 3/2024 was at a much higher weight so she may need repeat  - Encouraged her to increase her aerobic exercise in addition to maintaining good strengthening workout regimen  - Continue reflux medications    -Will continue her asthma treatments- continue Advair and prn albuterol (for symptoms prn and before exercise).  If she remains stable at the next appointment we will probably cut the dosage of her Advair to the lower dosage and trial her at that level  -Will no longer be on mycophenolate and thus no longer needs to be monitored with labs.  -Repeat overnight oximetry study on room air to see if improvement in her previous desaturations following her significant weight loss    RTC in 6 months with PFTs and CT; if stable, can change to yearly visits       40 minutes excluding the time spent on cigarette cessation was  spent on the date of the encounter doing chart review, history and exam, documentation and further activities as noted above.    These conclusions are made at the best of one's knowledge and belief based on the provided evidence such as patient's history and allergy test results and they can change over time or can be incomplete because of missing information's.    I explained the lab values, imagings and findings to the patient.  Patient expressed understanding I did not recognize any barriers to the understanding of the patient.    The longitudinal plan of care for the diagnosis(es)/condition(s) as documented were addressed during this visit. Due to the added complexity in care, I will continue to support Ju in the subsequent management and with ongoing continuity of care.    The above note was dictated using voice recognition  software and may include typographical errors. Please contact the author for any clarifications.    Isaac Duque MD  , Division of Pulmonary/Critical Care            Again, thank you for allowing me to participate in the care of your patient.        Sincerely,        Isaac Duque MD    Electronically signed

## 2025-07-20 NOTE — PROGRESS NOTES
Pulmonary Patient Follow Up Clinic Note   July 16, 2025        PCP: Jessie Rose    Reason for visit: ILD     Pulmonary HPI:   Elsie Rubi is a 65 year old female w/ h/o elevated BMI now s/p gastric surgery and significant weight loss, mild MIC (PSG 3/2024, before weight loss), hiatal hernia, GERD, HTN, pre-diabetes hx, reported hx of asthma  who presents for follow up of ILD, resolved exertional hypoxia, and nocturnal hypoxemia, and asthma.     Pulmonary visit 7/2023.  Team conference outcome-  NSIP vs organizing pna with small airways overlap or HP with small airways disease with enough acute inflammatory changes to suggest treatment with MMF.   Started on MMF, tolerating well. Was prescribed O2 2 LPM via NC on exertion.   12/20/2023: Developed URI symptoms. Viral work up negative. Also noted to have been of Advair; this was restarted due to asthma symptoms. Completed 13 sessions of pulm rehab before having to stop in 10/2023 due to a new job.   Symptomatic, not wearing O2 with exertion consistently.    O2 at rest (When she should be on RA) is good.   Was referred again to sleep medicine considering her previous MIC dx and concern for nocturnal hypoxia.     3/20/2024:   Retired now since Jan 2024.   Symptoms stable, though remains with exertional BECKER with going up flight of stairs (though not consistently wearing oxygen). Has not been keeping up with exercise routinely. Was referred to pulm rehab again by her PCP (though not clear regarding coverage)  Completed PSG on 3/12- noted for nocturnal hypoxia and AHI-12 (mild MIC). Has follow up visit appt soon to go over results and next steps. Tolerating MMF well- no GI symptoms; labs stable.  Seen at weight management clinic- possible looking into gastric sleeve surgery potentially in 3-4 months and will need pulm risk assessment. Looks like Zepbound was also prescribed. Cards consulted also for pre-op clearance.   Had EGD 3/13 as part of this process- found  grade A esophagitis with large hiatal hernia. Omeprazole added.       7/24/2024  Since retiring, feels much better in that she feels less stressed.   After last visit in late March, unfortunately, she developed a case of influenza A from which she was very symptomatic from. Was eval'd in the ED and discharged. Also reported mild delirium during it. MMF was held during the illness period.   Because of this experience, she is hesitant regarding continued MMF usage. She does not like wearing masks due to how it makes her breathing feel.   Followed up with sleep medicine for her mild MIC and nocturnal hypoxia- plan is to just use supplemental O2 of 2LPM.   Has felt more symptomatic on exertion.  Has lost weight with Wegovy. Is seen within the weight loss management group with additional counseling.   Since her last appointment, she also admits that she has not been very motivated to exercise she has been overall very sedentary and lacking energy.  She is seeing her primary care doctor to see if depression is being an issue.  She does remember that she was more enthusiastic about exercising when she was doing the pulmonary rehab and did really enjoy it.    1/15/2025:   Since last appointment, patient completed her laparoscopic sleeve gastrectomy in September 2024.  Her course was complicated by a likely postoperative hematoma near the distal esophagus in September that was initially symptomatic.  Symptoms improved with time and repeat imaging in October showed that it was starting to decrease in size.  Her course was also complicated by severe dehydration at the end of September which she went to the ED for and got large amounts of IV fluid and improved.  At her previous appointment she was already losing weight following GLP-1 agonist but now she is continue losing weight in the setting of her gastrectomy and is down 35 pounds since March 2024.    With the weight loss she has felt like her breathing has just become much  easier and exercise also has become more enjoyable.  She continues to use the Advair medium dose but is finding that she is not really using the albuterol very often.  It has been several months since she last really used it for shortness of breath symptoms.  No coughing or other respiratory symptoms really.    Of note, after starting mycophenolate in August 2023 in relation to their CT findings, she decided to stop her medication it appears 2 to 3 months ago though did not alert that I will the office.  Her concern of developing an overwhelming infection this became too great and she decided just to stop it especially since she was feeling better.    She also has felt well enough where she stopped using the 2 LPM O2 at night.  However she did not get retested since stopping usage.    She is going to fly again and not have to worry about oxygen.  Of note, she performed a 6-minute walk in July 2024 which showed that while she did have a desaturation she no longer developed hypoxia.    7/16/2025:  Has lost another 15 pounds since her last visit.  Having gallbladder with stones leading to colicky pain [facility] imaing did not show acute cholecystitis . May need removal in the future.     Reports that she is doing very well.  Has also stopped using oxygen at night as well though last time she was tested she still required 2 L/min.    Denies having issues with sleep related to her breathing.   states that she is not snoring.  Reports that her asthma has also been doing very well despite the poor air quality and high pollen.  This includes no coughing symptoms.  She has not used her albuterol for months.  She remains on the Advair.  Is not having issues in her day-to-day functions.  Doing more strength training exercises including this chair program but not doing much aerobic activity.  Overall she feels like she is doing well.    Review of systems: a complete 12-point ROS conducted, & found to be negative w/ exceptions  "as noted in the HPI.    Reviewed PMH, PSH, FH, SH    Medications:  Current Outpatient Medications   Medication Sig Dispense Refill    acetaminophen (TYLENOL) 325 MG tablet Take 2 tablets (650 mg) by mouth every 4 hours as needed for other (For optimal non-opioid multimodal pain management to improve pain control and physical function.).      albuterol (PROAIR HFA/PROVENTIL HFA/VENTOLIN HFA) 108 (90 Base) MCG/ACT inhaler Inhale 2 puffs into the lungs every 4 hours as needed for shortness of breath, wheezing or cough 18 g 3    buPROPion (WELLBUTRIN XL) 150 MG 24 hr tablet Take 1 tablet (150 mg) by mouth every morning 90 tablet 1    cyclobenzaprine (FLEXERIL) 5 MG tablet Take 1 tablet (5 mg) by mouth 2 times daily as needed for muscle spasms. 30 tablet 0    FLUoxetine (PROZAC) 40 MG capsule TAKE ONE CAPSULE BY MOUTH EVERY DAY 90 capsule 1    fluticasone-salmeterol (ADVAIR) 100-50 MCG/ACT inhaler Inhale 1 puff into the lungs every 12 hours. 3 each 3    omeprazole (PRILOSEC) 20 MG DR capsule Take 1 capsule (20 mg) by mouth daily. 30 capsule 3     No current facility-administered medications for this visit.       Allergies:  No Known Allergies    Physical examination:  BP (!) 144/99   Pulse 74   Ht 1.626 m (5' 4\")   Wt 76.5 kg (168 lb 9.6 oz)   SpO2 100%   BMI 28.94 kg/m      General: NAD, coughing with deep inspiration  CV: RR, no m/c/r   Lungs: CTAB, no wrr  Abd: Soft, NTTP, ND  Ext: WWP, no BLE edema  Neuro: No focal deficits  Psych: Euthymic, normal affect, good eye contact    Labs: reviewed in [facility] & personally interpreted.     Previous ILD serologies negative      Imaging: reviewed in [facility] & personally interpreted. Below are the interpretations from the formal Radiology review.    Reviewed previously CT Chest 10/2024, 9/2024, 7/2023    PFT:  Most Recent [facility] Pulmonary Function Testing (FVC/FEV1 only)  FVC-Pre   Date Value Ref Range Status   07/16/2025 2.18 L Final   01/15/2025 2.01 L    07/24/2024 1.91 L  "   03/20/2024 1.86 L      FVC-%Pred-Pre   Date Value Ref Range Status   07/16/2025 74 % Final   01/15/2025 70 %    07/24/2024 66 %    03/20/2024 64 %      FEV1-Pre   Date Value Ref Range Status   07/16/2025 1.78 L Final   01/15/2025 1.78 L    07/24/2024 1.64 L    03/20/2024 1.60 L      FEV1-%Pred-Pre   Date Value Ref Range Status   07/16/2025 77 % Final   01/15/2025 79 %    07/24/2024 72 %    03/20/2024 69 %      7/16/2025:  PFT-   Spirometry- normal, no change vs previous  Lung volumes- TLC moderately reduced; borderline significant decline vs previous  uncDLCO- moderately, significant decline vs previous    6MW- good distance (better than 1 year ago).  Ending/jong SpO2-95% on RA (desat without hypoxia; better spO2 vs 1 year ago)    ADELE 1/28/2025: Short testing duration of 1:21 hrs, with ROSAMARIA-25, spO2<88%,   Never responded for re-testing.      PFT 1/15/2025:  Spirometry showed no obstruction with normal FEV1 and mildly reduced FVC.   Lung volumes with mild restriction.  Uncorrected DLCO showed mild diffusion defect (hb-12.7 so likely minimal correction.  Compared to PFTs about 1.5 year ago, borderline significant improvement in FVC, significant significant improvement of her FEV1, significant improvement in her TLC, and significant proved in her DLCO.  Comparing her trend in her DLCO, it has been stable for 1 year.    PFT- Date: 7/24/2024  Similar spirometry findings as previous with FVC suggesting mild restriction and continued normal DLCO (significant improvement vs 7/2023)  6MW- completed on 2LPM at first- 366m and end spo2-96%  Repeat on RA- 360m and end spO2-92%    PFT- Date: 3/20/2024  FEV1- 1.6L (69%), FVC-1.86L (64%), FEV1/FVC- 0.86, DLCO- 16.19 ml/min/mmHg (81%)-  Interpretation-Similar spirometry with FVC suggesting mild restriction with normal DLCO (continued trend upward vs previous and significant improvement vs 7/2023)        PFT- Date: 12/20/2023   FEV1- 1.62L (70%), FVC-1.89L (65%), FEV1/FVC- 0.86,   DLCO- 15.3 ml/min/mmHg (76%)-  Interpretation-Similar spirometry with FVC suggesting mild restriction  Normalization of DLCO compared to previous      7/21/2023  FEV1- 1.51L (64%)  FVC- 1.83 (62%)  R- 0.82  TLC- 3.06L (58%)  uncDLCO- 12.85 (64%)  ERV and RV not as reduced as severely as TLC  RV/TLC-0.41  6MW- 2L NC O2 to complete (ended at 94%). 335m walked (329m -LLN)     [facility]H PFTs:  3/20/23:   FVC: 1.89 L (61%)  FEV1: 1.62 L (67%)  FEV1/FVC: 86%  RV: 1.18 L (60%)  TLC: 3.38 L (68%)  RV/TLC ratio 35%  uncDLCO: 14.93 ml/min/mmHg (76%, not adjusted for Hb)    Impression & recommendations:    Ju was seen today for recheck.    Diagnoses and all orders for this visit:    ILD (interstitial lung disease) (H)  -     Adult Pulmonology Clinic Follow-Up Order (6 Month)  -     CT Chest Hi-Resolution wo Contrast; Future  -     Pulmonary Function Test; Future  -     General PFT Lab (Please always keep checked); Future    Nocturnal hypoxia    Mild persistent asthma without complication  -     fluticasone-salmeterol (ADVAIR) 100-50 MCG/ACT inhaler; Inhale 1 puff into the lungs every 12 hours.    Other orders  -     Oximetry - HIM Scan  -     Adult Pulmonology Clinic Follow-Up Order (6 Month); Future      65 year old female w/ h/o elevated BMI now s/p gastric surgery and significant weight loss, mild MIC (PSG 3/2024, before weight loss), hiatal hernia, GERD, HTN, pre-diabetes hx, reported hx of asthma  who presents for follow up of ILD, resolved exertional hypoxia, and nocturnal hypoxemia/mild MIC, and asthma.     Diagnosis non-IPF- NSIP with organizing portions vs HP with evidence of small airways disease also present, groundglass also seen. Also MIC  Was on mycophenolate since 8/2023 until she took herself off in September or October 2024 due to concern for potentially developing severe respiratory infection (she did develop influenza A last year and was very symptomatic from it).     Overall, the mycophenolate was rather  helpful in resolving the ongoing inflammatory process in her lungs.  This reflected by not only her improvement in her imaging and PFTs, but also an improvement in her exertional oxygenation.  She had a CT chest performed last in October 2024 ordered by different provider which essentially showed fairly clearly lungs with exception of atelectasis versus underlying mild ILD in the bases of the lungs which is a dramatic improvement versus her CT chest in March and July 2023.      Her PFTs and exertional O2 requirements also have been aided by her weight loss which has been pretty significant in 2024 following initially GLP-1 agonist and then gastric surgery.    She symptomatically feels great and better than she has in years.      Her asthma also has improved with her weight loss and better control of her ILD.   Currently has not used albuterol in months; just has been using her controller Advair.    She previously had an overnight oximetry study in January that was very short in duration but was suggestive of desaturation events.  Was unable to repeat the study despite multiple messages.  Patient is going today to repeat it.    Her lung function test today has shown a slight decline in her total lung capacity though stable spirometry.  Her DLCO has a slight decline as well that is significantly changed.  However, her 6-minute walk showed an excellent distance that is dramatically improved compared to previously as well as continued good saturation that is better than her previous 6-minute walk 1 year ago as well.  Hence mixed results from her lung function test today.    -Continue as needed albuterol and continue Advair at the lower the dosage today  - Repeat PFTs in 6 months with a repeat CT chest to ensure stability of her ILD rather than worsening while off mycophenolate  - Repeat overnight oximetry study on room air if possible; if abnormal refer her back to sleep medicine.  Her previous PSG 3/2024 was at a much  higher weight so she may need repeat  - Encouraged her to increase her aerobic exercise in addition to maintaining good strengthening workout regimen  - Continue reflux medications    -Will continue her asthma treatments- continue Advair and prn albuterol (for symptoms prn and before exercise).  If she remains stable at the next appointment we will probably cut the dosage of her Advair to the lower dosage and trial her at that level  -Will no longer be on mycophenolate and thus no longer needs to be monitored with labs.  -Repeat overnight oximetry study on room air to see if improvement in her previous desaturations following her significant weight loss    RTC in 6 months with PFTs and CT; if stable, can change to yearly visits       40 minutes excluding the time spent on cigarette cessation was  spent on the date of the encounter doing chart review, history and exam, documentation and further activities as noted above.    These conclusions are made at the best of one's knowledge and belief based on the provided evidence such as patient's history and allergy test results and they can change over time or can be incomplete because of missing information's.    I explained the lab values, imagings and findings to the patient.  Patient expressed understanding I did not recognize any barriers to the understanding of the patient.    The longitudinal plan of care for the diagnosis(es)/condition(s) as documented were addressed during this visit. Due to the added complexity in care, I will continue to support Ju in the subsequent management and with ongoing continuity of care.    The above note was dictated using voice recognition software and may include typographical errors. Please contact the author for any clarifications.    Isaac Duque MD  , Division of Pulmonary/Critical Care

## 2025-08-04 SDOH — HEALTH STABILITY: PHYSICAL HEALTH: ON AVERAGE, HOW MANY DAYS PER WEEK DO YOU ENGAGE IN MODERATE TO STRENUOUS EXERCISE (LIKE A BRISK WALK)?: 3 DAYS

## 2025-08-04 SDOH — HEALTH STABILITY: PHYSICAL HEALTH: ON AVERAGE, HOW MANY MINUTES DO YOU ENGAGE IN EXERCISE AT THIS LEVEL?: 10 MIN

## 2025-08-04 ASSESSMENT — PATIENT HEALTH QUESTIONNAIRE - PHQ9
SUM OF ALL RESPONSES TO PHQ QUESTIONS 1-9: 2
10. IF YOU CHECKED OFF ANY PROBLEMS, HOW DIFFICULT HAVE THESE PROBLEMS MADE IT FOR YOU TO DO YOUR WORK, TAKE CARE OF THINGS AT HOME, OR GET ALONG WITH OTHER PEOPLE: NOT DIFFICULT AT ALL
SUM OF ALL RESPONSES TO PHQ QUESTIONS 1-9: 2

## 2025-08-04 ASSESSMENT — ASTHMA QUESTIONNAIRES
QUESTION_3 LAST FOUR WEEKS HOW OFTEN DID YOUR ASTHMA SYMPTOMS (WHEEZING, COUGHING, SHORTNESS OF BREATH, CHEST TIGHTNESS OR PAIN) WAKE YOU UP AT NIGHT OR EARLIER THAN USUAL IN THE MORNING: NOT AT ALL
QUESTION_5 LAST FOUR WEEKS HOW WOULD YOU RATE YOUR ASTHMA CONTROL: WELL CONTROLLED
QUESTION_1 LAST FOUR WEEKS HOW MUCH OF THE TIME DID YOUR ASTHMA KEEP YOU FROM GETTING AS MUCH DONE AT WORK, SCHOOL OR AT HOME: A LITTLE OF THE TIME
QUESTION_4 LAST FOUR WEEKS HOW OFTEN HAVE YOU USED YOUR RESCUE INHALER OR NEBULIZER MEDICATION (SUCH AS ALBUTEROL): ONCE A WEEK OR LESS
QUESTION_2 LAST FOUR WEEKS HOW OFTEN HAVE YOU HAD SHORTNESS OF BREATH: ONCE OR TWICE A WEEK
ACT_TOTALSCORE: 21

## 2025-08-04 ASSESSMENT — SOCIAL DETERMINANTS OF HEALTH (SDOH): HOW OFTEN DO YOU GET TOGETHER WITH FRIENDS OR RELATIVES?: ONCE A WEEK

## 2025-08-05 ENCOUNTER — OFFICE VISIT (OUTPATIENT)
Dept: FAMILY MEDICINE | Facility: OTHER | Age: 65
End: 2025-08-05
Attending: STUDENT IN AN ORGANIZED HEALTH CARE EDUCATION/TRAINING PROGRAM
Payer: COMMERCIAL

## 2025-08-05 VITALS
TEMPERATURE: 97.9 F | WEIGHT: 170.2 LBS | BODY MASS INDEX: 30.16 KG/M2 | OXYGEN SATURATION: 96 % | DIASTOLIC BLOOD PRESSURE: 86 MMHG | SYSTOLIC BLOOD PRESSURE: 130 MMHG | HEART RATE: 91 BPM | HEIGHT: 63 IN

## 2025-08-05 DIAGNOSIS — R73.03 PREDIABETES: Chronic | ICD-10-CM

## 2025-08-05 DIAGNOSIS — E66.3 OVERWEIGHT (BMI 25.0-29.9): ICD-10-CM

## 2025-08-05 DIAGNOSIS — F33.42 RECURRENT MAJOR DEPRESSIVE DISORDER, IN FULL REMISSION: ICD-10-CM

## 2025-08-05 DIAGNOSIS — J84.9 ILD (INTERSTITIAL LUNG DISEASE) (H): Chronic | ICD-10-CM

## 2025-08-05 DIAGNOSIS — E78.2 MIXED HYPERLIPIDEMIA: Chronic | ICD-10-CM

## 2025-08-05 DIAGNOSIS — Z00.00 ENCOUNTER FOR MEDICARE ANNUAL WELLNESS EXAM: Primary | ICD-10-CM

## 2025-08-05 DIAGNOSIS — I10 ESSENTIAL HYPERTENSION: Chronic | ICD-10-CM

## 2025-08-05 DIAGNOSIS — Z78.0 ASYMPTOMATIC MENOPAUSAL STATE: ICD-10-CM

## 2025-08-05 DIAGNOSIS — K59.04 CHRONIC IDIOPATHIC CONSTIPATION: ICD-10-CM

## 2025-08-05 PROBLEM — G47.33 OSA (OBSTRUCTIVE SLEEP APNEA): Chronic | Status: RESOLVED | Noted: 2021-10-14 | Resolved: 2025-08-05

## 2025-08-05 PROBLEM — G93.32 CHRONIC FATIGUE SYNDROME: Status: RESOLVED | Noted: 2020-08-13 | Resolved: 2025-08-05

## 2025-08-05 PROBLEM — E66.01 SEVERE OBESITY (BMI 35.0-39.9) WITH COMORBIDITY (H): Chronic | Status: RESOLVED | Noted: 2020-08-13 | Resolved: 2025-08-05

## 2025-08-05 PROBLEM — M79.7 FIBROMYALGIA: Status: RESOLVED | Noted: 2020-08-13 | Resolved: 2025-08-05

## 2025-08-05 PROBLEM — Z90.3 H/O GASTRIC SLEEVE: Chronic | Status: ACTIVE | Noted: 2024-09-16

## 2025-08-05 PROCEDURE — G0402 INITIAL PREVENTIVE EXAM: HCPCS | Performed by: STUDENT IN AN ORGANIZED HEALTH CARE EDUCATION/TRAINING PROGRAM

## 2025-08-05 PROCEDURE — G0463 HOSPITAL OUTPT CLINIC VISIT: HCPCS

## 2025-08-05 ASSESSMENT — ANXIETY QUESTIONNAIRES
GAD7 TOTAL SCORE: 1
4. TROUBLE RELAXING: NOT AT ALL
IF YOU CHECKED OFF ANY PROBLEMS ON THIS QUESTIONNAIRE, HOW DIFFICULT HAVE THESE PROBLEMS MADE IT FOR YOU TO DO YOUR WORK, TAKE CARE OF THINGS AT HOME, OR GET ALONG WITH OTHER PEOPLE: NOT DIFFICULT AT ALL
5. BEING SO RESTLESS THAT IT IS HARD TO SIT STILL: NOT AT ALL
1. FEELING NERVOUS, ANXIOUS, OR ON EDGE: NOT AT ALL
3. WORRYING TOO MUCH ABOUT DIFFERENT THINGS: NOT AT ALL
2. NOT BEING ABLE TO STOP OR CONTROL WORRYING: NOT AT ALL
6. BECOMING EASILY ANNOYED OR IRRITABLE: SEVERAL DAYS
GAD7 TOTAL SCORE: 1
7. FEELING AFRAID AS IF SOMETHING AWFUL MIGHT HAPPEN: NOT AT ALL

## 2025-08-05 ASSESSMENT — PAIN SCALES - GENERAL: PAINLEVEL_OUTOF10: NO PAIN (0)

## 2025-08-11 DIAGNOSIS — R10.13 DYSPEPSIA: ICD-10-CM

## 2025-08-11 DIAGNOSIS — Z98.84 S/P LAPAROSCOPIC SLEEVE GASTRECTOMY: ICD-10-CM

## 2025-08-11 DIAGNOSIS — F33.0 MILD EPISODE OF RECURRENT MAJOR DEPRESSIVE DISORDER: ICD-10-CM

## 2025-08-11 RX ORDER — FLUOXETINE HYDROCHLORIDE 40 MG/1
40 CAPSULE ORAL DAILY
Qty: 90 CAPSULE | Refills: 1 | Status: SHIPPED | OUTPATIENT
Start: 2025-08-11

## 2025-08-11 RX ORDER — OMEPRAZOLE 20 MG/1
20 CAPSULE, DELAYED RELEASE ORAL DAILY
Qty: 30 CAPSULE | Refills: 3 | Status: SHIPPED | OUTPATIENT
Start: 2025-08-11

## 2025-08-20 ENCOUNTER — HOSPITAL ENCOUNTER (OUTPATIENT)
Dept: BONE DENSITY | Facility: HOSPITAL | Age: 65
Discharge: HOME | End: 2025-08-20
Attending: STUDENT IN AN ORGANIZED HEALTH CARE EDUCATION/TRAINING PROGRAM
Payer: COMMERCIAL

## 2025-08-20 DIAGNOSIS — Z78.0 ASYMPTOMATIC MENOPAUSAL STATE: ICD-10-CM

## 2025-08-20 PROCEDURE — 77080 DXA BONE DENSITY AXIAL: CPT | Mod: 26 | Performed by: RADIOLOGY

## 2025-08-20 PROCEDURE — 77080 DXA BONE DENSITY AXIAL: CPT

## 2025-08-29 ENCOUNTER — APPOINTMENT (OUTPATIENT)
Dept: CT IMAGING | Facility: HOSPITAL | Age: 65
End: 2025-08-29
Attending: STUDENT IN AN ORGANIZED HEALTH CARE EDUCATION/TRAINING PROGRAM
Payer: COMMERCIAL

## 2025-08-29 ENCOUNTER — HOSPITAL ENCOUNTER (EMERGENCY)
Facility: HOSPITAL | Age: 65
Discharge: HOME | End: 2025-08-29
Attending: STUDENT IN AN ORGANIZED HEALTH CARE EDUCATION/TRAINING PROGRAM
Payer: COMMERCIAL

## 2025-08-29 VITALS
TEMPERATURE: 97.6 F | RESPIRATION RATE: 14 BRPM | BODY MASS INDEX: 29.7 KG/M2 | WEIGHT: 173.94 LBS | OXYGEN SATURATION: 95 % | HEART RATE: 64 BPM | DIASTOLIC BLOOD PRESSURE: 65 MMHG | SYSTOLIC BLOOD PRESSURE: 109 MMHG | HEIGHT: 64 IN

## 2025-08-29 DIAGNOSIS — K21.00 GASTROESOPHAGEAL REFLUX DISEASE WITH ESOPHAGITIS WITHOUT HEMORRHAGE: Primary | ICD-10-CM

## 2025-08-29 LAB
ALBUMIN SERPL BCG-MCNC: 3.8 G/DL (ref 3.5–5.2)
ALP SERPL-CCNC: 89 U/L (ref 40–150)
ALT SERPL W P-5'-P-CCNC: 19 U/L (ref 0–50)
ANION GAP SERPL CALCULATED.3IONS-SCNC: 13 MMOL/L (ref 7–15)
AST SERPL W P-5'-P-CCNC: 26 U/L (ref 0–45)
BASOPHILS # BLD AUTO: 0.04 10E3/UL (ref 0–0.2)
BASOPHILS NFR BLD AUTO: 0.4 %
BILIRUB SERPL-MCNC: 0.3 MG/DL
BUN SERPL-MCNC: 17.6 MG/DL (ref 8–23)
CALCIUM SERPL-MCNC: 9.1 MG/DL (ref 8.8–10.4)
CHLORIDE SERPL-SCNC: 107 MMOL/L (ref 98–107)
CREAT SERPL-MCNC: 0.76 MG/DL (ref 0.51–0.95)
EGFRCR SERPLBLD CKD-EPI 2021: 86 ML/MIN/1.73M2
EOSINOPHIL # BLD AUTO: 0.23 10E3/UL (ref 0–0.7)
EOSINOPHIL NFR BLD AUTO: 2.4 %
ERYTHROCYTE [DISTWIDTH] IN BLOOD BY AUTOMATED COUNT: 13.2 % (ref 10–15)
GLUCOSE SERPL-MCNC: 77 MG/DL (ref 70–99)
HCO3 SERPL-SCNC: 20 MMOL/L (ref 22–29)
HCT VFR BLD AUTO: 37.6 % (ref 35–47)
HGB BLD-MCNC: 12.4 G/DL (ref 11.7–15.7)
HOLD SPECIMEN: NORMAL
IMM GRANULOCYTES # BLD: <0.03 10E3/UL
IMM GRANULOCYTES NFR BLD: 0.2 %
LIPASE SERPL-CCNC: 32 U/L (ref 13–60)
LYMPHOCYTES # BLD AUTO: 4.65 10E3/UL (ref 0.8–5.3)
LYMPHOCYTES NFR BLD AUTO: 49.2 %
MCH RBC QN AUTO: 32.1 PG (ref 26.5–33)
MCHC RBC AUTO-ENTMCNC: 33 G/DL (ref 31.5–36.5)
MCV RBC AUTO: 97.4 FL (ref 78–100)
MONOCYTES # BLD AUTO: 1.16 10E3/UL (ref 0–1.3)
MONOCYTES NFR BLD AUTO: 12.3 %
NEUTROPHILS # BLD AUTO: 3.36 10E3/UL (ref 1.6–8.3)
NEUTROPHILS NFR BLD AUTO: 35.5 %
NRBC # BLD AUTO: <0.03 10E3/UL
NRBC BLD AUTO-RTO: 0 /100
PLATELET # BLD AUTO: 186 10E3/UL (ref 150–450)
POTASSIUM SERPL-SCNC: 3.7 MMOL/L (ref 3.4–5.3)
PROT SERPL-MCNC: 6.4 G/DL (ref 6.4–8.3)
RBC # BLD AUTO: 3.86 10E6/UL (ref 3.8–5.2)
SODIUM SERPL-SCNC: 140 MMOL/L (ref 135–145)
WBC # BLD AUTO: 9.46 10E3/UL (ref 4–11)

## 2025-08-29 PROCEDURE — 250N000011 HC RX IP 250 OP 636: Performed by: STUDENT IN AN ORGANIZED HEALTH CARE EDUCATION/TRAINING PROGRAM

## 2025-08-29 PROCEDURE — 82247 BILIRUBIN TOTAL: CPT | Performed by: STUDENT IN AN ORGANIZED HEALTH CARE EDUCATION/TRAINING PROGRAM

## 2025-08-29 PROCEDURE — 36415 COLL VENOUS BLD VENIPUNCTURE: CPT | Performed by: STUDENT IN AN ORGANIZED HEALTH CARE EDUCATION/TRAINING PROGRAM

## 2025-08-29 PROCEDURE — 83690 ASSAY OF LIPASE: CPT | Performed by: STUDENT IN AN ORGANIZED HEALTH CARE EDUCATION/TRAINING PROGRAM

## 2025-08-29 PROCEDURE — 250N000009 HC RX 250: Performed by: STUDENT IN AN ORGANIZED HEALTH CARE EDUCATION/TRAINING PROGRAM

## 2025-08-29 PROCEDURE — 250N000013 HC RX MED GY IP 250 OP 250 PS 637: Performed by: STUDENT IN AN ORGANIZED HEALTH CARE EDUCATION/TRAINING PROGRAM

## 2025-08-29 PROCEDURE — 85018 HEMOGLOBIN: CPT | Performed by: STUDENT IN AN ORGANIZED HEALTH CARE EDUCATION/TRAINING PROGRAM

## 2025-08-29 PROCEDURE — 74177 CT ABD & PELVIS W/CONTRAST: CPT

## 2025-08-29 PROCEDURE — 99285 EMERGENCY DEPT VISIT HI MDM: CPT | Mod: 25 | Performed by: STUDENT IN AN ORGANIZED HEALTH CARE EDUCATION/TRAINING PROGRAM

## 2025-08-29 PROCEDURE — 74177 CT ABD & PELVIS W/CONTRAST: CPT | Mod: 26 | Performed by: RADIOLOGY

## 2025-08-29 RX ORDER — LIDOCAINE HYDROCHLORIDE 20 MG/ML
10 SOLUTION OROPHARYNGEAL ONCE
Status: COMPLETED | OUTPATIENT
Start: 2025-08-29 | End: 2025-08-29

## 2025-08-29 RX ORDER — SUCRALFATE ORAL 1 G/10ML
1 SUSPENSION ORAL 4 TIMES DAILY
Qty: 560 ML | Refills: 0 | Status: SHIPPED | OUTPATIENT
Start: 2025-08-29 | End: 2025-09-12

## 2025-08-29 RX ORDER — IOPAMIDOL 755 MG/ML
84 INJECTION, SOLUTION INTRAVASCULAR ONCE
Status: COMPLETED | OUTPATIENT
Start: 2025-08-29 | End: 2025-08-29

## 2025-08-29 RX ORDER — MAGNESIUM HYDROXIDE/ALUMINUM HYDROXICE/SIMETHICONE 120; 1200; 1200 MG/30ML; MG/30ML; MG/30ML
15 SUSPENSION ORAL ONCE
Status: COMPLETED | OUTPATIENT
Start: 2025-08-29 | End: 2025-08-29

## 2025-08-29 RX ADMIN — LIDOCAINE HYDROCHLORIDE 10 ML: 20 SOLUTION ORAL at 21:31

## 2025-08-29 RX ADMIN — ALUMINUM HYDROXIDE, MAGNESIUM HYDROXIDE, AND SIMETHICONE 15 ML: 200; 200; 20 SUSPENSION ORAL at 21:32

## 2025-08-29 RX ADMIN — IOPAMIDOL 84 ML: 755 INJECTION, SOLUTION INTRAVENOUS at 21:54

## 2025-08-29 ASSESSMENT — COLUMBIA-SUICIDE SEVERITY RATING SCALE - C-SSRS
6. HAVE YOU EVER DONE ANYTHING, STARTED TO DO ANYTHING, OR PREPARED TO DO ANYTHING TO END YOUR LIFE?: NO
2. HAVE YOU ACTUALLY HAD ANY THOUGHTS OF KILLING YOURSELF IN THE PAST MONTH?: NO
1. IN THE PAST MONTH, HAVE YOU WISHED YOU WERE DEAD OR WISHED YOU COULD GO TO SLEEP AND NOT WAKE UP?: NO

## 2025-08-29 ASSESSMENT — ACTIVITIES OF DAILY LIVING (ADL)
ADLS_ACUITY_SCORE: 51
ADLS_ACUITY_SCORE: 51

## 2025-08-30 LAB
ATRIAL RATE - MUSE: 75 BPM
DIASTOLIC BLOOD PRESSURE - MUSE: NORMAL MMHG
INTERPRETATION ECG - MUSE: NORMAL
P AXIS - MUSE: 30 DEGREES
PR INTERVAL - MUSE: 158 MS
QRS DURATION - MUSE: 94 MS
QT - MUSE: 416 MS
QTC - MUSE: 464 MS
R AXIS - MUSE: 0 DEGREES
SYSTOLIC BLOOD PRESSURE - MUSE: NORMAL MMHG
T AXIS - MUSE: 33 DEGREES
VENTRICULAR RATE- MUSE: 75 BPM

## (undated) DEVICE — PREP CHLORAPREP 26ML TINTED HI-LITE ORANGE 930815

## (undated) DEVICE — ESU LIGASURE MARYLAND VESSEL LAP 44CM XLONG LF1944

## (undated) DEVICE — LINEN TOWEL PACK X6 WHITE 5487

## (undated) DEVICE — NDL SPINAL 22GA 3.5" QUINCKE 405181

## (undated) DEVICE — ANTIFOG SOLUTION W/FOAM PAD 31142527

## (undated) DEVICE — LINEN TOWEL PACK X5 5464

## (undated) DEVICE — STPL RELOAD REG TISSUE ECHELON 60 X 3.6MM BLUE GST60B

## (undated) DEVICE — DEVICE ENDO STITCH APPLIER 10MM 173016

## (undated) DEVICE — NDL INSUFFLATION 13GA 150MM C2202

## (undated) DEVICE — ENDO POUCH UNIVERSAL RETRIEVAL SYSTEM INZII 12/15MM CD004

## (undated) DEVICE — STRAP UNIVERSAL POSITIONING 2-PIECE 4X47X76" 91-287

## (undated) DEVICE — ENDO TROCAR OPTICAL ACCESS KII Z-THRD 15X100MM C0R37

## (undated) DEVICE — CLIP APPLIER ENDO 5MM M/L LIGAMAX EL5ML

## (undated) DEVICE — SPONGE RAY-TEC 4X8" 7318

## (undated) DEVICE — ENDO TROCAR SLEEVE KII ADV FIXATION 05X100MM CFS02

## (undated) DEVICE — ENDO TROCAR FIRST ENTRY KII FIOS ADV FIX 05X100MM CFF03

## (undated) DEVICE — SYSTEM CALIBRATION GASTRECTOMY SLEEVE VISIGI 3D 40FR 5240

## (undated) DEVICE — Device

## (undated) DEVICE — TUBING SMOKE EVAC PNEUMOCLEAR HIGH FLOW 0620050250

## (undated) DEVICE — SYR 30ML LL W/O NDL 302832

## (undated) DEVICE — STPL POWERED ECHELON LONG 60MM PLEE60A

## (undated) DEVICE — SU ENDO STITCH SURGIDAC 2-0 ES-9 7" TRIPLE STITCH 170041

## (undated) DEVICE — SOL WATER IRRIG 1000ML BOTTLE 2F7114

## (undated) DEVICE — GLOVE BIOGEL PI MICRO INDICATOR UNDERGLOVE SZ 7.5 48975

## (undated) DEVICE — GLOVE BIOGEL PI ULTRATOUCH SZ 7.5 41175

## (undated) DEVICE — ESU GROUND PAD ADULT W/CORD E7507

## (undated) DEVICE — DRSG PRIMAPORE 02X3" 7133

## (undated) DEVICE — STPL SKIN 35W ROTATING HEAD PRW35

## (undated) RX ORDER — FAMOTIDINE 20 MG/1
TABLET, FILM COATED ORAL
Status: DISPENSED
Start: 2024-09-10

## (undated) RX ORDER — FENTANYL CITRATE 50 UG/ML
INJECTION, SOLUTION INTRAMUSCULAR; INTRAVENOUS
Status: DISPENSED
Start: 2024-09-10

## (undated) RX ORDER — CEFAZOLIN SODIUM 1 G/3ML
INJECTION, POWDER, FOR SOLUTION INTRAMUSCULAR; INTRAVENOUS
Status: DISPENSED
Start: 2024-09-10

## (undated) RX ORDER — FENTANYL CITRATE 50 UG/ML
INJECTION, SOLUTION INTRAMUSCULAR; INTRAVENOUS
Status: DISPENSED
Start: 2024-03-13

## (undated) RX ORDER — ONDANSETRON 2 MG/ML
INJECTION INTRAMUSCULAR; INTRAVENOUS
Status: DISPENSED
Start: 2024-09-10

## (undated) RX ORDER — ACETAMINOPHEN 325 MG/1
TABLET ORAL
Status: DISPENSED
Start: 2024-09-10

## (undated) RX ORDER — HYDROMORPHONE HYDROCHLORIDE 1 MG/ML
INJECTION, SOLUTION INTRAMUSCULAR; INTRAVENOUS; SUBCUTANEOUS
Status: DISPENSED
Start: 2024-09-10

## (undated) RX ORDER — LABETALOL HYDROCHLORIDE 5 MG/ML
INJECTION, SOLUTION INTRAVENOUS
Status: DISPENSED
Start: 2024-09-10

## (undated) RX ORDER — BUPIVACAINE HYDROCHLORIDE 2.5 MG/ML
INJECTION, SOLUTION EPIDURAL; INFILTRATION; INTRACAUDAL
Status: DISPENSED
Start: 2024-09-10

## (undated) RX ORDER — SODIUM CHLORIDE, SODIUM LACTATE, POTASSIUM CHLORIDE, CALCIUM CHLORIDE 600; 310; 30; 20 MG/100ML; MG/100ML; MG/100ML; MG/100ML
INJECTION, SOLUTION INTRAVENOUS
Status: DISPENSED
Start: 2024-09-10

## (undated) RX ORDER — ENOXAPARIN SODIUM 100 MG/ML
INJECTION SUBCUTANEOUS
Status: DISPENSED
Start: 2024-09-10